# Patient Record
Sex: MALE | Race: BLACK OR AFRICAN AMERICAN | Employment: OTHER | URBAN - METROPOLITAN AREA
[De-identification: names, ages, dates, MRNs, and addresses within clinical notes are randomized per-mention and may not be internally consistent; named-entity substitution may affect disease eponyms.]

---

## 2017-10-17 PROBLEM — I10 HTN (HYPERTENSION): Status: ACTIVE | Noted: 2017-10-17

## 2017-10-17 PROBLEM — S72.009A FEMORAL NECK FRACTURE (HCC): Status: ACTIVE | Noted: 2017-10-17

## 2017-10-20 ENCOUNTER — CARE COORDINATION (OUTPATIENT)
Dept: CASE MANAGEMENT | Age: 72
End: 2017-10-20

## 2017-10-21 NOTE — CARE COORDINATION
Spoke with Patient's niece who said that Chyna Castellon was still asleep    Incision status: Niece said the home care came yesterday and checked it and said it looked fine    Edema/Swelling: Niece thinks about the same said they home care looked at it yesterday and said everything was fine.      Pain level and status: some pain per niece     Use of pain medications: yes     [address] active: Niece said the home care came yesterday and everything is set up     Outpatient therapy: N/A    Do you have all of your medications: yes    Changes in medications: No    Follow up appointments:    Future Appointments  Date Time Provider Alejandra Fajardo   11/3/2017 4:00 PM Satish Skinner MD [facility]       Daphney Fabry RN, BSN   Care Transition Coordinator  [phone]

## 2017-10-27 ENCOUNTER — CARE COORDINATION (OUTPATIENT)
Dept: CASE MANAGEMENT | Age: 72
End: 2017-10-27

## 2017-10-27 NOTE — CARE COORDINATION
Spoke with Jorge Maurer    Incision status: healing    Edema/Swelling: mild to moderate    Patient states he is at his outpatient therapy and doing well, pain is controlled , no needs identified    Follow up appointments:    Future Appointments  Date Time Provider Alejandra Tana   11/3/2017 4:00 PM Erika Maharaj MD [facility] Mederos MSN, MA, RN  Care Transition Coordinator  [phone]

## 2017-11-03 ENCOUNTER — OFFICE VISIT (OUTPATIENT)
Dept: ORTHOPEDIC SURGERY | Age: 72
End: 2017-11-03

## 2017-11-03 VITALS
WEIGHT: 115 LBS | HEART RATE: 64 BPM | DIASTOLIC BLOOD PRESSURE: 76 MMHG | BODY MASS INDEX: 20.38 KG/M2 | SYSTOLIC BLOOD PRESSURE: 125 MMHG | HEIGHT: 63 IN

## 2017-11-03 DIAGNOSIS — Z96.649 S/P HIP HEMIARTHROPLASTY: Primary | ICD-10-CM

## 2017-11-03 PROCEDURE — 73502 X-RAY EXAM HIP UNI 2-3 VIEWS: CPT | Performed by: ORTHOPAEDIC SURGERY

## 2017-11-03 PROCEDURE — 99024 POSTOP FOLLOW-UP VISIT: CPT | Performed by: ORTHOPAEDIC SURGERY

## 2017-11-03 RX ORDER — HYDROCODONE BITARTRATE AND ACETAMINOPHEN 5; 325 MG/1; MG/1
1 TABLET ORAL EVERY 8 HOURS PRN
Qty: 40 TABLET | Refills: 0 | Status: SHIPPED | OUTPATIENT
Start: 2017-11-03 | End: 2017-11-10

## 2017-11-03 RX ORDER — FUROSEMIDE 20 MG/1
20 TABLET ORAL 2 TIMES DAILY
Qty: 8 TABLET | Refills: 0 | Status: ON HOLD | OUTPATIENT
Start: 2017-11-03 | End: 2019-01-18

## 2017-11-03 NOTE — LETTER
[address] [address]  Phone: [phone]  Fax: [phone]    Dillon Cowart MD        November 3, 2017    Cecil Carton  [address] [place] [address] [address] [place] [address]      Order for manual wheel chair with leg rests    Dx: right hip fracture      If you have any questions or concerns, please don't hesitate to call.     Sincerely,              Dillon Cowart MD

## 2017-11-09 ENCOUNTER — TELEPHONE (OUTPATIENT)
Dept: ORTHOPEDIC SURGERY | Age: 72
End: 2017-11-09

## 2017-11-09 ENCOUNTER — CARE COORDINATION (OUTPATIENT)
Dept: CASE MANAGEMENT | Age: 72
End: 2017-11-09

## 2017-11-09 NOTE — CARE COORDINATION
Spoke with Kassandra Jony    Incision status: healing nicely     Edema/Swellingno    Pain level and status: on occasion     Use of pain medications: as needed, not too much    Bowels: good    Home Care Agency active: [facility]; 2x/week    Outpatient therapy: n/a    Do you have all of your medications: yes    Changes in medications: no    Doing well, had therapy today. next f/u listed below.  Agreed to more [address] f/u calls    Follow up appointments:    Future Appointments  Date Time Provider Alejandra Tana   11/28/2017 4:00 PM Misty Brown MD [facility]

## 2017-11-09 NOTE — TELEPHONE ENCOUNTER
[facility] home care calling for extended home care. He would like it for twice a week for 2 weeks.

## 2017-11-09 NOTE — TELEPHONE ENCOUNTER
[facility] home care calling for extended home care. He would like it for twice a week for 2 weeks.     Pt has 3 charts, XIN

## 2017-11-13 NOTE — PROGRESS NOTES
Encounter   Procedures    XR HIP 2-3 VW W PELVIS RIGHT       Plan:  He'll continue with his physical therapy and work on regaining his strength. He'll continue using adaptive devices such as a walker for stability. I did give him a short course of 20 mg Lasix to take over the weekend to see if this will help with swelling. His last potassium check was above 4. He will follow back with his primary care physician after the weekend and if he still having issues with swelling he may need further labs and medical workup. As far as his hip is concerned I'll see him back in 3 weeks to check his progress. He understands and accepts this course of care as does his daughter who accompanies him here today.

## 2017-11-22 ENCOUNTER — TELEPHONE (OUTPATIENT)
Dept: ORTHOPEDIC SURGERY | Age: 72
End: 2017-11-22

## 2017-11-28 ENCOUNTER — OFFICE VISIT (OUTPATIENT)
Dept: ORTHOPEDIC SURGERY | Age: 72
End: 2017-11-28

## 2017-11-28 VITALS — BODY MASS INDEX: 20.38 KG/M2 | WEIGHT: 115 LBS | HEIGHT: 63 IN

## 2017-11-28 DIAGNOSIS — Z96.649 S/P HIP HEMIARTHROPLASTY: Primary | ICD-10-CM

## 2017-11-28 PROCEDURE — 99024 POSTOP FOLLOW-UP VISIT: CPT | Performed by: ORTHOPAEDIC SURGERY

## 2017-12-01 ENCOUNTER — CARE COORDINATION (OUTPATIENT)
Dept: CASE MANAGEMENT | Age: 72
End: 2017-12-01

## 2017-12-01 NOTE — CARE COORDINATION
Spoke with Arnaldo Brian    Incision status: healed    Edema/Swellingno    Pain level and status: on occasion    Use of pain medications: no    Bowels: good    Home Care Agency active: [facility]; 1x/week for 2 more weeks starting the week of 12/4    Outpatient therapy: n/a    Do you have all of your medications: yes    Changes in medications: no    Pt states doing well, no issues or concerns.  Agreed to more [address] f/u calls      Follow up appointments:    Future Appointments  Date Time Provider Alejandra Tana   12/29/2017 4:00 PM Ramiro Purdy MD [facility]

## 2017-12-11 NOTE — PROGRESS NOTES
Michael Tejeda  [id number]  Encounter Date: 11/28/2017  YOB: 1945    Chief Complaint   Patient presents with    Post-Op Check     Right bipolar hip hemiarthroplasty; DOS 10/16/17. History:Mr. Michael Tejeda is here in follow up regarding his right hip hemiarthroplasty for femoral neck fracture. He still reports some moderate groin pain that increases with activities. He is now 6 weeks postop. He actually tripped and fell in the entryway to her building coming into the office today. Continues to use his walker to assist with ambulation. He denies numbness or tingling that radiates down the leg. He is working with physical therapy. Exam:  Ht 5' 3\" (1.6 m)   Wt 115 lb (52.2 kg)   BMI 20.37 kg/m²   General: Alert and oriented x3, No acute distress, Cooperative and conversant  right hip: Incision area remains well-healed. He tolerates logroll the hip today and can actively flex the hip to about 95°. There is some mild pain with resisted hip flexion and abduction in the groin. He can hold a seated knee extension. Calf is soft with negative Homans sign. No significant pedal edema. Capillary refill less than 2 seconds. No signs / symptoms of DVT / PE or infection    X-rays:  Based on the fact that he was able to continue ambulating after his fall he declined x-rays today. Assessment:  1. S/P hip hemiarthroplasty  External Referral To Physical Therapy       Orders  Orders Placed This Encounter   Procedures    External Referral To Physical Therapy       Plan:  Have him continue working with physical therapy and we'll go ahead and make a referral for continued outpatient PT. Needs to work on strengthening and balance. He can continue using his walker and transition to a cane as his strength improves. I will see him back in 4 weeks' time to check progress with repeat x-rays AP pelvis and 2 views right hip. He understands and accepts this course of care.

## 2017-12-29 ENCOUNTER — OFFICE VISIT (OUTPATIENT)
Dept: ORTHOPEDIC SURGERY | Age: 72
End: 2017-12-29

## 2017-12-29 VITALS
DIASTOLIC BLOOD PRESSURE: 82 MMHG | SYSTOLIC BLOOD PRESSURE: 126 MMHG | WEIGHT: 115 LBS | HEART RATE: 70 BPM | BODY MASS INDEX: 20.38 KG/M2 | HEIGHT: 63 IN

## 2017-12-29 DIAGNOSIS — Z96.649 S/P HIP HEMIARTHROPLASTY: Primary | ICD-10-CM

## 2017-12-29 PROCEDURE — 99024 POSTOP FOLLOW-UP VISIT: CPT | Performed by: ORTHOPAEDIC SURGERY

## 2017-12-29 PROCEDURE — 73502 X-RAY EXAM HIP UNI 2-3 VIEWS: CPT | Performed by: ORTHOPAEDIC SURGERY

## 2018-01-15 ENCOUNTER — CARE COORDINATION (OUTPATIENT)
Dept: CASE MANAGEMENT | Age: 73
End: 2018-01-15

## 2018-08-17 LAB
AVERAGE GLUCOSE: NORMAL
HBA1C MFR BLD: 14 %

## 2018-09-12 ENCOUNTER — HOSPITAL ENCOUNTER (EMERGENCY)
Age: 73
Discharge: NURSING, REHAB OR LONG-TERM CARE | End: 2018-09-12
Attending: EMERGENCY MEDICINE
Payer: MEDICARE

## 2018-09-12 VITALS
HEART RATE: 89 BPM | OXYGEN SATURATION: 95 % | WEIGHT: 139 LBS | DIASTOLIC BLOOD PRESSURE: 82 MMHG | SYSTOLIC BLOOD PRESSURE: 152 MMHG | TEMPERATURE: 98.2 F | RESPIRATION RATE: 20 BRPM | BODY MASS INDEX: 24.62 KG/M2

## 2018-09-12 DIAGNOSIS — E16.2 HYPOGLYCEMIA: Primary | ICD-10-CM

## 2018-09-12 LAB
ALBUMIN SERPL-MCNC: 3.2 G/DL (ref 3.4–5)
ALP BLD-CCNC: 90 U/L (ref 40–129)
ALT SERPL-CCNC: 13 U/L (ref 10–40)
ANION GAP SERPL CALCULATED.3IONS-SCNC: 11 MMOL/L (ref 3–16)
AST SERPL-CCNC: 23 U/L (ref 15–37)
BASOPHILS ABSOLUTE: 0 K/UL (ref 0–0.2)
BASOPHILS RELATIVE PERCENT: 0.3 %
BILIRUB SERPL-MCNC: <0.2 MG/DL (ref 0–1)
BILIRUBIN DIRECT: <0.2 MG/DL (ref 0–0.3)
BILIRUBIN URINE: NEGATIVE
BILIRUBIN, INDIRECT: ABNORMAL MG/DL (ref 0–1)
BLOOD, URINE: NEGATIVE
BUN BLDV-MCNC: 9 MG/DL (ref 7–20)
CALCIUM SERPL-MCNC: 8.8 MG/DL (ref 8.3–10.6)
CHLORIDE BLD-SCNC: 101 MMOL/L (ref 99–110)
CLARITY: CLEAR
CO2: 25 MMOL/L (ref 21–32)
COLOR: YELLOW
CREAT SERPL-MCNC: 1 MG/DL (ref 0.8–1.3)
EOSINOPHILS ABSOLUTE: 0.3 K/UL (ref 0–0.6)
EOSINOPHILS RELATIVE PERCENT: 3.8 %
EPITHELIAL CELLS, UA: ABNORMAL /HPF
GFR AFRICAN AMERICAN: >60
GFR NON-AFRICAN AMERICAN: >60
GLUCOSE BLD-MCNC: 114 MG/DL (ref 70–99)
GLUCOSE BLD-MCNC: 257 MG/DL (ref 70–99)
GLUCOSE BLD-MCNC: 360 MG/DL (ref 70–99)
GLUCOSE BLD-MCNC: 68 MG/DL (ref 70–99)
GLUCOSE BLD-MCNC: 74 MG/DL (ref 70–99)
GLUCOSE BLD-MCNC: 87 MG/DL (ref 70–99)
GLUCOSE URINE: 100 MG/DL
HCT VFR BLD CALC: 35.1 % (ref 40.5–52.5)
HEMOGLOBIN: 11.4 G/DL (ref 13.5–17.5)
KETONES, URINE: NEGATIVE MG/DL
LEUKOCYTE ESTERASE, URINE: NEGATIVE
LYMPHOCYTES ABSOLUTE: 0.6 K/UL (ref 1–5.1)
LYMPHOCYTES RELATIVE PERCENT: 8.4 %
MCH RBC QN AUTO: 30.2 PG (ref 26–34)
MCHC RBC AUTO-ENTMCNC: 32.5 G/DL (ref 31–36)
MCV RBC AUTO: 92.7 FL (ref 80–100)
MICROSCOPIC EXAMINATION: YES
MONOCYTES ABSOLUTE: 0.5 K/UL (ref 0–1.3)
MONOCYTES RELATIVE PERCENT: 6.8 %
NEUTROPHILS ABSOLUTE: 6.1 K/UL (ref 1.7–7.7)
NEUTROPHILS RELATIVE PERCENT: 80.7 %
NITRITE, URINE: NEGATIVE
PDW BLD-RTO: 14.6 % (ref 12.4–15.4)
PERFORMED ON: ABNORMAL
PERFORMED ON: NORMAL
PH UA: 8
PLATELET # BLD: 287 K/UL (ref 135–450)
PMV BLD AUTO: 8.4 FL (ref 5–10.5)
POTASSIUM SERPL-SCNC: 4.3 MMOL/L (ref 3.5–5.1)
PROTEIN UA: ABNORMAL MG/DL
RBC # BLD: 3.78 M/UL (ref 4.2–5.9)
RBC UA: ABNORMAL /HPF (ref 0–2)
SODIUM BLD-SCNC: 137 MMOL/L (ref 136–145)
SPECIFIC GRAVITY UA: 1.02
TOTAL PROTEIN: 6.6 G/DL (ref 6.4–8.2)
UROBILINOGEN, URINE: 0.2 E.U./DL
WBC # BLD: 7.6 K/UL (ref 4–11)
WBC UA: ABNORMAL /HPF (ref 0–5)

## 2018-09-12 PROCEDURE — 81001 URINALYSIS AUTO W/SCOPE: CPT

## 2018-09-12 PROCEDURE — 85025 COMPLETE CBC W/AUTO DIFF WBC: CPT

## 2018-09-12 PROCEDURE — 80076 HEPATIC FUNCTION PANEL: CPT

## 2018-09-12 PROCEDURE — 99285 EMERGENCY DEPT VISIT HI MDM: CPT

## 2018-09-12 PROCEDURE — 80048 BASIC METABOLIC PNL TOTAL CA: CPT

## 2018-09-12 ASSESSMENT — ENCOUNTER SYMPTOMS
CONSTIPATION: 0
CHEST TIGHTNESS: 0
SHORTNESS OF BREATH: 0
NAUSEA: 0
PHOTOPHOBIA: 0
COLOR CHANGE: 0
BLOOD IN STOOL: 0
DIARRHEA: 0
SORE THROAT: 0
WHEEZING: 0
VOMITING: 0
ABDOMINAL PAIN: 0
RHINORRHEA: 0
COUGH: 0

## 2018-09-12 NOTE — ED NOTES
FSBS 76 Md aware, per Md give pt orange juice and recheck fsbs, pt given 8 oz of OJ, pt does not have any symptoms of hypoglicemia     Noy Darling, RN  09/12/18 [address] Herbie, RN  09/12/18 [address], RN  09/12/18 1942

## 2018-09-12 NOTE — ED PROVIDER NOTES
color change and pallor. Neurological: Positive for headaches (reports mild pain from fall yesterday on anterior aspect of forehead). Negative for dizziness, speech difficulty, weakness, light-headedness and numbness. +LOC       Past Medical, Surgical, Family, and Social History     He has a past medical history of CAD (coronary artery disease); Diabetes mellitus ([facility] Diabetes mellitus type II, uncontrolled ([facility] D[facility]diabetic k[facility]acidoses) [facility] Hyperlipidemia; Hypertension; PEA (Pulseless electrical activity) ([facility] Pneumonia; Protein-calorie malnutrition, severe ([facility] and Systolic CHF ([facility] He has no past surgical history on file. His family history is not on file. He reports that he has never smoked. He has never used smokeless tobacco. He reports that he does not drink alcohol or use drugs. Medications     Previous Medications    AMLODIPINE BESYLATE PO    Take 5 mg by mouth daily Pt / family member unsure of dosage     ASPIRIN 81 MG EC TABLET    Take 81 mg by mouth daily     ATORVASTATIN (LIPITOR) 20 MG TABLET    TAKE ONE TABLET BY MOUTH EVERY NIGHT AT BEDTIME    CARVEDILOL (COREG) 6.25 MG TABLET    1 tablet by PEG Tube route 2 times daily (with meals)    ERGOCALCIFEROL (ERGOCALCIFEROL) 06493 UNITS CAPSULE    1 cap 2 times a week    FUROSEMIDE (LASIX) 20 MG TABLET    Take 1 tablet by mouth 2 times daily    INSULIN ASPART (NOVOLOG) 100 UNIT/ML INJECTION VIAL    Inject 6 Units into the skin 3 times daily (before meals) Check your blood sugars before each meal.  Take xtra correction insulin if needed.   IF:  -200 add 1 unit  201-250 add 2 units  251-300 add 3 units  > 301 add 4 units    INSULIN GLARGINE (TOUJEO MAX SOLOSTAR) 300 UNIT/ML INJECTION PEN    Inject 25 Units into the skin nightly    LISINOPRIL (PRINIVIL;ZESTRIL) 5 MG TABLET    Take 1 tablet by mouth every 24 hours    METFORMIN (GLUCOPHAGE) 500 MG TABLET    Take 500 mg by mouth 2 times daily (with meals)    NICOTINE (NICODERM 0.3 0.0 - 0.6 K/uL    Basophils # 0.0 0.0 - 0.2 K/uL   Basic Metabolic Panel (EP - 1)   Result Value Ref Range    Sodium 137 136 - 145 mmol/L    Potassium 4.3 3.5 - 5.1 mmol/L    Chloride 101 99 - 110 mmol/L    CO2 25 21 - 32 mmol/L    Anion Gap 11 3 - 16    Glucose 87 70 - 99 mg/dL    BUN 9 7 - 20 mg/dL    CREATININE 1.0 0.8 - 1.3 mg/dL    GFR Non-African American >60 >60    GFR African American >60 >60    Calcium 8.8 8.3 - 10.6 mg/dL   Hepatic function panel   Result Value Ref Range    Total Protein 6.6 6.4 - 8.2 g/dL    Alb 3.2 (L) 3.4 - 5.0 g/dL    Alkaline Phosphatase 90 40 - 129 U/L    ALT 13 10 - 40 U/L    AST 23 15 - 37 U/L    Total Bilirubin <0.2 0.0 - 1.0 mg/dL    Bilirubin, Direct <0.2 0.0 - 0.3 mg/dL    Bilirubin, Indirect see below 0.0 - 1.0 mg/dL   Urinalysis with microscopic (Lab)   Result Value Ref Range    Color, UA Yellow Straw/Yellow    Clarity, UA Clear Clear    Glucose, Ur 100 (A) Negative mg/dL    Bilirubin Urine Negative Negative    Ketones, Urine Negative Negative mg/dL    Specific Gravity, UA 1.020 1.005 - 1.030    Blood, Urine Negative Negative    pH, UA 8.0 5.0 - 8.0    Protein, UA TRACE (A) Negative mg/dL    Urobilinogen, Urine 0.2 <2.0 E.U./dL    Nitrite, Urine Negative Negative    Leukocyte Esterase, Urine Negative Negative    Microscopic Examination YES     WBC, UA 0-2 0 - 5 /HPF    RBC, UA 0-2 0 - 2 /HPF    Epi Cells 3-5 /HPF   POCT Glucose   Result Value Ref Range    POC Glucose 114 (H) 70 - 99 mg/dl    Performed on ACCU-CHEK    POCT Glucose   Result Value Ref Range    POC Glucose 68 (L) 70 - 99 mg/dl    Performed on ACCU-CHEK    POCT Glucose   Result Value Ref Range    POC Glucose 74 70 - 99 mg/dl    Performed on ACCU-CHEK    POCT Glucose   Result Value Ref Range    POC Glucose 257 (H) 70 - 99 mg/dl    Performed on ACCU-CHEK    POCT Glucose   Result Value Ref Range    POC Glucose 360 (H) 70 - 99 mg/dl    Performed on ACCU-CHEK        RECENT VITALS:  BP: 124/72, Temp: 98.2 °F (36.8 °C), Pulse: 100, Resp: 18, SpO2: 98 %       ED Course     Nursing Notes, Past Medical Hx, Past Surgical Hx, Social Hx, Allergies, and Family Hx were reviewed. The patient was given the following medications:  No orders of the defined types were placed in this encounter. CONSULTS:  None    MEDICAL DECISION MAKING / ASSESSMENT / PLAN     Marian Paul is a 74 y.o. male PMH HFrEF (ECHO 4/17 EF 30-35%), CAD, pAfib, DM, HLD, HTN who presents after being found unconscious with BS 39. This is the second occurrence of hypoglycemia in the last 2 days. Patient reports he was eating and drinking well today. Was given 8 units Novolog today at 1800 but does not believe he took any more than that. Patient denies any prodromal symptoms of hypoglycemia prior to becoming unconscious. Neurologically intact. Non-toxic appearing. Will feed patient and check CBC, BMP, LFTs and repeat glucose measurements. Reassessment 2010  Repeat accu-check at 68, followed by 74. UA without infection, glucose of 100, trace protein. Hemoglobin 11.4, stable from prior. Reassessment 2049  LFTs WNL. Electrolytes WNL. Repeat BS after eating 257. Reassessment 2213  Repeat blood sugar 360. Did discuss with [facility] who report patient received 6 units of insulin at breakfast, 6 at lunch and another 2 units following that. Will plan to discharge patient as no further episodes of hypoglycemia while here. Recommend patient's insulin regimen be decreased. Patient to follow closely with PCP. This patient was also evaluated by the attending physician. All care plans were discussed and agreed upon. Clinical Impression     1.  Hypoglycemia        Disposition     PATIENT REFERRED TO:  Janet Mckeon MD  [address] [address]  [phone]    Schedule an appointment as soon as possible for a visit in 3 days        DISCHARGE MEDICATIONS:  New Prescriptions    No medications on file       DISPOSITION Decision

## 2018-09-13 NOTE — ED NOTES
Report called to Beige at [facility]. All questions and concerns addressed. Nurse instructed to have Md there adjust insuline dosage.       Rajeev Portillo RN  09/12/18 [id number]

## 2018-10-29 ENCOUNTER — TELEPHONE (OUTPATIENT)
Dept: SURGERY | Age: 73
End: 2018-10-29

## 2018-12-13 ENCOUNTER — ANESTHESIA EVENT (OUTPATIENT)
Dept: ENDOSCOPY | Age: 73
End: 2018-12-13
Payer: MEDICARE

## 2018-12-13 RX ORDER — FERROUS SULFATE 325(65) MG
325 TABLET ORAL
Status: ON HOLD | COMMUNITY
End: 2019-05-24 | Stop reason: HOSPADM

## 2018-12-14 ENCOUNTER — HOSPITAL ENCOUNTER (OUTPATIENT)
Age: 73
Setting detail: OUTPATIENT SURGERY
Discharge: HOME | End: 2018-12-14
Attending: INTERNAL MEDICINE | Admitting: INTERNAL MEDICINE
Payer: MEDICARE

## 2018-12-14 ENCOUNTER — ANESTHESIA (OUTPATIENT)
Dept: ENDOSCOPY | Age: 73
End: 2018-12-14
Payer: MEDICARE

## 2018-12-14 VITALS
RESPIRATION RATE: 15 BRPM | DIASTOLIC BLOOD PRESSURE: 72 MMHG | OXYGEN SATURATION: 100 % | SYSTOLIC BLOOD PRESSURE: 149 MMHG

## 2018-12-14 VITALS
TEMPERATURE: 97 F | WEIGHT: 120 LBS | HEIGHT: 64 IN | DIASTOLIC BLOOD PRESSURE: 87 MMHG | RESPIRATION RATE: 16 BRPM | BODY MASS INDEX: 20.49 KG/M2 | HEART RATE: 87 BPM | SYSTOLIC BLOOD PRESSURE: 156 MMHG | OXYGEN SATURATION: 98 %

## 2018-12-14 LAB
GLUCOSE BLD-MCNC: 211 MG/DL (ref 70–99)
GLUCOSE BLD-MCNC: 225 MG/DL (ref 70–99)
PERFORMED ON: ABNORMAL
PERFORMED ON: ABNORMAL

## 2018-12-14 PROCEDURE — 43760 HC CHANGE OF GASTROSTOMY TUBE: CPT | Performed by: INTERNAL MEDICINE

## 2018-12-14 PROCEDURE — 7100000001 HC PACU RECOVERY - ADDTL 15 MIN: Performed by: INTERNAL MEDICINE

## 2018-12-14 PROCEDURE — 2580000003 HC RX 258

## 2018-12-14 PROCEDURE — 94761 N-INVAS EAR/PLS OXIMETRY MLT: CPT

## 2018-12-14 PROCEDURE — 94640 AIRWAY INHALATION TREATMENT: CPT

## 2018-12-14 PROCEDURE — 99213 OFFICE O/P EST LOW 20 MIN: CPT

## 2018-12-14 PROCEDURE — 6370000000 HC RX 637 (ALT 250 FOR IP): Performed by: ANESTHESIOLOGY

## 2018-12-14 PROCEDURE — 3700000000 HC ANESTHESIA ATTENDED CARE: Performed by: INTERNAL MEDICINE

## 2018-12-14 PROCEDURE — 2580000003 HC RX 258: Performed by: ANESTHESIOLOGY

## 2018-12-14 PROCEDURE — 7100000010 HC PHASE II RECOVERY - FIRST 15 MIN: Performed by: INTERNAL MEDICINE

## 2018-12-14 PROCEDURE — 2500000003 HC RX 250 WO HCPCS

## 2018-12-14 PROCEDURE — 7100000000 HC PACU RECOVERY - FIRST 15 MIN: Performed by: INTERNAL MEDICINE

## 2018-12-14 PROCEDURE — 94664 DEMO&/EVAL PT USE INHALER: CPT

## 2018-12-14 PROCEDURE — 6360000002 HC RX W HCPCS

## 2018-12-14 PROCEDURE — 7100000011 HC PHASE II RECOVERY - ADDTL 15 MIN: Performed by: INTERNAL MEDICINE

## 2018-12-14 RX ORDER — LIDOCAINE HYDROCHLORIDE 20 MG/ML
INJECTION, SOLUTION EPIDURAL; INFILTRATION; INTRACAUDAL; PERINEURAL PRN
Status: DISCONTINUED | OUTPATIENT
Start: 2018-12-14 | End: 2018-12-14 | Stop reason: SDUPTHER

## 2018-12-14 RX ORDER — LABETALOL HYDROCHLORIDE 5 MG/ML
5 INJECTION, SOLUTION INTRAVENOUS EVERY 10 MIN PRN
Status: DISCONTINUED | OUTPATIENT
Start: 2018-12-14 | End: 2018-12-14 | Stop reason: HOSPADM

## 2018-12-14 RX ORDER — FENTANYL CITRATE 50 UG/ML
25 INJECTION, SOLUTION INTRAMUSCULAR; INTRAVENOUS EVERY 5 MIN PRN
Status: DISCONTINUED | OUTPATIENT
Start: 2018-12-14 | End: 2018-12-14 | Stop reason: HOSPADM

## 2018-12-14 RX ORDER — PROMETHAZINE HYDROCHLORIDE 25 MG/ML
6.25 INJECTION, SOLUTION INTRAMUSCULAR; INTRAVENOUS
Status: DISCONTINUED | OUTPATIENT
Start: 2018-12-14 | End: 2018-12-14 | Stop reason: HOSPADM

## 2018-12-14 RX ORDER — SODIUM CHLORIDE 9 MG/ML
INJECTION, SOLUTION INTRAVENOUS CONTINUOUS PRN
Status: DISCONTINUED | OUTPATIENT
Start: 2018-12-14 | End: 2018-12-14 | Stop reason: SDUPTHER

## 2018-12-14 RX ORDER — SODIUM CHLORIDE 0.9 % (FLUSH) 0.9 %
10 SYRINGE (ML) INJECTION PRN
Status: DISCONTINUED | OUTPATIENT
Start: 2018-12-14 | End: 2018-12-14 | Stop reason: HOSPADM

## 2018-12-14 RX ORDER — SODIUM CHLORIDE 0.9 % (FLUSH) 0.9 %
10 SYRINGE (ML) INJECTION EVERY 12 HOURS SCHEDULED
Status: DISCONTINUED | OUTPATIENT
Start: 2018-12-14 | End: 2018-12-14 | Stop reason: HOSPADM

## 2018-12-14 RX ORDER — SODIUM CHLORIDE 9 MG/ML
INJECTION, SOLUTION INTRAVENOUS CONTINUOUS
Status: DISCONTINUED | OUTPATIENT
Start: 2018-12-14 | End: 2018-12-14 | Stop reason: HOSPADM

## 2018-12-14 RX ORDER — IPRATROPIUM BROMIDE AND ALBUTEROL SULFATE 2.5; .5 MG/3ML; MG/3ML
1 SOLUTION RESPIRATORY (INHALATION)
Status: DISCONTINUED | OUTPATIENT
Start: 2018-12-14 | End: 2018-12-14 | Stop reason: HOSPADM

## 2018-12-14 RX ORDER — PROPOFOL 10 MG/ML
INJECTION, EMULSION INTRAVENOUS PRN
Status: DISCONTINUED | OUTPATIENT
Start: 2018-12-14 | End: 2018-12-14 | Stop reason: SDUPTHER

## 2018-12-14 RX ADMIN — PROPOFOL 40 MG: 10 INJECTION, EMULSION INTRAVENOUS at 15:38

## 2018-12-14 RX ADMIN — LIDOCAINE HYDROCHLORIDE 20 MG: 20 INJECTION, SOLUTION EPIDURAL; INFILTRATION; INTRACAUDAL; PERINEURAL at 15:38

## 2018-12-14 RX ADMIN — IPRATROPIUM BROMIDE AND ALBUTEROL SULFATE 1 AMPULE: .5; 3 SOLUTION RESPIRATORY (INHALATION) at 15:25

## 2018-12-14 RX ADMIN — SODIUM CHLORIDE: 9 INJECTION, SOLUTION INTRAVENOUS at 15:24

## 2018-12-14 ASSESSMENT — PAIN SCALES - GENERAL
PAINLEVEL_OUTOF10: 0

## 2018-12-14 ASSESSMENT — PULMONARY FUNCTION TESTS
PIF_VALUE: 0

## 2018-12-14 ASSESSMENT — PAIN - FUNCTIONAL ASSESSMENT: PAIN_FUNCTIONAL_ASSESSMENT: 0-10

## 2018-12-14 ASSESSMENT — ENCOUNTER SYMPTOMS: SHORTNESS OF BREATH: 1

## 2018-12-14 NOTE — PROGRESS NOTES
[facility] called for transportation. They said it would be 60-90 minutes til they could be here. [facility] called and report about patient given to his nurse. Daughter at bedside. Verbalizes understanding of discharge instructions. Tolerated sitting up and po fluids well.
Report received from [facility]. Pt is ready for discharge. Waiting for [facility] transport to arrive. Family present.
Transportation arrived. Daughter walked and pt wheeled to [place].
Wheeled to bathroom and voided. Back to room. Waiting for transportation.
they will be removed, please bring a case for them. If you have a living will and a durable power of  for healthcare, please bring in a copy. As part of our patient safety program to minimize surgical site infections, we ask you to do the following:    · Please notify your surgeon if you develop any illness between         now and the  day of your surgery. · This includes a cough, cold, fever, sore throat, nausea,         or vomiting, and diarrhea, etc.  ·  Please notify your surgeon if you experience dizziness, shortness         of breath or blurred vision between now and the time of your surgery. Do not shave your operative site 96 hours prior to surgery. For face and neck surgery, men may use an electric razor 48 hours   prior to surgery. You may shower the night before surgery or the morning of   your surgery with an antibacterial soap. You will need to bring a photo ID and insurance card    [facility] has an onsite pharmacy, would you like to utilize our pharmacy     If you will be staying overnight and use a C-pap machine, please bring   your C-pap to hospital     Our goal is to provide you with excellent care, therefore, visitors will be limited to two(2) in the room at a time so that we may focus on providing this care for you. Please contact pre-admission testing if you have any further questions. [facility] phone number:  [phone] PAT fax number:  [phone]  Please note these are generalized instructions for all surgical cases, you may be provided with more specific instructions according to your surgery.

## 2019-01-17 ENCOUNTER — APPOINTMENT (OUTPATIENT)
Dept: CT IMAGING | Age: 74
DRG: 064 | End: 2019-01-17
Payer: MEDICARE

## 2019-01-17 ENCOUNTER — HOSPITAL ENCOUNTER (INPATIENT)
Age: 74
LOS: 4 days | Discharge: NURSING, REHAB OR LONG-TERM CARE | DRG: 064 | End: 2019-01-21
Attending: EMERGENCY MEDICINE | Admitting: FAMILY MEDICINE
Payer: MEDICARE

## 2019-01-17 DIAGNOSIS — R29.90 STROKE-LIKE SYMPTOMS: Primary | ICD-10-CM

## 2019-01-17 PROBLEM — I10 HTN (HYPERTENSION): Status: RESOLVED | Noted: 2017-10-17 | Resolved: 2019-01-17

## 2019-01-17 PROBLEM — S72.009A FEMORAL NECK FRACTURE (HCC): Status: RESOLVED | Noted: 2017-10-17 | Resolved: 2019-01-17

## 2019-01-17 LAB
APTT: 31.4 SEC (ref 26–36)
BASOPHILS ABSOLUTE: 0.1 K/UL (ref 0–0.2)
BASOPHILS RELATIVE PERCENT: 1.3 %
BILIRUBIN URINE: NEGATIVE
BLOOD, URINE: NEGATIVE
CALCIUM IONIZED: 1.26 MMOL/L (ref 1.12–1.32)
CLARITY: CLEAR
CO2: 26 MMOL/L (ref 21–32)
COLOR: YELLOW
EOSINOPHILS ABSOLUTE: 1.7 K/UL (ref 0–0.6)
EOSINOPHILS RELATIVE PERCENT: 23.3 %
GFR AFRICAN AMERICAN: 55
GFR NON-AFRICAN AMERICAN: 46
GLUCOSE BLD-MCNC: 167 MG/DL (ref 70–99)
GLUCOSE BLD-MCNC: 214 MG/DL (ref 70–99)
GLUCOSE URINE: NEGATIVE MG/DL
HCT VFR BLD CALC: 31.7 % (ref 40.5–52.5)
HEMOGLOBIN: 10.5 G/DL (ref 13.5–17.5)
INR BLD: 1.08 (ref 0.86–1.14)
KETONES, URINE: NEGATIVE MG/DL
LEUKOCYTE ESTERASE, URINE: NEGATIVE
LYMPHOCYTES ABSOLUTE: 0.9 K/UL (ref 1–5.1)
LYMPHOCYTES RELATIVE PERCENT: 11.9 %
MCH RBC QN AUTO: 30.8 PG (ref 26–34)
MCHC RBC AUTO-ENTMCNC: 33 G/DL (ref 31–36)
MCV RBC AUTO: 93.3 FL (ref 80–100)
MICROSCOPIC EXAMINATION: NORMAL
MONOCYTES ABSOLUTE: 0.7 K/UL (ref 0–1.3)
MONOCYTES RELATIVE PERCENT: 9.6 %
NEUTROPHILS ABSOLUTE: 4 K/UL (ref 1.7–7.7)
NEUTROPHILS RELATIVE PERCENT: 53.9 %
NITRITE, URINE: NEGATIVE
PDW BLD-RTO: 13.4 % (ref 12.4–15.4)
PERFORMED ON: ABNORMAL
PERFORMED ON: ABNORMAL
PH UA: 6
PLATELET # BLD: 217 K/UL (ref 135–450)
PMV BLD AUTO: 8.3 FL (ref 5–10.5)
POC ANION GAP: 10 (ref 10–20)
POC BUN: 17 MG/DL (ref 7–18)
POC CHLORIDE: 103 MMOL/L (ref 99–110)
POC CREATININE: 1.5 MG/DL (ref 0.8–1.3)
POC POTASSIUM: 4.9 MMOL/L (ref 3.5–5.1)
POC SAMPLE TYPE: ABNORMAL
POC SODIUM: 139 MMOL/L (ref 136–145)
PROTEIN UA: NEGATIVE MG/DL
PROTHROMBIN TIME: 12.3 SEC (ref 9.8–13)
RBC # BLD: 3.4 M/UL (ref 4.2–5.9)
SPECIFIC GRAVITY UA: 1.01
TROPONIN: 0.01 NG/ML
URINE TYPE: NORMAL
UROBILINOGEN, URINE: 0.2 E.U./DL
WBC # BLD: 7.5 K/UL (ref 4–11)

## 2019-01-17 PROCEDURE — 70498 CT ANGIOGRAPHY NECK: CPT

## 2019-01-17 PROCEDURE — 70496 CT ANGIOGRAPHY HEAD: CPT

## 2019-01-17 PROCEDURE — 81003 URINALYSIS AUTO W/O SCOPE: CPT

## 2019-01-17 PROCEDURE — 6360000004 HC RX CONTRAST MEDICATION: Performed by: EMERGENCY MEDICINE

## 2019-01-17 PROCEDURE — 2580000003 HC RX 258: Performed by: PHYSICIAN ASSISTANT

## 2019-01-17 PROCEDURE — 70450 CT HEAD/BRAIN W/O DYE: CPT

## 2019-01-17 PROCEDURE — 84484 ASSAY OF TROPONIN QUANT: CPT

## 2019-01-17 PROCEDURE — 85025 COMPLETE CBC W/AUTO DIFF WBC: CPT

## 2019-01-17 PROCEDURE — 93005 ELECTROCARDIOGRAM TRACING: CPT | Performed by: EMERGENCY MEDICINE

## 2019-01-17 PROCEDURE — 85730 THROMBOPLASTIN TIME PARTIAL: CPT

## 2019-01-17 PROCEDURE — 99285 EMERGENCY DEPT VISIT HI MDM: CPT

## 2019-01-17 PROCEDURE — 85610 PROTHROMBIN TIME: CPT

## 2019-01-17 PROCEDURE — 1200000000 HC SEMI PRIVATE

## 2019-01-17 PROCEDURE — 80047 BASIC METABLC PNL IONIZED CA: CPT

## 2019-01-17 RX ORDER — SODIUM CHLORIDE 9 MG/ML
INJECTION, SOLUTION INTRAVENOUS CONTINUOUS
Status: DISCONTINUED | OUTPATIENT
Start: 2019-01-17 | End: 2019-01-18

## 2019-01-17 RX ORDER — 0.9 % SODIUM CHLORIDE 0.9 %
500 INTRAVENOUS SOLUTION INTRAVENOUS ONCE
Status: COMPLETED | OUTPATIENT
Start: 2019-01-17 | End: 2019-01-17

## 2019-01-17 RX ADMIN — SODIUM CHLORIDE 500 ML: 9 INJECTION, SOLUTION INTRAVENOUS at 20:39

## 2019-01-17 RX ADMIN — IOPAMIDOL 80 ML: 755 INJECTION, SOLUTION INTRAVENOUS at 19:33

## 2019-01-18 ENCOUNTER — APPOINTMENT (OUTPATIENT)
Dept: MRI IMAGING | Age: 74
DRG: 064 | End: 2019-01-18
Payer: MEDICARE

## 2019-01-18 ENCOUNTER — APPOINTMENT (OUTPATIENT)
Dept: VASCULAR LAB | Age: 74
DRG: 064 | End: 2019-01-18
Payer: MEDICARE

## 2019-01-18 ENCOUNTER — APPOINTMENT (OUTPATIENT)
Dept: GENERAL RADIOLOGY | Age: 74
DRG: 064 | End: 2019-01-18
Payer: MEDICARE

## 2019-01-18 LAB
A/G RATIO: 1 (ref 1.1–2.2)
ALBUMIN SERPL-MCNC: 3.3 G/DL (ref 3.4–5)
ALP BLD-CCNC: 79 U/L (ref 40–129)
ALT SERPL-CCNC: 20 U/L (ref 10–40)
ANION GAP SERPL CALCULATED.3IONS-SCNC: 9 MMOL/L (ref 3–16)
AST SERPL-CCNC: 25 U/L (ref 15–37)
BASOPHILS ABSOLUTE: 0 K/UL (ref 0–0.2)
BASOPHILS RELATIVE PERCENT: 0.2 %
BILIRUB SERPL-MCNC: <0.2 MG/DL (ref 0–1)
BUN BLDV-MCNC: 15 MG/DL (ref 7–20)
CALCIUM SERPL-MCNC: 9 MG/DL (ref 8.3–10.6)
CHLORIDE BLD-SCNC: 107 MMOL/L (ref 99–110)
CHLORIDE URINE RANDOM: 163 MMOL/L
CHOLESTEROL, TOTAL: 91 MG/DL (ref 0–199)
CO2: 23 MMOL/L (ref 21–32)
CORTISOL TOTAL: 13.4 UG/DL
CREAT SERPL-MCNC: 1.3 MG/DL (ref 0.8–1.3)
CREATININE URINE: 45 MG/DL (ref 39–259)
EKG ATRIAL RATE: 88 BPM
EKG DIAGNOSIS: NORMAL
EKG Q-T INTERVAL: 374 MS
EKG QRS DURATION: 94 MS
EKG QTC CALCULATION (BAZETT): 455 MS
EKG R AXIS: -57 DEGREES
EKG T AXIS: 85 DEGREES
EKG VENTRICULAR RATE: 89 BPM
EOSINOPHILS ABSOLUTE: 0.8 K/UL (ref 0–0.6)
EOSINOPHILS RELATIVE PERCENT: 8.7 %
FOLATE: >20 NG/ML (ref 4.78–24.2)
GFR AFRICAN AMERICAN: >60
GFR NON-AFRICAN AMERICAN: 54
GLOBULIN: 3.4 G/DL
GLUCOSE BLD-MCNC: 105 MG/DL (ref 70–99)
GLUCOSE BLD-MCNC: 124 MG/DL (ref 70–99)
GLUCOSE BLD-MCNC: 145 MG/DL (ref 70–99)
GLUCOSE BLD-MCNC: 169 MG/DL (ref 70–99)
GLUCOSE BLD-MCNC: 24 MG/DL (ref 70–99)
GLUCOSE BLD-MCNC: 259 MG/DL (ref 70–99)
GLUCOSE BLD-MCNC: 37 MG/DL (ref 70–99)
GLUCOSE BLD-MCNC: 51 MG/DL (ref 70–99)
GLUCOSE BLD-MCNC: 69 MG/DL (ref 70–99)
HCT VFR BLD CALC: 32.7 % (ref 40.5–52.5)
HDLC SERPL-MCNC: 41 MG/DL (ref 40–60)
HEMOGLOBIN: 11 G/DL (ref 13.5–17.5)
LDL CHOLESTEROL CALCULATED: 36 MG/DL
LV EF: 58 %
LVEF MODALITY: NORMAL
LYMPHOCYTES ABSOLUTE: 0.6 K/UL (ref 1–5.1)
LYMPHOCYTES RELATIVE PERCENT: 7.1 %
MCH RBC QN AUTO: 31.3 PG (ref 26–34)
MCHC RBC AUTO-ENTMCNC: 33.7 G/DL (ref 31–36)
MCV RBC AUTO: 92.7 FL (ref 80–100)
MONOCYTES ABSOLUTE: 0.4 K/UL (ref 0–1.3)
MONOCYTES RELATIVE PERCENT: 5 %
NEUTROPHILS ABSOLUTE: 6.9 K/UL (ref 1.7–7.7)
NEUTROPHILS RELATIVE PERCENT: 79 %
PDW BLD-RTO: 14 % (ref 12.4–15.4)
PERFORMED ON: ABNORMAL
PLATELET # BLD: 226 K/UL (ref 135–450)
PMV BLD AUTO: 8.7 FL (ref 5–10.5)
POTASSIUM REFLEX MAGNESIUM: 5 MMOL/L (ref 3.5–5.1)
RBC # BLD: 3.53 M/UL (ref 4.2–5.9)
SEDIMENTATION RATE, ERYTHROCYTE: 63 MM/HR (ref 0–20)
SODIUM BLD-SCNC: 139 MMOL/L (ref 136–145)
SODIUM URINE: 160 MMOL/L
TOTAL CK: 369 U/L (ref 39–308)
TOTAL PROTEIN: 6.7 G/DL (ref 6.4–8.2)
TRIGL SERPL-MCNC: 69 MG/DL (ref 0–150)
UREA NITROGEN, UR: 229.3 MG/DL (ref 800–1666)
VITAMIN B-12: 309 PG/ML (ref 211–911)
VITAMIN D 25-HYDROXY: 57.3 NG/ML
VLDLC SERPL CALC-MCNC: 14 MG/DL
WBC # BLD: 8.8 K/UL (ref 4–11)

## 2019-01-18 PROCEDURE — 70551 MRI BRAIN STEM W/O DYE: CPT

## 2019-01-18 PROCEDURE — 99222 1ST HOSP IP/OBS MODERATE 55: CPT | Performed by: INTERNAL MEDICINE

## 2019-01-18 PROCEDURE — 97535 SELF CARE MNGMENT TRAINING: CPT

## 2019-01-18 PROCEDURE — 93971 EXTREMITY STUDY: CPT

## 2019-01-18 PROCEDURE — 97162 PT EVAL MOD COMPLEX 30 MIN: CPT

## 2019-01-18 PROCEDURE — 97116 GAIT TRAINING THERAPY: CPT

## 2019-01-18 PROCEDURE — 71046 X-RAY EXAM CHEST 2 VIEWS: CPT

## 2019-01-18 PROCEDURE — 92610 EVALUATE SWALLOWING FUNCTION: CPT

## 2019-01-18 PROCEDURE — 80053 COMPREHEN METABOLIC PANEL: CPT

## 2019-01-18 PROCEDURE — 82746 ASSAY OF FOLIC ACID SERUM: CPT

## 2019-01-18 PROCEDURE — 83036 HEMOGLOBIN GLYCOSYLATED A1C: CPT

## 2019-01-18 PROCEDURE — 84540 ASSAY OF URINE/UREA-N: CPT

## 2019-01-18 PROCEDURE — 6360000002 HC RX W HCPCS: Performed by: STUDENT IN AN ORGANIZED HEALTH CARE EDUCATION/TRAINING PROGRAM

## 2019-01-18 PROCEDURE — 2580000003 HC RX 258: Performed by: STUDENT IN AN ORGANIZED HEALTH CARE EDUCATION/TRAINING PROGRAM

## 2019-01-18 PROCEDURE — 82570 ASSAY OF URINE CREATININE: CPT

## 2019-01-18 PROCEDURE — 36415 COLL VENOUS BLD VENIPUNCTURE: CPT

## 2019-01-18 PROCEDURE — C8929 TTE W OR WO FOL WCON,DOPPLER: HCPCS

## 2019-01-18 PROCEDURE — 97166 OT EVAL MOD COMPLEX 45 MIN: CPT

## 2019-01-18 PROCEDURE — 85652 RBC SED RATE AUTOMATED: CPT

## 2019-01-18 PROCEDURE — 87040 BLOOD CULTURE FOR BACTERIA: CPT

## 2019-01-18 PROCEDURE — 97112 NEUROMUSCULAR REEDUCATION: CPT

## 2019-01-18 PROCEDURE — 74230 X-RAY XM SWLNG FUNCJ C+: CPT

## 2019-01-18 PROCEDURE — 82436 ASSAY OF URINE CHLORIDE: CPT

## 2019-01-18 PROCEDURE — 82306 VITAMIN D 25 HYDROXY: CPT

## 2019-01-18 PROCEDURE — G8997 SWALLOW GOAL STATUS: HCPCS

## 2019-01-18 PROCEDURE — 82550 ASSAY OF CK (CPK): CPT

## 2019-01-18 PROCEDURE — 80061 LIPID PANEL: CPT

## 2019-01-18 PROCEDURE — 97530 THERAPEUTIC ACTIVITIES: CPT

## 2019-01-18 PROCEDURE — 84300 ASSAY OF URINE SODIUM: CPT

## 2019-01-18 PROCEDURE — G8996 SWALLOW CURRENT STATUS: HCPCS

## 2019-01-18 PROCEDURE — 6370000000 HC RX 637 (ALT 250 FOR IP): Performed by: STUDENT IN AN ORGANIZED HEALTH CARE EDUCATION/TRAINING PROGRAM

## 2019-01-18 PROCEDURE — 92526 ORAL FUNCTION THERAPY: CPT

## 2019-01-18 PROCEDURE — 82607 VITAMIN B-12: CPT

## 2019-01-18 PROCEDURE — 85025 COMPLETE CBC W/AUTO DIFF WBC: CPT

## 2019-01-18 PROCEDURE — 92611 MOTION FLUOROSCOPY/SWALLOW: CPT

## 2019-01-18 PROCEDURE — 1200000000 HC SEMI PRIVATE

## 2019-01-18 PROCEDURE — 82533 TOTAL CORTISOL: CPT

## 2019-01-18 RX ORDER — DEXTROSE MONOHYDRATE 50 MG/ML
100 INJECTION, SOLUTION INTRAVENOUS PRN
Status: DISCONTINUED | OUTPATIENT
Start: 2019-01-18 | End: 2019-01-21 | Stop reason: HOSPADM

## 2019-01-18 RX ORDER — NICOTINE 21 MG/24HR
1 PATCH, TRANSDERMAL 24 HOURS TRANSDERMAL DAILY
Status: DISCONTINUED | OUTPATIENT
Start: 2019-01-18 | End: 2019-01-18

## 2019-01-18 RX ORDER — SODIUM CHLORIDE 9 MG/ML
INJECTION, SOLUTION INTRAVENOUS CONTINUOUS
Status: ACTIVE | OUTPATIENT
Start: 2019-01-18 | End: 2019-01-19

## 2019-01-18 RX ORDER — FERROUS SULFATE 325(65) MG
325 TABLET ORAL
Status: DISCONTINUED | OUTPATIENT
Start: 2019-01-18 | End: 2019-01-21 | Stop reason: HOSPADM

## 2019-01-18 RX ORDER — SODIUM CHLORIDE 0.9 % (FLUSH) 0.9 %
10 SYRINGE (ML) INJECTION PRN
Status: DISCONTINUED | OUTPATIENT
Start: 2019-01-18 | End: 2019-01-21 | Stop reason: HOSPADM

## 2019-01-18 RX ORDER — FUROSEMIDE 20 MG/1
20 TABLET ORAL 2 TIMES DAILY
COMMUNITY
End: 2019-03-26 | Stop reason: CLARIF

## 2019-01-18 RX ORDER — DEXTROSE MONOHYDRATE 25 G/50ML
12.5 INJECTION, SOLUTION INTRAVENOUS PRN
Status: DISCONTINUED | OUTPATIENT
Start: 2019-01-18 | End: 2019-01-21 | Stop reason: HOSPADM

## 2019-01-18 RX ORDER — DEXTROSE AND SODIUM CHLORIDE 5; .9 G/100ML; G/100ML
INJECTION, SOLUTION INTRAVENOUS CONTINUOUS
Status: ACTIVE | OUTPATIENT
Start: 2019-01-18 | End: 2019-01-18

## 2019-01-18 RX ORDER — SODIUM CHLORIDE 0.9 % (FLUSH) 0.9 %
10 SYRINGE (ML) INJECTION EVERY 12 HOURS SCHEDULED
Status: DISCONTINUED | OUTPATIENT
Start: 2019-01-18 | End: 2019-01-21 | Stop reason: HOSPADM

## 2019-01-18 RX ORDER — NICOTINE POLACRILEX 4 MG
15 LOZENGE BUCCAL PRN
Status: DISCONTINUED | OUTPATIENT
Start: 2019-01-18 | End: 2019-01-21 | Stop reason: HOSPADM

## 2019-01-18 RX ORDER — HEPARIN SODIUM 5000 [USP'U]/ML
5000 INJECTION, SOLUTION INTRAVENOUS; SUBCUTANEOUS EVERY 8 HOURS SCHEDULED
Status: DISCONTINUED | OUTPATIENT
Start: 2019-01-18 | End: 2019-01-21 | Stop reason: HOSPADM

## 2019-01-18 RX ORDER — ATORVASTATIN CALCIUM 40 MG/1
40 TABLET, FILM COATED ORAL NIGHTLY
Status: DISCONTINUED | OUTPATIENT
Start: 2019-01-18 | End: 2019-01-21 | Stop reason: HOSPADM

## 2019-01-18 RX ORDER — LABETALOL HYDROCHLORIDE 5 MG/ML
10 INJECTION, SOLUTION INTRAVENOUS EVERY 10 MIN PRN
Status: DISCONTINUED | OUTPATIENT
Start: 2019-01-18 | End: 2019-01-21 | Stop reason: HOSPADM

## 2019-01-18 RX ORDER — FAMOTIDINE 20 MG/1
20 TABLET, FILM COATED ORAL DAILY
Status: DISCONTINUED | OUTPATIENT
Start: 2019-01-18 | End: 2019-01-21 | Stop reason: HOSPADM

## 2019-01-18 RX ORDER — ASPIRIN 81 MG/1
81 TABLET ORAL DAILY
Status: DISCONTINUED | OUTPATIENT
Start: 2019-01-18 | End: 2019-01-21 | Stop reason: HOSPADM

## 2019-01-18 RX ORDER — ONDANSETRON 2 MG/ML
4 INJECTION INTRAMUSCULAR; INTRAVENOUS EVERY 6 HOURS PRN
Status: DISCONTINUED | OUTPATIENT
Start: 2019-01-18 | End: 2019-01-21 | Stop reason: HOSPADM

## 2019-01-18 RX ORDER — ACETAMINOPHEN 325 MG/1
650 TABLET ORAL EVERY 4 HOURS PRN
Status: DISCONTINUED | OUTPATIENT
Start: 2019-01-18 | End: 2019-01-21 | Stop reason: HOSPADM

## 2019-01-18 RX ADMIN — DEXTROSE MONOHYDRATE 100 ML/HR: 50 INJECTION, SOLUTION INTRAVENOUS at 01:58

## 2019-01-18 RX ADMIN — INSULIN LISPRO 6 UNITS: 100 INJECTION, SOLUTION INTRAVENOUS; SUBCUTANEOUS at 18:16

## 2019-01-18 RX ADMIN — DEXTROSE MONOHYDRATE 12.5 G: 500 INJECTION PARENTERAL at 22:18

## 2019-01-18 RX ADMIN — Medication 10 ML: at 22:21

## 2019-01-18 RX ADMIN — FAMOTIDINE 20 MG: 20 TABLET, FILM COATED ORAL at 13:28

## 2019-01-18 RX ADMIN — HEPARIN SODIUM 5000 UNITS: 5000 INJECTION INTRAVENOUS; SUBCUTANEOUS at 13:30

## 2019-01-18 RX ADMIN — DEXTROSE AND SODIUM CHLORIDE: 5; 900 INJECTION, SOLUTION INTRAVENOUS at 09:38

## 2019-01-18 RX ADMIN — DEXTROSE MONOHYDRATE 12.5 G: 500 INJECTION PARENTERAL at 01:41

## 2019-01-18 RX ADMIN — FERROUS SULFATE TAB 325 MG (65 MG ELEMENTAL FE) 325 MG: 325 (65 FE) TAB at 13:28

## 2019-01-18 RX ADMIN — DEXTROSE MONOHYDRATE 12.5 G: 500 INJECTION PARENTERAL at 06:53

## 2019-01-18 RX ADMIN — ASPIRIN 81 MG: 81 TABLET, COATED ORAL at 13:28

## 2019-01-18 RX ADMIN — SODIUM CHLORIDE: 9 INJECTION, SOLUTION INTRAVENOUS at 16:55

## 2019-01-18 RX ADMIN — DEXTROSE MONOHYDRATE 100 ML/HR: 50 INJECTION, SOLUTION INTRAVENOUS at 22:19

## 2019-01-18 RX ADMIN — SODIUM CHLORIDE: 9 INJECTION, SOLUTION INTRAVENOUS at 01:47

## 2019-01-18 ASSESSMENT — PAIN SCALES - GENERAL
PAINLEVEL_OUTOF10: 0

## 2019-01-18 ASSESSMENT — ENCOUNTER SYMPTOMS
SORE THROAT: 0
ABDOMINAL PAIN: 0
TROUBLE SWALLOWING: 1
COUGH: 0
WHEEZING: 0
NAUSEA: 0
CONSTIPATION: 0
SHORTNESS OF BREATH: 0
VOMITING: 0
ABDOMINAL DISTENTION: 0
DIARRHEA: 0

## 2019-01-19 PROBLEM — I63.511 ACUTE ISCHEMIC RIGHT MCA STROKE (HCC): Status: ACTIVE | Noted: 2019-01-17

## 2019-01-19 PROBLEM — I63.9 ACUTE ISCHEMIC STROKE (HCC): Status: ACTIVE | Noted: 2019-01-19

## 2019-01-19 LAB
ANION GAP SERPL CALCULATED.3IONS-SCNC: 12 MMOL/L (ref 3–16)
BASOPHILS ABSOLUTE: 0 K/UL (ref 0–0.2)
BASOPHILS RELATIVE PERCENT: 0.4 %
BUN BLDV-MCNC: 14 MG/DL (ref 7–20)
CALCIUM SERPL-MCNC: 8.4 MG/DL (ref 8.3–10.6)
CHLORIDE BLD-SCNC: 102 MMOL/L (ref 99–110)
CO2: 23 MMOL/L (ref 21–32)
CREAT SERPL-MCNC: 1.1 MG/DL (ref 0.8–1.3)
EOSINOPHILS ABSOLUTE: 0.4 K/UL (ref 0–0.6)
EOSINOPHILS RELATIVE PERCENT: 4.3 %
ESTIMATED AVERAGE GLUCOSE: 185.8 MG/DL
GFR AFRICAN AMERICAN: >60
GFR NON-AFRICAN AMERICAN: >60
GLUCOSE BLD-MCNC: 113 MG/DL (ref 70–99)
GLUCOSE BLD-MCNC: 116 MG/DL (ref 70–99)
GLUCOSE BLD-MCNC: 159 MG/DL (ref 70–99)
GLUCOSE BLD-MCNC: 205 MG/DL (ref 70–99)
GLUCOSE BLD-MCNC: 237 MG/DL (ref 70–99)
GLUCOSE BLD-MCNC: 245 MG/DL (ref 70–99)
GLUCOSE BLD-MCNC: 270 MG/DL (ref 70–99)
HBA1C MFR BLD: 8.1 %
HCT VFR BLD CALC: 32.1 % (ref 40.5–52.5)
HEMOGLOBIN: 10.8 G/DL (ref 13.5–17.5)
LYMPHOCYTES ABSOLUTE: 0.7 K/UL (ref 1–5.1)
LYMPHOCYTES RELATIVE PERCENT: 6.5 %
MAGNESIUM: 1.4 MG/DL (ref 1.8–2.4)
MCH RBC QN AUTO: 31.2 PG (ref 26–34)
MCHC RBC AUTO-ENTMCNC: 33.8 G/DL (ref 31–36)
MCV RBC AUTO: 92.3 FL (ref 80–100)
MONOCYTES ABSOLUTE: 0.5 K/UL (ref 0–1.3)
MONOCYTES RELATIVE PERCENT: 4.8 %
NEUTROPHILS ABSOLUTE: 8.7 K/UL (ref 1.7–7.7)
NEUTROPHILS RELATIVE PERCENT: 84 %
PDW BLD-RTO: 13.8 % (ref 12.4–15.4)
PERFORMED ON: ABNORMAL
PLATELET # BLD: 215 K/UL (ref 135–450)
PMV BLD AUTO: 8.6 FL (ref 5–10.5)
POTASSIUM REFLEX MAGNESIUM: 4.4 MMOL/L (ref 3.5–5.1)
RBC # BLD: 3.47 M/UL (ref 4.2–5.9)
SODIUM BLD-SCNC: 137 MMOL/L (ref 136–145)
TSH REFLEX: 2.01 UIU/ML (ref 0.27–4.2)
WBC # BLD: 10.4 K/UL (ref 4–11)

## 2019-01-19 PROCEDURE — 99233 SBSQ HOSP IP/OBS HIGH 50: CPT | Performed by: NURSE PRACTITIONER

## 2019-01-19 PROCEDURE — 36415 COLL VENOUS BLD VENIPUNCTURE: CPT

## 2019-01-19 PROCEDURE — 85025 COMPLETE CBC W/AUTO DIFF WBC: CPT

## 2019-01-19 PROCEDURE — 6370000000 HC RX 637 (ALT 250 FOR IP): Performed by: STUDENT IN AN ORGANIZED HEALTH CARE EDUCATION/TRAINING PROGRAM

## 2019-01-19 PROCEDURE — 2580000003 HC RX 258: Performed by: STUDENT IN AN ORGANIZED HEALTH CARE EDUCATION/TRAINING PROGRAM

## 2019-01-19 PROCEDURE — 80048 BASIC METABOLIC PNL TOTAL CA: CPT

## 2019-01-19 PROCEDURE — 6370000000 HC RX 637 (ALT 250 FOR IP): Performed by: INTERNAL MEDICINE

## 2019-01-19 PROCEDURE — 1200000000 HC SEMI PRIVATE

## 2019-01-19 PROCEDURE — 84443 ASSAY THYROID STIM HORMONE: CPT

## 2019-01-19 PROCEDURE — 6360000002 HC RX W HCPCS: Performed by: STUDENT IN AN ORGANIZED HEALTH CARE EDUCATION/TRAINING PROGRAM

## 2019-01-19 PROCEDURE — 83735 ASSAY OF MAGNESIUM: CPT

## 2019-01-19 RX ORDER — MAGNESIUM SULFATE IN WATER 40 MG/ML
2 INJECTION, SOLUTION INTRAVENOUS ONCE
Status: COMPLETED | OUTPATIENT
Start: 2019-01-19 | End: 2019-01-20

## 2019-01-19 RX ORDER — CLOPIDOGREL BISULFATE 75 MG/1
75 TABLET ORAL DAILY
Status: DISCONTINUED | OUTPATIENT
Start: 2019-01-19 | End: 2019-01-21 | Stop reason: HOSPADM

## 2019-01-19 RX ADMIN — HEPARIN SODIUM 5000 UNITS: 5000 INJECTION INTRAVENOUS; SUBCUTANEOUS at 01:42

## 2019-01-19 RX ADMIN — ACETAMINOPHEN 650 MG: 325 TABLET, FILM COATED ORAL at 08:20

## 2019-01-19 RX ADMIN — Medication 10 ML: at 20:38

## 2019-01-19 RX ADMIN — ASPIRIN 81 MG: 81 TABLET, COATED ORAL at 08:17

## 2019-01-19 RX ADMIN — SODIUM CHLORIDE: 9 INJECTION, SOLUTION INTRAVENOUS at 06:12

## 2019-01-19 RX ADMIN — INSULIN LISPRO 2 UNITS: 100 INJECTION, SOLUTION INTRAVENOUS; SUBCUTANEOUS at 13:05

## 2019-01-19 RX ADMIN — INSULIN LISPRO 2 UNITS: 100 INJECTION, SOLUTION INTRAVENOUS; SUBCUTANEOUS at 20:39

## 2019-01-19 RX ADMIN — ATORVASTATIN CALCIUM 40 MG: 40 TABLET, FILM COATED ORAL at 00:23

## 2019-01-19 RX ADMIN — CLOPIDOGREL BISULFATE 75 MG: 75 TABLET ORAL at 10:14

## 2019-01-19 RX ADMIN — HEPARIN SODIUM 5000 UNITS: 5000 INJECTION INTRAVENOUS; SUBCUTANEOUS at 08:17

## 2019-01-19 RX ADMIN — INSULIN LISPRO 4 UNITS: 100 INJECTION, SOLUTION INTRAVENOUS; SUBCUTANEOUS at 10:14

## 2019-01-19 RX ADMIN — ATORVASTATIN CALCIUM 40 MG: 40 TABLET, FILM COATED ORAL at 20:38

## 2019-01-19 RX ADMIN — HEPARIN SODIUM 5000 UNITS: 5000 INJECTION INTRAVENOUS; SUBCUTANEOUS at 19:42

## 2019-01-19 RX ADMIN — FERROUS SULFATE TAB 325 MG (65 MG ELEMENTAL FE) 325 MG: 325 (65 FE) TAB at 08:17

## 2019-01-19 RX ADMIN — FAMOTIDINE 20 MG: 20 TABLET, FILM COATED ORAL at 08:17

## 2019-01-19 RX ADMIN — Medication 10 ML: at 08:18

## 2019-01-19 RX ADMIN — INSULIN GLARGINE 5 UNITS: 100 INJECTION, SOLUTION SUBCUTANEOUS at 20:39

## 2019-01-19 ASSESSMENT — PAIN SCALES - GENERAL
PAINLEVEL_OUTOF10: 0

## 2019-01-19 ASSESSMENT — ENCOUNTER SYMPTOMS
ABDOMINAL PAIN: 0
VOMITING: 0
NAUSEA: 0
DIARRHEA: 0
SHORTNESS OF BREATH: 0
RHINORRHEA: 0
ABDOMINAL DISTENTION: 0
COLOR CHANGE: 0
WHEEZING: 0
SORE THROAT: 0
COUGH: 0
CHEST TIGHTNESS: 0
EYE PAIN: 0
TROUBLE SWALLOWING: 0

## 2019-01-20 LAB
ANION GAP SERPL CALCULATED.3IONS-SCNC: 7 MMOL/L (ref 3–16)
BASOPHILS ABSOLUTE: 0 K/UL (ref 0–0.2)
BASOPHILS RELATIVE PERCENT: 0.3 %
BUN BLDV-MCNC: 11 MG/DL (ref 7–20)
CALCIUM SERPL-MCNC: 8.8 MG/DL (ref 8.3–10.6)
CHLORIDE BLD-SCNC: 106 MMOL/L (ref 99–110)
CO2: 26 MMOL/L (ref 21–32)
CREAT SERPL-MCNC: 1 MG/DL (ref 0.8–1.3)
EOSINOPHILS ABSOLUTE: 1.1 K/UL (ref 0–0.6)
EOSINOPHILS RELATIVE PERCENT: 11.2 %
GFR AFRICAN AMERICAN: >60
GFR NON-AFRICAN AMERICAN: >60
GLUCOSE BLD-MCNC: 100 MG/DL (ref 70–99)
GLUCOSE BLD-MCNC: 153 MG/DL (ref 70–99)
GLUCOSE BLD-MCNC: 157 MG/DL (ref 70–99)
GLUCOSE BLD-MCNC: 257 MG/DL (ref 70–99)
GLUCOSE BLD-MCNC: 268 MG/DL (ref 70–99)
GLUCOSE BLD-MCNC: 69 MG/DL (ref 70–99)
GLUCOSE BLD-MCNC: 73 MG/DL (ref 70–99)
HCT VFR BLD CALC: 30.5 % (ref 40.5–52.5)
HEMOGLOBIN: 10.3 G/DL (ref 13.5–17.5)
LYMPHOCYTES ABSOLUTE: 0.8 K/UL (ref 1–5.1)
LYMPHOCYTES RELATIVE PERCENT: 8.3 %
MCH RBC QN AUTO: 31.5 PG (ref 26–34)
MCHC RBC AUTO-ENTMCNC: 33.9 G/DL (ref 31–36)
MCV RBC AUTO: 93 FL (ref 80–100)
MONOCYTES ABSOLUTE: 0.7 K/UL (ref 0–1.3)
MONOCYTES RELATIVE PERCENT: 7.2 %
NEUTROPHILS ABSOLUTE: 7.2 K/UL (ref 1.7–7.7)
NEUTROPHILS RELATIVE PERCENT: 73 %
PDW BLD-RTO: 13.8 % (ref 12.4–15.4)
PERFORMED ON: ABNORMAL
PLATELET # BLD: 207 K/UL (ref 135–450)
PMV BLD AUTO: 8.2 FL (ref 5–10.5)
POTASSIUM REFLEX MAGNESIUM: 4.1 MMOL/L (ref 3.5–5.1)
RBC # BLD: 3.29 M/UL (ref 4.2–5.9)
SODIUM BLD-SCNC: 139 MMOL/L (ref 136–145)
WBC # BLD: 9.9 K/UL (ref 4–11)

## 2019-01-20 PROCEDURE — 6360000002 HC RX W HCPCS: Performed by: STUDENT IN AN ORGANIZED HEALTH CARE EDUCATION/TRAINING PROGRAM

## 2019-01-20 PROCEDURE — 6370000000 HC RX 637 (ALT 250 FOR IP): Performed by: STUDENT IN AN ORGANIZED HEALTH CARE EDUCATION/TRAINING PROGRAM

## 2019-01-20 PROCEDURE — 85025 COMPLETE CBC W/AUTO DIFF WBC: CPT

## 2019-01-20 PROCEDURE — 6370000000 HC RX 637 (ALT 250 FOR IP): Performed by: INTERNAL MEDICINE

## 2019-01-20 PROCEDURE — 2580000003 HC RX 258: Performed by: STUDENT IN AN ORGANIZED HEALTH CARE EDUCATION/TRAINING PROGRAM

## 2019-01-20 PROCEDURE — 1200000000 HC SEMI PRIVATE

## 2019-01-20 PROCEDURE — 36415 COLL VENOUS BLD VENIPUNCTURE: CPT

## 2019-01-20 PROCEDURE — 99233 SBSQ HOSP IP/OBS HIGH 50: CPT | Performed by: NURSE PRACTITIONER

## 2019-01-20 PROCEDURE — 80048 BASIC METABOLIC PNL TOTAL CA: CPT

## 2019-01-20 RX ORDER — CARVEDILOL 6.25 MG/1
6.25 TABLET ORAL 2 TIMES DAILY WITH MEALS
Status: DISCONTINUED | OUTPATIENT
Start: 2019-01-20 | End: 2019-01-21 | Stop reason: HOSPADM

## 2019-01-20 RX ORDER — CARVEDILOL 6.25 MG/1
6.25 TABLET ORAL 2 TIMES DAILY WITH MEALS
Status: DISCONTINUED | OUTPATIENT
Start: 2019-01-20 | End: 2019-01-20

## 2019-01-20 RX ADMIN — CLOPIDOGREL BISULFATE 75 MG: 75 TABLET ORAL at 09:31

## 2019-01-20 RX ADMIN — INSULIN LISPRO 1 UNITS: 100 INJECTION, SOLUTION INTRAVENOUS; SUBCUTANEOUS at 13:10

## 2019-01-20 RX ADMIN — CARVEDILOL 6.25 MG: 6.25 TABLET, FILM COATED ORAL at 09:31

## 2019-01-20 RX ADMIN — HEPARIN SODIUM 5000 UNITS: 5000 INJECTION INTRAVENOUS; SUBCUTANEOUS at 01:36

## 2019-01-20 RX ADMIN — FAMOTIDINE 20 MG: 20 TABLET, FILM COATED ORAL at 09:39

## 2019-01-20 RX ADMIN — HEPARIN SODIUM 5000 UNITS: 5000 INJECTION INTRAVENOUS; SUBCUTANEOUS at 09:31

## 2019-01-20 RX ADMIN — INSULIN LISPRO 2 UNITS: 100 INJECTION, SOLUTION INTRAVENOUS; SUBCUTANEOUS at 21:23

## 2019-01-20 RX ADMIN — INSULIN LISPRO 3 UNITS: 100 INJECTION, SOLUTION INTRAVENOUS; SUBCUTANEOUS at 18:45

## 2019-01-20 RX ADMIN — FERROUS SULFATE TAB 325 MG (65 MG ELEMENTAL FE) 325 MG: 325 (65 FE) TAB at 09:31

## 2019-01-20 RX ADMIN — ASPIRIN 81 MG: 81 TABLET, COATED ORAL at 09:31

## 2019-01-20 RX ADMIN — HEPARIN SODIUM 5000 UNITS: 5000 INJECTION INTRAVENOUS; SUBCUTANEOUS at 18:46

## 2019-01-20 RX ADMIN — ATORVASTATIN CALCIUM 40 MG: 40 TABLET, FILM COATED ORAL at 21:22

## 2019-01-20 RX ADMIN — Medication 10 ML: at 09:35

## 2019-01-20 RX ADMIN — Medication 10 ML: at 21:25

## 2019-01-20 RX ADMIN — CARVEDILOL 6.25 MG: 6.25 TABLET, FILM COATED ORAL at 18:46

## 2019-01-20 RX ADMIN — INSULIN GLARGINE 5 UNITS: 100 INJECTION, SOLUTION SUBCUTANEOUS at 21:23

## 2019-01-20 RX ADMIN — MAGNESIUM SULFATE HEPTAHYDRATE 2 G: 40 INJECTION, SOLUTION INTRAVENOUS at 00:16

## 2019-01-20 ASSESSMENT — PAIN SCALES - GENERAL
PAINLEVEL_OUTOF10: 0

## 2019-01-21 ENCOUNTER — APPOINTMENT (OUTPATIENT)
Dept: CT IMAGING | Age: 74
DRG: 064 | End: 2019-01-21
Payer: MEDICARE

## 2019-01-21 VITALS
WEIGHT: 113.1 LBS | DIASTOLIC BLOOD PRESSURE: 67 MMHG | OXYGEN SATURATION: 99 % | RESPIRATION RATE: 18 BRPM | TEMPERATURE: 98 F | HEART RATE: 58 BPM | HEIGHT: 65 IN | BODY MASS INDEX: 18.84 KG/M2 | SYSTOLIC BLOOD PRESSURE: 154 MMHG

## 2019-01-21 LAB
ANION GAP SERPL CALCULATED.3IONS-SCNC: 10 MMOL/L (ref 3–16)
BASOPHILS ABSOLUTE: 0 K/UL (ref 0–0.2)
BASOPHILS RELATIVE PERCENT: 0.5 %
BUN BLDV-MCNC: 11 MG/DL (ref 7–20)
CALCIUM SERPL-MCNC: 9.5 MG/DL (ref 8.3–10.6)
CHLORIDE BLD-SCNC: 103 MMOL/L (ref 99–110)
CO2: 26 MMOL/L (ref 21–32)
CREAT SERPL-MCNC: 1.1 MG/DL (ref 0.8–1.3)
EOSINOPHILS ABSOLUTE: 1.4 K/UL (ref 0–0.6)
EOSINOPHILS RELATIVE PERCENT: 20.2 %
GFR AFRICAN AMERICAN: >60
GFR NON-AFRICAN AMERICAN: >60
GLUCOSE BLD-MCNC: 109 MG/DL (ref 70–99)
GLUCOSE BLD-MCNC: 205 MG/DL (ref 70–99)
GLUCOSE BLD-MCNC: 268 MG/DL (ref 70–99)
GLUCOSE BLD-MCNC: 65 MG/DL (ref 70–99)
GLUCOSE BLD-MCNC: 83 MG/DL (ref 70–99)
HCT VFR BLD CALC: 33.6 % (ref 40.5–52.5)
HEMOGLOBIN: 11.4 G/DL (ref 13.5–17.5)
LYMPHOCYTES ABSOLUTE: 1 K/UL (ref 1–5.1)
LYMPHOCYTES RELATIVE PERCENT: 13.7 %
MCH RBC QN AUTO: 31.3 PG (ref 26–34)
MCHC RBC AUTO-ENTMCNC: 33.8 G/DL (ref 31–36)
MCV RBC AUTO: 92.6 FL (ref 80–100)
MONOCYTES ABSOLUTE: 0.9 K/UL (ref 0–1.3)
MONOCYTES RELATIVE PERCENT: 12.7 %
NEUTROPHILS ABSOLUTE: 3.7 K/UL (ref 1.7–7.7)
NEUTROPHILS RELATIVE PERCENT: 52.9 %
PDW BLD-RTO: 14 % (ref 12.4–15.4)
PERFORMED ON: ABNORMAL
PLATELET # BLD: 228 K/UL (ref 135–450)
PMV BLD AUTO: 8.4 FL (ref 5–10.5)
POTASSIUM REFLEX MAGNESIUM: 4.2 MMOL/L (ref 3.5–5.1)
RBC # BLD: 3.63 M/UL (ref 4.2–5.9)
SODIUM BLD-SCNC: 139 MMOL/L (ref 136–145)
WBC # BLD: 7.1 K/UL (ref 4–11)

## 2019-01-21 PROCEDURE — 6370000000 HC RX 637 (ALT 250 FOR IP): Performed by: STUDENT IN AN ORGANIZED HEALTH CARE EDUCATION/TRAINING PROGRAM

## 2019-01-21 PROCEDURE — 6360000002 HC RX W HCPCS: Performed by: STUDENT IN AN ORGANIZED HEALTH CARE EDUCATION/TRAINING PROGRAM

## 2019-01-21 PROCEDURE — 97110 THERAPEUTIC EXERCISES: CPT

## 2019-01-21 PROCEDURE — 92523 SPEECH SOUND LANG COMPREHEN: CPT

## 2019-01-21 PROCEDURE — 6360000004 HC RX CONTRAST MEDICATION: Performed by: STUDENT IN AN ORGANIZED HEALTH CARE EDUCATION/TRAINING PROGRAM

## 2019-01-21 PROCEDURE — 36415 COLL VENOUS BLD VENIPUNCTURE: CPT

## 2019-01-21 PROCEDURE — 2500000003 HC RX 250 WO HCPCS: Performed by: RADIOLOGY

## 2019-01-21 PROCEDURE — 6370000000 HC RX 637 (ALT 250 FOR IP): Performed by: INTERNAL MEDICINE

## 2019-01-21 PROCEDURE — 97116 GAIT TRAINING THERAPY: CPT

## 2019-01-21 PROCEDURE — 85025 COMPLETE CBC W/AUTO DIFF WBC: CPT

## 2019-01-21 PROCEDURE — 99233 SBSQ HOSP IP/OBS HIGH 50: CPT | Performed by: NURSE PRACTITIONER

## 2019-01-21 PROCEDURE — 75574 CT ANGIO HRT W/3D IMAGE: CPT

## 2019-01-21 PROCEDURE — 2580000003 HC RX 258: Performed by: STUDENT IN AN ORGANIZED HEALTH CARE EDUCATION/TRAINING PROGRAM

## 2019-01-21 PROCEDURE — 92526 ORAL FUNCTION THERAPY: CPT

## 2019-01-21 PROCEDURE — 6370000000 HC RX 637 (ALT 250 FOR IP): Performed by: RADIOLOGY

## 2019-01-21 PROCEDURE — 80048 BASIC METABOLIC PNL TOTAL CA: CPT

## 2019-01-21 RX ORDER — LISINOPRIL 5 MG/1
5 TABLET ORAL EVERY 24 HOURS
Status: DISCONTINUED | OUTPATIENT
Start: 2019-01-21 | End: 2019-01-21 | Stop reason: HOSPADM

## 2019-01-21 RX ORDER — ATORVASTATIN CALCIUM 40 MG/1
40 TABLET, FILM COATED ORAL NIGHTLY
Qty: 30 TABLET | Refills: 3 | Status: SHIPPED | OUTPATIENT
Start: 2019-01-21 | End: 2019-03-26 | Stop reason: CLARIF

## 2019-01-21 RX ORDER — NITROGLYCERIN 0.4 MG/1
0.4 TABLET SUBLINGUAL ONCE
Status: COMPLETED | OUTPATIENT
Start: 2019-01-21 | End: 2019-01-21

## 2019-01-21 RX ORDER — METOPROLOL TARTRATE 5 MG/5ML
5 INJECTION INTRAVENOUS EVERY 5 MIN PRN
Status: DISCONTINUED | OUTPATIENT
Start: 2019-01-21 | End: 2019-01-21 | Stop reason: HOSPADM

## 2019-01-21 RX ADMIN — FERROUS SULFATE TAB 325 MG (65 MG ELEMENTAL FE) 325 MG: 325 (65 FE) TAB at 08:06

## 2019-01-21 RX ADMIN — CARVEDILOL 6.25 MG: 6.25 TABLET, FILM COATED ORAL at 08:06

## 2019-01-21 RX ADMIN — METOPROLOL TARTRATE 5 MG: 5 INJECTION, SOLUTION INTRAVENOUS at 08:45

## 2019-01-21 RX ADMIN — INSULIN LISPRO 2 UNITS: 100 INJECTION, SOLUTION INTRAVENOUS; SUBCUTANEOUS at 10:18

## 2019-01-21 RX ADMIN — NITROGLYCERIN 0.4 MG: 0.4 TABLET SUBLINGUAL at 08:45

## 2019-01-21 RX ADMIN — HEPARIN SODIUM 5000 UNITS: 5000 INJECTION INTRAVENOUS; SUBCUTANEOUS at 08:07

## 2019-01-21 RX ADMIN — INSULIN LISPRO 3 UNITS: 100 INJECTION, SOLUTION INTRAVENOUS; SUBCUTANEOUS at 12:46

## 2019-01-21 RX ADMIN — ASPIRIN 81 MG: 81 TABLET, COATED ORAL at 08:06

## 2019-01-21 RX ADMIN — HEPARIN SODIUM 5000 UNITS: 5000 INJECTION INTRAVENOUS; SUBCUTANEOUS at 01:29

## 2019-01-21 RX ADMIN — CLOPIDOGREL BISULFATE 75 MG: 75 TABLET ORAL at 08:06

## 2019-01-21 RX ADMIN — IOPAMIDOL 80 ML: 755 INJECTION, SOLUTION INTRAVENOUS at 08:38

## 2019-01-21 RX ADMIN — FAMOTIDINE 20 MG: 20 TABLET, FILM COATED ORAL at 08:06

## 2019-01-21 RX ADMIN — Medication 10 ML: at 10:20

## 2019-01-21 ASSESSMENT — PAIN SCALES - GENERAL
PAINLEVEL_OUTOF10: 0
PAINLEVEL_OUTOF10: 0

## 2019-01-23 LAB
BLOOD CULTURE, ROUTINE: NORMAL
CULTURE, BLOOD 2: NORMAL

## 2019-02-27 ENCOUNTER — HOSPITAL ENCOUNTER (INPATIENT)
Age: 74
LOS: 7 days | Discharge: NURSING, REHAB OR LONG-TERM CARE | DRG: 682 | End: 2019-03-07
Attending: EMERGENCY MEDICINE | Admitting: FAMILY MEDICINE
Payer: MEDICARE

## 2019-02-27 ENCOUNTER — APPOINTMENT (OUTPATIENT)
Dept: CT IMAGING | Age: 74
DRG: 682 | End: 2019-02-27
Payer: MEDICARE

## 2019-02-27 DIAGNOSIS — N17.9 ACUTE KIDNEY INJURY (HCC): Primary | ICD-10-CM

## 2019-02-27 LAB
ANION GAP SERPL CALCULATED.3IONS-SCNC: 18 MMOL/L (ref 3–16)
BASOPHILS ABSOLUTE: 0 K/UL (ref 0–0.2)
BASOPHILS RELATIVE PERCENT: 0.5 %
BUN BLDV-MCNC: 97 MG/DL (ref 7–20)
CALCIUM SERPL-MCNC: 9.2 MG/DL (ref 8.3–10.6)
CHLORIDE BLD-SCNC: 107 MMOL/L (ref 99–110)
CO2: 24 MMOL/L (ref 21–32)
CREAT SERPL-MCNC: 5.6 MG/DL (ref 0.8–1.3)
EOSINOPHILS ABSOLUTE: 0.2 K/UL (ref 0–0.6)
EOSINOPHILS RELATIVE PERCENT: 2.2 %
GFR AFRICAN AMERICAN: 12
GFR NON-AFRICAN AMERICAN: 10
GLUCOSE BLD-MCNC: 115 MG/DL (ref 70–99)
HCT VFR BLD CALC: 36.2 % (ref 40.5–52.5)
HEMOGLOBIN: 12 G/DL (ref 13.5–17.5)
LYMPHOCYTES ABSOLUTE: 0.8 K/UL (ref 1–5.1)
LYMPHOCYTES RELATIVE PERCENT: 9.9 %
MCH RBC QN AUTO: 30.6 PG (ref 26–34)
MCHC RBC AUTO-ENTMCNC: 33.1 G/DL (ref 31–36)
MCV RBC AUTO: 92.6 FL (ref 80–100)
MONOCYTES ABSOLUTE: 0.5 K/UL (ref 0–1.3)
MONOCYTES RELATIVE PERCENT: 6 %
NEUTROPHILS ABSOLUTE: 6.9 K/UL (ref 1.7–7.7)
NEUTROPHILS RELATIVE PERCENT: 81.4 %
PDW BLD-RTO: 14.1 % (ref 12.4–15.4)
PLATELET # BLD: 217 K/UL (ref 135–450)
PMV BLD AUTO: 10 FL (ref 5–10.5)
POTASSIUM REFLEX MAGNESIUM: 5.3 MMOL/L (ref 3.5–5.1)
RBC # BLD: 3.9 M/UL (ref 4.2–5.9)
SODIUM BLD-SCNC: 149 MMOL/L (ref 136–145)
SODIUM URINE: 64 MMOL/L
WBC # BLD: 8.4 K/UL (ref 4–11)

## 2019-02-27 PROCEDURE — 2580000003 HC RX 258: Performed by: STUDENT IN AN ORGANIZED HEALTH CARE EDUCATION/TRAINING PROGRAM

## 2019-02-27 PROCEDURE — 96361 HYDRATE IV INFUSION ADD-ON: CPT

## 2019-02-27 PROCEDURE — 96360 HYDRATION IV INFUSION INIT: CPT

## 2019-02-27 PROCEDURE — 85025 COMPLETE CBC W/AUTO DIFF WBC: CPT

## 2019-02-27 PROCEDURE — 99285 EMERGENCY DEPT VISIT HI MDM: CPT

## 2019-02-27 PROCEDURE — 93005 ELECTROCARDIOGRAM TRACING: CPT | Performed by: STUDENT IN AN ORGANIZED HEALTH CARE EDUCATION/TRAINING PROGRAM

## 2019-02-27 PROCEDURE — 84132 ASSAY OF SERUM POTASSIUM: CPT

## 2019-02-27 PROCEDURE — 81001 URINALYSIS AUTO W/SCOPE: CPT

## 2019-02-27 PROCEDURE — 84300 ASSAY OF URINE SODIUM: CPT

## 2019-02-27 PROCEDURE — 80048 BASIC METABOLIC PNL TOTAL CA: CPT

## 2019-02-27 PROCEDURE — 70490 CT SOFT TISSUE NECK W/O DYE: CPT

## 2019-02-27 RX ORDER — 0.9 % SODIUM CHLORIDE 0.9 %
1000 INTRAVENOUS SOLUTION INTRAVENOUS ONCE
Status: DISCONTINUED | OUTPATIENT
Start: 2019-02-27 | End: 2019-03-07 | Stop reason: HOSPADM

## 2019-02-27 RX ORDER — 0.9 % SODIUM CHLORIDE 0.9 %
1000 INTRAVENOUS SOLUTION INTRAVENOUS ONCE
Status: DISCONTINUED | OUTPATIENT
Start: 2019-02-27 | End: 2019-02-27

## 2019-02-27 RX ORDER — 0.9 % SODIUM CHLORIDE 0.9 %
1000 INTRAVENOUS SOLUTION INTRAVENOUS ONCE
Status: COMPLETED | OUTPATIENT
Start: 2019-02-27 | End: 2019-02-28

## 2019-02-27 RX ADMIN — SODIUM CHLORIDE 1000 ML: 9 INJECTION, SOLUTION INTRAVENOUS at 21:59

## 2019-02-27 ASSESSMENT — ENCOUNTER SYMPTOMS
SORE THROAT: 0
COUGH: 0
ABDOMINAL PAIN: 0
NAUSEA: 0
TROUBLE SWALLOWING: 1
VOMITING: 0
DIARRHEA: 0
SHORTNESS OF BREATH: 0

## 2019-02-28 ENCOUNTER — APPOINTMENT (OUTPATIENT)
Dept: GENERAL RADIOLOGY | Age: 74
DRG: 682 | End: 2019-02-28
Payer: MEDICARE

## 2019-02-28 PROBLEM — E11.65 UNCONTROLLED TYPE 2 DIABETES MELLITUS WITH HYPERGLYCEMIA, WITH LONG-TERM CURRENT USE OF INSULIN (HCC): Chronic | Status: ACTIVE | Noted: 2017-10-17

## 2019-02-28 PROBLEM — Z86.73 H/O ISCHEMIC MULTIFOCAL MULTIPLE VASCULAR TERRITORIES STROKE: Chronic | Status: ACTIVE | Noted: 2019-02-28

## 2019-02-28 PROBLEM — N17.9 AKI (ACUTE KIDNEY INJURY) (HCC): Status: ACTIVE | Noted: 2019-02-28

## 2019-02-28 PROBLEM — Z79.01 ANTICOAGULATED: Chronic | Status: ACTIVE | Noted: 2019-02-28

## 2019-02-28 PROBLEM — E43 SEVERE PROTEIN-CALORIE MALNUTRITION (HCC): Chronic | Status: ACTIVE | Noted: 2019-02-28

## 2019-02-28 PROBLEM — Z96.649 S/P HIP HEMIARTHROPLASTY: Chronic | Status: ACTIVE | Noted: 2017-12-29

## 2019-02-28 PROBLEM — Z93.1 S/P PERCUTANEOUS ENDOSCOPIC GASTROSTOMY (PEG) TUBE PLACEMENT (HCC): Status: ACTIVE | Noted: 2019-02-28

## 2019-02-28 PROBLEM — E86.0 DEHYDRATION: Status: ACTIVE | Noted: 2019-02-28

## 2019-02-28 PROBLEM — I28.0 RIGHT TO LEFT CARDIAC SHUNT (HCC): Chronic | Status: ACTIVE | Noted: 2019-02-28

## 2019-02-28 PROBLEM — Z79.4 UNCONTROLLED TYPE 2 DIABETES MELLITUS WITH HYPERGLYCEMIA, WITH LONG-TERM CURRENT USE OF INSULIN (HCC): Chronic | Status: ACTIVE | Noted: 2017-10-17

## 2019-02-28 PROBLEM — D64.9 NORMOCYTIC ANEMIA: Chronic | Status: ACTIVE | Noted: 2019-02-28

## 2019-02-28 PROBLEM — I63.511 ACUTE ISCHEMIC RIGHT MCA STROKE (HCC): Chronic | Status: ACTIVE | Noted: 2019-01-17

## 2019-02-28 LAB
ALBUMIN SERPL-MCNC: 2.6 G/DL (ref 3.4–5)
ALBUMIN SERPL-MCNC: 2.8 G/DL (ref 3.4–5)
ANION GAP SERPL CALCULATED.3IONS-SCNC: 16 MMOL/L (ref 3–16)
ANION GAP SERPL CALCULATED.3IONS-SCNC: 21 MMOL/L (ref 3–16)
ANION GAP SERPL CALCULATED.3IONS-SCNC: 21 MMOL/L (ref 3–16)
BASOPHILS ABSOLUTE: 0 K/UL (ref 0–0.2)
BASOPHILS RELATIVE PERCENT: 0.5 %
BILIRUBIN URINE: NEGATIVE
BLOOD, URINE: NEGATIVE
BUN BLDV-MCNC: 92 MG/DL (ref 7–20)
BUN BLDV-MCNC: 94 MG/DL (ref 7–20)
BUN BLDV-MCNC: 98 MG/DL (ref 7–20)
CALCIUM SERPL-MCNC: 8.5 MG/DL (ref 8.3–10.6)
CALCIUM SERPL-MCNC: 8.8 MG/DL (ref 8.3–10.6)
CALCIUM SERPL-MCNC: 9.5 MG/DL (ref 8.3–10.6)
CASTS: ABNORMAL /LPF
CHLORIDE BLD-SCNC: 107 MMOL/L (ref 99–110)
CHLORIDE BLD-SCNC: 112 MMOL/L (ref 99–110)
CHLORIDE BLD-SCNC: 112 MMOL/L (ref 99–110)
CLARITY: CLEAR
CO2: 20 MMOL/L (ref 21–32)
CO2: 21 MMOL/L (ref 21–32)
CO2: 23 MMOL/L (ref 21–32)
COLOR: YELLOW
CREAT SERPL-MCNC: 5.2 MG/DL (ref 0.8–1.3)
CREAT SERPL-MCNC: 5.4 MG/DL (ref 0.8–1.3)
CREAT SERPL-MCNC: 5.6 MG/DL (ref 0.8–1.3)
EKG ATRIAL RATE: 82 BPM
EKG DIAGNOSIS: NORMAL
EKG P AXIS: 82 DEGREES
EKG P-R INTERVAL: 140 MS
EKG Q-T INTERVAL: 390 MS
EKG QRS DURATION: 98 MS
EKG QTC CALCULATION (BAZETT): 455 MS
EKG R AXIS: -54 DEGREES
EKG T AXIS: 90 DEGREES
EKG VENTRICULAR RATE: 82 BPM
EOSINOPHILS ABSOLUTE: 0.2 K/UL (ref 0–0.6)
EOSINOPHILS RELATIVE PERCENT: 4.1 %
EPITHELIAL CELLS, UA: ABNORMAL /HPF
GFR AFRICAN AMERICAN: 12
GFR AFRICAN AMERICAN: 13
GFR AFRICAN AMERICAN: 13
GFR NON-AFRICAN AMERICAN: 10
GFR NON-AFRICAN AMERICAN: 10
GFR NON-AFRICAN AMERICAN: 11
GLUCOSE BLD-MCNC: 113 MG/DL (ref 70–99)
GLUCOSE BLD-MCNC: 118 MG/DL (ref 70–99)
GLUCOSE BLD-MCNC: 121 MG/DL (ref 70–99)
GLUCOSE BLD-MCNC: 122 MG/DL (ref 70–99)
GLUCOSE BLD-MCNC: 138 MG/DL (ref 70–99)
GLUCOSE BLD-MCNC: 161 MG/DL (ref 70–99)
GLUCOSE BLD-MCNC: 171 MG/DL (ref 70–99)
GLUCOSE URINE: NEGATIVE MG/DL
HCT VFR BLD CALC: 32.3 % (ref 40.5–52.5)
HEMOGLOBIN: 10.7 G/DL (ref 13.5–17.5)
KETONES, URINE: NEGATIVE MG/DL
LACTIC ACID: 1 MMOL/L (ref 0.4–2)
LEUKOCYTE ESTERASE, URINE: NEGATIVE
LYMPHOCYTES ABSOLUTE: 0.7 K/UL (ref 1–5.1)
LYMPHOCYTES RELATIVE PERCENT: 12.3 %
MAGNESIUM: 1.7 MG/DL (ref 1.8–2.4)
MCH RBC QN AUTO: 30.5 PG (ref 26–34)
MCHC RBC AUTO-ENTMCNC: 33 G/DL (ref 31–36)
MCV RBC AUTO: 92.3 FL (ref 80–100)
MICROSCOPIC EXAMINATION: YES
MONOCYTES ABSOLUTE: 0.5 K/UL (ref 0–1.3)
MONOCYTES RELATIVE PERCENT: 8.6 %
NEUTROPHILS ABSOLUTE: 4.3 K/UL (ref 1.7–7.7)
NEUTROPHILS RELATIVE PERCENT: 74.5 %
NITRITE, URINE: NEGATIVE
PDW BLD-RTO: 13.7 % (ref 12.4–15.4)
PERFORMED ON: ABNORMAL
PH UA: 5.5
PHOSPHORUS: 4.5 MG/DL (ref 2.5–4.9)
PHOSPHORUS: 4.5 MG/DL (ref 2.5–4.9)
PLATELET # BLD: 187 K/UL (ref 135–450)
PMV BLD AUTO: 10 FL (ref 5–10.5)
POTASSIUM SERPL-SCNC: 4.6 MMOL/L (ref 3.5–5.1)
POTASSIUM SERPL-SCNC: 4.6 MMOL/L (ref 3.5–5.1)
POTASSIUM SERPL-SCNC: 5.2 MMOL/L (ref 3.5–5.1)
PROCALCITONIN: 0.41 NG/ML (ref 0–0.15)
PROTEIN UA: ABNORMAL MG/DL
RBC # BLD: 3.51 M/UL (ref 4.2–5.9)
RBC UA: ABNORMAL /HPF (ref 0–2)
REASON FOR REJECTION: NORMAL
REJECTED TEST: NORMAL
SODIUM BLD-SCNC: 149 MMOL/L (ref 136–145)
SODIUM BLD-SCNC: 151 MMOL/L (ref 136–145)
SODIUM BLD-SCNC: 153 MMOL/L (ref 136–145)
SPECIFIC GRAVITY UA: 1.02
URINE REFLEX TO CULTURE: ABNORMAL
URINE TYPE: ABNORMAL
UROBILINOGEN, URINE: 0.2 E.U./DL
WBC # BLD: 5.8 K/UL (ref 4–11)
WBC UA: ABNORMAL /HPF (ref 0–5)

## 2019-02-28 PROCEDURE — 74230 X-RAY XM SWLNG FUNCJ C+: CPT

## 2019-02-28 PROCEDURE — 85025 COMPLETE CBC W/AUTO DIFF WBC: CPT

## 2019-02-28 PROCEDURE — 83735 ASSAY OF MAGNESIUM: CPT

## 2019-02-28 PROCEDURE — 84145 PROCALCITONIN (PCT): CPT

## 2019-02-28 PROCEDURE — 71046 X-RAY EXAM CHEST 2 VIEWS: CPT

## 2019-02-28 PROCEDURE — 92610 EVALUATE SWALLOWING FUNCTION: CPT

## 2019-02-28 PROCEDURE — 6370000000 HC RX 637 (ALT 250 FOR IP): Performed by: STUDENT IN AN ORGANIZED HEALTH CARE EDUCATION/TRAINING PROGRAM

## 2019-02-28 PROCEDURE — 6360000002 HC RX W HCPCS: Performed by: STUDENT IN AN ORGANIZED HEALTH CARE EDUCATION/TRAINING PROGRAM

## 2019-02-28 PROCEDURE — 2580000003 HC RX 258: Performed by: STUDENT IN AN ORGANIZED HEALTH CARE EDUCATION/TRAINING PROGRAM

## 2019-02-28 PROCEDURE — 92611 MOTION FLUOROSCOPY/SWALLOW: CPT

## 2019-02-28 PROCEDURE — 36415 COLL VENOUS BLD VENIPUNCTURE: CPT

## 2019-02-28 PROCEDURE — 80069 RENAL FUNCTION PANEL: CPT

## 2019-02-28 PROCEDURE — 92526 ORAL FUNCTION THERAPY: CPT

## 2019-02-28 PROCEDURE — 1200000000 HC SEMI PRIVATE

## 2019-02-28 PROCEDURE — 6360000002 HC RX W HCPCS: Performed by: FAMILY MEDICINE

## 2019-02-28 PROCEDURE — 87449 NOS EACH ORGANISM AG IA: CPT

## 2019-02-28 PROCEDURE — 83605 ASSAY OF LACTIC ACID: CPT

## 2019-02-28 RX ORDER — ASPIRIN 81 MG/1
81 TABLET ORAL DAILY
Status: DISCONTINUED | OUTPATIENT
Start: 2019-02-28 | End: 2019-03-07 | Stop reason: HOSPADM

## 2019-02-28 RX ORDER — DEXTROSE MONOHYDRATE 50 MG/ML
100 INJECTION, SOLUTION INTRAVENOUS PRN
Status: DISCONTINUED | OUTPATIENT
Start: 2019-02-28 | End: 2019-03-07 | Stop reason: HOSPADM

## 2019-02-28 RX ORDER — DEXTROSE, SODIUM CHLORIDE, SODIUM LACTATE, POTASSIUM CHLORIDE, AND CALCIUM CHLORIDE 5; .6; .31; .03; .02 G/100ML; G/100ML; G/100ML; G/100ML; G/100ML
INJECTION, SOLUTION INTRAVENOUS CONTINUOUS
Status: DISCONTINUED | OUTPATIENT
Start: 2019-02-28 | End: 2019-03-02

## 2019-02-28 RX ORDER — ONDANSETRON 2 MG/ML
4 INJECTION INTRAMUSCULAR; INTRAVENOUS EVERY 6 HOURS PRN
Status: DISCONTINUED | OUTPATIENT
Start: 2019-02-28 | End: 2019-03-07 | Stop reason: HOSPADM

## 2019-02-28 RX ORDER — DEXTROSE MONOHYDRATE 25 G/50ML
12.5 INJECTION, SOLUTION INTRAVENOUS PRN
Status: DISCONTINUED | OUTPATIENT
Start: 2019-02-28 | End: 2019-03-07 | Stop reason: HOSPADM

## 2019-02-28 RX ORDER — SODIUM CHLORIDE 0.9 % (FLUSH) 0.9 %
10 SYRINGE (ML) INJECTION PRN
Status: DISCONTINUED | OUTPATIENT
Start: 2019-02-28 | End: 2019-03-07 | Stop reason: HOSPADM

## 2019-02-28 RX ORDER — DEXTROSE AND SODIUM CHLORIDE 5; .45 G/100ML; G/100ML
INJECTION, SOLUTION INTRAVENOUS CONTINUOUS
Status: DISCONTINUED | OUTPATIENT
Start: 2019-02-28 | End: 2019-02-28

## 2019-02-28 RX ORDER — CLOPIDOGREL BISULFATE 75 MG/1
75 TABLET ORAL DAILY
Status: DISCONTINUED | OUTPATIENT
Start: 2019-02-28 | End: 2019-03-07 | Stop reason: HOSPADM

## 2019-02-28 RX ORDER — SODIUM CHLORIDE 0.9 % (FLUSH) 0.9 %
10 SYRINGE (ML) INJECTION EVERY 12 HOURS SCHEDULED
Status: DISCONTINUED | OUTPATIENT
Start: 2019-02-28 | End: 2019-03-07 | Stop reason: HOSPADM

## 2019-02-28 RX ORDER — ATORVASTATIN CALCIUM 40 MG/1
40 TABLET, FILM COATED ORAL NIGHTLY
Status: DISCONTINUED | OUTPATIENT
Start: 2019-02-28 | End: 2019-03-07 | Stop reason: HOSPADM

## 2019-02-28 RX ORDER — SODIUM CHLORIDE 9 MG/ML
INJECTION, SOLUTION INTRAVENOUS CONTINUOUS
Status: DISCONTINUED | OUTPATIENT
Start: 2019-02-28 | End: 2019-02-28

## 2019-02-28 RX ORDER — MAGNESIUM SULFATE IN WATER 40 MG/ML
2 INJECTION, SOLUTION INTRAVENOUS ONCE
Status: COMPLETED | OUTPATIENT
Start: 2019-02-28 | End: 2019-02-28

## 2019-02-28 RX ORDER — NICOTINE POLACRILEX 4 MG
15 LOZENGE BUCCAL PRN
Status: DISCONTINUED | OUTPATIENT
Start: 2019-02-28 | End: 2019-03-07 | Stop reason: HOSPADM

## 2019-02-28 RX ADMIN — DEXTROSE AND SODIUM CHLORIDE: 5; 450 INJECTION, SOLUTION INTRAVENOUS at 02:38

## 2019-02-28 RX ADMIN — MAGNESIUM SULFATE HEPTAHYDRATE 2 G: 40 INJECTION, SOLUTION INTRAVENOUS at 12:56

## 2019-02-28 RX ADMIN — INSULIN LISPRO 1 UNITS: 100 INJECTION, SOLUTION INTRAVENOUS; SUBCUTANEOUS at 17:57

## 2019-02-28 RX ADMIN — ATORVASTATIN CALCIUM 40 MG: 40 TABLET, FILM COATED ORAL at 19:50

## 2019-02-28 RX ADMIN — ASPIRIN 81 MG: 81 TABLET ORAL at 12:55

## 2019-02-28 RX ADMIN — Medication 10 ML: at 19:54

## 2019-02-28 RX ADMIN — SODIUM CHLORIDE, SODIUM LACTATE, POTASSIUM CHLORIDE, CALCIUM CHLORIDE AND DEXTROSE MONOHYDRATE: 5; 600; 310; 30; 20 INJECTION, SOLUTION INTRAVENOUS at 12:35

## 2019-02-28 RX ADMIN — INSULIN LISPRO 1 UNITS: 100 INJECTION, SOLUTION INTRAVENOUS; SUBCUTANEOUS at 20:01

## 2019-02-28 RX ADMIN — SODIUM CHLORIDE: 9 INJECTION, SOLUTION INTRAVENOUS at 05:08

## 2019-02-28 RX ADMIN — AMPICILLIN SODIUM AND SULBACTAM SODIUM 1.5 G: 1; .5 INJECTION, POWDER, FOR SOLUTION INTRAMUSCULAR; INTRAVENOUS at 05:17

## 2019-02-28 RX ADMIN — CLOPIDOGREL 75 MG: 75 TABLET, FILM COATED ORAL at 12:55

## 2019-02-28 RX ADMIN — APIXABAN 2.5 MG: 5 TABLET, FILM COATED ORAL at 12:55

## 2019-02-28 RX ADMIN — APIXABAN 2.5 MG: 5 TABLET, FILM COATED ORAL at 19:50

## 2019-02-28 ASSESSMENT — ENCOUNTER SYMPTOMS
ABDOMINAL PAIN: 0
PHOTOPHOBIA: 0
RHINORRHEA: 0
CHOKING: 0
CHEST TIGHTNESS: 0
COUGH: 1
NAUSEA: 0
SINUS PAIN: 0
DIARRHEA: 0
VOMITING: 0
SORE THROAT: 0
SHORTNESS OF BREATH: 0

## 2019-02-28 ASSESSMENT — PAIN SCALES - GENERAL
PAINLEVEL_OUTOF10: 0
PAINLEVEL_OUTOF10: 0

## 2019-03-01 PROBLEM — I65.01 VERTEBRAL ARTERY STENOSIS, RIGHT: Chronic | Status: ACTIVE | Noted: 2019-03-01

## 2019-03-01 LAB
ALBUMIN SERPL-MCNC: 3.2 G/DL (ref 3.4–5)
ANION GAP SERPL CALCULATED.3IONS-SCNC: 16 MMOL/L (ref 3–16)
BASOPHILS ABSOLUTE: 0 K/UL (ref 0–0.2)
BASOPHILS RELATIVE PERCENT: 0.5 %
BUN BLDV-MCNC: 86 MG/DL (ref 7–20)
CALCIUM SERPL-MCNC: 9.3 MG/DL (ref 8.3–10.6)
CHLORIDE BLD-SCNC: 111 MMOL/L (ref 99–110)
CO2: 23 MMOL/L (ref 21–32)
CREAT SERPL-MCNC: 5.1 MG/DL (ref 0.8–1.3)
EOSINOPHILS ABSOLUTE: 0.3 K/UL (ref 0–0.6)
EOSINOPHILS RELATIVE PERCENT: 6.1 %
GFR AFRICAN AMERICAN: 13
GFR NON-AFRICAN AMERICAN: 11
GLUCOSE BLD-MCNC: 192 MG/DL (ref 70–99)
GLUCOSE BLD-MCNC: 215 MG/DL (ref 70–99)
GLUCOSE BLD-MCNC: 227 MG/DL (ref 70–99)
GLUCOSE BLD-MCNC: 244 MG/DL (ref 70–99)
GLUCOSE BLD-MCNC: 303 MG/DL (ref 70–99)
HCT VFR BLD CALC: 33.8 % (ref 40.5–52.5)
HEMOGLOBIN: 11.2 G/DL (ref 13.5–17.5)
LYMPHOCYTES ABSOLUTE: 0.7 K/UL (ref 1–5.1)
LYMPHOCYTES RELATIVE PERCENT: 13.7 %
MAGNESIUM: 2.3 MG/DL (ref 1.8–2.4)
MCH RBC QN AUTO: 30.8 PG (ref 26–34)
MCHC RBC AUTO-ENTMCNC: 33.1 G/DL (ref 31–36)
MCV RBC AUTO: 93.3 FL (ref 80–100)
MONOCYTES ABSOLUTE: 0.4 K/UL (ref 0–1.3)
MONOCYTES RELATIVE PERCENT: 8.6 %
NEUTROPHILS ABSOLUTE: 3.7 K/UL (ref 1.7–7.7)
NEUTROPHILS RELATIVE PERCENT: 71.1 %
PDW BLD-RTO: 13.9 % (ref 12.4–15.4)
PERFORMED ON: ABNORMAL
PHOSPHORUS: 4.3 MG/DL (ref 2.5–4.9)
PLATELET # BLD: 230 K/UL (ref 135–450)
PMV BLD AUTO: 9.9 FL (ref 5–10.5)
POTASSIUM SERPL-SCNC: 4.7 MMOL/L (ref 3.5–5.1)
RBC # BLD: 3.62 M/UL (ref 4.2–5.9)
SODIUM BLD-SCNC: 150 MMOL/L (ref 136–145)
SODIUM URINE: 82 MMOL/L
WBC # BLD: 5.2 K/UL (ref 4–11)

## 2019-03-01 PROCEDURE — 80069 RENAL FUNCTION PANEL: CPT

## 2019-03-01 PROCEDURE — 87070 CULTURE OTHR SPECIMN AEROBIC: CPT

## 2019-03-01 PROCEDURE — 84300 ASSAY OF URINE SODIUM: CPT

## 2019-03-01 PROCEDURE — 2580000003 HC RX 258: Performed by: STUDENT IN AN ORGANIZED HEALTH CARE EDUCATION/TRAINING PROGRAM

## 2019-03-01 PROCEDURE — 6370000000 HC RX 637 (ALT 250 FOR IP): Performed by: STUDENT IN AN ORGANIZED HEALTH CARE EDUCATION/TRAINING PROGRAM

## 2019-03-01 PROCEDURE — 1200000000 HC SEMI PRIVATE

## 2019-03-01 PROCEDURE — 36415 COLL VENOUS BLD VENIPUNCTURE: CPT

## 2019-03-01 PROCEDURE — 87186 SC STD MICRODIL/AGAR DIL: CPT

## 2019-03-01 PROCEDURE — 87077 CULTURE AEROBIC IDENTIFY: CPT

## 2019-03-01 PROCEDURE — 85025 COMPLETE CBC W/AUTO DIFF WBC: CPT

## 2019-03-01 PROCEDURE — 83735 ASSAY OF MAGNESIUM: CPT

## 2019-03-01 PROCEDURE — 82570 ASSAY OF URINE CREATININE: CPT

## 2019-03-01 PROCEDURE — 6360000002 HC RX W HCPCS: Performed by: STUDENT IN AN ORGANIZED HEALTH CARE EDUCATION/TRAINING PROGRAM

## 2019-03-01 PROCEDURE — 87205 SMEAR GRAM STAIN: CPT

## 2019-03-01 PROCEDURE — 92526 ORAL FUNCTION THERAPY: CPT

## 2019-03-01 PROCEDURE — 87449 NOS EACH ORGANISM AG IA: CPT

## 2019-03-01 RX ADMIN — INSULIN LISPRO 1 UNITS: 100 INJECTION, SOLUTION INTRAVENOUS; SUBCUTANEOUS at 22:58

## 2019-03-01 RX ADMIN — INSULIN LISPRO 2 UNITS: 100 INJECTION, SOLUTION INTRAVENOUS; SUBCUTANEOUS at 13:08

## 2019-03-01 RX ADMIN — ATORVASTATIN CALCIUM 40 MG: 40 TABLET, FILM COATED ORAL at 20:29

## 2019-03-01 RX ADMIN — ASPIRIN 81 MG: 81 TABLET ORAL at 08:09

## 2019-03-01 RX ADMIN — INSULIN LISPRO 1 UNITS: 100 INJECTION, SOLUTION INTRAVENOUS; SUBCUTANEOUS at 08:34

## 2019-03-01 RX ADMIN — APIXABAN 2.5 MG: 5 TABLET, FILM COATED ORAL at 20:30

## 2019-03-01 RX ADMIN — CLOPIDOGREL 75 MG: 75 TABLET, FILM COATED ORAL at 08:09

## 2019-03-01 RX ADMIN — AMPICILLIN SODIUM AND SULBACTAM SODIUM 1.5 G: 1; .5 INJECTION, POWDER, FOR SOLUTION INTRAMUSCULAR; INTRAVENOUS at 04:55

## 2019-03-01 RX ADMIN — INSULIN LISPRO 4 UNITS: 100 INJECTION, SOLUTION INTRAVENOUS; SUBCUTANEOUS at 17:36

## 2019-03-01 RX ADMIN — SODIUM CHLORIDE, SODIUM LACTATE, POTASSIUM CHLORIDE, CALCIUM CHLORIDE AND DEXTROSE MONOHYDRATE: 5; 600; 310; 30; 20 INJECTION, SOLUTION INTRAVENOUS at 22:59

## 2019-03-01 RX ADMIN — APIXABAN 2.5 MG: 5 TABLET, FILM COATED ORAL at 08:09

## 2019-03-01 RX ADMIN — SODIUM CHLORIDE, SODIUM LACTATE, POTASSIUM CHLORIDE, CALCIUM CHLORIDE AND DEXTROSE MONOHYDRATE: 5; 600; 310; 30; 20 INJECTION, SOLUTION INTRAVENOUS at 13:08

## 2019-03-01 ASSESSMENT — PAIN SCALES - GENERAL
PAINLEVEL_OUTOF10: 0

## 2019-03-02 LAB
ALBUMIN SERPL-MCNC: 2.6 G/DL (ref 3.4–5)
ANION GAP SERPL CALCULATED.3IONS-SCNC: 14 MMOL/L (ref 3–16)
BASOPHILS ABSOLUTE: 0 K/UL (ref 0–0.2)
BASOPHILS RELATIVE PERCENT: 0.7 %
BUN BLDV-MCNC: 70 MG/DL (ref 7–20)
CALCIUM SERPL-MCNC: 9 MG/DL (ref 8.3–10.6)
CHLORIDE BLD-SCNC: 112 MMOL/L (ref 99–110)
CO2: 23 MMOL/L (ref 21–32)
CREAT SERPL-MCNC: 4.5 MG/DL (ref 0.8–1.3)
CREATININE URINE: 64.1 MG/DL (ref 39–259)
EOSINOPHILS ABSOLUTE: 0.3 K/UL (ref 0–0.6)
EOSINOPHILS RELATIVE PERCENT: 6.6 %
GFR AFRICAN AMERICAN: 15
GFR NON-AFRICAN AMERICAN: 13
GLUCOSE BLD-MCNC: 238 MG/DL (ref 70–99)
GLUCOSE BLD-MCNC: 251 MG/DL (ref 70–99)
GLUCOSE BLD-MCNC: 286 MG/DL (ref 70–99)
GLUCOSE BLD-MCNC: 312 MG/DL (ref 70–99)
GLUCOSE BLD-MCNC: 350 MG/DL (ref 70–99)
HCT VFR BLD CALC: 31.3 % (ref 40.5–52.5)
HEMOGLOBIN: 10.4 G/DL (ref 13.5–17.5)
L. PNEUMOPHILA SEROGP 1 UR AG: NORMAL
L. PNEUMOPHILA SEROGP 1 UR AG: NORMAL
LYMPHOCYTES ABSOLUTE: 0.8 K/UL (ref 1–5.1)
LYMPHOCYTES RELATIVE PERCENT: 17.2 %
MCH RBC QN AUTO: 30.6 PG (ref 26–34)
MCHC RBC AUTO-ENTMCNC: 33.1 G/DL (ref 31–36)
MCV RBC AUTO: 92.5 FL (ref 80–100)
MONOCYTES ABSOLUTE: 0.5 K/UL (ref 0–1.3)
MONOCYTES RELATIVE PERCENT: 11.3 %
NEUTROPHILS ABSOLUTE: 3 K/UL (ref 1.7–7.7)
NEUTROPHILS RELATIVE PERCENT: 64.2 %
PDW BLD-RTO: 13.9 % (ref 12.4–15.4)
PERFORMED ON: ABNORMAL
PHOSPHORUS: 3.3 MG/DL (ref 2.5–4.9)
PLATELET # BLD: 212 K/UL (ref 135–450)
PMV BLD AUTO: 9.3 FL (ref 5–10.5)
POTASSIUM SERPL-SCNC: 4.1 MMOL/L (ref 3.5–5.1)
RBC # BLD: 3.39 M/UL (ref 4.2–5.9)
SODIUM BLD-SCNC: 149 MMOL/L (ref 136–145)
STREP PNEUMONIAE ANTIGEN, URINE: NORMAL
STREP PNEUMONIAE ANTIGEN, URINE: NORMAL
WBC # BLD: 4.7 K/UL (ref 4–11)

## 2019-03-02 PROCEDURE — 6370000000 HC RX 637 (ALT 250 FOR IP): Performed by: INTERNAL MEDICINE

## 2019-03-02 PROCEDURE — 2580000003 HC RX 258: Performed by: STUDENT IN AN ORGANIZED HEALTH CARE EDUCATION/TRAINING PROGRAM

## 2019-03-02 PROCEDURE — 36415 COLL VENOUS BLD VENIPUNCTURE: CPT

## 2019-03-02 PROCEDURE — 1200000000 HC SEMI PRIVATE

## 2019-03-02 PROCEDURE — 2580000003 HC RX 258: Performed by: INTERNAL MEDICINE

## 2019-03-02 PROCEDURE — 6370000000 HC RX 637 (ALT 250 FOR IP): Performed by: STUDENT IN AN ORGANIZED HEALTH CARE EDUCATION/TRAINING PROGRAM

## 2019-03-02 PROCEDURE — 6360000002 HC RX W HCPCS: Performed by: STUDENT IN AN ORGANIZED HEALTH CARE EDUCATION/TRAINING PROGRAM

## 2019-03-02 PROCEDURE — 51701 INSERT BLADDER CATHETER: CPT

## 2019-03-02 PROCEDURE — 80069 RENAL FUNCTION PANEL: CPT

## 2019-03-02 PROCEDURE — 51798 US URINE CAPACITY MEASURE: CPT

## 2019-03-02 PROCEDURE — 85025 COMPLETE CBC W/AUTO DIFF WBC: CPT

## 2019-03-02 PROCEDURE — 92526 ORAL FUNCTION THERAPY: CPT

## 2019-03-02 RX ORDER — LANOLIN ALCOHOL/MO/W.PET/CERES
CREAM (GRAM) TOPICAL 2 TIMES DAILY
Status: DISCONTINUED | OUTPATIENT
Start: 2019-03-02 | End: 2019-03-07 | Stop reason: HOSPADM

## 2019-03-02 RX ORDER — TAMSULOSIN HYDROCHLORIDE 0.4 MG/1
0.4 CAPSULE ORAL DAILY
Status: DISCONTINUED | OUTPATIENT
Start: 2019-03-02 | End: 2019-03-07

## 2019-03-02 RX ORDER — SODIUM CHLORIDE, SODIUM LACTATE, POTASSIUM CHLORIDE, CALCIUM CHLORIDE 600; 310; 30; 20 MG/100ML; MG/100ML; MG/100ML; MG/100ML
INJECTION, SOLUTION INTRAVENOUS CONTINUOUS
Status: DISCONTINUED | OUTPATIENT
Start: 2019-03-02 | End: 2019-03-03

## 2019-03-02 RX ADMIN — INSULIN GLARGINE 5 UNITS: 100 INJECTION, SOLUTION SUBCUTANEOUS at 21:22

## 2019-03-02 RX ADMIN — SODIUM CHLORIDE, POTASSIUM CHLORIDE, SODIUM LACTATE AND CALCIUM CHLORIDE: 600; 310; 30; 20 INJECTION, SOLUTION INTRAVENOUS at 11:54

## 2019-03-02 RX ADMIN — INSULIN LISPRO 5 UNITS: 100 INJECTION, SOLUTION INTRAVENOUS; SUBCUTANEOUS at 21:22

## 2019-03-02 RX ADMIN — SODIUM CHLORIDE, POTASSIUM CHLORIDE, SODIUM LACTATE AND CALCIUM CHLORIDE: 600; 310; 30; 20 INJECTION, SOLUTION INTRAVENOUS at 21:12

## 2019-03-02 RX ADMIN — ATORVASTATIN CALCIUM 40 MG: 40 TABLET, FILM COATED ORAL at 21:12

## 2019-03-02 RX ADMIN — APIXABAN 2.5 MG: 5 TABLET, FILM COATED ORAL at 09:14

## 2019-03-02 RX ADMIN — INSULIN LISPRO 2 UNITS: 100 INJECTION, SOLUTION INTRAVENOUS; SUBCUTANEOUS at 09:18

## 2019-03-02 RX ADMIN — APIXABAN 2.5 MG: 5 TABLET, FILM COATED ORAL at 21:12

## 2019-03-02 RX ADMIN — INSULIN LISPRO 12 UNITS: 100 INJECTION, SOLUTION INTRAVENOUS; SUBCUTANEOUS at 12:10

## 2019-03-02 RX ADMIN — CLOPIDOGREL 75 MG: 75 TABLET, FILM COATED ORAL at 09:15

## 2019-03-02 RX ADMIN — INSULIN LISPRO 15 UNITS: 100 INJECTION, SOLUTION INTRAVENOUS; SUBCUTANEOUS at 16:53

## 2019-03-02 RX ADMIN — Medication 10 ML: at 21:12

## 2019-03-02 RX ADMIN — ASPIRIN 81 MG: 81 TABLET ORAL at 09:15

## 2019-03-02 RX ADMIN — Medication 10 ML: at 09:22

## 2019-03-02 RX ADMIN — TAMSULOSIN HYDROCHLORIDE 0.4 MG: 0.4 CAPSULE ORAL at 13:25

## 2019-03-02 RX ADMIN — Medication: at 21:25

## 2019-03-02 RX ADMIN — AMPICILLIN SODIUM AND SULBACTAM SODIUM 1.5 G: 1; .5 INJECTION, POWDER, FOR SOLUTION INTRAMUSCULAR; INTRAVENOUS at 04:50

## 2019-03-02 ASSESSMENT — PAIN SCALES - GENERAL
PAINLEVEL_OUTOF10: 0
PAINLEVEL_OUTOF10: 0

## 2019-03-03 LAB
ALBUMIN SERPL-MCNC: 2.5 G/DL (ref 3.4–5)
ANION GAP SERPL CALCULATED.3IONS-SCNC: 11 MMOL/L (ref 3–16)
BASOPHILS ABSOLUTE: 0 K/UL (ref 0–0.2)
BASOPHILS RELATIVE PERCENT: 0.5 %
BILIRUBIN URINE: NEGATIVE
BLOOD, URINE: NEGATIVE
BUN BLDV-MCNC: 56 MG/DL (ref 7–20)
CALCIUM SERPL-MCNC: 8.7 MG/DL (ref 8.3–10.6)
CHLORIDE BLD-SCNC: 118 MMOL/L (ref 99–110)
CLARITY: CLEAR
CO2: 25 MMOL/L (ref 21–32)
COLOR: YELLOW
CREAT SERPL-MCNC: 4.1 MG/DL (ref 0.8–1.3)
CULTURE, RESPIRATORY: ABNORMAL
EOSINOPHILS ABSOLUTE: 0.3 K/UL (ref 0–0.6)
EOSINOPHILS RELATIVE PERCENT: 7.1 %
EPITHELIAL CELLS, UA: NORMAL /HPF
GFR AFRICAN AMERICAN: 17
GFR NON-AFRICAN AMERICAN: 14
GLUCOSE BLD-MCNC: 115 MG/DL (ref 70–99)
GLUCOSE BLD-MCNC: 120 MG/DL (ref 70–99)
GLUCOSE BLD-MCNC: 133 MG/DL (ref 70–99)
GLUCOSE BLD-MCNC: 141 MG/DL (ref 70–99)
GLUCOSE BLD-MCNC: 94 MG/DL (ref 70–99)
GLUCOSE URINE: NEGATIVE MG/DL
GRAM STAIN RESULT: ABNORMAL
HCT VFR BLD CALC: 28.6 % (ref 40.5–52.5)
HEMOGLOBIN: 9.6 G/DL (ref 13.5–17.5)
KETONES, URINE: NEGATIVE MG/DL
LEUKOCYTE ESTERASE, URINE: ABNORMAL
LYMPHOCYTES ABSOLUTE: 0.7 K/UL (ref 1–5.1)
LYMPHOCYTES RELATIVE PERCENT: 18.3 %
MCH RBC QN AUTO: 30.9 PG (ref 26–34)
MCHC RBC AUTO-ENTMCNC: 33.5 G/DL (ref 31–36)
MCV RBC AUTO: 92.2 FL (ref 80–100)
MICROSCOPIC EXAMINATION: YES
MONOCYTES ABSOLUTE: 0.4 K/UL (ref 0–1.3)
MONOCYTES RELATIVE PERCENT: 10.7 %
NEUTROPHILS ABSOLUTE: 2.6 K/UL (ref 1.7–7.7)
NEUTROPHILS RELATIVE PERCENT: 63.4 %
NITRITE, URINE: NEGATIVE
ORGANISM: ABNORMAL
ORGANISM: ABNORMAL
PDW BLD-RTO: 14.1 % (ref 12.4–15.4)
PERFORMED ON: ABNORMAL
PH UA: 5.5 (ref 5–8)
PHOSPHORUS: 2.8 MG/DL (ref 2.5–4.9)
PLATELET # BLD: 196 K/UL (ref 135–450)
PMV BLD AUTO: 9 FL (ref 5–10.5)
POTASSIUM SERPL-SCNC: 4.2 MMOL/L (ref 3.5–5.1)
PROTEIN UA: NEGATIVE MG/DL
RBC # BLD: 3.1 M/UL (ref 4.2–5.9)
RBC UA: NORMAL /HPF (ref 0–2)
SODIUM BLD-SCNC: 154 MMOL/L (ref 136–145)
SPECIFIC GRAVITY UA: 1.01 (ref 1–1.03)
URINE TYPE: ABNORMAL
UROBILINOGEN, URINE: 0.2 E.U./DL
WBC # BLD: 4.1 K/UL (ref 4–11)
WBC UA: NORMAL /HPF (ref 0–5)

## 2019-03-03 PROCEDURE — 81001 URINALYSIS AUTO W/SCOPE: CPT

## 2019-03-03 PROCEDURE — 2580000003 HC RX 258: Performed by: INTERNAL MEDICINE

## 2019-03-03 PROCEDURE — 85025 COMPLETE CBC W/AUTO DIFF WBC: CPT

## 2019-03-03 PROCEDURE — 6370000000 HC RX 637 (ALT 250 FOR IP): Performed by: STUDENT IN AN ORGANIZED HEALTH CARE EDUCATION/TRAINING PROGRAM

## 2019-03-03 PROCEDURE — 2580000003 HC RX 258: Performed by: STUDENT IN AN ORGANIZED HEALTH CARE EDUCATION/TRAINING PROGRAM

## 2019-03-03 PROCEDURE — 1200000000 HC SEMI PRIVATE

## 2019-03-03 PROCEDURE — 92526 ORAL FUNCTION THERAPY: CPT

## 2019-03-03 PROCEDURE — 6370000000 HC RX 637 (ALT 250 FOR IP): Performed by: INTERNAL MEDICINE

## 2019-03-03 PROCEDURE — 51798 US URINE CAPACITY MEASURE: CPT

## 2019-03-03 PROCEDURE — 36415 COLL VENOUS BLD VENIPUNCTURE: CPT

## 2019-03-03 PROCEDURE — 6360000002 HC RX W HCPCS: Performed by: STUDENT IN AN ORGANIZED HEALTH CARE EDUCATION/TRAINING PROGRAM

## 2019-03-03 PROCEDURE — 80069 RENAL FUNCTION PANEL: CPT

## 2019-03-03 RX ORDER — CIPROFLOXACIN 500 MG/1
500 TABLET, FILM COATED ORAL EVERY 12 HOURS SCHEDULED
Status: DISCONTINUED | OUTPATIENT
Start: 2019-03-03 | End: 2019-03-03

## 2019-03-03 RX ORDER — CIPROFLOXACIN 250 MG/1
250 TABLET, FILM COATED ORAL EVERY 12 HOURS SCHEDULED
Status: DISCONTINUED | OUTPATIENT
Start: 2019-03-03 | End: 2019-03-07

## 2019-03-03 RX ORDER — SODIUM CHLORIDE 450 MG/100ML
INJECTION, SOLUTION INTRAVENOUS CONTINUOUS
Status: DISCONTINUED | OUTPATIENT
Start: 2019-03-03 | End: 2019-03-05

## 2019-03-03 RX ADMIN — Medication 10 ML: at 08:29

## 2019-03-03 RX ADMIN — ASPIRIN 81 MG: 81 TABLET ORAL at 09:50

## 2019-03-03 RX ADMIN — CIPROFLOXACIN HYDROCHLORIDE 250 MG: 250 TABLET, FILM COATED ORAL at 20:50

## 2019-03-03 RX ADMIN — APIXABAN 2.5 MG: 5 TABLET, FILM COATED ORAL at 09:50

## 2019-03-03 RX ADMIN — Medication: at 09:50

## 2019-03-03 RX ADMIN — Medication: at 20:56

## 2019-03-03 RX ADMIN — INSULIN LISPRO 3 UNITS: 100 INJECTION, SOLUTION INTRAVENOUS; SUBCUTANEOUS at 12:15

## 2019-03-03 RX ADMIN — AMPICILLIN SODIUM AND SULBACTAM SODIUM 1.5 G: 1; .5 INJECTION, POWDER, FOR SOLUTION INTRAMUSCULAR; INTRAVENOUS at 04:18

## 2019-03-03 RX ADMIN — INSULIN GLARGINE 5 UNITS: 100 INJECTION, SOLUTION SUBCUTANEOUS at 20:50

## 2019-03-03 RX ADMIN — SODIUM CHLORIDE: 4.5 INJECTION, SOLUTION INTRAVENOUS at 08:28

## 2019-03-03 RX ADMIN — TAMSULOSIN HYDROCHLORIDE 0.4 MG: 0.4 CAPSULE ORAL at 09:50

## 2019-03-03 RX ADMIN — CLOPIDOGREL 75 MG: 75 TABLET, FILM COATED ORAL at 09:50

## 2019-03-03 RX ADMIN — APIXABAN 2.5 MG: 5 TABLET, FILM COATED ORAL at 20:50

## 2019-03-03 RX ADMIN — ATORVASTATIN CALCIUM 40 MG: 40 TABLET, FILM COATED ORAL at 20:50

## 2019-03-03 RX ADMIN — CIPROFLOXACIN HYDROCHLORIDE 250 MG: 250 TABLET, FILM COATED ORAL at 09:50

## 2019-03-03 ASSESSMENT — PAIN SCALES - GENERAL
PAINLEVEL_OUTOF10: 0
PAINLEVEL_OUTOF10: 0

## 2019-03-04 ENCOUNTER — APPOINTMENT (OUTPATIENT)
Dept: ULTRASOUND IMAGING | Age: 74
DRG: 682 | End: 2019-03-04
Payer: MEDICARE

## 2019-03-04 LAB
ALBUMIN SERPL-MCNC: 2.3 G/DL (ref 3.4–5)
ANION GAP SERPL CALCULATED.3IONS-SCNC: 9 MMOL/L (ref 3–16)
BASOPHILS ABSOLUTE: 0 K/UL (ref 0–0.2)
BASOPHILS RELATIVE PERCENT: 0.7 %
BUN BLDV-MCNC: 48 MG/DL (ref 7–20)
CALCIUM SERPL-MCNC: 8.3 MG/DL (ref 8.3–10.6)
CHLORIDE BLD-SCNC: 114 MMOL/L (ref 99–110)
CO2: 24 MMOL/L (ref 21–32)
CREAT SERPL-MCNC: 3.6 MG/DL (ref 0.8–1.3)
EOSINOPHILS ABSOLUTE: 0.3 K/UL (ref 0–0.6)
EOSINOPHILS RELATIVE PERCENT: 8.1 %
GFR AFRICAN AMERICAN: 20
GFR NON-AFRICAN AMERICAN: 17
GLUCOSE BLD-MCNC: 100 MG/DL (ref 70–99)
GLUCOSE BLD-MCNC: 152 MG/DL (ref 70–99)
GLUCOSE BLD-MCNC: 195 MG/DL (ref 70–99)
GLUCOSE BLD-MCNC: 97 MG/DL (ref 70–99)
GLUCOSE BLD-MCNC: 98 MG/DL (ref 70–99)
HCT VFR BLD CALC: 27.8 % (ref 40.5–52.5)
HEMOGLOBIN: 9.3 G/DL (ref 13.5–17.5)
LYMPHOCYTES ABSOLUTE: 0.8 K/UL (ref 1–5.1)
LYMPHOCYTES RELATIVE PERCENT: 20 %
MCH RBC QN AUTO: 31 PG (ref 26–34)
MCHC RBC AUTO-ENTMCNC: 33.5 G/DL (ref 31–36)
MCV RBC AUTO: 92.6 FL (ref 80–100)
MONOCYTES ABSOLUTE: 0.3 K/UL (ref 0–1.3)
MONOCYTES RELATIVE PERCENT: 8.4 %
NEUTROPHILS ABSOLUTE: 2.5 K/UL (ref 1.7–7.7)
NEUTROPHILS RELATIVE PERCENT: 62.8 %
PDW BLD-RTO: 14.4 % (ref 12.4–15.4)
PERFORMED ON: ABNORMAL
PERFORMED ON: NORMAL
PHOSPHORUS: 2.8 MG/DL (ref 2.5–4.9)
PLATELET # BLD: 181 K/UL (ref 135–450)
PMV BLD AUTO: 8.9 FL (ref 5–10.5)
POTASSIUM SERPL-SCNC: 4.4 MMOL/L (ref 3.5–5.1)
RBC # BLD: 3 M/UL (ref 4.2–5.9)
SODIUM BLD-SCNC: 147 MMOL/L (ref 136–145)
WBC # BLD: 4 K/UL (ref 4–11)

## 2019-03-04 PROCEDURE — 6370000000 HC RX 637 (ALT 250 FOR IP): Performed by: STUDENT IN AN ORGANIZED HEALTH CARE EDUCATION/TRAINING PROGRAM

## 2019-03-04 PROCEDURE — 1200000000 HC SEMI PRIVATE

## 2019-03-04 PROCEDURE — 76770 US EXAM ABDO BACK WALL COMP: CPT

## 2019-03-04 PROCEDURE — 85025 COMPLETE CBC W/AUTO DIFF WBC: CPT

## 2019-03-04 PROCEDURE — 36415 COLL VENOUS BLD VENIPUNCTURE: CPT

## 2019-03-04 PROCEDURE — 92526 ORAL FUNCTION THERAPY: CPT

## 2019-03-04 PROCEDURE — 51798 US URINE CAPACITY MEASURE: CPT

## 2019-03-04 PROCEDURE — 2580000003 HC RX 258: Performed by: INTERNAL MEDICINE

## 2019-03-04 PROCEDURE — 6370000000 HC RX 637 (ALT 250 FOR IP): Performed by: INTERNAL MEDICINE

## 2019-03-04 PROCEDURE — 80069 RENAL FUNCTION PANEL: CPT

## 2019-03-04 RX ORDER — CARVEDILOL 6.25 MG/1
6.25 TABLET ORAL 2 TIMES DAILY WITH MEALS
Status: DISCONTINUED | OUTPATIENT
Start: 2019-03-04 | End: 2019-03-07 | Stop reason: HOSPADM

## 2019-03-04 RX ADMIN — INSULIN GLARGINE 5 UNITS: 100 INJECTION, SOLUTION SUBCUTANEOUS at 20:49

## 2019-03-04 RX ADMIN — Medication: at 20:51

## 2019-03-04 RX ADMIN — CIPROFLOXACIN HYDROCHLORIDE 250 MG: 250 TABLET, FILM COATED ORAL at 20:50

## 2019-03-04 RX ADMIN — CIPROFLOXACIN HYDROCHLORIDE 250 MG: 250 TABLET, FILM COATED ORAL at 10:25

## 2019-03-04 RX ADMIN — APIXABAN 2.5 MG: 5 TABLET, FILM COATED ORAL at 10:25

## 2019-03-04 RX ADMIN — CARVEDILOL 6.25 MG: 6.25 TABLET, FILM COATED ORAL at 17:34

## 2019-03-04 RX ADMIN — INSULIN LISPRO 2 UNITS: 100 INJECTION, SOLUTION INTRAVENOUS; SUBCUTANEOUS at 20:49

## 2019-03-04 RX ADMIN — SODIUM CHLORIDE: 4.5 INJECTION, SOLUTION INTRAVENOUS at 10:24

## 2019-03-04 RX ADMIN — APIXABAN 2.5 MG: 5 TABLET, FILM COATED ORAL at 20:50

## 2019-03-04 RX ADMIN — ATORVASTATIN CALCIUM 40 MG: 40 TABLET, FILM COATED ORAL at 20:50

## 2019-03-04 RX ADMIN — ASPIRIN 81 MG: 81 TABLET ORAL at 10:25

## 2019-03-04 RX ADMIN — INSULIN LISPRO 3 UNITS: 100 INJECTION, SOLUTION INTRAVENOUS; SUBCUTANEOUS at 12:50

## 2019-03-04 RX ADMIN — TAMSULOSIN HYDROCHLORIDE 0.4 MG: 0.4 CAPSULE ORAL at 10:25

## 2019-03-04 RX ADMIN — Medication: at 10:25

## 2019-03-04 RX ADMIN — CLOPIDOGREL 75 MG: 75 TABLET, FILM COATED ORAL at 10:25

## 2019-03-04 ASSESSMENT — PAIN SCALES - GENERAL
PAINLEVEL_OUTOF10: 0

## 2019-03-05 PROBLEM — E86.0 DEHYDRATION: Status: RESOLVED | Noted: 2019-02-28 | Resolved: 2019-03-05

## 2019-03-05 PROBLEM — E16.1 HYPOGLYCEMIA, EXOGENOUS HYPERINSULINEMIA: Status: ACTIVE | Noted: 2019-03-05

## 2019-03-05 LAB
ALBUMIN SERPL-MCNC: 2.1 G/DL (ref 3.4–5)
ANION GAP SERPL CALCULATED.3IONS-SCNC: 10 MMOL/L (ref 3–16)
BASOPHILS ABSOLUTE: 0 K/UL (ref 0–0.2)
BASOPHILS RELATIVE PERCENT: 0.6 %
BUN BLDV-MCNC: 40 MG/DL (ref 7–20)
CALCIUM SERPL-MCNC: 8.1 MG/DL (ref 8.3–10.6)
CHLORIDE BLD-SCNC: 111 MMOL/L (ref 99–110)
CO2: 19 MMOL/L (ref 21–32)
CREAT SERPL-MCNC: 3.2 MG/DL (ref 0.8–1.3)
EOSINOPHILS ABSOLUTE: 0.4 K/UL (ref 0–0.6)
EOSINOPHILS RELATIVE PERCENT: 9.9 %
GFR AFRICAN AMERICAN: 23
GFR NON-AFRICAN AMERICAN: 19
GLUCOSE BLD-MCNC: 159 MG/DL (ref 70–99)
GLUCOSE BLD-MCNC: 161 MG/DL (ref 70–99)
GLUCOSE BLD-MCNC: 165 MG/DL (ref 70–99)
GLUCOSE BLD-MCNC: 68 MG/DL (ref 70–99)
GLUCOSE BLD-MCNC: 69 MG/DL (ref 70–99)
GLUCOSE BLD-MCNC: 73 MG/DL (ref 70–99)
HCT VFR BLD CALC: 29 % (ref 40.5–52.5)
HEMOGLOBIN: 9.7 G/DL (ref 13.5–17.5)
LYMPHOCYTES ABSOLUTE: 0.7 K/UL (ref 1–5.1)
LYMPHOCYTES RELATIVE PERCENT: 18.4 %
MCH RBC QN AUTO: 31.1 PG (ref 26–34)
MCHC RBC AUTO-ENTMCNC: 33.3 G/DL (ref 31–36)
MCV RBC AUTO: 93.5 FL (ref 80–100)
MONOCYTES ABSOLUTE: 0.3 K/UL (ref 0–1.3)
MONOCYTES RELATIVE PERCENT: 8.8 %
NEUTROPHILS ABSOLUTE: 2.3 K/UL (ref 1.7–7.7)
NEUTROPHILS RELATIVE PERCENT: 62.3 %
PDW BLD-RTO: 14 % (ref 12.4–15.4)
PERFORMED ON: ABNORMAL
PERFORMED ON: NORMAL
PHOSPHORUS: 2.9 MG/DL (ref 2.5–4.9)
PLATELET # BLD: 165 K/UL (ref 135–450)
PMV BLD AUTO: 8.9 FL (ref 5–10.5)
POTASSIUM SERPL-SCNC: 4.4 MMOL/L (ref 3.5–5.1)
RBC # BLD: 3.11 M/UL (ref 4.2–5.9)
SODIUM BLD-SCNC: 140 MMOL/L (ref 136–145)
WBC # BLD: 3.8 K/UL (ref 4–11)

## 2019-03-05 PROCEDURE — 6370000000 HC RX 637 (ALT 250 FOR IP): Performed by: STUDENT IN AN ORGANIZED HEALTH CARE EDUCATION/TRAINING PROGRAM

## 2019-03-05 PROCEDURE — 2580000003 HC RX 258: Performed by: STUDENT IN AN ORGANIZED HEALTH CARE EDUCATION/TRAINING PROGRAM

## 2019-03-05 PROCEDURE — 6370000000 HC RX 637 (ALT 250 FOR IP): Performed by: INTERNAL MEDICINE

## 2019-03-05 PROCEDURE — 36415 COLL VENOUS BLD VENIPUNCTURE: CPT

## 2019-03-05 PROCEDURE — 85025 COMPLETE CBC W/AUTO DIFF WBC: CPT

## 2019-03-05 PROCEDURE — 92526 ORAL FUNCTION THERAPY: CPT

## 2019-03-05 PROCEDURE — 80069 RENAL FUNCTION PANEL: CPT

## 2019-03-05 PROCEDURE — 1200000000 HC SEMI PRIVATE

## 2019-03-05 PROCEDURE — 2580000003 HC RX 258: Performed by: INTERNAL MEDICINE

## 2019-03-05 RX ORDER — SODIUM CHLORIDE 9 MG/ML
INJECTION, SOLUTION INTRAVENOUS CONTINUOUS
Status: ACTIVE | OUTPATIENT
Start: 2019-03-05 | End: 2019-03-07

## 2019-03-05 RX ORDER — AMLODIPINE BESYLATE 5 MG/1
5 TABLET ORAL DAILY
Status: DISCONTINUED | OUTPATIENT
Start: 2019-03-05 | End: 2019-03-07

## 2019-03-05 RX ADMIN — TAMSULOSIN HYDROCHLORIDE 0.4 MG: 0.4 CAPSULE ORAL at 09:08

## 2019-03-05 RX ADMIN — SODIUM CHLORIDE: 9 INJECTION, SOLUTION INTRAVENOUS at 12:52

## 2019-03-05 RX ADMIN — Medication 10 ML: at 21:46

## 2019-03-05 RX ADMIN — APIXABAN 2.5 MG: 5 TABLET, FILM COATED ORAL at 09:08

## 2019-03-05 RX ADMIN — SODIUM CHLORIDE: 4.5 INJECTION, SOLUTION INTRAVENOUS at 04:44

## 2019-03-05 RX ADMIN — INSULIN GLARGINE 5 UNITS: 100 INJECTION, SOLUTION SUBCUTANEOUS at 21:49

## 2019-03-05 RX ADMIN — Medication: at 09:08

## 2019-03-05 RX ADMIN — INSULIN LISPRO 1 UNITS: 100 INJECTION, SOLUTION INTRAVENOUS; SUBCUTANEOUS at 17:07

## 2019-03-05 RX ADMIN — CARVEDILOL 6.25 MG: 6.25 TABLET, FILM COATED ORAL at 17:07

## 2019-03-05 RX ADMIN — CARVEDILOL 6.25 MG: 6.25 TABLET, FILM COATED ORAL at 09:08

## 2019-03-05 RX ADMIN — AMLODIPINE BESYLATE 5 MG: 5 TABLET ORAL at 12:51

## 2019-03-05 RX ADMIN — APIXABAN 2.5 MG: 5 TABLET, FILM COATED ORAL at 21:45

## 2019-03-05 RX ADMIN — ASPIRIN 81 MG: 81 TABLET ORAL at 09:08

## 2019-03-05 RX ADMIN — CLOPIDOGREL 75 MG: 75 TABLET, FILM COATED ORAL at 09:08

## 2019-03-05 RX ADMIN — Medication: at 21:46

## 2019-03-05 RX ADMIN — ATORVASTATIN CALCIUM 40 MG: 40 TABLET, FILM COATED ORAL at 21:46

## 2019-03-05 RX ADMIN — CIPROFLOXACIN HYDROCHLORIDE 250 MG: 250 TABLET, FILM COATED ORAL at 21:46

## 2019-03-05 RX ADMIN — INSULIN LISPRO 1 UNITS: 100 INJECTION, SOLUTION INTRAVENOUS; SUBCUTANEOUS at 12:54

## 2019-03-05 RX ADMIN — CIPROFLOXACIN HYDROCHLORIDE 250 MG: 250 TABLET, FILM COATED ORAL at 09:08

## 2019-03-05 ASSESSMENT — PAIN SCALES - GENERAL
PAINLEVEL_OUTOF10: 0

## 2019-03-06 LAB
ALBUMIN SERPL-MCNC: 2.3 G/DL (ref 3.4–5)
ANION GAP SERPL CALCULATED.3IONS-SCNC: 9 MMOL/L (ref 3–16)
BASOPHILS ABSOLUTE: 0 K/UL (ref 0–0.2)
BASOPHILS RELATIVE PERCENT: 0.9 %
BUN BLDV-MCNC: 32 MG/DL (ref 7–20)
CALCIUM SERPL-MCNC: 7.7 MG/DL (ref 8.3–10.6)
CHLORIDE BLD-SCNC: 112 MMOL/L (ref 99–110)
CO2: 20 MMOL/L (ref 21–32)
CREAT SERPL-MCNC: 2.7 MG/DL (ref 0.8–1.3)
EOSINOPHILS ABSOLUTE: 0.3 K/UL (ref 0–0.6)
EOSINOPHILS RELATIVE PERCENT: 10.7 %
FERRITIN: 316.6 NG/ML (ref 30–400)
GFR AFRICAN AMERICAN: 28
GFR NON-AFRICAN AMERICAN: 23
GLUCOSE BLD-MCNC: 107 MG/DL (ref 70–99)
GLUCOSE BLD-MCNC: 120 MG/DL (ref 70–99)
GLUCOSE BLD-MCNC: 122 MG/DL (ref 70–99)
GLUCOSE BLD-MCNC: 184 MG/DL (ref 70–99)
GLUCOSE BLD-MCNC: 95 MG/DL (ref 70–99)
HCT VFR BLD CALC: 28 % (ref 40.5–52.5)
HEMOGLOBIN: 9.5 G/DL (ref 13.5–17.5)
IRON SATURATION: 62 % (ref 20–50)
IRON: 70 UG/DL (ref 59–158)
LYMPHOCYTES ABSOLUTE: 0.6 K/UL (ref 1–5.1)
LYMPHOCYTES RELATIVE PERCENT: 19.6 %
MCH RBC QN AUTO: 30.9 PG (ref 26–34)
MCHC RBC AUTO-ENTMCNC: 33.8 G/DL (ref 31–36)
MCV RBC AUTO: 91.6 FL (ref 80–100)
MONOCYTES ABSOLUTE: 0.3 K/UL (ref 0–1.3)
MONOCYTES RELATIVE PERCENT: 10 %
NEUTROPHILS ABSOLUTE: 1.9 K/UL (ref 1.7–7.7)
NEUTROPHILS RELATIVE PERCENT: 58.8 %
PDW BLD-RTO: 13.9 % (ref 12.4–15.4)
PERFORMED ON: ABNORMAL
PERFORMED ON: NORMAL
PHOSPHORUS: 2.9 MG/DL (ref 2.5–4.9)
PLATELET # BLD: 154 K/UL (ref 135–450)
PMV BLD AUTO: 9.2 FL (ref 5–10.5)
POTASSIUM SERPL-SCNC: 4.1 MMOL/L (ref 3.5–5.1)
RBC # BLD: 3.06 M/UL (ref 4.2–5.9)
SODIUM BLD-SCNC: 141 MMOL/L (ref 136–145)
TOTAL IRON BINDING CAPACITY: 113 UG/DL (ref 260–445)
WBC # BLD: 3.2 K/UL (ref 4–11)

## 2019-03-06 PROCEDURE — 85025 COMPLETE CBC W/AUTO DIFF WBC: CPT

## 2019-03-06 PROCEDURE — 83540 ASSAY OF IRON: CPT

## 2019-03-06 PROCEDURE — 80069 RENAL FUNCTION PANEL: CPT

## 2019-03-06 PROCEDURE — 1200000000 HC SEMI PRIVATE

## 2019-03-06 PROCEDURE — 6370000000 HC RX 637 (ALT 250 FOR IP): Performed by: STUDENT IN AN ORGANIZED HEALTH CARE EDUCATION/TRAINING PROGRAM

## 2019-03-06 PROCEDURE — 82728 ASSAY OF FERRITIN: CPT

## 2019-03-06 PROCEDURE — 36415 COLL VENOUS BLD VENIPUNCTURE: CPT

## 2019-03-06 PROCEDURE — 6370000000 HC RX 637 (ALT 250 FOR IP): Performed by: INTERNAL MEDICINE

## 2019-03-06 PROCEDURE — 83550 IRON BINDING TEST: CPT

## 2019-03-06 RX ADMIN — TAMSULOSIN HYDROCHLORIDE 0.4 MG: 0.4 CAPSULE ORAL at 08:44

## 2019-03-06 RX ADMIN — CIPROFLOXACIN HYDROCHLORIDE 250 MG: 250 TABLET, FILM COATED ORAL at 20:37

## 2019-03-06 RX ADMIN — CARVEDILOL 6.25 MG: 6.25 TABLET, FILM COATED ORAL at 08:44

## 2019-03-06 RX ADMIN — CLOPIDOGREL 75 MG: 75 TABLET, FILM COATED ORAL at 08:45

## 2019-03-06 RX ADMIN — APIXABAN 2.5 MG: 5 TABLET, FILM COATED ORAL at 08:44

## 2019-03-06 RX ADMIN — APIXABAN 2.5 MG: 5 TABLET, FILM COATED ORAL at 20:37

## 2019-03-06 RX ADMIN — CIPROFLOXACIN HYDROCHLORIDE 250 MG: 250 TABLET, FILM COATED ORAL at 08:45

## 2019-03-06 RX ADMIN — ATORVASTATIN CALCIUM 40 MG: 40 TABLET, FILM COATED ORAL at 20:37

## 2019-03-06 RX ADMIN — Medication: at 20:38

## 2019-03-06 RX ADMIN — ASPIRIN 81 MG: 81 TABLET ORAL at 08:45

## 2019-03-06 RX ADMIN — Medication: at 08:47

## 2019-03-06 RX ADMIN — INSULIN GLARGINE 5 UNITS: 100 INJECTION, SOLUTION SUBCUTANEOUS at 20:40

## 2019-03-06 RX ADMIN — AMLODIPINE BESYLATE 5 MG: 5 TABLET ORAL at 08:44

## 2019-03-06 RX ADMIN — CARVEDILOL 6.25 MG: 6.25 TABLET, FILM COATED ORAL at 17:53

## 2019-03-06 ASSESSMENT — PAIN SCALES - GENERAL
PAINLEVEL_OUTOF10: 0

## 2019-03-07 VITALS
TEMPERATURE: 93.6 F | RESPIRATION RATE: 18 BRPM | HEIGHT: 65 IN | WEIGHT: 102.6 LBS | DIASTOLIC BLOOD PRESSURE: 76 MMHG | SYSTOLIC BLOOD PRESSURE: 132 MMHG | BODY MASS INDEX: 17.09 KG/M2 | HEART RATE: 74 BPM | OXYGEN SATURATION: 100 %

## 2019-03-07 LAB
ALBUMIN SERPL-MCNC: 2 G/DL (ref 3.4–5)
ANION GAP SERPL CALCULATED.3IONS-SCNC: 8 MMOL/L (ref 3–16)
BASOPHILS ABSOLUTE: 0 K/UL (ref 0–0.2)
BASOPHILS RELATIVE PERCENT: 0.7 %
BUN BLDV-MCNC: 29 MG/DL (ref 7–20)
CALCIUM SERPL-MCNC: 7.7 MG/DL (ref 8.3–10.6)
CHLORIDE BLD-SCNC: 116 MMOL/L (ref 99–110)
CO2: 20 MMOL/L (ref 21–32)
CREAT SERPL-MCNC: 2.4 MG/DL (ref 0.8–1.3)
EOSINOPHILS ABSOLUTE: 0.3 K/UL (ref 0–0.6)
EOSINOPHILS RELATIVE PERCENT: 9.7 %
GFR AFRICAN AMERICAN: 32
GFR NON-AFRICAN AMERICAN: 26
GLUCOSE BLD-MCNC: 164 MG/DL (ref 70–99)
GLUCOSE BLD-MCNC: 57 MG/DL (ref 70–99)
GLUCOSE BLD-MCNC: 60 MG/DL (ref 70–99)
GLUCOSE BLD-MCNC: 70 MG/DL (ref 70–99)
GLUCOSE BLD-MCNC: 88 MG/DL (ref 70–99)
HCT VFR BLD CALC: 29.5 % (ref 40.5–52.5)
HEMOGLOBIN: 9.9 G/DL (ref 13.5–17.5)
LYMPHOCYTES ABSOLUTE: 0.6 K/UL (ref 1–5.1)
LYMPHOCYTES RELATIVE PERCENT: 17.6 %
MCH RBC QN AUTO: 30.8 PG (ref 26–34)
MCHC RBC AUTO-ENTMCNC: 33.6 G/DL (ref 31–36)
MCV RBC AUTO: 91.7 FL (ref 80–100)
MONOCYTES ABSOLUTE: 0.4 K/UL (ref 0–1.3)
MONOCYTES RELATIVE PERCENT: 12.4 %
NEUTROPHILS ABSOLUTE: 2 K/UL (ref 1.7–7.7)
NEUTROPHILS RELATIVE PERCENT: 59.6 %
PDW BLD-RTO: 14.3 % (ref 12.4–15.4)
PERFORMED ON: ABNORMAL
PERFORMED ON: NORMAL
PHOSPHORUS: 3 MG/DL (ref 2.5–4.9)
PLATELET # BLD: 147 K/UL (ref 135–450)
PMV BLD AUTO: 9.3 FL (ref 5–10.5)
POTASSIUM SERPL-SCNC: 3.7 MMOL/L (ref 3.5–5.1)
RBC # BLD: 3.21 M/UL (ref 4.2–5.9)
SODIUM BLD-SCNC: 144 MMOL/L (ref 136–145)
WBC # BLD: 3.3 K/UL (ref 4–11)

## 2019-03-07 PROCEDURE — 80069 RENAL FUNCTION PANEL: CPT

## 2019-03-07 PROCEDURE — 36415 COLL VENOUS BLD VENIPUNCTURE: CPT

## 2019-03-07 PROCEDURE — 6370000000 HC RX 637 (ALT 250 FOR IP): Performed by: STUDENT IN AN ORGANIZED HEALTH CARE EDUCATION/TRAINING PROGRAM

## 2019-03-07 PROCEDURE — 92526 ORAL FUNCTION THERAPY: CPT

## 2019-03-07 PROCEDURE — 6370000000 HC RX 637 (ALT 250 FOR IP): Performed by: INTERNAL MEDICINE

## 2019-03-07 PROCEDURE — 85025 COMPLETE CBC W/AUTO DIFF WBC: CPT

## 2019-03-07 RX ORDER — AMLODIPINE BESYLATE 10 MG/1
10 TABLET ORAL DAILY
Status: DISCONTINUED | OUTPATIENT
Start: 2019-03-07 | End: 2019-03-07 | Stop reason: HOSPADM

## 2019-03-07 RX ORDER — TAMSULOSIN HYDROCHLORIDE 0.4 MG/1
0.8 CAPSULE ORAL DAILY
Status: DISCONTINUED | OUTPATIENT
Start: 2019-03-08 | End: 2019-03-07 | Stop reason: HOSPADM

## 2019-03-07 RX ADMIN — TAMSULOSIN HYDROCHLORIDE 0.4 MG: 0.4 CAPSULE ORAL at 10:13

## 2019-03-07 RX ADMIN — AMLODIPINE BESYLATE 10 MG: 10 TABLET ORAL at 10:13

## 2019-03-07 RX ADMIN — Medication: at 10:14

## 2019-03-07 RX ADMIN — ASPIRIN 81 MG: 81 TABLET ORAL at 10:12

## 2019-03-07 RX ADMIN — CLOPIDOGREL 75 MG: 75 TABLET, FILM COATED ORAL at 10:13

## 2019-03-07 RX ADMIN — APIXABAN 2.5 MG: 5 TABLET, FILM COATED ORAL at 10:12

## 2019-03-07 RX ADMIN — CIPROFLOXACIN HYDROCHLORIDE 250 MG: 250 TABLET, FILM COATED ORAL at 10:13

## 2019-03-07 RX ADMIN — CARVEDILOL 6.25 MG: 6.25 TABLET, FILM COATED ORAL at 10:13

## 2019-03-07 ASSESSMENT — PAIN SCALES - GENERAL
PAINLEVEL_OUTOF10: 0

## 2019-03-26 ENCOUNTER — APPOINTMENT (OUTPATIENT)
Dept: GENERAL RADIOLOGY | Age: 74
DRG: 987 | End: 2019-03-26
Payer: MEDICARE

## 2019-03-26 ENCOUNTER — HOSPITAL ENCOUNTER (INPATIENT)
Age: 74
LOS: 12 days | Discharge: NURSING, REHAB OR LONG-TERM CARE | DRG: 987 | End: 2019-04-07
Attending: EMERGENCY MEDICINE | Admitting: INTERNAL MEDICINE
Payer: MEDICARE

## 2019-03-26 ENCOUNTER — APPOINTMENT (OUTPATIENT)
Dept: CT IMAGING | Age: 74
DRG: 987 | End: 2019-03-26
Payer: MEDICARE

## 2019-03-26 DIAGNOSIS — N17.9 ACUTE RENAL FAILURE, UNSPECIFIED ACUTE RENAL FAILURE TYPE (HCC): ICD-10-CM

## 2019-03-26 DIAGNOSIS — A41.9 SEPSIS, DUE TO UNSPECIFIED ORGANISM: ICD-10-CM

## 2019-03-26 DIAGNOSIS — T68.XXXA HYPOTHERMIA, INITIAL ENCOUNTER: Primary | ICD-10-CM

## 2019-03-26 PROBLEM — E87.20 METABOLIC ACIDOSIS: Status: ACTIVE | Noted: 2019-03-26

## 2019-03-26 PROBLEM — G93.41 ACUTE METABOLIC ENCEPHALOPATHY: Status: ACTIVE | Noted: 2019-03-26

## 2019-03-26 LAB
ALBUMIN SERPL-MCNC: 2.1 G/DL (ref 3.4–5)
ALP BLD-CCNC: 169 U/L (ref 40–129)
ALT SERPL-CCNC: 113 U/L (ref 10–40)
AMORPHOUS: ABNORMAL /HPF
ANION GAP SERPL CALCULATED.3IONS-SCNC: 14 MMOL/L (ref 3–16)
ANION GAP SERPL CALCULATED.3IONS-SCNC: 17 MMOL/L (ref 3–16)
ANISOCYTOSIS: ABNORMAL
AST SERPL-CCNC: 29 U/L (ref 15–37)
BACTERIA: ABNORMAL /HPF
BANDED NEUTROPHILS RELATIVE PERCENT: 1 % (ref 0–7)
BASOPHILS ABSOLUTE: 0 K/UL (ref 0–0.2)
BASOPHILS RELATIVE PERCENT: 0 %
BILIRUB SERPL-MCNC: <0.2 MG/DL (ref 0–1)
BILIRUBIN DIRECT: <0.2 MG/DL (ref 0–0.3)
BILIRUBIN URINE: NEGATIVE
BILIRUBIN, INDIRECT: ABNORMAL MG/DL (ref 0–1)
BLOOD, URINE: NEGATIVE
BUN BLDV-MCNC: 105 MG/DL (ref 7–20)
BUN BLDV-MCNC: 111 MG/DL (ref 7–20)
BURR CELLS: ABNORMAL
CALCIUM SERPL-MCNC: 7.5 MG/DL (ref 8.3–10.6)
CALCIUM SERPL-MCNC: 8.6 MG/DL (ref 8.3–10.6)
CHLORIDE BLD-SCNC: 115 MMOL/L (ref 99–110)
CHLORIDE BLD-SCNC: 116 MMOL/L (ref 99–110)
CLARITY: CLEAR
CO2: 16 MMOL/L (ref 21–32)
CO2: 17 MMOL/L (ref 21–32)
COLOR: YELLOW
CREAT SERPL-MCNC: 5.4 MG/DL (ref 0.8–1.3)
CREAT SERPL-MCNC: 5.9 MG/DL (ref 0.8–1.3)
EKG ATRIAL RATE: 62 BPM
EKG DIAGNOSIS: NORMAL
EKG P AXIS: 69 DEGREES
EKG P-R INTERVAL: 110 MS
EKG Q-T INTERVAL: 478 MS
EKG QRS DURATION: 102 MS
EKG QTC CALCULATION (BAZETT): 485 MS
EKG R AXIS: 37 DEGREES
EKG T AXIS: 203 DEGREES
EKG VENTRICULAR RATE: 62 BPM
EOSINOPHILS ABSOLUTE: 0 K/UL (ref 0–0.6)
EOSINOPHILS RELATIVE PERCENT: 0 %
EPITHELIAL CELLS, UA: ABNORMAL /HPF
GFR AFRICAN AMERICAN: 11
GFR AFRICAN AMERICAN: 13
GFR NON-AFRICAN AMERICAN: 10
GFR NON-AFRICAN AMERICAN: 9
GLUCOSE BLD-MCNC: 180 MG/DL (ref 70–99)
GLUCOSE BLD-MCNC: 235 MG/DL (ref 70–99)
GLUCOSE URINE: NEGATIVE MG/DL
HCT VFR BLD CALC: 34.1 % (ref 40.5–52.5)
HEMOGLOBIN: 11.1 G/DL (ref 13.5–17.5)
KETONES, URINE: ABNORMAL MG/DL
LEUKOCYTE ESTERASE, URINE: NEGATIVE
LYMPHOCYTES ABSOLUTE: 0.7 K/UL (ref 1–5.1)
LYMPHOCYTES RELATIVE PERCENT: 11 %
MAGNESIUM: 1.4 MG/DL (ref 1.8–2.4)
MAGNESIUM: 1.8 MG/DL (ref 1.8–2.4)
MCH RBC QN AUTO: 30.7 PG (ref 26–34)
MCHC RBC AUTO-ENTMCNC: 32.5 G/DL (ref 31–36)
MCV RBC AUTO: 94.6 FL (ref 80–100)
MICROSCOPIC EXAMINATION: YES
MONOCYTES ABSOLUTE: 0.5 K/UL (ref 0–1.3)
MONOCYTES RELATIVE PERCENT: 8 %
NEUTROPHILS ABSOLUTE: 5.2 K/UL (ref 1.7–7.7)
NEUTROPHILS RELATIVE PERCENT: 80 %
NITRITE, URINE: NEGATIVE
NUCLEATED RED BLOOD CELLS: 5 /100 WBC
PDW BLD-RTO: 16.8 % (ref 12.4–15.4)
PH UA: 5.5 (ref 5–8)
PHOSPHORUS: 5.3 MG/DL (ref 2.5–4.9)
PHOSPHORUS: 6 MG/DL (ref 2.5–4.9)
PLATELET # BLD: 197 K/UL (ref 135–450)
PMV BLD AUTO: 10.1 FL (ref 5–10.5)
POTASSIUM SERPL-SCNC: 4.8 MMOL/L (ref 3.5–5.1)
POTASSIUM SERPL-SCNC: 5.9 MMOL/L (ref 3.5–5.1)
PROCALCITONIN: 0.52 NG/ML (ref 0–0.15)
PROTEIN UA: ABNORMAL MG/DL
RAPID INFLUENZA  B AGN: NEGATIVE
RAPID INFLUENZA A AGN: NEGATIVE
RBC # BLD: 3.61 M/UL (ref 4.2–5.9)
RBC UA: ABNORMAL /HPF (ref 0–2)
SODIUM BLD-SCNC: 145 MMOL/L (ref 136–145)
SODIUM BLD-SCNC: 150 MMOL/L (ref 136–145)
SPECIFIC GRAVITY UA: 1.02 (ref 1–1.03)
TOTAL PROTEIN: 4.7 G/DL (ref 6.4–8.2)
URINE TYPE: ABNORMAL
UROBILINOGEN, URINE: 0.2 E.U./DL
WBC # BLD: 6.4 K/UL (ref 4–11)
WBC UA: ABNORMAL /HPF (ref 0–5)
YEAST: PRESENT /HPF

## 2019-03-26 PROCEDURE — 82803 BLOOD GASES ANY COMBINATION: CPT

## 2019-03-26 PROCEDURE — 2580000003 HC RX 258: Performed by: EMERGENCY MEDICINE

## 2019-03-26 PROCEDURE — 87633 RESP VIRUS 12-25 TARGETS: CPT

## 2019-03-26 PROCEDURE — 87086 URINE CULTURE/COLONY COUNT: CPT

## 2019-03-26 PROCEDURE — 96365 THER/PROPH/DIAG IV INF INIT: CPT

## 2019-03-26 PROCEDURE — 87040 BLOOD CULTURE FOR BACTERIA: CPT

## 2019-03-26 PROCEDURE — 81001 URINALYSIS AUTO W/SCOPE: CPT

## 2019-03-26 PROCEDURE — 87581 M.PNEUMON DNA AMP PROBE: CPT

## 2019-03-26 PROCEDURE — 93005 ELECTROCARDIOGRAM TRACING: CPT | Performed by: STUDENT IN AN ORGANIZED HEALTH CARE EDUCATION/TRAINING PROGRAM

## 2019-03-26 PROCEDURE — 71045 X-RAY EXAM CHEST 1 VIEW: CPT

## 2019-03-26 PROCEDURE — 94761 N-INVAS EAR/PLS OXIMETRY MLT: CPT

## 2019-03-26 PROCEDURE — 83605 ASSAY OF LACTIC ACID: CPT

## 2019-03-26 PROCEDURE — 84132 ASSAY OF SERUM POTASSIUM: CPT

## 2019-03-26 PROCEDURE — 85025 COMPLETE CBC W/AUTO DIFF WBC: CPT

## 2019-03-26 PROCEDURE — 6370000000 HC RX 637 (ALT 250 FOR IP): Performed by: STUDENT IN AN ORGANIZED HEALTH CARE EDUCATION/TRAINING PROGRAM

## 2019-03-26 PROCEDURE — 6360000002 HC RX W HCPCS: Performed by: STUDENT IN AN ORGANIZED HEALTH CARE EDUCATION/TRAINING PROGRAM

## 2019-03-26 PROCEDURE — 84145 PROCALCITONIN (PCT): CPT

## 2019-03-26 PROCEDURE — 83735 ASSAY OF MAGNESIUM: CPT

## 2019-03-26 PROCEDURE — 2500000003 HC RX 250 WO HCPCS: Performed by: STUDENT IN AN ORGANIZED HEALTH CARE EDUCATION/TRAINING PROGRAM

## 2019-03-26 PROCEDURE — 2000000000 HC ICU R&B

## 2019-03-26 PROCEDURE — 82330 ASSAY OF CALCIUM: CPT

## 2019-03-26 PROCEDURE — 74018 RADEX ABDOMEN 1 VIEW: CPT

## 2019-03-26 PROCEDURE — 84439 ASSAY OF FREE THYROXINE: CPT

## 2019-03-26 PROCEDURE — 87804 INFLUENZA ASSAY W/OPTIC: CPT

## 2019-03-26 PROCEDURE — 2580000003 HC RX 258: Performed by: STUDENT IN AN ORGANIZED HEALTH CARE EDUCATION/TRAINING PROGRAM

## 2019-03-26 PROCEDURE — 80076 HEPATIC FUNCTION PANEL: CPT

## 2019-03-26 PROCEDURE — 70450 CT HEAD/BRAIN W/O DYE: CPT

## 2019-03-26 PROCEDURE — 2700000000 HC OXYGEN THERAPY PER DAY

## 2019-03-26 PROCEDURE — 99291 CRITICAL CARE FIRST HOUR: CPT

## 2019-03-26 PROCEDURE — 93005 ELECTROCARDIOGRAM TRACING: CPT | Performed by: EMERGENCY MEDICINE

## 2019-03-26 PROCEDURE — 87641 MR-STAPH DNA AMP PROBE: CPT

## 2019-03-26 PROCEDURE — 84295 ASSAY OF SERUM SODIUM: CPT

## 2019-03-26 PROCEDURE — 02HV33Z INSERTION OF INFUSION DEVICE INTO SUPERIOR VENA CAVA, PERCUTANEOUS APPROACH: ICD-10-PCS | Performed by: INTERNAL MEDICINE

## 2019-03-26 PROCEDURE — 80048 BASIC METABOLIC PNL TOTAL CA: CPT

## 2019-03-26 PROCEDURE — 87798 DETECT AGENT NOS DNA AMP: CPT

## 2019-03-26 PROCEDURE — 84100 ASSAY OF PHOSPHORUS: CPT

## 2019-03-26 PROCEDURE — 87486 CHLMYD PNEUM DNA AMP PROBE: CPT

## 2019-03-26 PROCEDURE — 84443 ASSAY THYROID STIM HORMONE: CPT

## 2019-03-26 PROCEDURE — 82947 ASSAY GLUCOSE BLOOD QUANT: CPT

## 2019-03-26 PROCEDURE — 6360000002 HC RX W HCPCS: Performed by: EMERGENCY MEDICINE

## 2019-03-26 RX ORDER — ACETAMINOPHEN 325 MG/1
650 TABLET ORAL EVERY 4 HOURS PRN
Status: DISCONTINUED | OUTPATIENT
Start: 2019-03-26 | End: 2019-03-29

## 2019-03-26 RX ORDER — MIRTAZAPINE 15 MG/1
15 TABLET, FILM COATED ORAL NIGHTLY
Status: ON HOLD | COMMUNITY
End: 2019-05-24 | Stop reason: HOSPADM

## 2019-03-26 RX ORDER — HEPARIN SODIUM 10000 [USP'U]/100ML
12 INJECTION, SOLUTION INTRAVENOUS CONTINUOUS
Status: DISCONTINUED | OUTPATIENT
Start: 2019-03-26 | End: 2019-03-27

## 2019-03-26 RX ORDER — SODIUM CHLORIDE 9 MG/ML
INJECTION, SOLUTION INTRAVENOUS
Status: COMPLETED
Start: 2019-03-26 | End: 2019-03-26

## 2019-03-26 RX ORDER — MEGESTROL ACETATE 40 MG/ML
400 SUSPENSION ORAL DAILY
Status: ON HOLD | COMMUNITY
End: 2019-05-04 | Stop reason: CLARIF

## 2019-03-26 RX ORDER — LINEZOLID 2 MG/ML
600 INJECTION, SOLUTION INTRAVENOUS EVERY 12 HOURS
Status: DISCONTINUED | OUTPATIENT
Start: 2019-03-27 | End: 2019-03-27

## 2019-03-26 RX ORDER — SODIUM CHLORIDE 9 MG/ML
INJECTION, SOLUTION INTRAVENOUS
Status: DISPENSED
Start: 2019-03-26 | End: 2019-03-27

## 2019-03-26 RX ORDER — NICOTINE POLACRILEX 4 MG
15 LOZENGE BUCCAL PRN
Status: DISCONTINUED | OUTPATIENT
Start: 2019-03-26 | End: 2019-04-07 | Stop reason: HOSPADM

## 2019-03-26 RX ORDER — HEPARIN SODIUM 5000 [USP'U]/ML
30 INJECTION, SOLUTION INTRAVENOUS; SUBCUTANEOUS PRN
Status: DISCONTINUED | OUTPATIENT
Start: 2019-03-26 | End: 2019-03-27

## 2019-03-26 RX ORDER — DEXTROSE MONOHYDRATE 50 MG/ML
100 INJECTION, SOLUTION INTRAVENOUS PRN
Status: DISCONTINUED | OUTPATIENT
Start: 2019-03-26 | End: 2019-04-07 | Stop reason: HOSPADM

## 2019-03-26 RX ORDER — MAGNESIUM SULFATE IN WATER 40 MG/ML
2 INJECTION, SOLUTION INTRAVENOUS ONCE
Status: COMPLETED | OUTPATIENT
Start: 2019-03-26 | End: 2019-03-26

## 2019-03-26 RX ORDER — LISINOPRIL 5 MG/1
5 TABLET ORAL DAILY
Status: ON HOLD | COMMUNITY
End: 2019-04-22 | Stop reason: HOSPADM

## 2019-03-26 RX ORDER — DEXTROSE MONOHYDRATE 25 G/50ML
12.5 INJECTION, SOLUTION INTRAVENOUS PRN
Status: DISCONTINUED | OUTPATIENT
Start: 2019-03-26 | End: 2019-04-07 | Stop reason: HOSPADM

## 2019-03-26 RX ORDER — ERGOCALCIFEROL 1.25 MG/1
50000 CAPSULE ORAL
Status: ON HOLD | COMMUNITY
End: 2019-05-24 | Stop reason: HOSPADM

## 2019-03-26 RX ORDER — 0.9 % SODIUM CHLORIDE 0.9 %
1000 INTRAVENOUS SOLUTION INTRAVENOUS ONCE
Status: COMPLETED | OUTPATIENT
Start: 2019-03-26 | End: 2019-03-27

## 2019-03-26 RX ORDER — 0.9 % SODIUM CHLORIDE 0.9 %
1000 INTRAVENOUS SOLUTION INTRAVENOUS ONCE
Status: COMPLETED | OUTPATIENT
Start: 2019-03-26 | End: 2019-03-26

## 2019-03-26 RX ORDER — SODIUM CHLORIDE 0.9 % (FLUSH) 0.9 %
10 SYRINGE (ML) INJECTION PRN
Status: DISCONTINUED | OUTPATIENT
Start: 2019-03-26 | End: 2019-03-29

## 2019-03-26 RX ORDER — HEPARIN SODIUM 5000 [USP'U]/ML
60 INJECTION, SOLUTION INTRAVENOUS; SUBCUTANEOUS PRN
Status: DISCONTINUED | OUTPATIENT
Start: 2019-03-26 | End: 2019-03-27

## 2019-03-26 RX ORDER — CARVEDILOL 6.25 MG/1
6.25 TABLET ORAL 2 TIMES DAILY WITH MEALS
Status: ON HOLD | COMMUNITY
End: 2019-04-22 | Stop reason: HOSPADM

## 2019-03-26 RX ORDER — SODIUM CHLORIDE 0.9 % (FLUSH) 0.9 %
10 SYRINGE (ML) INJECTION EVERY 12 HOURS SCHEDULED
Status: DISCONTINUED | OUTPATIENT
Start: 2019-03-26 | End: 2019-04-07 | Stop reason: HOSPADM

## 2019-03-26 RX ORDER — 0.9 % SODIUM CHLORIDE 0.9 %
500 INTRAVENOUS SOLUTION INTRAVENOUS ONCE
Status: COMPLETED | OUTPATIENT
Start: 2019-03-26 | End: 2019-03-26

## 2019-03-26 RX ORDER — FERROUS SULFATE 325(65) MG
325 TABLET ORAL
Status: DISCONTINUED | OUTPATIENT
Start: 2019-03-26 | End: 2019-03-26

## 2019-03-26 RX ADMIN — CEFEPIME HYDROCHLORIDE 1 G: 1 INJECTION, POWDER, FOR SOLUTION INTRAMUSCULAR; INTRAVENOUS at 16:27

## 2019-03-26 RX ADMIN — SODIUM BICARBONATE: 84 INJECTION, SOLUTION INTRAVENOUS at 21:28

## 2019-03-26 RX ADMIN — VANCOMYCIN HYDROCHLORIDE 1000 MG: 10 INJECTION, POWDER, LYOPHILIZED, FOR SOLUTION INTRAVENOUS at 17:35

## 2019-03-26 RX ADMIN — SODIUM CHLORIDE 1000 ML: 9 INJECTION, SOLUTION INTRAVENOUS at 21:28

## 2019-03-26 RX ADMIN — SODIUM CHLORIDE 500 ML: 9 INJECTION, SOLUTION INTRAVENOUS at 19:08

## 2019-03-26 RX ADMIN — Medication 10 ML: at 22:48

## 2019-03-26 RX ADMIN — MAGNESIUM SULFATE HEPTAHYDRATE 2 G: 40 INJECTION, SOLUTION INTRAVENOUS at 21:31

## 2019-03-26 RX ADMIN — INSULIN LISPRO 2 UNITS: 100 INJECTION, SOLUTION INTRAVENOUS; SUBCUTANEOUS at 21:34

## 2019-03-26 RX ADMIN — INSULIN LISPRO 1 UNITS: 100 INJECTION, SOLUTION INTRAVENOUS; SUBCUTANEOUS at 21:35

## 2019-03-26 RX ADMIN — PIPERACILLIN SODIUM,TAZOBACTAM SODIUM 3.38 G: 3; .375 INJECTION, POWDER, FOR SOLUTION INTRAVENOUS at 23:31

## 2019-03-26 RX ADMIN — SODIUM CHLORIDE 1000 ML: 0.9 INJECTION, SOLUTION INTRAVENOUS at 16:04

## 2019-03-26 RX ADMIN — SODIUM CHLORIDE 1000 ML: 0.9 INJECTION, SOLUTION INTRAVENOUS at 21:34

## 2019-03-26 ASSESSMENT — PAIN SCALES - GENERAL
PAINLEVEL_OUTOF10: 0
PAINLEVEL_OUTOF10: 0

## 2019-03-26 ASSESSMENT — ENCOUNTER SYMPTOMS
CONSTIPATION: 0
ABDOMINAL DISTENTION: 0
COUGH: 0
DIARRHEA: 0
ABDOMINAL PAIN: 0
TROUBLE SWALLOWING: 1
VOMITING: 1
SHORTNESS OF BREATH: 0
NAUSEA: 0

## 2019-03-26 NOTE — PROGRESS NOTES
4 Eyes Admission Assessment     I agree as the admission nurse that 2 RN's have performed a thorough Head to Toe Skin Assessment on the patient. ALL assessment sites listed below have been assessed on admission. Areas assessed by both nurses:   [x]   Head, Face, and Ears   [x]   Shoulders, Back, and Chest - old G tube site, dry, discolored and crusted over  [x]   Arms, Elbows, and Hands   [x]   Coccyx, Sacrum, and Ischum dark discoloration, but intact  X   Legs, Feet, and Heels - dry scaly, flaking skin        Does the Patient have Skin Breakdown?   No         Larry Prevention initiated:  Yes   Wound Care Orders initiated:  NA      [facility] nurse consulted for Pressure Injury (Stage 3,4, Unstageable, DTI, NWPT, and Complex wounds):  No      Nurse 1 eSignature: Electronically signed by Elmer Wilson RN on 3/26/19 at 6:44 PM    **SHARE this note so that the co-signing nurse is able to place an eSignature**    Nurse 2 eSignature: Electronically signed by Karly Gutierrez RN on 3/26/19 at 6:48 PM

## 2019-03-26 NOTE — ED NOTES
Bed: B14-14  Expected date: 3/26/19  Expected time: 3:11 PM  Means of arrival:   Comments:  CLARE, [facility]  03/26/19 1524

## 2019-03-26 NOTE — H&P
Angina pectoris ([facility]     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[facility]     CAD (coronary artery disease)     Cardiac arrest ([facility]     Cardiac arrest (HCC)     Chronic systolic heart failure (HCC)     Diabetes mellitus ([facility]     Diabetes mellitus ([facility] 10/9/2012    Diabetes mellitus type II, uncontrolled ([facility]     4/1017 A1c = 13    DKA (diabetic ketoacidoses) ([facility] 04/2017    Elevated LFTs     Femoral neck fracture ([facility] 10/17/2017    HTN (hypertension) 10/17/2017    Hyperkalemia     Hyperlipidemia     Hypertension     [facility]oxysmal [facility] fibrillation) ([facility]     Paroxysmal A-fib ([facility])     PEA (Pulseless electrical activity) ([facility] 04/13/2017    Pneumonia 04/2017    LIKELY PNEUMOCOCCAL    Pneumonia due to organism     Pneumonia of right lower lobe due to Streptococcus pneumoniae ([facility])     Protein-calorie malnutrition, severe ([facility] 04/2017    BMI = 19    S/P hip hemiarthroplasty 12/29/2017    Septic shock ([facility])     Systolic CHF ([facility]     EF 30%    Thrombocytopenia ([facility])     Thrombocytopenia ([facility])     Type 2 diabetes mellitus with hyperglycemia ([facility] 11/28/2016     Past SurgicalHistory:          Procedure Laterality Date    GASTROSTOMY TUBE CHANGE N/A 12/14/2018    GASTRIC TUBE REMOVAL performed by Hollie Gosselin, MD at [facility]      G-tube placement    JOINT REPLACEMENT      hip     Medications Prior to Admission:      Prior to Admission medications    Medication Sig Start Date End Date Taking?  Authorizing Provider   carvedilol (COREG) 6.25 MG tablet Take 6.25 mg by mouth 2 times daily (with meals)   Yes Historical Provider, MD   vitamin D (ERGOCALCIFEROL) 02103 units CAPS capsule Take 50,000 Units by mouth Twice a Week Give on Monday and Thursday   Yes Historical Provider, MD   lisinopril (PRINIVIL;ZESTRIL) 5 MG tablet Take 5 mg by mouth daily   Yes Historical Provider, MD   megestrol (MEGACE) 40 MG/ML suspension Take 400 mg by mouth daily   Yes Historical Provider, MD mirtazapine (REMERON) 15 MG tablet Take 15 mg by mouth nightly   Yes Historical Provider, MD   apixaban (ELIQUIS) 2.5 MG TABS tablet Take 1 tablet by mouth 2 times daily 3/7/19  Yes Bobbi Palacios MD   insulin glargine (TOUJEO MAX SOLOSTAR) 300 UNIT/ML injection pen Inject 5 Units into the skin nightly 1/21/19  Yes Raquel Epperson MD   metFORMIN (GLUCOPHAGE) 500 MG tablet Take 500 mg by mouth 2 times daily (with meals)    Yes Historical Provider, MD   insulin aspart (NOVOLOG) 100 UNIT/ML injection vial Inject 0-4 Units into the skin 3 times daily (before meals) Inject as per sliding scale   Yes Historical Provider, MD   ferrous sulfate 325 (65 Fe) MG tablet Take 325 mg by mouth 3 times daily (with meals)    Yes Historical Provider, MD   amLODIPine (NORVASC) 5 MG tablet Take 5 mg by mouth daily    Yes Historical Provider, MD   aspirin 81 MG tablet Take 81 mg by mouth daily    Yes Historical Provider, MD     Allergies:  Patient has no known allergies. Social History:      The patient currently lives at [facility]:   reports that he quit smoking about 14 months ago. He has never used smokeless tobacco.  ETOH:   reports that he does not drink alcohol. Family History:    History reviewed. No pertinent family history. REVIEW OF SYSTEMS: Pertinent positives as noted in the HPI. All other systems reviewed and negative. ROS: Review of Systems   Constitutional: Positive for appetite change. Negative for chills and fever. HENT: Positive for trouble swallowing. Respiratory: Negative for cough and shortness of breath. Cardiovascular: Negative for chest pain, palpitations and leg swelling. Gastrointestinal: Positive for vomiting (1 ep on 3/21). Negative for abdominal distention, abdominal pain, constipation, diarrhea and nausea. Genitourinary: Positive for decreased urine volume. Negative for difficulty urinating, dysuria, frequency and genital sores.    Musculoskeletal: Negative. Skin: Negative. Neurological: Positive for speech difficulty (Slow) and weakness. Negative for dizziness, light-headedness and headaches. Psychiatric/Behavioral: Positive for confusion. PHYSICAL EXAM PERFORMED:    Temp  Av.1 °F (33.4 °C)  Min: 90.3 °F (32.4 °C)  Max: 93.4 °F (34.1 °C)  Pulse  Av.8  Min: 61  Max: 73  BP  Min: 73/52  Max: 101/58  SpO2  Av.1 %  Min: 88 %  Max: 100 %  Patient Vitals for the past 4 hrs:   BP Temp Temp src Pulse Resp SpO2 Height Weight   19 1900 (!) 93/58 93.4 °F (34.1 °C) CORE 67 13 97 % 5' 5\" (1.651 m) 94 lb 12.8 oz (43 kg)   19 1754 (!) 73/52 92.8 °F (33.8 °C) -- 73 19 98 % -- --   19 1745 (!) 73/51 -- -- 63 12 97 % -- --   19 1730 (!) 101/58 92.5 °F (33.6 °C) -- 69 17 98 % -- --   19 1715 (!) 74/57 -- -- 65 11 91 % -- --   19 1704 (!) 88/55 -- -- 63 11 97 % -- --   19 1700 87/62 (!) 91.8 °F (33.2 °C) CORE 64 13 99 % -- --   19 1645 91/61 -- -- 61 10 (!) 88 % -- --   19 1630 92/67 -- -- 64 13 -- -- --   19 1615 90/70 (!) 91.6 °F (33.1 °C) -- 63 12 100 % 5' 5\" (1.651 m) 89 lb 8 oz (40.6 kg)   19 1608 -- -- -- -- -- -- -- 89 lb 1.6 oz (40.4 kg)   19 1600 (!) 88/56 -- -- 62 16 -- -- --   19 1550 (!) 74/61 (!) 90.3 °F (32.4 °C) CORE 63 18 -- -- --     No intake or output data in the 24 hours ending 19  Physical Exam   Constitutional: He is oriented to person, place, and time. He appears well-developed. He appears cachectic. He is easily aroused. No distress. Nasal cannula in place. Frail, laying in bed   HENT:   Head: Normocephalic and atraumatic. Nose: Nose normal.   Mouth/Throat: Oropharynx is clear and moist.   Eyes: Conjunctivae and EOM are normal. No scleral icterus. Neck: Normal range of motion. Neck supple. No JVD present. No tracheal deviation present. Cardiovascular: Normal rate, regular rhythm, normal heart sounds and intact distal pulses. PORTABLE   Final Result      Right lower lung opacity present previously improved likely relates to improvement of the atelectasis and/or pneumonitis. Right lower lobe bronchiectasis present previously. ASSESSMENT & PLAN:  Anthony De La Vega is a 66 y.o. male PMH ischemic MCA stroke, CKD, FTT, presents from NH with electrolyte imbalances, found to be hypothermic, hypernatremic, hyperkalemic with SANDY on CKD. Hypotension. Concern for sepsis, volume depletion secondary to poor oral intake. Received 1L bolus. Remains hypotensive. Giving D5 with bicarb at 60 cc/hr. Levophed for hypotension. Active Hospital Problems    Diagnosis Date Noted    possible sepsis [A41.9] 03/26/2019    Hypothermia [I46. XXXA] 03/26/2019    SANDY (acute kidney injury) ([facility] [N17.9] 56/87/0075    Metabolic acidosis [V72.8] 03/26/2019    Acute metabolic encephalopathy [E02.90] 03/26/2019     Neuro/Sedation  Acute metabolic encephalopathy - Likely 2/2 sepsis w/ h/o ischemic MCA stroke, HCT w/o acute stroke/mass/hemorrhage  - Infxn w/u as below    Hypothermia to the 90s  - Possibly 2/2 sepsis, HCT as above  - TSH w/ reflex, cortisol - pending  - [employer]even [employer], if not improved consider warming fluids  - watch for rewarming     Respiratory  Previously treated PNA - CXR w/ RLL opacity improved, bronchiectasis  - RespFilmArray, RapidFlu, RespCx, MRSA screen, procal - pending  - ABG - pending    Cardiovascular  Hypotension - Likely 2/2 hypovolemia 2/2 dec PO intake, but still some c/f sepsis w/ concomitant hypothermia, lactic acidosis w/o sig leukocytosis, s/p 1L NS bolus  - 150meq bicarb in 1L D5 @ 60ml/hr d/t hypernatremia and acidosis  - 500ml NS bolus  - Levophed, wean as tolerated  - BCx x2, UCx, Resp W/u - pending  - Zosyn/Zyvox (No vanc d/t SANDY)  - Lactate q6h    Paroxysmal Afib - rate controlled on coreg  - Eliquis  - HOLD BB d/t HoTN    CHFpEF  - HOLD coreg d/t hypotension  - Strict I/O, daily wts    CAD s/p cardiac arrest  - Holding ASA    Chronic Fe Def Anemia  - Supp Fe    SANDY on CKD - Likely prerenal, 2/2 hypovolemia 2/2 dec PO intake Cr 5.9 (2.4 several wks ago), , s/p 1L bolus  - RFP q6h  - Strict I/Os  - UA unremarkable, UCx - pending  - hold nephrotoxic meds  - If K and Na do not improve, consider Nephro consult (Kidney HTN)    IDDMII - Was on metformin, SSI, and basal  - Hypoglycemia protocol  - LDSSI  - accuchecks q6h    FEN/GI  Elyte Abnmls   - Hypernatremia   - Na 150, if fluids, use <150mEq Na  - Hyperkalemia - No gross EKG changes   - Repeat EKG  - RFPs as above    Oral Dysphagia 2/2 CVA w/ sev protein calorie malnutrition 2/2 stroke - had PEG tube previously, but was removed 12/2018, dec PO intake per NH  - SLP Eval and Treat  - Dietitian Consulted  - NGT placement    Palliative Care Consulted    Prophylaxis: AC'ed on Eliquis  Diet: Diet NPO Effective Now  Code Status: Full Code    Dispo - ICU for hypothermia, hyperkalemia, hypernatremia, hypotension     Austyn Cortés MD    I will discuss the patient with the senior resident and MD Austyn Velasquez MD  Internal Medicine Resident, PGY-1

## 2019-03-26 NOTE — PROGRESS NOTES
Zosyn 3.375g IV q8 hours ordered for patient. This medication is renally eliminated. Will change to q12 hours per renal dose adjustment policy. Estimated CrCl < 10 mL/min  SANDY on CKD    Pharmacy will continue to monitor renal function and adjust dose as necessary. Please call with any questions.     Thanks  James Dockery, [facility] 3/26/2019, 7:35 PM

## 2019-03-26 NOTE — PROGRESS NOTES
Patient picked up from ED. Notified HUC that we were taking patient to ICU and to notify RN if in another room. SBP in 70s on arrival, patient alert and oriented, but slow responses. Currently on 2L NC, temp sensing link in place with CORE temp of 34.1C. Skin intact, but lower extremities very dry, scaly.  Patient states he does have a daughter in town, ICU residents aware of patient's arrival.  Dr Clifton Torres at bedside, 1L NS bolus started per Dr Clifton Torres.

## 2019-03-26 NOTE — ED NOTES
Home Med List is complete. Source of medications in list is [facility].     Patient's RN states that he did have all morning meds today, with the exception of BP meds.   These were held for low BP.        Zander Davis PharmD., [facility]   3/26/2019 4:41 PM  Wireless: [phone]

## 2019-03-26 NOTE — ED PROVIDER NOTES
[address] [facility] [address] ENCOUNTER          ATTENDING PHYSICIAN NOTE       Date of evaluation: 3/26/2019    Chief Complaint     Fatigue      History of Present Illness     Vimal Walker is a 66 y.o. male who presents to the emergency department from his nursing facility with abnormal labs. The patient has a previous history of stroke leading to dysphasia and some decreased by mouth intake with possible aspiration. He had initial impression 1 month ago for acute renal failure secondary to prerenal failure. He had laboratory investigations done at his nursing facility, which resulted earlier today and showed a sodium of 190 and a creatinine of 5.6 , as well as a BUN greater than 100. Review of Systems     As documented in the HPI, otherwise all other systems were reviewed and were negative. Past Medical, Surgical, Family, and Social History     He has a past medical history of Acute renal failure ([address] Acute respiratory failure with hypoxia ([address] Angina pectoris ([address] 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[address] CAD (coronary artery disease), Cardiac arrest ([address] Cardiac arrest ([address] Chronic systolic heart failure ([address] Diabetes mellitus ([address] Diabetes mellitus ([address] Diabetes mellitus type II, uncontrolled ([address] DKA (diabetic ketoacidoses) ([address] Elevated LFTs, Femoral neck fracture ([address] HTN (hypertension), Hyperkalemia, Hyperlipidemia, Hypertension, PAF (paroxysmal atrial fibrillation) ([address] Paroxysmal A-fib ([address] PEA (Pulseless electrical activity) ([address] Pneumonia, Pneumonia due to organism, Pneumonia of right lower lobe due to Streptococcus pneumoniae ([address] Protein-calorie malnutrition, severe ([address] S/P hip hemiarthroplasty, Septic shock ([address] Systolic CHF ([address] Thrombocytopenia ([address] Thrombocytopenia ([address] and Type 2 diabetes mellitus with hyperglycemia ([address]   He has a past surgical history that includes Gastrostomy tube placement; joint replacement; and gastrostomy tube change (N/A, 12/14/2018). His family history is not on file. He reports that he quit smoking about 14 months ago. He has never used smokeless tobacco. He reports that he does not drink alcohol or use drugs. Medications     Previous Medications    AMLODIPINE (NORVASC) 5 MG TABLET    Take 5 mg by mouth daily     APIXABAN (ELIQUIS) 2.5 MG TABS TABLET    Take 1 tablet by mouth 2 times daily    ASPIRIN 81 MG TABLET    Take 81 mg by mouth daily     CARVEDILOL (COREG) 6.25 MG TABLET    Take 6.25 mg by mouth 2 times daily (with meals)    FERROUS SULFATE 325 (65 FE) MG TABLET    Take 325 mg by mouth 3 times daily (with meals)     INSULIN ASPART (NOVOLOG) 100 UNIT/ML INJECTION VIAL    Inject 0-4 Units into the skin 3 times daily (before meals) Inject as per sliding scale    INSULIN GLARGINE (TOUJEO MAX SOLOSTAR) 300 UNIT/ML INJECTION PEN    Inject 5 Units into the skin nightly    LISINOPRIL (PRINIVIL;ZESTRIL) 5 MG TABLET    Take 5 mg by mouth daily    MEGESTROL (MEGACE) 40 MG/ML SUSPENSION    Take 400 mg by mouth daily    METFORMIN (GLUCOPHAGE) 500 MG TABLET    Take 500 mg by mouth 2 times daily (with meals)     MIRTAZAPINE (REMERON) 15 MG TABLET    Take 15 mg by mouth nightly    VITAMIN D (ERGOCALCIFEROL) 32264 UNITS CAPS CAPSULE    Take 50,000 Units by mouth Twice a Week Give on Monday and Thursday       Allergies     He has No Known Allergies.     Physical Exam     INITIAL VITALS: BP: (!) 74/61, Temp: (!) 90.3 °F (32.4 °C), Pulse: 63, Resp: 18, SpO2: 100 %   General: Frail, cachectic, 24-year-old male, sitting in bed in no acute cardiac or disease  HEENT:  head is atraumatic, pupils equal round and reactive to light, sclera are clear, oropharynx is nonerythematous  Neck: supple, no lymphadenopathy  Chest: clear to auscultation bilaterally with no wheezes rhonchi, rales  Cardiovascular: Regular, rate, and rhythm, normal S1S2, no murmurs, rubs, or gallops, 2+ radial pulses bilaterally, capillary refill 2 seconds  Abdominal: Soft, investigations sent. IV access was obtained. He was found to have acute renal failure with creatinine of 5.9, BUN of 111. Sodium was 150 for us. Potassium was 5.9. His EKG did not demonstrate any changes consistent with hyperkalemia. The patient's CBC did have 1% bands and some [place] cells but had no evidence of significant leukocytosis, lactic acid was slightly greater than 2. He was given 1 L of IV fluids. There was concern for possible sepsis given his hypothermia. The patient was given mitomycin and cefepime. The chest x-ray showed a right lower lung opacity, which was present. Previous. His urinalysis currently pending. Blood cultures, urine cultures have been sent. At this point time, the patient will be admitted to the ICU for further care and management of hypothermia and the acute renal failure with possible sepsis superimposed. Due to the immediate potential for life-threatening deterioration due to . Acute renal failure, hypothermia, I spent 35 minutes providing direct bedside critical care. This time is excluding time spent supervising residents and time spent performing separately billed procedures      Clinical Impression     1. Hypothermia, initial encounter    2. Acute renal failure, unspecified acute renal failure type ([facility]        Disposition     PATIENT REFERRED TO:  No follow-up provider specified.     DISCHARGE MEDICATIONS:  New Prescriptions    No medications on file       DISPOSITION           Sandra Marcelino MD  03/26/19 [id number]

## 2019-03-27 PROBLEM — E43 SEVERE MALNUTRITION (HCC): Chronic | Status: ACTIVE | Noted: 2019-03-27

## 2019-03-27 PROBLEM — E03.9 ACQUIRED HYPOTHYROIDISM: Status: ACTIVE | Noted: 2019-03-27

## 2019-03-27 LAB
ALBUMIN SERPL-MCNC: 2.2 G/DL (ref 3.4–5)
ALBUMIN SERPL-MCNC: 2.3 G/DL (ref 3.4–5)
ALBUMIN SERPL-MCNC: 2.5 G/DL (ref 3.4–5)
AMMONIA: 31 UMOL/L (ref 16–60)
ANION GAP SERPL CALCULATED.3IONS-SCNC: 12 MMOL/L (ref 3–16)
ANION GAP SERPL CALCULATED.3IONS-SCNC: 13 MMOL/L (ref 3–16)
ANION GAP SERPL CALCULATED.3IONS-SCNC: 14 MMOL/L (ref 3–16)
ANION GAP SERPL CALCULATED.3IONS-SCNC: 16 MMOL/L (ref 3–16)
ANION GAP SERPL CALCULATED.3IONS-SCNC: 16 MMOL/L (ref 3–16)
ANTI-XA UNFRAC HEPARIN: >1.8 IU/ML (ref 0.3–0.7)
APTT: 75 SEC (ref 26–36)
BASE EXCESS ARTERIAL: -9 (ref -3–3)
BASE EXCESS VENOUS: -9 (ref -3–3)
BASOPHILS ABSOLUTE: 0 K/UL (ref 0–0.2)
BASOPHILS RELATIVE PERCENT: 0.2 %
BUN BLDV-MCNC: 100 MG/DL (ref 7–20)
BUN BLDV-MCNC: 92 MG/DL (ref 7–20)
BUN BLDV-MCNC: 93 MG/DL (ref 7–20)
BUN BLDV-MCNC: 94 MG/DL (ref 7–20)
BUN BLDV-MCNC: 97 MG/DL (ref 7–20)
CALCIUM IONIZED: 1.15 MMOL/L (ref 1.12–1.32)
CALCIUM SERPL-MCNC: 7.4 MG/DL (ref 8.3–10.6)
CALCIUM SERPL-MCNC: 7.5 MG/DL (ref 8.3–10.6)
CALCIUM SERPL-MCNC: 7.6 MG/DL (ref 8.3–10.6)
CHLORIDE BLD-SCNC: 116 MMOL/L (ref 99–110)
CHLORIDE BLD-SCNC: 118 MMOL/L (ref 99–110)
CHLORIDE BLD-SCNC: 122 MMOL/L (ref 99–110)
CO2: 17 MMOL/L (ref 21–32)
CO2: 17 MMOL/L (ref 21–32)
CO2: 19 MMOL/L (ref 21–32)
CO2: 20 MMOL/L (ref 21–32)
CO2: 21 MMOL/L (ref 21–32)
CORTISOL TOTAL: 22.5 UG/DL
CREAT SERPL-MCNC: 4.8 MG/DL (ref 0.8–1.3)
CREAT SERPL-MCNC: 5 MG/DL (ref 0.8–1.3)
CREAT SERPL-MCNC: 5 MG/DL (ref 0.8–1.3)
CREAT SERPL-MCNC: 5.1 MG/DL (ref 0.8–1.3)
CREAT SERPL-MCNC: 5.3 MG/DL (ref 0.8–1.3)
EKG ATRIAL RATE: 68 BPM
EKG DIAGNOSIS: NORMAL
EKG P AXIS: 76 DEGREES
EKG P-R INTERVAL: 122 MS
EKG Q-T INTERVAL: 452 MS
EKG QRS DURATION: 114 MS
EKG QTC CALCULATION (BAZETT): 480 MS
EKG R AXIS: 55 DEGREES
EKG T AXIS: 91 DEGREES
EKG VENTRICULAR RATE: 68 BPM
EOSINOPHILS ABSOLUTE: 0.1 K/UL (ref 0–0.6)
EOSINOPHILS RELATIVE PERCENT: 1.9 %
GFR AFRICAN AMERICAN: 13
GFR AFRICAN AMERICAN: 13
GFR AFRICAN AMERICAN: 14
GFR NON-AFRICAN AMERICAN: 11
GFR NON-AFRICAN AMERICAN: 12
GLUCOSE BLD-MCNC: 101 MG/DL (ref 70–99)
GLUCOSE BLD-MCNC: 103 MG/DL (ref 70–99)
GLUCOSE BLD-MCNC: 129 MG/DL (ref 70–99)
GLUCOSE BLD-MCNC: 173 MG/DL (ref 70–99)
GLUCOSE BLD-MCNC: 175 MG/DL (ref 70–99)
GLUCOSE BLD-MCNC: 176 MG/DL (ref 70–99)
GLUCOSE BLD-MCNC: 212 MG/DL (ref 70–99)
GLUCOSE BLD-MCNC: 218 MG/DL (ref 70–99)
GLUCOSE BLD-MCNC: 242 MG/DL (ref 70–99)
GLUCOSE BLD-MCNC: 87 MG/DL (ref 70–99)
GLUCOSE BLD-MCNC: 98 MG/DL (ref 70–99)
HCO3 ARTERIAL: 17 MMOL/L (ref 21–29)
HCO3 VENOUS: 17.2 MMOL/L (ref 23–29)
HCT VFR BLD CALC: 25.1 % (ref 40.5–52.5)
HEMOGLOBIN: 8.3 G/DL (ref 13.5–17.5)
INR BLD: 2.02 (ref 0.86–1.14)
LACTATE: 0.79 MMOL/L (ref 0.4–2)
LACTATE: 2.58 MMOL/L (ref 0.4–2)
LACTIC ACID, SEPSIS: 1.5 MMOL/L (ref 0.4–1.9)
LYMPHOCYTES ABSOLUTE: 0.5 K/UL (ref 1–5.1)
LYMPHOCYTES RELATIVE PERCENT: 9.6 %
MAGNESIUM: 1.8 MG/DL (ref 1.8–2.4)
MAGNESIUM: 1.9 MG/DL (ref 1.8–2.4)
MAGNESIUM: 2 MG/DL (ref 1.8–2.4)
MAGNESIUM: 2.2 MG/DL (ref 1.8–2.4)
MCH RBC QN AUTO: 30.5 PG (ref 26–34)
MCHC RBC AUTO-ENTMCNC: 33.1 G/DL (ref 31–36)
MCV RBC AUTO: 92.1 FL (ref 80–100)
MONOCYTES ABSOLUTE: 0.3 K/UL (ref 0–1.3)
MONOCYTES RELATIVE PERCENT: 6.9 %
NEUTROPHILS ABSOLUTE: 4 K/UL (ref 1.7–7.7)
NEUTROPHILS RELATIVE PERCENT: 81.4 %
O2 SAT, ARTERIAL: 74 % (ref 93–100)
O2 SAT, VEN: 30 %
PCO2 ARTERIAL: 34 MM HG (ref 35–45)
PCO2, VEN: 34.8 MM HG (ref 40–50)
PDW BLD-RTO: 15.7 % (ref 12.4–15.4)
PERFORMED ON: ABNORMAL
PERFORMED ON: NORMAL
PH ARTERIAL: 7.31 (ref 7.35–7.45)
PH VENOUS: 7.3 (ref 7.35–7.45)
PHOSPHORUS: 3.5 MG/DL (ref 2.5–4.9)
PHOSPHORUS: 3.6 MG/DL (ref 2.5–4.9)
PHOSPHORUS: 3.8 MG/DL (ref 2.5–4.9)
PHOSPHORUS: 4 MG/DL (ref 2.5–4.9)
PHOSPHORUS: 4.3 MG/DL (ref 2.5–4.9)
PLATELET # BLD: 179 K/UL (ref 135–450)
PMV BLD AUTO: 9.7 FL (ref 5–10.5)
PO2 ARTERIAL: 42.4 MM HG (ref 75–108)
PO2, VEN: 21 MM HG
POC POTASSIUM: 4.7 MMOL/L (ref 3.5–5.1)
POC SAMPLE TYPE: ABNORMAL
POC SAMPLE TYPE: ABNORMAL
POC SODIUM: 149 MMOL/L (ref 136–145)
POTASSIUM SERPL-SCNC: 4 MMOL/L (ref 3.5–5.1)
POTASSIUM SERPL-SCNC: 4.2 MMOL/L (ref 3.5–5.1)
POTASSIUM SERPL-SCNC: 4.2 MMOL/L (ref 3.5–5.1)
POTASSIUM SERPL-SCNC: 4.4 MMOL/L (ref 3.5–5.1)
POTASSIUM SERPL-SCNC: 4.4 MMOL/L (ref 3.5–5.1)
PROTHROMBIN TIME: 23 SEC (ref 9.8–13)
RBC # BLD: 2.72 M/UL (ref 4.2–5.9)
REPORT: NORMAL
RESPIRATORY PANEL PCR: NORMAL
SODIUM BLD-SCNC: 149 MMOL/L (ref 136–145)
SODIUM BLD-SCNC: 151 MMOL/L (ref 136–145)
SODIUM BLD-SCNC: 151 MMOL/L (ref 136–145)
SODIUM BLD-SCNC: 152 MMOL/L (ref 136–145)
SODIUM BLD-SCNC: 154 MMOL/L (ref 136–145)
T4 FREE: 1 NG/DL (ref 0.9–1.8)
TCO2 ARTERIAL: 18 MMOL/L
TCO2 CALC VENOUS: 18 MMOL/L
TSH REFLEX: 12.41 UIU/ML (ref 0.27–4.2)
URINE CULTURE, ROUTINE: NORMAL
WBC # BLD: 4.9 K/UL (ref 4–11)

## 2019-03-27 PROCEDURE — 2500000003 HC RX 250 WO HCPCS: Performed by: STUDENT IN AN ORGANIZED HEALTH CARE EDUCATION/TRAINING PROGRAM

## 2019-03-27 PROCEDURE — 6370000000 HC RX 637 (ALT 250 FOR IP): Performed by: STUDENT IN AN ORGANIZED HEALTH CARE EDUCATION/TRAINING PROGRAM

## 2019-03-27 PROCEDURE — 2580000003 HC RX 258: Performed by: STUDENT IN AN ORGANIZED HEALTH CARE EDUCATION/TRAINING PROGRAM

## 2019-03-27 PROCEDURE — 6360000002 HC RX W HCPCS: Performed by: STUDENT IN AN ORGANIZED HEALTH CARE EDUCATION/TRAINING PROGRAM

## 2019-03-27 PROCEDURE — 85520 HEPARIN ASSAY: CPT

## 2019-03-27 PROCEDURE — 82140 ASSAY OF AMMONIA: CPT

## 2019-03-27 PROCEDURE — 80069 RENAL FUNCTION PANEL: CPT

## 2019-03-27 PROCEDURE — 36556 INSERT NON-TUNNEL CV CATH: CPT

## 2019-03-27 PROCEDURE — 83735 ASSAY OF MAGNESIUM: CPT

## 2019-03-27 PROCEDURE — 85025 COMPLETE CBC W/AUTO DIFF WBC: CPT

## 2019-03-27 PROCEDURE — 99221 1ST HOSP IP/OBS SF/LOW 40: CPT | Performed by: NURSE PRACTITIONER

## 2019-03-27 PROCEDURE — 92610 EVALUATE SWALLOWING FUNCTION: CPT

## 2019-03-27 PROCEDURE — 82533 TOTAL CORTISOL: CPT

## 2019-03-27 PROCEDURE — 85610 PROTHROMBIN TIME: CPT

## 2019-03-27 PROCEDURE — 93010 ELECTROCARDIOGRAM REPORT: CPT | Performed by: INTERNAL MEDICINE

## 2019-03-27 PROCEDURE — 80074 ACUTE HEPATITIS PANEL: CPT

## 2019-03-27 PROCEDURE — 36415 COLL VENOUS BLD VENIPUNCTURE: CPT

## 2019-03-27 PROCEDURE — 2580000003 HC RX 258: Performed by: INTERNAL MEDICINE

## 2019-03-27 PROCEDURE — 85730 THROMBOPLASTIN TIME PARTIAL: CPT

## 2019-03-27 PROCEDURE — 99233 SBSQ HOSP IP/OBS HIGH 50: CPT | Performed by: INTERNAL MEDICINE

## 2019-03-27 PROCEDURE — 2000000000 HC ICU R&B

## 2019-03-27 PROCEDURE — 83605 ASSAY OF LACTIC ACID: CPT

## 2019-03-27 PROCEDURE — 2500000003 HC RX 250 WO HCPCS: Performed by: INTERNAL MEDICINE

## 2019-03-27 RX ORDER — HEPARIN SODIUM 5000 [USP'U]/ML
60 INJECTION, SOLUTION INTRAVENOUS; SUBCUTANEOUS PRN
Status: DISCONTINUED | OUTPATIENT
Start: 2019-03-27 | End: 2019-03-28

## 2019-03-27 RX ORDER — HEPARIN SODIUM 10000 [USP'U]/100ML
6 INJECTION, SOLUTION INTRAVENOUS CONTINUOUS
Status: DISCONTINUED | OUTPATIENT
Start: 2019-03-27 | End: 2019-03-28

## 2019-03-27 RX ORDER — 0.9 % SODIUM CHLORIDE 0.9 %
5 VIAL (ML) INJECTION DAILY
Status: DISCONTINUED | OUTPATIENT
Start: 2019-03-27 | End: 2019-04-07 | Stop reason: HOSPADM

## 2019-03-27 RX ORDER — SODIUM CHLORIDE 9 MG/ML
INJECTION, SOLUTION INTRAVENOUS
Status: DISPENSED
Start: 2019-03-27 | End: 2019-03-27

## 2019-03-27 RX ORDER — HEPARIN SODIUM 5000 [USP'U]/ML
60 INJECTION, SOLUTION INTRAVENOUS; SUBCUTANEOUS ONCE
Status: COMPLETED | OUTPATIENT
Start: 2019-03-27 | End: 2019-03-27

## 2019-03-27 RX ORDER — ASPIRIN 81 MG/1
81 TABLET, CHEWABLE ORAL DAILY
Status: DISCONTINUED | OUTPATIENT
Start: 2019-03-27 | End: 2019-03-28

## 2019-03-27 RX ORDER — LEVOTHYROXINE SODIUM 0.07 MG/1
75 TABLET ORAL DAILY
Status: DISCONTINUED | OUTPATIENT
Start: 2019-03-27 | End: 2019-03-27

## 2019-03-27 RX ORDER — LEVOTHYROXINE SODIUM ANHYDROUS 100 UG/5ML
37.5 INJECTION, POWDER, LYOPHILIZED, FOR SOLUTION INTRAVENOUS DAILY
Status: DISCONTINUED | OUTPATIENT
Start: 2019-03-27 | End: 2019-03-27 | Stop reason: ALTCHOICE

## 2019-03-27 RX ORDER — HEPARIN SODIUM 5000 [USP'U]/ML
30 INJECTION, SOLUTION INTRAVENOUS; SUBCUTANEOUS PRN
Status: DISCONTINUED | OUTPATIENT
Start: 2019-03-27 | End: 2019-03-28

## 2019-03-27 RX ORDER — 0.9 % SODIUM CHLORIDE 0.9 %
5 VIAL (ML) INJECTION DAILY
Status: DISCONTINUED | OUTPATIENT
Start: 2019-03-27 | End: 2019-03-27 | Stop reason: ALTCHOICE

## 2019-03-27 RX ORDER — LEVOTHYROXINE SODIUM ANHYDROUS 100 UG/5ML
37.5 INJECTION, POWDER, LYOPHILIZED, FOR SOLUTION INTRAVENOUS DAILY
Status: DISCONTINUED | OUTPATIENT
Start: 2019-03-27 | End: 2019-04-07 | Stop reason: HOSPADM

## 2019-03-27 RX ADMIN — Medication 10 ML: at 20:50

## 2019-03-27 RX ADMIN — SODIUM CHLORIDE, PRESERVATIVE FREE 5 ML: 5 INJECTION INTRAVENOUS at 14:53

## 2019-03-27 RX ADMIN — LEVOTHYROXINE SODIUM ANHYDROUS 37.5 MCG: 100 INJECTION, POWDER, LYOPHILIZED, FOR SOLUTION INTRAVENOUS at 14:48

## 2019-03-27 RX ADMIN — DEXTROSE AND SODIUM CHLORIDE: 5; 450 INJECTION, SOLUTION INTRAVENOUS at 19:41

## 2019-03-27 RX ADMIN — ASPIRIN 81 MG 81 MG: 81 TABLET ORAL at 11:10

## 2019-03-27 RX ADMIN — NOREPINEPHRINE BITARTRATE 2 MCG/MIN: 1 INJECTION INTRAVENOUS at 00:29

## 2019-03-27 RX ADMIN — HEPARIN SODIUM 2650 UNITS: 5000 INJECTION INTRAVENOUS; SUBCUTANEOUS at 21:22

## 2019-03-27 RX ADMIN — INSULIN LISPRO 1 UNITS: 100 INJECTION, SOLUTION INTRAVENOUS; SUBCUTANEOUS at 08:42

## 2019-03-27 RX ADMIN — INSULIN LISPRO 2 UNITS: 100 INJECTION, SOLUTION INTRAVENOUS; SUBCUTANEOUS at 11:54

## 2019-03-27 RX ADMIN — HEPARIN SODIUM AND DEXTROSE 12 UNITS/KG/HR: 10000; 5 INJECTION INTRAVENOUS at 00:28

## 2019-03-27 RX ADMIN — PIPERACILLIN SODIUM,TAZOBACTAM SODIUM 3.38 G: 3; .375 INJECTION, POWDER, FOR SOLUTION INTRAVENOUS at 07:47

## 2019-03-27 RX ADMIN — Medication 10 ML: at 09:09

## 2019-03-27 RX ADMIN — SODIUM BICARBONATE: 84 INJECTION, SOLUTION INTRAVENOUS at 15:40

## 2019-03-27 RX ADMIN — MUPIROCIN: 20 OINTMENT TOPICAL at 08:03

## 2019-03-27 RX ADMIN — HEPARIN SODIUM AND DEXTROSE 9 UNITS/KG/HR: 10000; 5 INJECTION INTRAVENOUS at 21:22

## 2019-03-27 RX ADMIN — APIXABAN 2.5 MG: 5 TABLET, FILM COATED ORAL at 11:10

## 2019-03-27 RX ADMIN — NOREPINEPHRINE BITARTRATE 3 MCG/MIN: 1 INJECTION INTRAVENOUS at 07:27

## 2019-03-27 ASSESSMENT — PAIN SCALES - GENERAL
PAINLEVEL_OUTOF10: 0

## 2019-03-27 ASSESSMENT — ENCOUNTER SYMPTOMS
DIARRHEA: 0
COUGH: 1
CONSTIPATION: 0
SHORTNESS OF BREATH: 0

## 2019-03-27 NOTE — FLOWSHEET NOTE
03/27/19 1525   Encounter Summary   Services provided to: Patient  Kee Cabral, cousin, was present. )   Referral/Consult From: [address] [facility] Family members   Continue Visiting   (Seen 3/27/19, ADRY. )   Complexity of Encounter Moderate   Length of Encounter 15 minutes   Routine   Type Initial   Assessment Approachable   Intervention Nurtured hope   Outcome Expressed gratitude

## 2019-03-27 NOTE — PROGRESS NOTES
Nutrition Assessment    Type and Reason for Visit: Initial, Consult    Nutrition Recommendations:     1. Full liquid, honey-thick diet per SLP recommendations. Monitor tolerance and ability to advance. 2. Add Magic cup BID, encourage acceptance  3. Monitor nutritional adequacy and clinical progress    Nutrition Assessment: Consult for supplement recommendations. Patient nutritionally compromised as he meets ASPEN criteria for severe protein calorie malnutrition. Patient with hx od dysphagia and poor po intake requiring PEG however removed January 2019. SLP recommends full honey thick liquids until MBS completed. Patient is edentulous. EMR reflects significant wt loss of 5% ~1 month and 19% over the past three months. Will order ONS and monitor nutritional status. Malnutrition Assessment:  · Malnutrition Status: Meets the criteria for severe malnutrition  · Context: Chronic illness  · Findings of the 6 clinical characteristics of malnutrition (Minimum of 2 out of 6 clinical characteristics is required to make the diagnosis of moderate or severe Protein Calorie Malnutrition based on AND/ASPEN Guidelines):  1. Energy Intake-Less than or equal to 75% of estimated energy requirement, Greater than or equal to 1 month    2. Weight Loss-10% loss or greater, in 3 months  3. Fat Loss-Severe subcutaneous fat loss,    4. Muscle Loss-Severe muscle mass loss,    5. Fluid Accumulation-No significant fluid accumulation,    6.   Strength-Not measured    Nutrition Risk Level: High    Nutrient Needs:  · Estimated Daily Total Kcal: 1341-9793  · Estimated Daily Protein (g): 57-66  · Estimated Daily Total Fluid (ml/day): 1540 ml    Nutrition Diagnosis:   · Problem: Severe malnutrition  · Etiology: related to Insufficient energy/nutrient consumption     Signs and symptoms:  as evidenced by Diet history of poor intake, Weight loss greater than or equal to 7.5% in 3 months, Severe loss of subcutaneous fat, Severe muscle loss    Objective Information:  · Nutrition-Focused Physical Findings: +BM 3/26  · Wound Type: None  · Current Nutrition Therapies:  · Oral Diet Orders: Full Liquid, Honey Thick   · Oral Diet intake: 0%  · Oral Nutrition Supplement (ONS) Orders: None  · Anthropometric Measures:  · Ht: 5' 5\" (165.1 cm)   · Current Body Wt: 97 lb (44 kg)  · % Weight Change:  ,  5% loss ~1 month, 19% ~3 months  · Ideal Body Wt: 136 lb (61.7 kg),   · BMI Classification: BMI <18.5 Underweight(16.2)    Nutrition Interventions:   Continue current diet, Start ONS  Continued Inpatient Monitoring    Nutrition Evaluation:   · Evaluation: Goals set   · Goals: Patient will tolerate and consume 50% or greater of all meals and supplements    · Monitoring: Nutrition Progression, Supplement Intake, Diet Tolerance, Pertinent Labs      Electronically signed by Norberto Bee RD, BRITTNEY on 3/27/19 at 10:10 AM    Contact Number: [phone]

## 2019-03-27 NOTE — PROGRESS NOTES
Corpak removed per MD at this time d/t radiologist stating tube not in correct place. Patient tolerated fine, will attempt to place another Corpak. Will continue to monitor.

## 2019-03-27 NOTE — PROGRESS NOTES
Speech Language Pathology  Dysphagia - contact note    RN Bruce Sue notified SLP pt having difficulty with lunch. Observed pt briefly with lunch, pt holding throat and sounding wet/congested. D/w RN and will make pt NPO this date and follow up next session for repeat BS assessment and follow up with repeat MBS as appropriate. Alyssa Paul M.S./[facility]-SLP #[id number]  Pg.  # [id number]

## 2019-03-27 NOTE — PROCEDURES
Jhoan Agudelo is a 66 y.o. male patient. 1. Hypothermia, initial encounter    2. Acute renal failure, unspecified acute renal failure type [facility])      Past Medical History:   Diagnosis Date    Acute renal failure (HCC)     Acute respiratory failure with hypoxia (HCC)     Angina pectoris (HCC)     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[address]     CAD (coronary artery disease)     Cardiac arrest ([address]     Cardiac arrest ([address]     Chronic systolic heart failure ([address]     Diabetes mellitus ([address]     Diabetes mellitus ([address] 10/9/2012    Diabetes mellitus type II, uncontrolled ([address]     4/1017 A1c = 13    DKA (diabetic ketoacidoses) ([address] 04/2017    Elevated LFTs     Femoral neck fracture ([address] 10/17/2017    HTN (hypertension) 10/17/2017    Hyperkalemia     Hyperlipidemia     Hypertension     PAF (paroxysmal atrial fibrillation) ([facility])     Paroxysmal A-fib ([facility])     PEA (Pulseless electrical activity) ([address] 04/13/2017    Pneumonia 04/2017    LIKELY PNEUMOCOCCAL    Pneumonia due to organism     Pneumonia of right lower lobe due to Streptococcus pneumoniae ([facility])     Protein-calorie malnutrition, severe ([address] 04/2017    BMI = 19    S/P hip hemiarthroplasty 12/29/2017    Septic shock (HCC)     Systolic CHF (HCC)     EF 30%    Thrombocytopenia (HCC)     Thrombocytopenia (HCC)     Type 2 diabetes mellitus with hyperglycemia ([address] 11/28/2016     Blood pressure (!) 83/57, pulse 76, temperature 97 °F (36.1 °C), temperature source Core, resp. rate 15, height 5' 5\" (1.651 m), weight 94 lb 12.8 oz (43 kg), SpO2 (!) 81 %. Central Line  Date/Time: 3/26/2019 11:46 PM  Performed by: Jac Carlson DO  Authorized by: Jac Carlson DO   Consent: Verbal consent obtained. Written consent obtained.   Risks and benefits: risks, benefits and alternatives were discussed  Consent given by: guardian  Patient understanding: patient states understanding of the procedure being performed  Patient consent: the patient's understanding of the procedure matches consent given  Procedure consent: procedure consent matches procedure scheduled  Relevant documents: relevant documents present and verified  Test results: test results available and properly labeled  Site marked: the operative site was marked  Imaging studies: imaging studies available  Required items: required blood products, implants, devices, and special equipment available  Patient identity confirmed: arm band and hospital-assigned identification number  Time out: Immediately prior to procedure a \"time out\" was called to verify the correct patient, procedure, equipment, support staff and site/side marked as required.   Indications: vascular access  Anesthesia: local infiltration    Anesthesia:  Local Anesthetic: lidocaine 1% without epinephrine  Anesthetic total: 3 mL    Sedation:  Patient sedated: no    Preparation: skin prepped with ChloraPrep  Skin prep agent dried: skin prep agent completely dried prior to procedure  Sterile barriers: all five maximum sterile barriers used - cap, mask, sterile gown, sterile gloves, and large sterile sheet  Hand hygiene: hand hygiene performed prior to central venous catheter insertion  Location details: right internal jugular  Patient position: Trendelenburg  Catheter size: 7 Fr  Pre-procedure: landmarks identified  Ultrasound guidance: yes  Sterile ultrasound techniques: sterile gel and sterile probe covers were used  Number of attempts: 1  Successful placement: yes  Assessment: placement verified by x-ray  Patient tolerance: Patient tolerated the procedure well with no immediate complications          Liv Eddy DO  3/26/2019

## 2019-03-27 NOTE — PROGRESS NOTES
Medical residents at bedside placing CVC. Patient remains hypotensive, third liter of IVF bolus infusing at this time. This RN remains at bedside, will continue to monitor.

## 2019-03-27 NOTE — PROGRESS NOTES
Attempted to place a second Corpak at this time. Unable to advance corpak, meeting resistance, and patient coughing up thick, white sputum. Notified MD, okay to leave out overnight. Will hold PO meds and start a continuous Heparin drip per MD. Will continue to monitor.

## 2019-03-27 NOTE — PROGRESS NOTES
Speech Language Pathology  Facility/Department: [facility] ICU   BEDSIDE SWALLOW EVALUATION      NAME: Kalee Florian  : 1945  MRN: [id number]    ADMISSION DATE: 3/26/2019  ADMITTING DIAGNOSIS: has Vitamin D deficiency; Vitamin B deficiency; Type 2 diabetes mellitus ([address] Angiopathy, peripheral ([address] Noncompliance with medication regimen; Gonadotropin deficiency ([address] Essential hypertension; Long term current use of insulin ([address] Dyslipidemia; Carotid artery narrowing; Atherosclerosis of coronary artery; Paroxysmal A-fib ([address] Coronary artery disease involving native coronary artery of native heart without angina pectoris; Cardiomyopathy ([address] Uncontrolled type 2 diabetes mellitus with hyperglycemia, with long-term current use of insulin ([address] S/P hip hemiarthroplasty; Acute ischemic right MCA stroke ([address] SANDY (acute kidney injury) ([address] Normocytic anemia; Severe protein-calorie malnutrition ([address] S/P percutaneous endoscopic gastrostomy (PEG) tube placement ([address] Right to left cardiac shunt ([address] H/O ischemic multifocal multiple vascular territories stroke; Anticoagulated; Vertebral artery stenosis, right; Hypoglycemia, exogenous insulin, in setting of SANDY; possible sepsis; Hypothermia; SANDY (acute kidney injury) ([address] Metabolic acidosis; and Acute metabolic encephalopathy on their problem list.  ONSET DATE: 3/26/19    Recent Chest Xray 3/27/19  Right IJ central line in the SVC.  No pneumothorax.      Final Result       Right lower lung opacity present previously improved likely relates to improvement of the atelectasis and/or pneumonitis.       Right lower lobe bronchiectasis present previously.            CT of head 3/27/19      1. No acute stroke, mass, or hemorrhage. 2. Remote infarcts in the bilateral cerebellar and cerebral hemispheres as above. 3. Moderate chronic small vessel ischemic white matter disease. 4. Mild diffuse atrophy.    5. Moderate right mastoid effusion.         Date of Eval: 3/27/2019  Evaluating Therapist: Sakshi Cesar    Current Diet level:  Current Diet : NPO  Current Liquid Diet : NPO    Primary Complaint  Patient Complaint: pt eager to eat. Pt states he would not ever want a feeding tube     Pain:  Pain Assessment  Patient Currently in Pain: No    Reason for Referral  Ethyl Party was referred for a bedside swallow evaluation to assess the efficiency of his swallow function, identify signs and symptoms of aspiration and make recommendations regarding safe dietary consistencies, effective compensatory strategies, and safe eating environment. Previous MBS (#2) 1/18/19  Oral Phase: Mild-moderate oral phase deficits. Pt edentulous and reported that dentures currently at nursing home when questioned and reported \"I can chew anything with or without them\".  Pt [facility] for pureed with fairly timely oral phase with no oral residue.  With cracker trial, pt with slow  A-P tranport and prolonged mastication with minimal residue on the left due to reduced left lingual strength and ROM.     Pharyngeal phase:  Pt with moderate pharyngeal deficits characterized by delay in swallow initiation with most po trials with spillover into pyriform sinus prior to swallow initiation with most po trials.  Minimal to no pharyngeal residue.  Due to reduced laryngeal excursion and elevation, pt with silent aspiration during the swallow with nectar via cup and aspiration with delayed cough response with nectar via cup with chin tuck.  Chin tuck did not eliminate aspiration. No aspiration/penetration with puree x2 trials, honey thick liquids via cup X2 or spoon or with nectar via spoon (only one trial given).  Did not attempt thins due to deficits with nectar.      Previous MBS #1- 4/21/17  Oral Phase:   1. Moderate-severe oral stage dysphagia characterized by suspected decreased mastication and decreased lingual manipulation/control.    2. Prolonged bolus prep and oral transit with lingual mashing.    3. Reduced bolus control with premature bolus loss to the pharynx with all trials.    4. Lingual residue with all trials is cleared with cued repeat swallow. Pharyngeal:   1. Severe pharyngeal stage dysphagia characterized by delayed swallow, decreased laryngeal elevation, and reduced pharyngeal peristalsis.    2. Premature spillage to the valleculae with all trials and to the pyriform with nectar and thin trials.    3. Intermittent, flash penetration with nectar and honey trials.    4. Deep penetration with thin; residue lining laryngeal vestibule evident post-penetration of thin.    5. Vallecular and pyriform residue (vallecular > pyriform) with all trials.    6. Clears liquid residue with cued repeat swallow.    7. Unable to clear puree residue using repeat swallows and/or liquid wash.    8. Patient at severe risk for aspiration after the swallow of observed residues during study. Upper Esophageal Screen:   1. No apparent upper esophageal involvements per screening  Dysphagia Outcome Severity Scale: Level 2: Moderate Severe dysphagia- Maximum assistance or maximum use of strategies with partial PO only    MBS results 2/28/19:  Pt presents with moderate oral and pharyngeal phase dysphagia  Oral- pt with poor A-P transport with all consistencies. Mastication with cracker was prolonged. pt unable to draw honey thick barium up the straw. Pharyngeal- pt presents with incomplete epiglottal closure which resulted in deep penetration of nectar thick barium without spontaneous clearing. Nectar thick barium would have been aspirated it pt was not instructed to cough and clear throat several times. Pt also silently aspirated thin barium during the swallow.  Did not attempt chin tuck as question pt's ability to recall and perform independently as well as this strategy was not beneficial in the past. Pt with no aspiration or penetration with pureed, cracker or honey thick liquids by cup.  Pt with minimal residue remaining in the valleculae and pyriform after the swallow with honey thick liquids and to a slightly greater degree with pureed and cracker. Pt able to clear residue when instructed to dry swallow.        Impression  Dysphagia Diagnosis: Suspected needs further assessment  Dysphagia Impression : Pt with extensive swallowing history, most recent MBS 2/28/19, where dysphagia II/honey thick liquids recommended. NH records state pt was on dental soft/nectar and then downgraded 3/26/19 to puree/nectar. Pt recalls coughing and choking on a turkey sandwich prior to admit. Pt analyzed with puree, honey thick via tsp/cup and soft solids. Pt with no overt signs of aspiration with these consistencies, voice remained clear. Swallow movement felt upon palpation of anterior neck. Pt is edentulous, demonstrated effective mastication with soft solid, though some lingual residue noted. Recommend trial Full/honey thick liquids (liquid per prior MBS recommendation) with close monitoring for s/s of aspiration. Will follow up to determine toleration, need to repeat MBS vs ability to advance texture. Dysphagia Outcome Severity Scale: Level 3: Moderate dysphagia- Total assisstance, supervision or strategies. Two or more diet consistencies restricted     Treatment Plan  Requires SLP Intervention: Yes  Duration/Frequency of Treatment: 3-5 x  D/C Recommendations: To be determined       Recommended Diet and Intervention  Diet Recommendation: Full Honey thick liquids - no straw - if any s/s of aspiration emerge, or there is respiratory decline,  make NPO until further evaluated by SLP    Recommended Form of Meds: Meds in puree  Therapeutic Interventions: Diet tolerance monitoring;Oral care; Patient/Family education    Compensatory Swallowing Strategies  Upright as possible for all oral intake;  Remain upright for 30-45 minutes after meals;  Small bites/sips;   No straws   Effortful swallow     Treatment/Goals  Pt to be seen to address the following goals:  1-The patient will tolerate recommended diet without observed clinical signs of aspiration    2- The patient Mick Raymundo will verbalize/demonstrate understanding of dysphagia recommendations  3/27: Educated pt to purpose of visit, s/s of aspiration, concern if aspiration occurs, rationale for diet recommendation/strategies to reduce risk for aspiration and instruction to notify staff if any signs emerge. Pt stated understanding. Cont goal    General  Chart Reviewed: Yes  Behavior/Cognition: Alert; Cooperative;Pleasant mood  Respiratory Status: O2 via nasual cannula  Follows Directions: Simple  Dentition: Edentulous  Patient Positioning: Upright in bed  Baseline Vocal Quality: Normal  Volitional Cough: Strong  Prior Dysphagia History: pt has been seen extensively by dysphagia team.  Most recently, pt had MBS 2/2/19. Results as outlined above  Consistencies Administered: Soft solid;Puree; Honey - teaspoon;Honey - cup    Vision/Hearing  Vision  Vision: Within Functional Limits  Hearing  Hearing: Within functional limits    Oral Motor Deficits  Oral/Motor  Oral Motor: Within functional limits    Oral Phase Dysfunction  Pt is edentulous, demonstrated effective mastication with soft solid, though some lingual residue noted. Indicators of Pharyngeal Phase Dysfunction  Pt with extensive swallowing history, most recent MBS 2/28/19, where dysphagia II/honey thick liquids recommended. NH records state pt was on dental soft/nectar and then downgraded 3/26/19 to puree/nectar. Pt recalls coughing and choking on a turkey sandwich prior to admit. Pt analyzed with puree, honey thick via tsp/cup and soft solids. Pt with no overt signs of aspiration with these consistencies, voice remained clear. Swallow movement felt upon palpation of anterior neck.     Prognosis  Prognosis  Prognosis for safe diet advancement: guarded  Barriers to reach goals: time post onset;severity of dysphagia  Individuals consulted  Consulted and agree with results and recommendations: Patient;RN    Education  Patient Education: pt educated to purpose of visit  Patient Education Response: Verbalizes understanding       Therapy Time  SLP Individual Minutes  Time In: 0869  Time Out: 0830  Minutes: 20    Plan:  Diet Recommendation: Full Honey thick liquids - no straw - if any s/s of aspiration emerge, or there is respiratory decline,  make NPO until further evaluated by SLP  Pt therapy goal: pt eager to eat  Pt dc goal: to feel better    Gracie Cat M.S./[facility]-SLP #[id number]  Pg.  # [phone]    Needs met prior to leaving room, call light within reach, d/w RN Curtis Grover and Mnyor Johnson  This document will serve as a dc summary if pt dc prior to next visit  3/27/2019 8:42 AM

## 2019-03-27 NOTE — PLAN OF CARE
Problem: Risk for Impaired Skin Integrity  Goal: Tissue integrity - skin and mucous membranes  Description  Structural intactness and normal physiological function of skin and  mucous membranes. Outcome: Met This Shift  Pt with dry, flaky skin. Turned side to side this shift. Pat boots in place with heel protectors. Scar per previous G tube  Sacral heart in place. Problem: Falls - Risk of:  Goal: Will remain free from falls  Description  Will remain free from falls  Outcome: Met This Shift  Uses call light appropriately for assistance as needed. Bed breaks on, non skid footwear on, side rails up x2, call light within reach. Hourly rounding performed per unit protocol. Will continue safety precautions.

## 2019-03-27 NOTE — PROGRESS NOTES
Resident Progress Note  PGY-2                                                             Code:Full Code  Admit Date: 3/26/2019                                             PCP: Earnestine Mclaughlin MD                                 SUBJECTIVE:     Interval Hx: Patient admitted yesterday evening from SNF. Overnight remained hypotensive and placed on pressor support now off. BP stable. Temp improved to 97. He is alert and oriented x2. Advanced dementia. No new complaints. MEDICATIONS:   Scheduled Meds:   mupirocin   Topical Daily    sodium chloride flush  10 mL Intravenous 2 times per day    linezolid  600 mg Intravenous Q12H    piperacillin-tazobactam  3.375 g Intravenous Q12H    insulin lispro  0-6 Units Subcutaneous TID WC    insulin lispro  0-3 Units Subcutaneous Nightly      Continuous Infusions:   sodium chloride      dextrose      norepinephrine 5 mcg/min (03/27/19 0049)    sodium bicarbonate infusion 60 mL/hr at 03/26/19 2128    heparin (porcine) 12 Units/kg/hr (03/27/19 0028)    sodium chloride       PRN Meds:sodium chloride flush, acetaminophen, glucose, dextrose, glucagon (rDNA), dextrose, heparin (porcine), heparin (porcine)    Allergies: No Known Allergies    PHYSICAL EXAM:       Vitals: /67   Pulse 73   Temp 97.9 °F (36.6 °C) (Core)   Resp 17   Ht 5' 5\" (1.651 m)   Wt 97 lb 3.6 oz (44.1 kg)   SpO2 96%   BMI 16.18 kg/m²     I/O:      Intake/Output Summary (Last 24 hours) at 3/27/2019 0651  Last data filed at 3/27/2019 0630  Gross per 24 hour   Intake 3994.35 ml   Output 575 ml   Net 3419.35 ml     I/O this shift:  In: 3994.4 [I.V.:3994.4]  Out: 500 [Urine:500]  I/O last 3 completed shifts:  In: -   Out: 75 [Urine:75]  Review of Systems   Constitutional: Negative for chills and fever. Respiratory: Positive for cough. Negative for shortness of breath. Cardiovascular: Negative for chest pain. Gastrointestinal: Negative for constipation and diarrhea.    Neurological: Positive for speech difficulty and weakness. Psychiatric/Behavioral: Positive for decreased concentration. Physical Exam   Constitutional:   Malnourished   HENT:   Head: Normocephalic and atraumatic. Eyes: Pupils are equal, round, and reactive to light. Neck: Normal range of motion. Cardiovascular: Normal rate, regular rhythm, normal heart sounds and intact distal pulses. Pulmonary/Chest: Effort normal. He has rales. Abdominal: Soft. Bowel sounds are normal. He exhibits no distension. There is no tenderness. Musculoskeletal: Normal range of motion. He exhibits no edema. Neurological: He is alert. No cranial nerve deficit or sensory deficit. Oriented x2   Skin: Skin is warm and dry. Lower extremity skin changes noted. DATA:       Labs:  CBC:   Recent Labs     03/26/19  1551 03/27/19  0614   WBC 6.4 4.9   HGB 11.1* 8.3*   HCT 34.1* 25.1*    179       BMP:   Recent Labs     03/26/19  1551 03/26/19  1944 03/27/19  0037   * 145 152*   K 5.9* 4.8 4.4   * 115* 122*   CO2 17* 16* 17*   * 105* 100*   CREATININE 5.9* 5.4* 5.1*   GLUCOSE 180* 235* 129*     LFT's:   Recent Labs     03/26/19 1944   AST 29   *   BILITOT <0.2   ALKPHOS 169*     Troponin: No results for input(s): TROPONINI in the last 72 hours. BNP: No results for input(s): BNP in the last 72 hours. ABGs: No results for input(s): PHART, IFB2ZIX, PO2ART in the last 72 hours. INR: No results for input(s): INR in the last 72 hours. U/A:  Recent Labs     03/26/19  1622   COLORU Yellow   PHUR 5.5   WBCUA 6-10*   RBCUA 0-2   YEAST Present*   BACTERIA 2+*   CLARITYU Clear   SPECGRAV 1.025   LEUKOCYTESUR Negative   UROBILINOGEN 0.2   BILIRUBINUR Negative   BLOODU Negative   GLUCOSEU Negative   AMORPHOUS 1+*       XR CHEST PORTABLE   Final Result     Right IJ central line in the SVC. No pneumothorax.           XR ABDOMEN (KUB) (SINGLE AP VIEW)   Final Result      Feeding tube with tip possibly dehydration vs sepsis  -Replace free water deficit 1.2L  -Received IV fluids boluses for resuscitation  -On D5 with bicarb at 60cc/hr. Acute metabolic encephalopathy-Likely in setting of uremia vs sepsis  -CT head negative  -Continue working on sepsis workup and correcting acidemia. Chronic problems  pAfib-holding home eliquis started on heparin drip  HTN-Hold home antihypertensives  Diastolic heart failure-Holding betablocker in setting of hypotension  Ischemic MCA-Continue heparin holding holding home AC/AP. Will discuss with attending physician Dr. Luz Chang    Code Status: FULL  FEN: Pending SLP. Corpak difficult to place.    PPX: Heparin drip  DISPO: ICU     Nathan Harrison MD, PGY-2  [employer]  3/27/2019,  6:51 AM

## 2019-03-27 NOTE — CONSULTS
Nephrology Consult Note  [phone]  [phone]   [email or web address].        Reason for Consult:  SANDY, Hypernatremia     HISTORY OF PRESENT ILLNESS:                This is a patient with significant past medical history of SANDY,CKD prior CVA ,dysphagia with recent admission here for SANDY and Pneumonia , Cr was 2.4 at discharge  who presents with volume depletion very poor po intake and SANDY, Cr >5, hypernatremia . Initially hypotensive now improved, off pressors. He is making urine . Case reviewed with ICU staff.     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[facility])     CAD (coronary artery disease)     Cardiac arrest ([address]     Cardiac arrest ([facility])     Chronic systolic heart failure ([facility])     Diabetes mellitus ([address]     Diabetes mellitus ([address] 10/9/2012    Diabetes mellitus type II, uncontrolled ([address]     4/1017 A1c = 13    DKA (diabetic ketoacidoses) ([address] 04/2017    Elevated LFTs     Femoral neck fracture ([address] 10/17/2017    HTN (hypertension) 10/17/2017    Hyperkalemia     Hyperlipidemia     Hypertension     PAF (paroxysmal atrial fibrillation) ([facility])     Paroxysmal A-fib ([facility])     PEA (Pulseless electrical activity) ([address] 04/13/2017    Pneumonia 04/2017    LIKELY PNEUMOCOCCAL    Pneumonia due to organism     Pneumonia of right lower lobe due to Streptococcus pneumoniae ([facility])     Protein-calorie malnutrition, severe ([address] 04/2017    BMI = 19    S/P hip hemiarthroplasty 12/29/2017    Septic shock ([facility])     Systolic CHF ([address]     EF 30%    Thrombocytopenia (HCC)     Thrombocytopenia (HCC)     Type 2 diabetes mellitus with hyperglycemia ([address] 11/28/2016       Past Surgical History:        Procedure Laterality Date    GASTROSTOMY TUBE CHANGE N/A 12/14/2018    GASTRIC TUBE REMOVAL performed by Tee Chauhan MD at [facility]      G-tube placement    JOINT REPLACEMENT      hip       Current Medications:    No current facility-administered medications on file prior to encounter. Current Outpatient Medications on File Prior to Encounter   Medication Sig Dispense Refill    carvedilol (COREG) 6.25 MG tablet Take 6.25 mg by mouth 2 times daily (with meals)      vitamin D (ERGOCALCIFEROL) 49755 units CAPS capsule Take 50,000 Units by mouth Twice a Week Give on Monday and Thursday      lisinopril (PRINIVIL;ZESTRIL) 5 MG tablet Take 5 mg by mouth daily      megestrol (MEGACE) 40 MG/ML suspension Take 400 mg by mouth daily      mirtazapine (REMERON) 15 MG tablet Take 15 mg by mouth nightly      apixaban (ELIQUIS) 2.5 MG TABS tablet Take 1 tablet by mouth 2 times daily 60 tablet 2    insulin glargine (TOUJEO MAX SOLOSTAR) 300 UNIT/ML injection pen Inject 5 Units into the skin nightly 3 pen 3    metFORMIN (GLUCOPHAGE) 500 MG tablet Take 500 mg by mouth 2 times daily (with meals)       insulin aspart (NOVOLOG) 100 UNIT/ML injection vial Inject 0-4 Units into the skin 3 times daily (before meals) Inject as per sliding scale      ferrous sulfate 325 (65 Fe) MG tablet Take 325 mg by mouth 3 times daily (with meals)       amLODIPine (NORVASC) 5 MG tablet Take 5 mg by mouth daily       aspirin 81 MG tablet Take 81 mg by mouth daily          Allergies:  Patient has no known allergies.     Social History:    Social History     Socioeconomic History    Marital status:      Spouse name: Not on file    Number of children: Not on file    Years of education: Not on file    Highest education level: Not on file   Occupational History    Not on file   Social Needs    Financial resource strain: Not on file    Food insecurity:     Worry: Not on file     Inability: Not on file    Transportation needs:     Medical: Not on file     Non-medical: Not on file   Tobacco Use    Smoking status: Former Smoker     Last attempt to quit: 1/18/2018     Years since quittin.1    Smokeless tobacco: Never Used   Substance and Sexual Activity    Alcohol use: No    Drug use: No    Sexual activity: Not on file   Lifestyle    Physical activity:     Days per week: Not on file     Minutes per session: Not on file    Stress: Not on file   Relationships    Social connections:     Talks on phone: Not on file     Gets together: Not on file     Attends Hindu service: Not on file     Active member of club or organization: Not on file     Attends meetings of clubs or organizations: Not on file     Relationship status: Not on file    Intimate partner violence:     Fear of current or ex partner: Not on file     Emotionally abused: Not on file     Physically abused: Not on file     Forced sexual activity: Not on file   Other Topics Concern    Not on file   Social History Narrative    ** Merged History Encounter **         Lives alone       Family History:   History reviewed. No pertinent family history. REVIEW OF SYSTEMS:    Unable vt take much po ,he is some what confused but denies pain or dyspnea , rest of 12 point RoS is negative     PHYSICAL EXAM:    Vitals:    BP (!) 110/95   Pulse 89   Temp 97.9 °F (36.6 °C)   Resp 14   Ht 5' 5\" (1.651 m)   Wt 97 lb 3.6 oz (44.1 kg)   SpO2 97%   BMI 16.18 kg/m²   I/O last 3 completed shifts: In: 4620.4 [I.V.:4620.4]  Out: 825 [Urine:825]  I/O this shift:  In: -   Out: 275 [Urine:275]    Physical Exam:  Gen: Resting in bed, NAD. HEENT: MMM, OP clear. CV: RRR no m/r/g. No S3.  Lungs: Good respiratory effort, clear air entry   Abd: S/NT +BS  Ext: No edema, no cyanosis  Skin: Warm. No rashes appreciated. : No TTP over bladder, nondistended. Neuro: Alert and oriented x 3, nonfocal.  Joints: No erythema noted over joints.     DATA:    CBC:   Lab Results   Component Value Date    WBC 4.9 2019    RBC 2.72 2019    HGB 8.3 2019    HCT 25.1 2019    MCV 92.1 2019    MCH 30.5 2019    MCHC 33.1 03/27/2019    RDW 15.7 03/27/2019     03/27/2019    MPV 9.7 03/27/2019     BMP:    Lab Results   Component Value Date     03/27/2019    K 4.2 03/27/2019    K 5.3 02/27/2019     03/27/2019    CO2 20 03/27/2019    BUN 92 03/27/2019    LABALBU 2.5 03/27/2019    CREATININE 5.3 03/27/2019    CALCIUM 7.6 03/27/2019    GFRAA 13 03/27/2019    LABGLOM 11 03/27/2019    GLUCOSE 103 03/27/2019       IMPRESSION/RECOMMENDATIONS:      1. SANDY in setting of volume depletion and hypotension ,misael have ATN, agree with supportive care   2. IV fluids adjusted   3. Acidosis, better will remove bicarb from IV for now   4. Hypernatremia needs more free water ,will adjust IV fluids as discussed. 5. Encephalopathy now seems to be improving   6. Needs nutrition may need feeding tube replaced   7. DM2 monitor   8. Brian Danielson will check iron may need Epogen   9. PAF was on eliquis     Thank you for allowing me to participate in the care of this patient. I will continue to follow along. Please call with questions.     Diane Saha MD

## 2019-03-27 NOTE — PROGRESS NOTES
Patient's lunch arrived, patient unable to feed himself at this time. This RN at bedside assisting feeding, patient unable to clear mouth well with one small bite of pudding. Second bite able to swallow, and began coughing. SLP at nursing station near by, called to bedside to witness s/sx of aspiration. Patient coughing, holding throat. When asked why he was holding his throat, patient unable to explain why. Lunch tray removed, patient returned to NPO status and MDs notified.

## 2019-03-27 NOTE — PLAN OF CARE
Problem: Neurological  Intervention: Speech Evaluation/treatment  SLP completed evaluation.  Please refer to notes in EMR.]

## 2019-03-27 NOTE — PROGRESS NOTES
Patient with decreased UOP throughout the shift. Notified MD, no new orders, will continue to monitor.

## 2019-03-27 NOTE — CONSULTS
[facility]  Palliative Medicine Consultation Note      Date Of Admission:3/26/2019  Date of consult: 03/27/19  Seen by [facility] in the past:  No    Recommendations:        Patient lying in bed, appears chronically ill. He was alert and oriented X 4 today. We did discuss how things were going at the facility and what he understands about his overall health issues. He wasn't very insightful but did state that his daughter Jeanine Gilman does assist him when needed in making medical decisions. He does have 4 adult children, but he reported that he would want Dior to make his decisions in the event he cannot make his own decisions. PC will ask [place] to please come and do HCPOA/LW with patient. He did state that he would want to be resuscitated and intubated if necessary, but is unsure of having a PEG placed again. 1. Goals of Care/Advanced Care planning/Code status: Full code  2. Pain: patient denied pain  3. SOB: patient denies SOB but is on supplemental oxygen therapy  4. Dysphagia: patient had a PEG tube in the past due to dysphagia and today speech saw him and recommended continuation of full/honey thick liquids. Patient indicated that he didn't think he would want a PEG again and when PC reach out to patient's daughter Jeanine Gilman, she indicated, \"I just wish he would eat. \" PC continued the discussion about the modified diet and that patient doesn't appear to want to eat that diet and she agreed. PC did talk about liberalizing diet and Hospice care but she said they were not interested in hospice at this time. Will continue conversation with patient as well. 5. Malnutrition: patient is apparently not entertaining the dysphagia diet and his PO intake has decreased per facility and daughter. He is 97 lbs. If he continues not to eat and refuses PEG tube then Hospice would be the next steps. [facility]    7. AMS changes: alert and oriented today for PC Hypertension     [facility]ox[facility] fibrillation) ([facility])     Paroxysmal A-fib ([facility])     PEA (Pulseless electrical activity) ([facility]) 04/13/2017    Pneumonia 04/2017    LIKELY PNEUMOCOCCAL    Pneumonia due to organism     Pneumonia of right lower lobe due to Streptococcus pneumoniae ([facility])     Protein-calorie malnutrition, severe ([facility] 04/2017    BMI = 19    S/P hip hemiarthroplasty 12/29/2017    Septic shock ([facility])     Systolic CHF ([facility]     EF 30%    Thrombocytopenia ([facility])     Thrombocytopenia ([facility])     Type 2 diabetes mellitus with hyperglycemia ([facility] 11/28/2016       Past Surgical History:        Procedure Laterality Date    GASTROSTOMY TUBE CHANGE N/A 12/14/2018    GASTRIC TUBE REMOVAL performed by Harsha Bonner MD at [address] [place] [address]      G-tube placement    JOINT REPLACEMENT      hip       Current Medications:    Current Facility-Administered Medications: sodium chloride 0.9 % infusion, , ,   mupirocin (BACTROBAN) 2 % ointment, , Topical, Daily  sodium chloride flush 0.9 % injection 10 mL, 10 mL, Intravenous, 2 times per day  sodium chloride flush 0.9 % injection 10 mL, 10 mL, Intravenous, PRN  acetaminophen (TYLENOL) tablet 650 mg, 650 mg, Oral, Q4H PRN  linezolid (ZYVOX) IVPB 600 mg, 600 mg, Intravenous, Q12H  piperacillin-tazobactam (ZOSYN) 3.375 g in dextrose 5 % 100 mL IVPB extended infusion (mini-bag), 3.375 g, Intravenous, Q12H  glucose (GLUTOSE) 40 % oral gel 15 g, 15 g, Oral, PRN  dextrose 50 % solution 12.5 g, 12.5 g, Intravenous, PRN  glucagon (rDNA) injection 1 mg, 1 mg, Intramuscular, PRN  dextrose 5 % solution, 100 mL/hr, Intravenous, PRN  insulin lispro (HUMALOG) injection pen 0-6 Units, 0-6 Units, Subcutaneous, TID WC  insulin lispro (HUMALOG) injection pen 0-3 Units, 0-3 Units, Subcutaneous, Nightly  norepinephrine (LEVOPHED) 16 mg in dextrose 5 % 250 mL infusion, 2 mcg/min, Intravenous, Continuous  sodium bicarbonate 150 mEq in dextrose 5 % 1,000 mL infusion, , Intravenous, Continuous  heparin (porcine) injection 2,600 Units, 60 Units/kg, Intravenous, PRN  heparin (porcine) injection 1,300 Units, 30 Units/kg, Intravenous, PRN  heparin 25,000 units in dextrose 5% 250 mL infusion, 12 Units/kg/hr, Intravenous, Continuous  sodium chloride 0.9 % infusion, , ,     Allergies:  Patient has no known allergies. Social History:    Patient currently lives in a nursing home    Review of Systems -   General ROS: positive for  - fatigue and weight loss  Psychological ROS: negative  ENT ROS: negative  Hematological and Lymphatic ROS: negative  Respiratory ROS: positive for - cough and shortness of breath  Cardiovascular ROS: negative  Gastrointestinal ROS: positive for - appetite loss and difficulty swallowing   Genito-Urinary ROS: negative  Musculoskeletal ROS: positive for - muscular weakness  Neurological ROS: negative    Objective:        PHYSICAL EXAM:    General appearance: alert, appears stated age, cachectic, cooperative and no distress  Head: Normocephalic, without obvious abnormality, atraumatic  Eyes: negative findings: lids and lashes normal and conjunctivae and sclerae normal  Lungs: diminished breath sounds anterior - bilateral and RLL and coarse respiratory sounds  Heart: regular rate and rhythm  Abdomen: soft, non-tender; bowel sounds normal; no masses,  no organomegaly  Extremities: extremities normal, atraumatic, no cyanosis or edema  Neurologic: Grossly normal    Palliative Performance Scale:  60% ? Ambulation reduced; Significant disease; Can't do hobbies/housework; intake normal   or reduced; occasional assist; LOC full/confusion  50% ? Mainly sit/lie; Extensive disease; Can't do any work; Considerable assist; intake normal  Or reduced; LOC full/confusion  40% ? Mainly in bed; Extensive disease; Mainly assist; intake normal or reduced; occasional assist; LOC full/confusion  30% ? Bed Bound; Extensive disease;  Total care; intake reduced; LOC patient and family on unit greater than 50% in counseling regarding palliative care and in goals of care for the patient. Thank you to Dr. Ivone Hanks for this consultation. We will continue to follow Mr. Suarez's care as needed.         Johanne Stein, NIKA/CNP  Palliative Care  [phone]

## 2019-03-27 NOTE — PROGRESS NOTES
Patient has triggered bed alarm multiple times each hour today. Continues to be a high fall risk. Educated numerous times on safety precautions, bedrest order, not pulling lines and IVs, etc.  Bed alarm remains engaged, bed in lowest and locked position, and patient already directly across from nursing station. Will continue to frequently check on patient.

## 2019-03-27 NOTE — PROGRESS NOTES
ICU Progress Note    Admit Date: 3/26/2019  Hospital Day: 2    ICU DAY  Code:Full Code  PCP: Florin Grande MD            SUBJECTIVE:   Interval Hx:  Patient was seen this morning. ROS was somewhat limited due to decrease in mentation. He was alert and orientated times one only to himself. He looked fatigued but comfortable. Denies any chest pain. Nursing staff endorses spontaneous coughing without significant sputum production. Has been stable on the nasal cannula saturating appropriately. Levophed is weaned off. Blood pressures stable. Advance diet. MEDICATIONS:   Scheduled Meds:    mupirocin   Topical Daily    sodium chloride flush  10 mL Intravenous 2 times per day    linezolid  600 mg Intravenous Q12H    piperacillin-tazobactam  3.375 g Intravenous Q12H    insulin lispro  0-6 Units Subcutaneous TID WC    insulin lispro  0-3 Units Subcutaneous Nightly      Continuous Infusions:   sodium chloride      dextrose      norepinephrine 3 mcg/min (03/27/19 0727)    sodium bicarbonate infusion 60 mL/hr at 03/26/19 2128    heparin (porcine) 12 Units/kg/hr (03/27/19 0028)    sodium chloride       PRN Meds:sodium chloride flush, acetaminophen, glucose, dextrose, glucagon (rDNA), dextrose, heparin (porcine), heparin (porcine)  Allergies: No Known Allergies    PHYSICAL EXAM:       Vitals: /70   Pulse 84   Temp 97.9 °F (36.6 °C) (Core)   Resp 18   Ht 5' 5\" (1.651 m)   Wt 97 lb 3.6 oz (44.1 kg)   SpO2 92%   BMI 16.18 kg/m²   I/O:      Intake/Output Summary (Last 24 hours) at 3/27/2019 0744  Last data filed at 3/27/2019 0630  Gross per 24 hour   Intake 3994.35 ml   Output 575 ml   Net 3419.35 ml     No intake/output data recorded. I/O last 3 completed shifts: In: 3994.4 [I.V.:3994.4]  Out: 18 [Urine:575]    Physical Examination:   Physical Exam   Constitutional: He appears cachectic. He has a sickly appearance. He appears ill. He appears distressed. Nasal cannula in place.    HENT:   Head: Normocephalic and atraumatic. Not microcephalic. Eyes: Pupils are equal, round, and reactive to light. Right eye exhibits no discharge. No scleral icterus. Unable to fully asses   Neck: Normal range of motion. Neck supple. Cardiovascular: Normal rate, regular rhythm and intact distal pulses. Pulmonary/Chest: No respiratory distress. Increased effot, decreased air intake, coarse breathe sounds. Abdominal: Soft. Bowel sounds are normal. There is no tenderness. Musculoskeletal: Normal range of motion. He exhibits no edema or tenderness. Neurological:   Tired, solmenent, orientated times one. Vent Settings:   / / /     DATA:       Labs:  CBC:   Recent Labs     03/26/19  1551 03/27/19  0614   WBC 6.4 4.9   HGB 11.1* 8.3*   HCT 34.1* 25.1*    179       BMP:   Recent Labs     03/26/19  1944 03/27/19  0037 03/27/19  0614    152* 149*   K 4.8 4.4 4.4   * 122* 118*   CO2 16* 17* 17*   * 100* 97*   CREATININE 5.4* 5.1* 5.0*   GLUCOSE 235* 129* 175*     ABGs:   Recent Labs     03/26/19  2004   PHART 7.308*   EMT4OUS 34.0*   PO2ART 42.4*       ASSESSMENT AND PLAN:   Luis Dukes is a 66 y.o. male, who was admitted with Hypothermia. Neuro/Sedation  Acute metabolic encephalopathy   - Likely 2/2 dehydration, hypothermia   - h/o ischemic MCA stroke 1/17/2019 with residual swallowing deficits   - Head CT no acute changes on this admission 3/26/2019  - MRI never done     Respiratory  Hypoxic respiratory failure   - Unlikely to be PNA, with xray that shows resolution of previous pneumonia. Which was + klebsiella, + Pseudomonas.  Negative WBC, Afebrile.   - BCx, UCx, Resp panel follow up  - discontinue antibiotics  - VBG 7.308      Cardiovascular  Hypotension   - Likely 2/2 hypovolemia and hypothermia 2/2 dec PO intake  - lactic acid resolved   - Levophed requirement is off      Paroxysmal Afib   - NSR  - Continue eliquis now that he is PO  - Discontinue heparin  - HOLD BB d/t HoTN     CHFpEF  - HOLD coreg d/t hypotension  - Strict I/O, daily wts     CAD s/p cardiac arrest  - restarted aspirin  - restarted plavix   - discontinue heparin      SANDY on CKD (baseline Cr approx. 2)  - Prerenal, decreased PO intake   - Improved creatine with hydration, history of similar   - UA was relatively bland.   - On discharge cr 2.4  - renal US on last admission showed simple renal cyst nothing more   - RFP q6h for electrolyte follow up  - continue 150meq bicarb in 1L D5 @ 60ml/hr d/t hypernatremia and acidosis     IDDMII   - Was on metformin 500 BID, SSI, and Lantus 5 units nightly  - Hypoglycemia protocol  - LDSSI     FEN/GI  Elyte Abnmls   - Hypernatremia with Bicarb deficit of 123  - continue 150meq bicarb in 1L D5 @ 60ml/hr d/t hypernatremia and acidosis  - RFPs every 6 hours     Oral Dysphagia 2/2 CVA w/ sev protein calorie malnutrition 2/2 stroke   - dysphagia diet dysphagia II mechanically altered, honey thick restarted     Transaminitis   - most likely due hypoperfusion in setting of hypovolemia   - hepatitis panel pending       Hypothermia resolved  Palliative Care Consulted    Code Status: Full Code  PPX: Plavix   DISPO: ICU  -----------------------------  Ramón Keith MD, PGY - [address] Internal [facility]  3/27/2019 7:44 AM   Patient examined, findings as discussed with Dr. Kela Fleischer. Additionally, find TSH quite elevated at 12.4. This may explain hypothermia. Hormone therapy started. Other problems related particularly to dysphagia and inadequate oral intake, both food and water. MBS pending. He may need to make a decision regarding placement of feeding tube. Renal function responding slowly to rehydration. Patient and family updated regarding his progress.   Discussed with Dr. Tate Every

## 2019-03-27 NOTE — PROGRESS NOTES
Corpak placed at this time per MD orders. Patient tolerated well. KUB taken at this time, will continue to monitor.

## 2019-03-28 ENCOUNTER — APPOINTMENT (OUTPATIENT)
Dept: GENERAL RADIOLOGY | Age: 74
DRG: 987 | End: 2019-03-28
Payer: MEDICARE

## 2019-03-28 LAB
ABO/RH: NORMAL
ALBUMIN SERPL-MCNC: 1.9 G/DL (ref 3.4–5)
ALBUMIN SERPL-MCNC: 1.9 G/DL (ref 3.4–5)
ALBUMIN SERPL-MCNC: 2 G/DL (ref 3.4–5)
ALBUMIN SERPL-MCNC: 2 G/DL (ref 3.4–5)
ALBUMIN SERPL-MCNC: 2.1 G/DL (ref 3.4–5)
ANION GAP SERPL CALCULATED.3IONS-SCNC: 10 MMOL/L (ref 3–16)
ANION GAP SERPL CALCULATED.3IONS-SCNC: 12 MMOL/L (ref 3–16)
ANTI-XA UNFRAC HEPARIN: 1.22 IU/ML (ref 0.3–0.7)
ANTI-XA UNFRAC HEPARIN: >1.8 IU/ML (ref 0.3–0.7)
ANTIBODY SCREEN: NORMAL
BASOPHILS ABSOLUTE: 0 K/UL (ref 0–0.2)
BASOPHILS RELATIVE PERCENT: 0.3 %
BLOOD BANK DISPENSE STATUS: NORMAL
BLOOD BANK PRODUCT CODE: NORMAL
BPU ID: NORMAL
BUN BLDV-MCNC: 78 MG/DL (ref 7–20)
BUN BLDV-MCNC: 83 MG/DL (ref 7–20)
BUN BLDV-MCNC: 86 MG/DL (ref 7–20)
BUN BLDV-MCNC: 88 MG/DL (ref 7–20)
BUN BLDV-MCNC: 89 MG/DL (ref 7–20)
CALCIUM SERPL-MCNC: 7.4 MG/DL (ref 8.3–10.6)
CALCIUM SERPL-MCNC: 7.5 MG/DL (ref 8.3–10.6)
CALCIUM SERPL-MCNC: 7.5 MG/DL (ref 8.3–10.6)
CALCIUM SERPL-MCNC: 7.6 MG/DL (ref 8.3–10.6)
CALCIUM SERPL-MCNC: 7.7 MG/DL (ref 8.3–10.6)
CHLORIDE BLD-SCNC: 117 MMOL/L (ref 99–110)
CHLORIDE BLD-SCNC: 118 MMOL/L (ref 99–110)
CO2: 20 MMOL/L (ref 21–32)
CO2: 20 MMOL/L (ref 21–32)
CO2: 21 MMOL/L (ref 21–32)
CO2: 22 MMOL/L (ref 21–32)
CO2: 22 MMOL/L (ref 21–32)
CREAT SERPL-MCNC: 4.2 MG/DL (ref 0.8–1.3)
CREAT SERPL-MCNC: 4.3 MG/DL (ref 0.8–1.3)
CREAT SERPL-MCNC: 4.5 MG/DL (ref 0.8–1.3)
CREAT SERPL-MCNC: 4.7 MG/DL (ref 0.8–1.3)
CREAT SERPL-MCNC: 4.9 MG/DL (ref 0.8–1.3)
DESCRIPTION BLOOD BANK: NORMAL
EOSINOPHILS ABSOLUTE: 0.1 K/UL (ref 0–0.6)
EOSINOPHILS RELATIVE PERCENT: 1.5 %
GFR AFRICAN AMERICAN: 14
GFR AFRICAN AMERICAN: 15
GFR AFRICAN AMERICAN: 15
GFR AFRICAN AMERICAN: 16
GFR AFRICAN AMERICAN: 17
GFR NON-AFRICAN AMERICAN: 12
GFR NON-AFRICAN AMERICAN: 12
GFR NON-AFRICAN AMERICAN: 13
GFR NON-AFRICAN AMERICAN: 13
GFR NON-AFRICAN AMERICAN: 14
GLUCOSE BLD-MCNC: 131 MG/DL (ref 70–99)
GLUCOSE BLD-MCNC: 144 MG/DL (ref 70–99)
GLUCOSE BLD-MCNC: 145 MG/DL (ref 70–99)
GLUCOSE BLD-MCNC: 193 MG/DL (ref 70–99)
GLUCOSE BLD-MCNC: 194 MG/DL (ref 70–99)
GLUCOSE BLD-MCNC: 222 MG/DL (ref 70–99)
GLUCOSE BLD-MCNC: 238 MG/DL (ref 70–99)
GLUCOSE BLD-MCNC: 247 MG/DL (ref 70–99)
GLUCOSE BLD-MCNC: 289 MG/DL (ref 70–99)
GLUCOSE BLD-MCNC: 290 MG/DL (ref 70–99)
HAV IGM SER IA-ACNC: NORMAL
HCT VFR BLD CALC: 20.3 % (ref 40.5–52.5)
HCT VFR BLD CALC: 21.2 % (ref 40.5–52.5)
HEMOGLOBIN: 6.7 G/DL (ref 13.5–17.5)
HEMOGLOBIN: 7.2 G/DL (ref 13.5–17.5)
HEPATITIS B CORE IGM ANTIBODY: NORMAL
HEPATITIS B SURFACE ANTIGEN INTERPRETATION: NORMAL
HEPATITIS C ANTIBODY INTERPRETATION: NORMAL
LYMPHOCYTES ABSOLUTE: 0.5 K/UL (ref 1–5.1)
LYMPHOCYTES RELATIVE PERCENT: 7.9 %
MAGNESIUM: 1.7 MG/DL (ref 1.8–2.4)
MAGNESIUM: 1.7 MG/DL (ref 1.8–2.4)
MAGNESIUM: 2.1 MG/DL (ref 1.8–2.4)
MAGNESIUM: 2.2 MG/DL (ref 1.8–2.4)
MAGNESIUM: 2.5 MG/DL (ref 1.8–2.4)
MCH RBC QN AUTO: 31 PG (ref 26–34)
MCHC RBC AUTO-ENTMCNC: 33.9 G/DL (ref 31–36)
MCV RBC AUTO: 91.5 FL (ref 80–100)
MONOCYTES ABSOLUTE: 0.4 K/UL (ref 0–1.3)
MONOCYTES RELATIVE PERCENT: 6.6 %
MRSA SCREEN RT-PCR: NORMAL
NEUTROPHILS ABSOLUTE: 5.2 K/UL (ref 1.7–7.7)
NEUTROPHILS RELATIVE PERCENT: 83.7 %
OCCULT BLOOD DIAGNOSTIC: ABNORMAL
PDW BLD-RTO: 15.8 % (ref 12.4–15.4)
PERFORMED ON: ABNORMAL
PHOSPHORUS: 3.2 MG/DL (ref 2.5–4.9)
PHOSPHORUS: 3.3 MG/DL (ref 2.5–4.9)
PHOSPHORUS: 3.4 MG/DL (ref 2.5–4.9)
PLATELET # BLD: 125 K/UL (ref 135–450)
PMV BLD AUTO: 9.4 FL (ref 5–10.5)
POTASSIUM SERPL-SCNC: 3.7 MMOL/L (ref 3.5–5.1)
POTASSIUM SERPL-SCNC: 3.7 MMOL/L (ref 3.5–5.1)
POTASSIUM SERPL-SCNC: 3.8 MMOL/L (ref 3.5–5.1)
POTASSIUM SERPL-SCNC: 3.9 MMOL/L (ref 3.5–5.1)
POTASSIUM SERPL-SCNC: 4 MMOL/L (ref 3.5–5.1)
RBC # BLD: 2.31 M/UL (ref 4.2–5.9)
SODIUM BLD-SCNC: 148 MMOL/L (ref 136–145)
SODIUM BLD-SCNC: 149 MMOL/L (ref 136–145)
SODIUM BLD-SCNC: 151 MMOL/L (ref 136–145)
SODIUM BLD-SCNC: 152 MMOL/L (ref 136–145)
SODIUM BLD-SCNC: 152 MMOL/L (ref 136–145)
WBC # BLD: 6.2 K/UL (ref 4–11)

## 2019-03-28 PROCEDURE — 6370000000 HC RX 637 (ALT 250 FOR IP): Performed by: STUDENT IN AN ORGANIZED HEALTH CARE EDUCATION/TRAINING PROGRAM

## 2019-03-28 PROCEDURE — 99233 SBSQ HOSP IP/OBS HIGH 50: CPT | Performed by: INTERNAL MEDICINE

## 2019-03-28 PROCEDURE — 2580000003 HC RX 258: Performed by: STUDENT IN AN ORGANIZED HEALTH CARE EDUCATION/TRAINING PROGRAM

## 2019-03-28 PROCEDURE — C9113 INJ PANTOPRAZOLE SODIUM, VIA: HCPCS | Performed by: STUDENT IN AN ORGANIZED HEALTH CARE EDUCATION/TRAINING PROGRAM

## 2019-03-28 PROCEDURE — 80069 RENAL FUNCTION PANEL: CPT

## 2019-03-28 PROCEDURE — 92526 ORAL FUNCTION THERAPY: CPT

## 2019-03-28 PROCEDURE — 85014 HEMATOCRIT: CPT

## 2019-03-28 PROCEDURE — 85018 HEMOGLOBIN: CPT

## 2019-03-28 PROCEDURE — 85025 COMPLETE CBC W/AUTO DIFF WBC: CPT

## 2019-03-28 PROCEDURE — 92611 MOTION FLUOROSCOPY/SWALLOW: CPT

## 2019-03-28 PROCEDURE — 36592 COLLECT BLOOD FROM PICC: CPT

## 2019-03-28 PROCEDURE — 6360000002 HC RX W HCPCS: Performed by: STUDENT IN AN ORGANIZED HEALTH CARE EDUCATION/TRAINING PROGRAM

## 2019-03-28 PROCEDURE — 86901 BLOOD TYPING SEROLOGIC RH(D): CPT

## 2019-03-28 PROCEDURE — 36430 TRANSFUSION BLD/BLD COMPNT: CPT

## 2019-03-28 PROCEDURE — 71045 X-RAY EXAM CHEST 1 VIEW: CPT

## 2019-03-28 PROCEDURE — 86900 BLOOD TYPING SEROLOGIC ABO: CPT

## 2019-03-28 PROCEDURE — 99232 SBSQ HOSP IP/OBS MODERATE 35: CPT | Performed by: NURSE PRACTITIONER

## 2019-03-28 PROCEDURE — 74230 X-RAY XM SWLNG FUNCJ C+: CPT

## 2019-03-28 PROCEDURE — 36415 COLL VENOUS BLD VENIPUNCTURE: CPT

## 2019-03-28 PROCEDURE — 2500000003 HC RX 250 WO HCPCS: Performed by: INTERNAL MEDICINE

## 2019-03-28 PROCEDURE — P9016 RBC LEUKOCYTES REDUCED: HCPCS

## 2019-03-28 PROCEDURE — 86923 COMPATIBILITY TEST ELECTRIC: CPT

## 2019-03-28 PROCEDURE — 83735 ASSAY OF MAGNESIUM: CPT

## 2019-03-28 PROCEDURE — 85520 HEPARIN ASSAY: CPT

## 2019-03-28 PROCEDURE — 1200000000 HC SEMI PRIVATE

## 2019-03-28 PROCEDURE — 2580000003 HC RX 258: Performed by: INTERNAL MEDICINE

## 2019-03-28 PROCEDURE — G0328 FECAL BLOOD SCRN IMMUNOASSAY: HCPCS

## 2019-03-28 PROCEDURE — 86850 RBC ANTIBODY SCREEN: CPT

## 2019-03-28 RX ORDER — 0.9 % SODIUM CHLORIDE 0.9 %
250 INTRAVENOUS SOLUTION INTRAVENOUS ONCE
Status: COMPLETED | OUTPATIENT
Start: 2019-03-28 | End: 2019-03-29

## 2019-03-28 RX ORDER — DEXTROSE AND SODIUM CHLORIDE 5; .45 G/100ML; G/100ML
INJECTION, SOLUTION INTRAVENOUS CONTINUOUS
Status: DISCONTINUED | OUTPATIENT
Start: 2019-03-28 | End: 2019-04-02

## 2019-03-28 RX ORDER — PANTOPRAZOLE SODIUM 40 MG/10ML
40 INJECTION, POWDER, LYOPHILIZED, FOR SOLUTION INTRAVENOUS 2 TIMES DAILY
Status: DISCONTINUED | OUTPATIENT
Start: 2019-03-28 | End: 2019-04-07 | Stop reason: HOSPADM

## 2019-03-28 RX ORDER — MAGNESIUM SULFATE IN WATER 40 MG/ML
2 INJECTION, SOLUTION INTRAVENOUS ONCE
Status: COMPLETED | OUTPATIENT
Start: 2019-03-28 | End: 2019-03-28

## 2019-03-28 RX ADMIN — SODIUM CHLORIDE 250 ML: 9 INJECTION, SOLUTION INTRAVENOUS at 22:53

## 2019-03-28 RX ADMIN — LEVOTHYROXINE SODIUM ANHYDROUS 37.5 MCG: 100 INJECTION, POWDER, LYOPHILIZED, FOR SOLUTION INTRAVENOUS at 10:47

## 2019-03-28 RX ADMIN — MUPIROCIN: 20 OINTMENT TOPICAL at 09:30

## 2019-03-28 RX ADMIN — INSULIN LISPRO 2 UNITS: 100 INJECTION, SOLUTION INTRAVENOUS; SUBCUTANEOUS at 12:06

## 2019-03-28 RX ADMIN — INSULIN LISPRO 6 UNITS: 100 INJECTION, SOLUTION INTRAVENOUS; SUBCUTANEOUS at 16:41

## 2019-03-28 RX ADMIN — Medication 10 ML: at 09:30

## 2019-03-28 RX ADMIN — DEXTROSE AND SODIUM CHLORIDE: 5; 450 INJECTION, SOLUTION INTRAVENOUS at 12:49

## 2019-03-28 RX ADMIN — INSULIN LISPRO 1 UNITS: 100 INJECTION, SOLUTION INTRAVENOUS; SUBCUTANEOUS at 09:30

## 2019-03-28 RX ADMIN — DEXTROSE AND SODIUM CHLORIDE: 5; 450 INJECTION, SOLUTION INTRAVENOUS at 10:48

## 2019-03-28 RX ADMIN — SODIUM CHLORIDE, PRESERVATIVE FREE 5 ML: 5 INJECTION INTRAVENOUS at 10:48

## 2019-03-28 RX ADMIN — MAGNESIUM SULFATE HEPTAHYDRATE 2 G: 40 INJECTION, SOLUTION INTRAVENOUS at 08:12

## 2019-03-28 RX ADMIN — Medication 10 ML: at 20:39

## 2019-03-28 RX ADMIN — INSULIN LISPRO 3 UNITS: 100 INJECTION, SOLUTION INTRAVENOUS; SUBCUTANEOUS at 21:33

## 2019-03-28 RX ADMIN — DEXTROSE AND SODIUM CHLORIDE: 5; 450 INJECTION, SOLUTION INTRAVENOUS at 05:20

## 2019-03-28 RX ADMIN — PANTOPRAZOLE SODIUM 40 MG: 40 INJECTION, POWDER, FOR SOLUTION INTRAVENOUS at 20:39

## 2019-03-28 RX ADMIN — ASPIRIN 81 MG 81 MG: 81 TABLET ORAL at 12:51

## 2019-03-28 ASSESSMENT — PAIN SCALES - GENERAL
PAINLEVEL_OUTOF10: 0

## 2019-03-28 NOTE — PLAN OF CARE
Problem: Risk for Impaired Skin Integrity  Goal: Tissue integrity - skin and mucous membranes  Description  Structural intactness and normal physiological function of skin and  mucous membranes. Outcome: Ongoing  Note:   Pt at risk for skin breakdown. See Larry score. Pt remains on bedrest. Unable to reposition self in bed. Heels elevated off bed. Sacral heart mepilex intact to protect,  site inspected and intact underneath. Will continue to turn and reposition patient every two hours and as needed. Will continue to keep patient clean and dry, applying skin care cream as needed. Pillows used for repositioning q2hs. Will continue to monitor and assess for skin breakdown. Problem: Falls - Risk of:  Goal: Will remain free from falls  Description  Will remain free from falls  Outcome: Ongoing  Note:   Pt is a fall risk. Fall risk protocol in place. See Leola Siddiqi Fall Score. Pt bed is in low position, bed alarm is on, side rails up, fall risk bracelet applied. , non-skid footwear in use. Patient/family educated on fall risk protocol, instructed to call for assistance when needed and belongings are in reach. assistance. Will continue with hourly rounds for po intake, pain needs, toileting and repositioning as needed. Will continue to monitor for needs. Problem: Nutritional:  Goal: Consumption of food in small portions  Description  Consumption of food in small portions  Outcome: Ongoing  Goal: Consumption of liquid of appropriate consistency  Description  Consumption of liquid of appropriate consistency  Outcome: Ongoing  Note:   Liquids to be nectar thick per Speech Language Therapist and Modified Barium Study 3/28/19     Problem: Respiratory:  Goal: Ability to maintain a clear airway will improve  Description  Ability to maintain a clear airway will improve  Outcome: Ongoing  Note:   Will monitor pt while eating for any signs of asp. Will encourage to clear throat effectively.  Pt will sit upright while eating.

## 2019-03-28 NOTE — PROCEDURES
INSTRUMENTAL SWALLOW REPORT  MODIFIED BARIUM SWALLOW/treatment note    NAME: Kev Farrell   : 1945  MRN: [id number]       Date of Eval: 3/28/2019     Ordering Physician: Dr. Gurdeep Wallis  Radiologist: Dr. Princess Marquez     Referring Diagnosis(es): Referring Diagnosis: sepsis    Past Medical History:  has a past medical history of Acute renal failure ([address] Acute respiratory failure with hypoxia ([address] Angina pectoris ([address] 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[address] CAD (coronary artery disease), Cardiac arrest [facility]), Cardiac arrest ([address] Chronic systolic heart failure ([address] Diabetes mellitus ([address] Diabetes mellitus ([address] Diabetes mellitus type II, uncontrolled ([address] DKA (diabetic ketoacidoses) ([address] Elevated LFTs, Femoral neck fracture ([address] HTN (hypertension), Hyperkalemia, Hyperlipidemia, Hypertension, PAF (paroxysmal atrial fibrillation) ([address] Paroxysmal A-fib ([address] PEA (Pulseless electrical activity) ([address] Pneumonia, Pneumonia due to organism, Pneumonia of right lower lobe due to Streptococcus pneumoniae ([address] Protein-calorie malnutrition, severe ([address] S/P hip hemiarthroplasty, Septic shock ([address] Systolic CHF ([address] Thrombocytopenia ([address] Thrombocytopenia ([address] and Type 2 diabetes mellitus with hyperglycemia ([address] Past Surgical History:  has a past surgical history that includes Gastrostomy tube placement; joint replacement; and gastrostomy tube change (N/A, 2018). Current Diet Solid Consistency: NPO  Current Diet Liquid Consistency: NPO       Type of Study: Repeat MBS     Recent Chest Xray 3/27/19  Right IJ central line in the SVC.  No pneumothorax.      Final Result       Right lower lung opacity present previously improved likely relates to improvement of the atelectasis and/or pneumonitis.       Right lower lobe bronchiectasis present previously.             CT of head 3/27/19      1. No acute stroke, mass, or hemorrhage.    2. Remote infarcts in the bilateral cerebellar and cerebral hemispheres as    3. Reduced bolus control with premature bolus loss to the pharynx with all trials.    4. Lingual residue with all trials is cleared with cued repeat swallow. Pharyngeal:   1. Severe pharyngeal stage dysphagia characterized by delayed swallow, decreased laryngeal elevation, and reduced pharyngeal peristalsis.    2. Premature spillage to the valleculae with all trials and to the pyriform with nectar and thin trials.    3. Intermittent, flash penetration with nectar and honey trials.    4. Deep penetration with thin; residue lining laryngeal vestibule evident post-penetration of thin.    5. Vallecular and pyriform residue (vallecular > pyriform) with all trials.    6. Clears liquid residue with cued repeat swallow.    7. Unable to clear puree residue using repeat swallows and/or liquid wash.    8. Patient at severe risk for aspiration after the swallow of observed residues during study. Upper Esophageal Screen:   1. No apparent upper esophageal involvements per screening  Dysphagia Outcome Severity Scale: Level 2: Moderate Severe dysphagia- Maximum assistance or maximum use of strategies with partial PO only     MBS results 2/28/19:  Pt presents with moderate oral and pharyngeal phase dysphagia  Oral- pt with poor A-P transport with all consistencies. Mastication with cracker was prolonged. pt unable to draw honey thick barium up the straw. Pharyngeal- pt presents with incomplete epiglottal closure which resulted in deep penetration of nectar thick barium without spontaneous clearing. Nectar thick barium would have been aspirated it pt was not instructed to cough and clear throat several times. Pt also silently aspirated thin barium during the swallow.  Did not attempt chin tuck as question pt's ability to recall and perform independently as well as this strategy was not beneficial in the past. Pt with no aspiration or penetration with pureed, cracker or honey thick liquids by cup.  Pt with minimal residue airway, passes below the vocal folds, and no effort is made to eject    Recommended Diet:  Liquid consistency: Nectar(clears)  Liquid administration via: Spoon;Cup    Medication administration: (medication through alternate means as able or if needed PO, trial whole followed by nectar)    Safe Swallow Protocol:  Supervision: 1:1  Compensatory Swallowing Strategies:   Small bites/sips  Assist feed;  Upright as possible for all oral intake  Remain upright for 30-45 minutes after meals    Recommendations/Treatment  Requires SLP Intervention: Yes  D/C Recommendations: pt will require follow up upon dc     Recommended Exercises:    Therapeutic Interventions: Diet tolerance monitoring;Patient/Family education;Oral care; Tongue base strengthening;Effortful swallow      Education: Images and recommendations were reviewed with pt following this exam.   Patient Education: pt educated to results of MBS  Patient Education Response: Needs reinforcement    Prognosis  Prognosis for safe diet advancement: guarded  Barriers to reach goals: severity of dysphagia;time post onset  Duration/Frequency of Treatment  Duration/Frequency of Treatment: 3-5 x/week      Goals:    1-The patient will tolerate recommended diet without observed clinical signs of aspiration  3/27: ALEN Zavala notified SLP pt having difficulty with lunch. Observed pt briefly with lunch, pt holding throat and sounding wet/congested. D/w RN and will make pt NPO this date and follow up next session for repeat BS assessment and follow up with repeat MBS as appropriate.     2- The patient Radha Pravin will verbalize/demonstrate understanding of dysphagia recommendations  3/27: Educated pt to purpose of visit, s/s of aspiration, concern if aspiration occurs, rationale for diet recommendation/strategies to reduce risk for aspiration and instruction to notify staff if any signs emerge. Pt stated understanding.   Cont goal  3/28: pt educated to results of MBS, rationale for diet recommendations and strategies to improve safety of swallow. Explained cont risk for aspiration and pt cont to state he would not want a feeding tube. Pt will benefit from cont education  Cont goal    3- Pt will participate in [facility]  3/28: goal met    4- pt will participate in swallowing exercises with mod cues    Oral Preparation / Oral Phase  Pt exhibits slow propulsion of bolus posteriorly in oral cavity. Lingual residue of cracker noted after study completed. Pharyngeal Phase  Pt with reduced tongue base strength and reduced laryngeal elevation resulting in mod amount of residue in valleculae and pyriform sinuses with puree and honey thick liquid textures. This then resulted in spillover into airway with deep penetration and eventual SILENT aspiration. Pt did not clear residue spontaneously, no extra swallows were noted. Pt asked if he felt anything, which he denied. Pt then asked to swallow again, which did not clear residue. Pt then presented with nectar thick liquid trials via cup/straw/tsp, where no penetration or aspiration was identified. Thin liquids was presented via tsp and cup, where deep penetration occurred during the swallow, without spontaneous clearing and no cough reflex. Esophageal Phase  Not assessed    Pain   Patient Currently in Pain: Denies  Pain Level: 0    Therapy Time:   Individual Concurrent Group Co-treatment   Time In 0957         Time Out 1021         Minutes 24               Plan:  Recommended diet: nectar thick clear liquids - monitor closely for s/s of aspiration.     Will follow up for advancement, pending pt/family wishes for quality of life  Needs met prior to leaving radiology,  d/w RN BODØ  This document will serve as a dc summary if pt dc prior to next visit   3/28/2019, 10:23 AM

## 2019-03-28 NOTE — PROGRESS NOTES
RN down to MBS with pt. Results communicated with physicians and dietician. Pt tolerated trip well, VSS.

## 2019-03-28 NOTE — PROGRESS NOTES
ICU Progress Note    Admit Date: 3/26/2019  Hospital Day: 3    ICU DAY  Code:Full Code  PCP: Beatriz Raphael MD            SUBJECTIVE:   Interval Hx:  Patient was seen this morning. Was mentating much better this morning. Failed diet yesterday with s/s aspiration. Alert and orientated times three. He looked fatigued but comfortable. Denies any chest pain. No complaints otherhwise. Not requiring oxygen overnight  In the evening was a little confused. Nursing staff endorses spontaneous coughing without significant sputum production. Blood pressures stable. Good urine output. MEDICATIONS:   Scheduled Meds:    magnesium sulfate  2 g Intravenous Once    mupirocin   Topical Daily    aspirin  81 mg Oral Daily    levothyroxine  37.5 mcg Intravenous Daily    And    sodium chloride (PF)  5 mL Intravenous Daily    sodium chloride flush  10 mL Intravenous 2 times per day    insulin lispro  0-6 Units Subcutaneous TID WC    insulin lispro  0-3 Units Subcutaneous Nightly      Continuous Infusions:   IV infusion builder 100 mL/hr at 03/28/19 0520    heparin (porcine) Stopped (03/28/19 0420)    dextrose      norepinephrine Stopped (03/27/19 1630)     PRN Meds:heparin (porcine), heparin (porcine), sodium chloride flush, acetaminophen, glucose, dextrose, glucagon (rDNA), dextrose  Allergies: No Known Allergies    PHYSICAL EXAM:       Vitals: BP (!) 117/53   Pulse 70   Temp 96.3 °F (35.7 °C) (Core)   Resp 11   Ht 5' 5\" (1.651 m)   Wt 101 lb 13.6 oz (46.2 kg)   SpO2 100%   BMI 16.95 kg/m²   I/O:      Intake/Output Summary (Last 24 hours) at 3/28/2019 0724  Last data filed at 3/28/2019 0600  Gross per 24 hour   Intake 2517 ml   Output 1390 ml   Net 1127 ml     No intake/output data recorded. I/O last 3 completed shifts: In: 2517 [I.V.:2517]  Out: 1276 [Urine:1390]    Physical Examination:   Physical Exam   Constitutional: He appears cachectic. He has a sickly appearance. He appears ill.  He appears distressed. HENT:   Head: Normocephalic and atraumatic. Not microcephalic. Eyes: Pupils are equal, round, and reactive to light. Right eye exhibits no discharge. No scleral icterus. Unable to fully asses   Neck: Normal range of motion. Neck supple. Cardiovascular: Normal rate, regular rhythm and intact distal pulses. Pulmonary/Chest: No respiratory distress. Decreased air intake, decreased air intake, no crackles, no wheeing no coarseness    Abdominal: Soft. Bowel sounds are normal. There is no tenderness. Musculoskeletal: Normal range of motion. He exhibits no edema or tenderness. Neurological:   Alert orientated times 3       Vent Settings:   / / /     DATA:       Labs:  CBC:   Recent Labs     03/26/19  1551 03/27/19  0614 03/28/19  0343   WBC 6.4 4.9 6.2   HGB 11.1* 8.3* 7.2*   HCT 34.1* 25.1* 21.2*    179 125*       BMP:   Recent Labs     03/27/19  1634 03/27/19  2143 03/28/19  0343   * 151* 152*  152*   K 4.2 4.0 3.7  3.7   * 118* 118*  118*   CO2 20* 21 22  22   BUN 92* 93* 88*  89*   CREATININE 5.3* 4.8* 4.9*  4.7*   GLUCOSE 103* 98 145*  144*     ABGs:   Recent Labs     03/26/19 2004   PHART 7.308*   [id number] 34.0*   PO2ART 42.4*       ASSESSMENT AND PLAN:   Francesco Mishra is a 66 y.o. male, who was admitted with Hypothermia. Neuro/Sedation  Acute metabolic encephalopathy   - mildly improving with fluids, probably a component of malnutrition, hypothyroidism  - stroke in January with residual deficits  - Head CT no acute changes on this admission 3/26/2019 (no MRI record)     Respiratory  Hypoxic respiratory failure (resolved)  - repeat xray looks worse but could be aspiration leading to pneumonitis, watch for signs of infection before starting antibiotics. - continue to be afebrile and no leukocytosis.  Is trending towards hypothermia which may be sign of infection vs (malnutrition, low metabolic state and hypothyroidism)  - BCx NGTD, UCx NGTD, Resp f/u negative with aspiration (silent), and he does not want a feeding tube. Chest x-ray findings with right lower lobe infiltrative process likely represents chronic aspiration. No sign of active infection at this time. Ongoing discussion regarding goals of care is necessary.   He may transfer to the medical floor

## 2019-03-28 NOTE — PROGRESS NOTES
questions appropriately but falls asleep during conversation. Subjective:     Scheduled Meds:   insulin lispro  0-12 Units Subcutaneous TID WC    insulin lispro  0-6 Units Subcutaneous Nightly    mupirocin   Topical Daily    aspirin  81 mg Oral Daily    levothyroxine  37.5 mcg Intravenous Daily    And    sodium chloride (PF)  5 mL Intravenous Daily    sodium chloride flush  10 mL Intravenous 2 times per day       Continuous Infusions:   dextrose 5 % and 0.45 % NaCl 150 mL/hr at 03/28/19 1249    heparin (porcine) 6 Units/kg/hr (03/28/19 0817)    dextrose      norepinephrine Stopped (03/27/19 1630)       PRN Meds:heparin (porcine), heparin (porcine), sodium chloride flush, acetaminophen, glucose, dextrose, glucagon (rDNA), dextrose    Review of Systems -   General ROS: positive for  - fatigue and weight loss  Psychological ROS: negative  ENT ROS: negative  Hematological and Lymphatic ROS: negative  Respiratory ROS: positive for - cough and shortness of breath  Cardiovascular ROS: negative  Gastrointestinal ROS: positive for - appetite loss and difficulty swallowing   Genito-Urinary ROS: negative  Musculoskeletal ROS: positive for - muscular weakness  Neurological ROS: negative    Performance status 40%    Objective:     Patient Vitals for the past 8 hrs:   BP Temp Pulse Resp SpO2   03/28/19 1200 (!) 113/94 95.7 °F (35.4 °C) 74 14 --   03/28/19 1100 104/70 -- 72 14 --   03/28/19 1000 99/65 -- 77 15 --   03/28/19 0900 118/71 -- 63 12 96 %   03/28/19 0800 120/68 -- 76 12 96 %     I/O last 3 completed shifts: In: 1891 [I.V.:1891]  Out: 2030 [Urine:1530]  No intake/output data recorded.     General appearance: appears stated age, cooperative and fatigued  Lungs: clear to auscultation bilaterally  Heart: regular rate and rhythm  Abdomen: soft, non-tender; bowel sounds normal; no masses,  no organomegaly  Extremities: extremities normal, atraumatic, no cyanosis or edema  Skin: Skin color, texture, turgor

## 2019-03-28 NOTE — PROGRESS NOTES
Spoke with pharmacy and ICU residents about heparin drip. Since patient was previously on heparin drip 12 hours ago and first anti-xa was drawn and was 1.8, decision to decrease rate of heparin drip to 9u/kg/hr and still bolus patient. Will check anti-xa in 6 hours.

## 2019-03-28 NOTE — PROGRESS NOTES
Speech Language Pathology  Facility/Department: [facility] ICU  Dysphagia Daily Treatment Note    NAME: Francesco Mishra  : 1945  MRN: [id number]    Patient Diagnosis(es):   Patient Active Problem List    Diagnosis Date Noted    Severe malnutrition ([address] 2019    Acquired hypothyroidism 2019    Altered mental status     Oropharyngeal dysphagia     Weakness     SOB (shortness of breath)     Goals of care, counseling/discussion     DNR (do not resuscitate) discussion     Encounter for palliative care     possible sepsis 2019    Hypothermia 2019    SANDY (acute kidney injury) ([address]     Metabolic acidosis     Acute metabolic encephalopathy     Hypoglycemia, exogenous insulin, in setting of SANDY 2019    Vertebral artery stenosis, right 2019    SANDY (acute kidney injury) ([address] 2019    Normocytic anemia 2019    Severe protein-calorie malnutrition ([address] 2019    S/P percutaneous endoscopic gastrostomy (PEG) tube placement ([address] 2019    Right to left cardiac shunt ([address] 2019    H/O ischemic multifocal multiple vascular territories stroke 2019    Anticoagulated 2019    Acute ischemic right MCA stroke ([address] 2019    S/P hip hemiarthroplasty 2017    Uncontrolled type 2 diabetes mellitus with hyperglycemia, with long-term current use of insulin ([address] 10/17/2017    Paroxysmal A-fib ([address]     Coronary artery disease involving native coronary artery of native heart without angina pectoris     Cardiomyopathy ([address]     Acute renal failure ([address]     Noncompliance with medication regimen 10/17/2016    Type 2 diabetes mellitus ([address] 2016    Essential hypertension 2016    Vitamin B deficiency 2012    Atherosclerosis of coronary artery 2011    Long term current use of insulin ([address] 2011    Vitamin D deficiency 2010    Angiopathy, peripheral ([address] 09/17/2010    Gonadotropin deficiency ([facility] 09/17/2010    Dyslipidemia 02/12/2010    Carotid artery narrowing 02/12/2010     Allergies: No Known Allergies    Type of Study: Repeat MBS  Recent Chest Xray 3/27/19      Right IJ central line in the SVC.  No pneumothorax.       Final Result       Right lower lung opacity present previously improved likely relates to improvement of the atelectasis and/or pneumonitis.       Right lower lobe bronchiectasis present previously.             CT of head 3/27/19      1. No acute stroke, mass, or hemorrhage. 2. Remote infarcts in the bilateral cerebellar and cerebral hemispheres as above. 3. Moderate chronic small vessel ischemic white matter disease. 4. Mild diffuse atrophy. 5. Moderate right mastoid effusion.         Prior dysphagia history:  Previous MBS (#2) 1/18/19  Oral Phase: Mild-moderate oral phase deficits. Pt edentulous and reported that dentures currently at nursing home when questioned and reported \"I can chew anything with or without them\".  Pt [facility] for pureed with fairly timely oral phase with no oral residue.  With cracker trial, pt with slow  A-P tranport and prolonged mastication with minimal residue on the left due to reduced left lingual strength and ROM.     Pharyngeal phase:  Pt with moderate pharyngeal deficits characterized by delay in swallow initiation with most po trials with spillover into pyriform sinus prior to swallow initiation with most po trials.  Minimal to no pharyngeal residue.  Due to reduced laryngeal excursion and elevation, pt with silent aspiration during the swallow with nectar via cup and aspiration with delayed cough response with nectar via cup with chin tuck.  Chin tuck did not eliminate aspiration.  No aspiration/penetration with puree x2 trials, honey thick liquids via cup X2 or spoon or with nectar via spoon (only one trial given).  Did not attempt thins due to deficits with nectar.      Previous MBS #1- 4/21/17  Oral Phase: 1. Moderate-severe oral stage dysphagia characterized by suspected decreased mastication and decreased lingual manipulation/control.    2. Prolonged bolus prep and oral transit with lingual mashing.    3. Reduced bolus control with premature bolus loss to the pharynx with all trials.    4. Lingual residue with all trials is cleared with cued repeat swallow. Pharyngeal:   1. Severe pharyngeal stage dysphagia characterized by delayed swallow, decreased laryngeal elevation, and reduced pharyngeal peristalsis.    2. Premature spillage to the valleculae with all trials and to the pyriform with nectar and thin trials.    3. Intermittent, flash penetration with nectar and honey trials.    4. Deep penetration with thin; residue lining laryngeal vestibule evident post-penetration of thin.    5. Vallecular and pyriform residue (vallecular > pyriform) with all trials.    6. Clears liquid residue with cued repeat swallow.    7. Unable to clear puree residue using repeat swallows and/or liquid wash.    8. Patient at severe risk for aspiration after the swallow of observed residues during study. Upper Esophageal Screen:   1. No apparent upper esophageal involvements per screening  Dysphagia Outcome Severity Scale: Level 2: Moderate Severe dysphagia- Maximum assistance or maximum use of strategies with partial PO only     MBS results 2/28/19:  Pt presents with moderate oral and pharyngeal phase dysphagia  Oral- pt with poor A-P transport with all consistencies. Mastication with cracker was prolonged. pt unable to draw honey thick barium up the straw. Pharyngeal- pt presents with incomplete epiglottal closure which resulted in deep penetration of nectar thick barium without spontaneous clearing. Nectar thick barium would have been aspirated it pt was not instructed to cough and clear throat several times.  Pt also silently aspirated thin barium during the swallow.  Did not attempt chin tuck as question pt's ability to recall and perform independently as well as this strategy was not beneficial in the past. Pt with no aspiration or penetration with pureed, cracker or honey thick liquids by cup. Pt with minimal residue remaining in the valleculae and pyriform after the swallow with honey thick liquids and to a slightly greater degree with pureed and cracker. Pt able to clear residue when instructed to dry swallow.         [facility] 3/28/19:  Impressions:  Pt exhibits slow propulsion of bolus posteriorly in oral cavity. Lingual residue of cracker noted after study completed. Pt with reduced tongue base strength and reduced laryngeal elevation resulting in mod amount of residue in valleculae and pyriform sinuses with puree and honey thick liquid textures. This then resulted in spillover into airway with deep penetration and eventual SILENT aspiration. Pt did not clear residue spontaneously, no extra swallows were noted. Pt asked if he felt anything, which he denied. Pt then asked to swallow again, which did not clear residue. Pt then presented with nectar thick liquid trials via cup/straw/tsp, where no penetration or aspiration was identified. Thin liquids was presented via tsp and cup, where deep penetration occurred during the swallow, without spontaneous clearing and no cough reflex. In summary, nectar thick liquids were the only texture that pt did not penetrate or aspirate. Would trial nectar thick clear liquid diet with close monitoring for s/s of aspiration. Pending pt wishes for quality of life, will follow up for diet advancement. Solid texture poses a risk due to pt being edentulous, oral residue noted after study as well as pt report of choking on turkey sandwich prior to admit.       Chart reviewed.     Medical Diagnosis: sepsis  Treatment Diagnosis: dysphagia     Pain: none    Current Diet : nectar clears    Treatment:  Pt seen bedside to address the following goals:  1-The patient will tolerate recommended diet without observed clinical signs of aspiration  3/27: ALEN Vu notified SLP pt having difficulty with lunch. Bennett Leal pt briefly with lunch, pt holding throat and sounding wet/congested.  D/w RN and will make pt NPO this date and follow up next session for repeat BS assessment and follow up with repeat MBS as appropriate. 3/28: pt analyzed with portion of lunch. Pt consuming nectar thick clears with no immediate coughing or throat clearing, voice remained clear. After finished with tray, occasional mild cough noted. Cont goal    2- The patient Sakina Syed will verbalize/demonstrate understanding of dysphagia recommendations  3/27: Educated pt to purpose of visit, s/s of aspiration, concern if aspiration occurs, rationale for diet recommendation/strategies to reduce risk for aspiration and instruction to notify staff if any signs emerge. Pt stated understanding. Cont goal  3/28: pt educated to results of MBS, rationale for diet recommendations and strategies to improve safety of swallow. Explained cont risk for aspiration and pt cont to state he would not want a feeding tube. Pt will benefit from cont education  Cont goal  3/28:  Pt /family member educated again to results of MBS study. Explained high risk for aspiration of PO, however nectar thick liquids were consumed without aspiration. Also educated to strategies to aide in increasing safety of PO. Discussed cont this nectar clear liquid diet vs advancing for quality of life,  however this would increase risk for aspiration due to residue that was noted on MBS. Pt did not state his wishes regarding this, however did reiterate that he would not want a feeding tube.    Pt / family will benefit from cont education  Cont goal    3- Pt will participate in [facility]  3/28: goal met     4- pt will participate in swallowing exercises with mod cues  3/28: not addressed at this time  Cont goal    Patient/Family/Caregiver Education:  As above    Compensatory Strategies:  Supervision: 1:1  Compensatory Swallowing Strategies:   Small bites/sips  Assist feed;  Upright as possible for all oral intake  Remain upright for 30-45 minutes after meals        Plan:  Continued daily Dysphagia treatment with goals per  plan of care. Diet recommendations: Nectar thick clear liquids   Consider advance of solids (to puree or D2)  for QOL, pending pt/family wishes  DC recommendation: pt will most likely benefit from ongoing treatment upon dc  Treatment: 13  D/W nursing [facility] [place]  Needs met prior to leaving room, call button in reach. Jennifer Iavn M.S./[facility]-SLP #[id number]  Pg.  # [phone]    If patient is discharged prior to next treatment, this note will serve as the discharge summary

## 2019-03-28 NOTE — PROGRESS NOTES
Transfer Acceptance note     The patient was seen and examined. Briefly, Mr. Gm Benavidez is a 65 y/o M who presented with hypothermia. He was found to have a temperature of 92.1. Patient had a recent stroke and has had difficulty swallowing since then. His oral intake has been poor at his nursing home. He also presented with a creatinine of 5.9, sodium of 150 and an elevated lactic. Patient was admitted to the ICU. He was started on tracey hugger and broad spectrum antibiotics for suspected sepsis. He was fluid resuscitated as well. His hypothermia has improved and he is now refusing tracey hugger. He continues to receive fluids and his creatinine and sodium have been trending downwards. Antibiotics were discontinued as patient does not have a leukocytosis and has remained afebrile. His mental status has improved. CT head was negative. He is on heparin for paroxysmal afib as he is unable to swallow pills. Speech has been evaluating the patient and recommended nectar thick fluids only. Patient is refusing feeding tube placement.      Vitals:    03/28/19 1000   BP: 99/65   Pulse: 77   Resp: 15   Temp:    SpO2:      Scheduled Meds:   mupirocin   Topical Daily    aspirin  81 mg Oral Daily    levothyroxine  37.5 mcg Intravenous Daily    And    sodium chloride (PF)  5 mL Intravenous Daily    sodium chloride flush  10 mL Intravenous 2 times per day    insulin lispro  0-6 Units Subcutaneous TID WC    insulin lispro  0-3 Units Subcutaneous Nightly     Continuous Infusions:   dextrose 5 % and 0.45 % NaCl 150 mL/hr at 03/28/19 1048    heparin (porcine) 6 Units/kg/hr (03/28/19 0817)    dextrose      norepinephrine Stopped (03/27/19 1630)     PRN Meds:heparin (porcine), heparin (porcine), sodium chloride flush, acetaminophen, glucose, dextrose, glucagon (rDNA), dextrose    Gen: supine in bed; no apparent distress, cachectic  HEENT: MMM  Cardiovascular: +S1, +S2; RRR; no murmurs  Lungs:CTAB  Abdomen: soft, non-tender, non-distended, bowel sounds present throughout  Extremities: warm, well-perfused; no cyanosis, no edema, no erythema  Neuro: AAO x3; no evidence of aphasia or neglect    The objective and subjective findings as well as the ICU course of treatment have been reviewed with the ICU team. The treatment plan has been reviewed with the ICU team. The patient is being transferred to [place] [id number] in stable condition.     Karrie Hernandez DO, PGY1  [phone]  03/28/19  11:10 AM

## 2019-03-28 NOTE — PROGRESS NOTES
Speech Language Pathology  Dysphagia - contact note    Chart reviewed, d/w RN. Okay to proceed with MBS today. Full note to follow. Mirza Lemus M.S./[facility]-SLP #[id number]  Pg.  # [phone]

## 2019-03-29 LAB
ALBUMIN SERPL-MCNC: 1.7 G/DL (ref 3.4–5)
ALBUMIN SERPL-MCNC: 1.8 G/DL (ref 3.4–5)
ALBUMIN SERPL-MCNC: 1.8 G/DL (ref 3.4–5)
ANION GAP SERPL CALCULATED.3IONS-SCNC: 10 MMOL/L (ref 3–16)
ANION GAP SERPL CALCULATED.3IONS-SCNC: 11 MMOL/L (ref 3–16)
ANION GAP SERPL CALCULATED.3IONS-SCNC: 11 MMOL/L (ref 3–16)
ANTI-XA UNFRAC HEPARIN: 0.76 IU/ML (ref 0.3–0.7)
APTT: 37.1 SEC (ref 26–36)
BASOPHILS ABSOLUTE: 0 K/UL (ref 0–0.2)
BASOPHILS RELATIVE PERCENT: 0.2 %
BUN BLDV-MCNC: 63 MG/DL (ref 7–20)
BUN BLDV-MCNC: 68 MG/DL (ref 7–20)
BUN BLDV-MCNC: 74 MG/DL (ref 7–20)
CALCIUM SERPL-MCNC: 7.6 MG/DL (ref 8.3–10.6)
CALCIUM SERPL-MCNC: 7.6 MG/DL (ref 8.3–10.6)
CALCIUM SERPL-MCNC: 7.7 MG/DL (ref 8.3–10.6)
CHLORIDE BLD-SCNC: 115 MMOL/L (ref 99–110)
CHLORIDE BLD-SCNC: 117 MMOL/L (ref 99–110)
CHLORIDE BLD-SCNC: 117 MMOL/L (ref 99–110)
CO2: 18 MMOL/L (ref 21–32)
CO2: 19 MMOL/L (ref 21–32)
CO2: 19 MMOL/L (ref 21–32)
CREAT SERPL-MCNC: 3.7 MG/DL (ref 0.8–1.3)
CREAT SERPL-MCNC: 3.9 MG/DL (ref 0.8–1.3)
CREAT SERPL-MCNC: 4 MG/DL (ref 0.8–1.3)
EOSINOPHILS ABSOLUTE: 0.2 K/UL (ref 0–0.6)
EOSINOPHILS RELATIVE PERCENT: 3.7 %
GFR AFRICAN AMERICAN: 18
GFR AFRICAN AMERICAN: 18
GFR AFRICAN AMERICAN: 19
GFR NON-AFRICAN AMERICAN: 15
GFR NON-AFRICAN AMERICAN: 15
GFR NON-AFRICAN AMERICAN: 16
GLUCOSE BLD-MCNC: 140 MG/DL (ref 70–99)
GLUCOSE BLD-MCNC: 141 MG/DL (ref 70–99)
GLUCOSE BLD-MCNC: 154 MG/DL (ref 70–99)
GLUCOSE BLD-MCNC: 155 MG/DL (ref 70–99)
GLUCOSE BLD-MCNC: 157 MG/DL (ref 70–99)
GLUCOSE BLD-MCNC: 75 MG/DL (ref 70–99)
GLUCOSE BLD-MCNC: 79 MG/DL (ref 70–99)
HCT VFR BLD CALC: 22.2 % (ref 40.5–52.5)
HCT VFR BLD CALC: 22.3 % (ref 40.5–52.5)
HCT VFR BLD CALC: 23 % (ref 40.5–52.5)
HCT VFR BLD CALC: 23.2 % (ref 40.5–52.5)
HEMOGLOBIN: 7.5 G/DL (ref 13.5–17.5)
HEMOGLOBIN: 7.5 G/DL (ref 13.5–17.5)
HEMOGLOBIN: 7.7 G/DL (ref 13.5–17.5)
HEMOGLOBIN: 7.8 G/DL (ref 13.5–17.5)
INR BLD: 1.82 (ref 0.86–1.14)
LYMPHOCYTES ABSOLUTE: 0.5 K/UL (ref 1–5.1)
LYMPHOCYTES RELATIVE PERCENT: 8.3 %
MAGNESIUM: 1.7 MG/DL (ref 1.8–2.4)
MAGNESIUM: 1.8 MG/DL (ref 1.8–2.4)
MAGNESIUM: 1.9 MG/DL (ref 1.8–2.4)
MCH RBC QN AUTO: 30.5 PG (ref 26–34)
MCHC RBC AUTO-ENTMCNC: 33.6 G/DL (ref 31–36)
MCV RBC AUTO: 90.8 FL (ref 80–100)
MONOCYTES ABSOLUTE: 0.4 K/UL (ref 0–1.3)
MONOCYTES RELATIVE PERCENT: 6.6 %
NEUTROPHILS ABSOLUTE: 4.8 K/UL (ref 1.7–7.7)
NEUTROPHILS RELATIVE PERCENT: 81.2 %
PDW BLD-RTO: 15 % (ref 12.4–15.4)
PERFORMED ON: ABNORMAL
PERFORMED ON: NORMAL
PHOSPHORUS: 2.8 MG/DL (ref 2.5–4.9)
PHOSPHORUS: 3 MG/DL (ref 2.5–4.9)
PHOSPHORUS: 3.3 MG/DL (ref 2.5–4.9)
PLATELET # BLD: 105 K/UL (ref 135–450)
PMV BLD AUTO: 9.4 FL (ref 5–10.5)
POTASSIUM SERPL-SCNC: 3.7 MMOL/L (ref 3.5–5.1)
POTASSIUM SERPL-SCNC: 3.8 MMOL/L (ref 3.5–5.1)
POTASSIUM SERPL-SCNC: 3.8 MMOL/L (ref 3.5–5.1)
PROTHROMBIN TIME: 20.8 SEC (ref 9.8–13)
RBC # BLD: 2.44 M/UL (ref 4.2–5.9)
SODIUM BLD-SCNC: 144 MMOL/L (ref 136–145)
SODIUM BLD-SCNC: 146 MMOL/L (ref 136–145)
SODIUM BLD-SCNC: 147 MMOL/L (ref 136–145)
WBC # BLD: 6 K/UL (ref 4–11)

## 2019-03-29 PROCEDURE — 85014 HEMATOCRIT: CPT

## 2019-03-29 PROCEDURE — 85610 PROTHROMBIN TIME: CPT

## 2019-03-29 PROCEDURE — 1200000000 HC SEMI PRIVATE

## 2019-03-29 PROCEDURE — 6360000002 HC RX W HCPCS: Performed by: STUDENT IN AN ORGANIZED HEALTH CARE EDUCATION/TRAINING PROGRAM

## 2019-03-29 PROCEDURE — 2580000003 HC RX 258: Performed by: STUDENT IN AN ORGANIZED HEALTH CARE EDUCATION/TRAINING PROGRAM

## 2019-03-29 PROCEDURE — C9113 INJ PANTOPRAZOLE SODIUM, VIA: HCPCS | Performed by: STUDENT IN AN ORGANIZED HEALTH CARE EDUCATION/TRAINING PROGRAM

## 2019-03-29 PROCEDURE — 36415 COLL VENOUS BLD VENIPUNCTURE: CPT

## 2019-03-29 PROCEDURE — 85730 THROMBOPLASTIN TIME PARTIAL: CPT

## 2019-03-29 PROCEDURE — 80069 RENAL FUNCTION PANEL: CPT

## 2019-03-29 PROCEDURE — 94760 N-INVAS EAR/PLS OXIMETRY 1: CPT

## 2019-03-29 PROCEDURE — 2500000003 HC RX 250 WO HCPCS: Performed by: STUDENT IN AN ORGANIZED HEALTH CARE EDUCATION/TRAINING PROGRAM

## 2019-03-29 PROCEDURE — 92526 ORAL FUNCTION THERAPY: CPT

## 2019-03-29 PROCEDURE — 83735 ASSAY OF MAGNESIUM: CPT

## 2019-03-29 PROCEDURE — 51798 US URINE CAPACITY MEASURE: CPT

## 2019-03-29 PROCEDURE — 85018 HEMOGLOBIN: CPT

## 2019-03-29 PROCEDURE — 85025 COMPLETE CBC W/AUTO DIFF WBC: CPT

## 2019-03-29 PROCEDURE — 85520 HEPARIN ASSAY: CPT

## 2019-03-29 PROCEDURE — 36592 COLLECT BLOOD FROM PICC: CPT

## 2019-03-29 PROCEDURE — 6370000000 HC RX 637 (ALT 250 FOR IP): Performed by: STUDENT IN AN ORGANIZED HEALTH CARE EDUCATION/TRAINING PROGRAM

## 2019-03-29 RX ORDER — MAGNESIUM SULFATE IN WATER 40 MG/ML
2 INJECTION, SOLUTION INTRAVENOUS ONCE
Status: COMPLETED | OUTPATIENT
Start: 2019-03-29 | End: 2019-03-29

## 2019-03-29 RX ADMIN — DEXTROSE AND SODIUM CHLORIDE: 5; 450 INJECTION, SOLUTION INTRAVENOUS at 20:15

## 2019-03-29 RX ADMIN — INSULIN LISPRO 2 UNITS: 100 INJECTION, SOLUTION INTRAVENOUS; SUBCUTANEOUS at 20:42

## 2019-03-29 RX ADMIN — INSULIN LISPRO 3 UNITS: 100 INJECTION, SOLUTION INTRAVENOUS; SUBCUTANEOUS at 09:04

## 2019-03-29 RX ADMIN — PANTOPRAZOLE SODIUM 40 MG: 40 INJECTION, POWDER, FOR SOLUTION INTRAVENOUS at 20:42

## 2019-03-29 RX ADMIN — SODIUM CHLORIDE, PRESERVATIVE FREE 5 ML: 5 INJECTION INTRAVENOUS at 09:08

## 2019-03-29 RX ADMIN — MUPIROCIN: 20 OINTMENT TOPICAL at 09:10

## 2019-03-29 RX ADMIN — MAGNESIUM SULFATE HEPTAHYDRATE 2 G: 40 INJECTION, SOLUTION INTRAVENOUS at 18:38

## 2019-03-29 RX ADMIN — PANTOPRAZOLE SODIUM 40 MG: 40 INJECTION, POWDER, FOR SOLUTION INTRAVENOUS at 09:05

## 2019-03-29 RX ADMIN — Medication 10 ML: at 09:08

## 2019-03-29 RX ADMIN — DEXTROSE AND SODIUM CHLORIDE: 5; 450 INJECTION, SOLUTION INTRAVENOUS at 01:45

## 2019-03-29 RX ADMIN — DEXTROSE AND SODIUM CHLORIDE: 5; 450 INJECTION, SOLUTION INTRAVENOUS at 13:17

## 2019-03-29 RX ADMIN — LEVOTHYROXINE SODIUM ANHYDROUS 37.5 MCG: 100 INJECTION, POWDER, LYOPHILIZED, FOR SOLUTION INTRAVENOUS at 09:05

## 2019-03-29 RX ADMIN — INSULIN LISPRO 3 UNITS: 100 INJECTION, SOLUTION INTRAVENOUS; SUBCUTANEOUS at 17:56

## 2019-03-29 ASSESSMENT — PAIN SCALES - GENERAL
PAINLEVEL_OUTOF10: 0

## 2019-03-29 NOTE — PLAN OF CARE
Problem: Risk for Impaired Skin Integrity  Goal: Tissue integrity - skin and mucous membranes  Description  Structural intactness and normal physiological function of skin and  mucous membranes. Outcome: Ongoing  Note:   Pt at risk for skin breakdown. See Larry score. Pt remains on bedrest. Able to reposition self in bed. Heels elevated off bed. Sacral heart mepilex intact to protect,  site inspected and intact underneath. Will continue to help turn and reposition patient every two hours and as needed. Will continue to keep patient clean and dry, applying skin care cream as needed. Pillows used for repositioning q2hs. Will continue to monitor and assess for skin breakdown. Problem: Falls - Risk of:  Goal: Will remain free from falls  Description  Will remain free from falls  Outcome: Ongoing  Note:   Pt is a fall risk. Fall risk protocol in place. See Holly [place] Fall Score. Pt bed is in low position, bed alarm is on, side rails up, fall risk bracelet applied. , non-skid footwear in use. Patient/family educated on fall risk protocol, instructed to call for assistance when needed and belongings are in reach. Will continue with hourly rounds for po intake, pain needs, toileting and repositioning as needed. Will continue to monitor for needs. Problem: Nutrition  Goal: Optimal nutrition therapy  Outcome: Ongoing  Note:   Poor intake. Pt has lack of appetite. Will continue to encourage diet promoting nutrition, new orders for clear liquid diet. Problem: Nutritional:  Goal: Consumption of liquid of appropriate consistency  Description  Consumption of liquid of appropriate consistency  Note:   All liquids to be nectar thick. RN will continue to monitor and ensure it is nectar thick. Problem: Respiratory:  Goal: Absence of aspiration  Description  Absence of aspiration  Note:   On room at at this time. All liquids nectar thick.

## 2019-03-29 NOTE — PROGRESS NOTES
Hospital Medicine Care  Progress Note      Chief complain; Fatigue           Subjective:     Patient feels much better. He denies any pain. He denies any chest pain, shortness of breath. I have discussed with him regarding the tube feedings, however he doesn't seem excited for the gastrostomy tube    Drop in Hgb . Appetite is normal per patient. On discussion of weight loss, he doesn't have clue why he is loosing weight. Review of Systems:     Review of Systems as mentioned above, other ros is negative. Objective:       Medications        Scheduled Meds:   pantoprazole  40 mg Intravenous BID    insulin lispro  0-18 Units Subcutaneous TID WC    insulin lispro  0-9 Units Subcutaneous Nightly    mupirocin   Topical Daily    levothyroxine  37.5 mcg Intravenous Daily    And    sodium chloride (PF)  5 mL Intravenous Daily    sodium chloride flush  10 mL Intravenous 2 times per day     Continuous Infusions:   dextrose 5 % and 0.45 % NaCl 150 mL/hr at 03/29/19 0145    dextrose       PRN Meds:.glucose, dextrose, glucagon (rDNA), dextrose      Allergies  No Known Allergies      Vitals:    03/29/19 0900 03/29/19 1000 03/29/19 1100 03/29/19 1200   BP: (!) 134/93 97/61 133/79 120/62   Pulse: 79 76 77 78   Resp: 15 13 16 13   Temp:    96.6 °F (35.9 °C)   TempSrc:    Core   SpO2:  100%  95%   Weight:       Height:             Physical exam:         Physical Exam   Constitutional: He is oriented to person, place, and time. He appears well-developed. He appears cachectic. No distress. HENT:   Head: Normocephalic and atraumatic. Eyes: Pupils are equal, round, and reactive to light. EOM are normal.   Cardiovascular: Normal rate and regular rhythm. Exam reveals no friction rub. No murmur heard. Pulmonary/Chest: Effort normal and breath sounds normal. No stridor. No respiratory distress. Abdominal: Soft. Bowel sounds are normal. He exhibits no distension. There is no tenderness.    Musculoskeletal: Normal range of motion. He exhibits no edema or deformity. Neurological: He is alert and oriented to person, place, and time. No cranial nerve deficit. Coordination normal.   Skin: Skin is dry. Capillary refill takes less than 2 seconds. He is not diaphoretic. Labs      Recent Labs     03/27/19  0614 03/28/19  0343 03/28/19 2215 03/29/19  0408 03/29/19  1145   WBC 4.9 6.2  --  6.0  --    HGB 8.3* 7.2* 6.7* 7.5* 7.8*   HCT 25.1* 21.2* 20.3* 22.2* 23.0*    125*  --  105*  --    MCV 92.1 91.5  --  90.8  --         Recent Labs     03/28/19 2215 03/29/19  0408 03/29/19  1145   * 147* 146*   K 3.8 3.7 3.8   * 117* 117*   CO2 20* 19* 19*   PHOS 3.3 3.3 3.0   BUN 78* 74* 68*   CREATININE 4.2* 4.0* 3.9*       Recent Labs     03/26/19  1944   AST 29   *   BILIDIR <0.2   BILITOT <0.2   ALKPHOS 169*       Recent Labs     03/28/19 2215 03/29/19  0408 03/29/19  1145   MG 2.10 1.90 1.80       Radiology  FL MODIFIED BARIUM SWALLOW W VIDEO   Final Result      Silent aspiration with honey, puree and thin consistencies. No definite penetration or aspiration with nectar. XR CHEST PORTABLE   Final Result   Impression: Patchy infiltrate of the right lung base is increased in the interval. Stable blunting of the right costophrenic angle. XR CHEST PORTABLE   Final Result     Right IJ central line in the SVC. No pneumothorax. XR ABDOMEN (KUB) (SINGLE AP VIEW)   Final Result      Feeding tube with tip possibly within the right mainstem bronchus. The tube should be removed and replaced      Findings called as a critical result to the floor by Dr. Jose Ramon Kilgore at the time of the dictation 9:33 PM 3/26/2019. CT Head WO Contrast   Final Result      1. No acute stroke, mass, or hemorrhage. 2. Remote infarcts in the bilateral cerebellar and cerebral hemispheres as above. 3. Moderate chronic small vessel ischemic white matter disease. 4. Mild diffuse atrophy.    5. Moderate right

## 2019-03-29 NOTE — PROGRESS NOTES
GI Progress Note    Mert Huerta is a 66 y.o. male patient. 1. Hypothermia, initial encounter    2. Acute renal failure, unspecified acute renal failure type ([facility]    3. Sepsis, due to unspecified organism ([facility]    4. Oropharyngeal dysphagia  5.       malnutrition    Admit Date: 3/26/2019    Subjective:       feels the same  No abdominal pain  Not eating.  Little intake of thickened iquid            Scheduled Meds:   pantoprazole  40 mg Intravenous BID    insulin lispro  0-18 Units Subcutaneous TID WC    insulin lispro  0-9 Units Subcutaneous Nightly    mupirocin   Topical Daily    levothyroxine  37.5 mcg Intravenous Daily    And    sodium chloride (PF)  5 mL Intravenous Daily    sodium chloride flush  10 mL Intravenous 2 times per day       Continuous Infusions:   dextrose 5 % and 0.45 % NaCl 150 mL/hr at 03/29/19 1317    dextrose         PRN Meds:  glucose, dextrose, glucagon (rDNA), dextrose      Objective:       Patient Vitals for the past 24 hrs:   BP Temp Temp src Pulse Resp SpO2 Weight   03/29/19 1600 135/70 96.4 °F (35.8 °C) CORE 77 13 97 % --   03/29/19 1243 -- -- -- -- 12 99 % --   03/29/19 1200 120/62 96.6 °F (35.9 °C) CORE 78 13 95 % --   03/29/19 1100 133/79 -- -- 77 16 -- --   03/29/19 1000 97/61 -- -- 76 13 100 % --   03/29/19 0900 (!) 134/93 -- -- 79 15 -- --   03/29/19 0800 (!) 140/82 98.1 °F (36.7 °C) CORE 75 14 99 % --   03/29/19 0700 121/78 -- -- 71 13 93 % --   03/29/19 0600 -- -- -- 71 15 -- 106 lb 4.2 oz (48.2 kg)   03/29/19 0500 (!) 109/54 -- -- 68 13 -- --   03/29/19 0400 120/67 97.2 °F (36.2 °C) CORE 72 19 100 % --   03/29/19 0300 133/72 -- -- 80 16 99 % --   03/29/19 0200 -- -- -- 60 9 100 % --   03/29/19 0110 118/67 95.9 °F (35.5 °C) CORE 60 11 97 % --   03/29/19 0100 (!) 94/54 -- -- (!) 49 14 100 % --   03/29/19 0014 (!) 88/58 96 °F (35.6 °C) Axillary 56 10 100 % --   03/29/19 0000 101/66 95.4 °F (35.2 °C) CORE 62 12 100 % --   03/28/19 2314 119/67 95.2 °F (35.1 °C) CORE 70 13 100 % --   19 2300 126/72 -- -- 74 14 97 % --   19 2253 129/70 95.2 °F (35.1 °C) -- 74 13 -- --   19 2200 -- -- -- 74 16 -- --   19 2100 -- -- -- 65 12 -- --   19 123/83 95.5 °F (35.3 °C) Oral 76 18 100 % --       Exam:    VITALS:  /70   Pulse 77   Temp 96.4 °F (35.8 °C) (Core)   Resp 13   Ht 5' 5\" (1.651 m)   Wt 106 lb 4.2 oz (48.2 kg)   SpO2 97%   BMI 17.68 kg/m²   TEMPERATURE:  Current - Temp: 96.4 °F (35.8 °C); Max - Temp  Av.2 °F (35.7 °C)  Min: 95.2 °F (35.1 °C)  Max: 98.1 °F (36.7 °C)    NAD  General appearance: alert, appears stated age, cooperative and no distress  Head: Normocephalic, without obvious abnormality, atraumatic  Neck: supple, symmetrical, trachea midline and thyroid not enlarged, symmetric, no tenderness/mass/nodules  CVS:  RRR, Nl s1s2  Lungs CTA Bilaterally, normal effort  Abdomen: soft, non-tender; bowel sounds normal; no masses,  no organomegaly  Extremities: No edema. Lab Data:  Recent Labs     19  0614 19  0343  19  0408 19  1145 19  1730   WBC 4.9 6.2  --  6.0  --   --    HGB 8.3* 7.2*   < > 7.5* 7.8* 7.7*   HCT 25.1* 21.2*   < > 22.2* 23.0* 23.2*   MCV 92.1 91.5  --  90.8  --   --     125*  --  105*  --   --     < > = values in this interval not displayed. Recent Labs     19  0408 19  1145 19  1730   * 146* 144   K 3.7 3.8 3.8   * 117* 115*   CO2 19* 19* 18*   PHOS 3.3 3.0 2.8   BUN 74* 68* 63*   CREATININE 4.0* 3.9* 3.7*     Recent Labs     19   AST 29   *   BILIDIR <0.2   BILITOT <0.2   ALKPHOS 169*     No results for input(s): LIPASE, AMYLASE in the last 72 hours. Recent Labs     19  0614 19  0408   PROT 4.7*  --   --    INR  --  2.02* 1.82*     No results for input(s): PTT in the last 72 hours. No results for input(s): OCCULTBLD in the last 72 hours.     INR 1.82  Anti xa 7.6    Assessment:       Principal Problem:    Hypothermia  Active Problems:    Acute renal failure (HCC)    possible sepsis    SANDY (acute kidney injury) (HCC)    Metabolic acidosis    Acute metabolic encephalopathy    Altered mental status    Oropharyngeal dysphagia    Weakness    SOB (shortness of breath)    Goals of care, counseling/discussion    DNR (do not resuscitate) discussion    Encounter for palliative care    Severe malnutrition ([facility]    Acquired hypothyroidism  Resolved Problems:    * No resolved hospital problems. *  malnutrition  Hypoalbuminemia  Dehydration  SANDY  Anemia, blood loss  Gi bleed      Recommendations: With prolonged INR procedures has to be postponed till INR returns to normal    Repeat INR in am    Discussed with RN who felt patient will not  cooperate with NG insertion to deliver [address] with pharmacist re thickening golytely. It can not be done    If patient agrees to PEG procedure, we can give golytely through it to clean his colon. Discussed with RN    Adele Kraus m.d.   ICU [id number]  40 minutes of critical care      Asael Encinas MD  3/29/2019  6:13 PM

## 2019-03-29 NOTE — PROGRESS NOTES
Speech Language Pathology  Facility/Department: [facility] ICU  Dysphagia Daily Treatment Note    NAME: Haley Nixon  : 1945  MRN: [id number]    Patient Diagnosis(es):   Patient Active Problem List    Diagnosis Date Noted    Severe malnutrition ([address] 2019    Acquired hypothyroidism 2019    Altered mental status     Oropharyngeal dysphagia     Weakness     SOB (shortness of breath)     Goals of care, counseling/discussion     DNR (do not resuscitate) discussion     Encounter for palliative care     possible sepsis 2019    Hypothermia 2019    SANDY (acute kidney injury) ([address]     Metabolic acidosis     Acute metabolic encephalopathy     Hypoglycemia, exogenous insulin, in setting of SANDY 2019    Vertebral artery stenosis, right 2019    SANDY (acute kidney injury) ([address] 2019    Normocytic anemia 2019    Severe protein-calorie malnutrition ([address] 2019    S/P percutaneous endoscopic gastrostomy (PEG) tube placement ([address] 2019    Right to left cardiac shunt ([address] 2019    H/O ischemic multifocal multiple vascular territories stroke 2019    Anticoagulated 2019    Acute ischemic right MCA stroke ([address] 2019    S/P hip hemiarthroplasty 2017    Uncontrolled type 2 diabetes mellitus with hyperglycemia, with long-term current use of insulin ([address] 10/17/2017    Paroxysmal A-fib ([address]     Coronary artery disease involving native coronary artery of native heart without angina pectoris     Cardiomyopathy ([address]     Acute renal failure ([address]     Noncompliance with medication regimen 10/17/2016    Type 2 diabetes mellitus ([address] 2016    Essential hypertension 2016    Vitamin B deficiency 2012    Atherosclerosis of coronary artery 2011    Long term current use of insulin ([address] 2011    Vitamin D deficiency 2010    Angiopathy, peripheral ([address] 2010  Gonadotropin deficiency ([facility] 09/17/2010    Dyslipidemia 02/12/2010    Carotid artery narrowing 02/12/2010     Allergies: No Known Allergies    Type of Study: Repeat MBS  Recent Chest Xray 3/27/19      Right IJ central line in the SVC.  No pneumothorax.       Final Result       Right lower lung opacity present previously improved likely relates to improvement of the atelectasis and/or pneumonitis.       Right lower lobe bronchiectasis present previously.             CT of head 3/27/19      1. No acute stroke, mass, or hemorrhage. 2. Remote infarcts in the bilateral cerebellar and cerebral hemispheres as above. 3. Moderate chronic small vessel ischemic white matter disease. 4. Mild diffuse atrophy. 5. Moderate right mastoid effusion.         Prior dysphagia history:  Previous MBS (#2) 1/18/19  Oral Phase: Mild-moderate oral phase deficits. Pt edentulous and reported that dentures currently at nursing home when questioned and reported \"I can chew anything with or without them\".  Pt [facility] for pureed with fairly timely oral phase with no oral residue.  With cracker trial, pt with slow  A-P tranport and prolonged mastication with minimal residue on the left due to reduced left lingual strength and ROM.     Pharyngeal phase:  Pt with moderate pharyngeal deficits characterized by delay in swallow initiation with most po trials with spillover into pyriform sinus prior to swallow initiation with most po trials.  Minimal to no pharyngeal residue.  Due to reduced laryngeal excursion and elevation, pt with silent aspiration during the swallow with nectar via cup and aspiration with delayed cough response with nectar via cup with chin tuck.  Chin tuck did not eliminate aspiration.  No aspiration/penetration with puree x2 trials, honey thick liquids via cup X2 or spoon or with nectar via spoon (only one trial given).  Did not attempt thins due to deficits with nectar.      Previous MBS #1- 4/21/17  Oral Phase: and perform independently as well as this strategy was not beneficial in the past. Pt with no aspiration or penetration with pureed, cracker or honey thick liquids by cup. Pt with minimal residue remaining in the valleculae and pyriform after the swallow with honey thick liquids and to a slightly greater degree with pureed and cracker. Pt able to clear residue when instructed to dry swallow.         [facility] [place] 3/28/19:  Impressions:  Pt exhibits slow propulsion of bolus posteriorly in oral cavity. Lingual residue of cracker noted after study completed. Pt with reduced tongue base strength and reduced laryngeal elevation resulting in mod amount of residue in valleculae and pyriform sinuses with puree and honey thick liquid textures. This then resulted in spillover into airway with deep penetration and eventual SILENT aspiration. Pt did not clear residue spontaneously, no extra swallows were noted. Pt asked if he felt anything, which he denied. Pt then asked to swallow again, which did not clear residue. Pt then presented with nectar thick liquid trials via cup/straw/tsp, where no penetration or aspiration was identified. Thin liquids was presented via tsp and cup, where deep penetration occurred during the swallow, without spontaneous clearing and no cough reflex. In summary, nectar thick liquids were the only texture that pt did not penetrate or aspirate. Would trial nectar thick clear liquid diet with close monitoring for s/s of aspiration. Pending pt wishes for quality of life, will follow up for diet advancement. Solid texture poses a risk due to pt being edentulous, oral residue noted after study as well as pt report of choking on turkey sandwich prior to admit.       Chart reviewed.     Medical Diagnosis: sepsis  Treatment Diagnosis: dysphagia     Pain: none    Current Diet : nectar clears    Treatment:  Pt seen bedside to address the following goals:  1-The patient will tolerate recommended diet without observed clinical signs of aspiration  3/27: ALEN Stewart notified SLP pt having difficulty with lunch. Yahaira Huggins pt briefly with lunch, pt holding throat and sounding wet/congested.  D/w RN and will make pt NPO this date and follow up next session for repeat BS assessment and follow up with repeat MBS as appropriate. 3/28: pt analyzed with portion of lunch. Pt consuming nectar thick clears with no immediate coughing or throat clearing, voice remained clear. After finished with tray, occasional mild cough noted. Cont goal  3/29: Pt only agreeable to x1 trial of nectar thick liquids which results in delayed cough. RN reports lungs are okay but pt continued with productive cough and has very poor PO intake. Daughter present and endorses pt was eating \"nothing\" at nursing home. Continue goal.     2- The patient Collette Limb will verbalize/demonstrate understanding of dysphagia recommendations  3/27: Educated pt to purpose of visit, s/s of aspiration, concern if aspiration occurs, rationale for diet recommendation/strategies to reduce risk for aspiration and instruction to notify staff if any signs emerge. Pt stated understanding. Cont goal  3/28: pt educated to results of MBS, rationale for diet recommendations and strategies to improve safety of swallow. Explained cont risk for aspiration and pt cont to state he would not want a feeding tube. Pt will benefit from cont education  Cont goal  3/28:  Pt /family member educated again to results of MBS study. Explained high risk for aspiration of PO, however nectar thick liquids were consumed without aspiration. Also educated to strategies to aide in increasing safety of PO. Discussed cont this nectar clear liquid diet vs advancing for quality of life,  however this would increase risk for aspiration due to residue that was noted on MBS. Pt did not state his wishes regarding this, however did reiterate that he would not want a feeding tube.    Pt / family will benefit from cont education  Cont goal  3/29: Patient's daughter present and reports she was not present for results of MBS study. Explained risk of aspiration with PO but no aspiration with nectars per MBS. Educated on importance of oral care in reducing risk for aspiration pneumonia. Educated on recommendation for nectar thick liquids only at this time to reduced risk of aspiration and choices would be for alternative means or diet advancement for quality of life. Patient was not able to verbalize any information educated on or indicate desire/aversion for feeding tube as previously documented. Daughter indicated she would want feeding tube placed for nourishment and reducing aspiration. Educated that tube placement would not eliminate possibility of aspiration. Per chart review, there is a man listed as the patients legal guardian. Pt / family would benefit from ongoing education. Continue goal.      3- Pt will participate in [address]  3/28: goal met     4- pt will participate in swallowing exercises with mod cues  3/28: not addressed at this time  Cont goal  3/29: Goal not targeted this session. Continue goal.     Patient/Family/Caregiver Education:  As above    Compensatory Strategies:  Supervision: 1:1  Compensatory Swallowing Strategies:   Small bites/sips  Assist feed;  Upright as possible for all oral intake  Remain upright for 30-45 minutes after meals      Plan:  Continued daily Dysphagia treatment with goals per  plan of care. Diet recommendations: Nectar thick clear liquids   Consider advance of solids (to puree or D2)  for QOL, pending pt/family wishes  DC recommendation: pt will most likely benefit from ongoing treatment upon dc  Treatment: 10  D/W nursing  Needs met prior to leaving room, call button in reach. Teresa Munoz M.A., [id number] [facility], [phone]  Speech-Language Pathologist  Pg.  # [phone]    If patient is discharged prior to next treatment, this note will serve as the discharge summary

## 2019-03-29 NOTE — PROGRESS NOTES
Internal Medicine PGY-1 Resident Progress Note        PCP: Hussain Cross MD    Date of Admission: 3/26/2019    Chief Complaint: Abnormal Labs     Subjective: Yesterday, patient was FOBT+ and Hgb dropped to 6.7 and required 1upRBC. Otherwise, patient denies any CP or SOB. Medications:  Reviewed    Infusion Medications    dextrose 5 % and 0.45 % NaCl 150 mL/hr at 03/29/19 1317    dextrose       Scheduled Medications    pantoprazole  40 mg Intravenous BID    insulin lispro  0-18 Units Subcutaneous TID WC    insulin lispro  0-9 Units Subcutaneous Nightly    mupirocin   Topical Daily    levothyroxine  37.5 mcg Intravenous Daily    And    sodium chloride (PF)  5 mL Intravenous Daily    sodium chloride flush  10 mL Intravenous 2 times per day     PRN Meds: glucose, dextrose, glucagon (rDNA), dextrose      Intake/Output Summary (Last 24 hours) at 3/29/2019 1453  Last data filed at 3/29/2019 1400  Gross per 24 hour   Intake 5566 ml   Output 1700 ml   Net 3866 ml       Physical Exam Performed:    /62   Pulse 78   Temp 96.6 °F (35.9 °C) (Core)   Resp 12   Ht 5' 5\" (1.651 m)   Wt 106 lb 4.2 oz (48.2 kg)   SpO2 99%   BMI 17.68 kg/m²     General appearance: Thin appearing AA M, No apparent distress, appears stated age and cooperative. HEENT: Pupils equal, round, and reactive to light. Conjunctivae/corneas clear. Neck: Supple, with full range of motion. No jugular venous distention. Trachea midline. Respiratory:  Normal respiratory effort. Decreased lung sounds but grossly clear to auscultation, bilaterally without Rales/Wheezes/Rhonchi. Cardiovascular: Regular rate and rhythm with normal S1/S2 without murmurs, rubs or gallops. Abdomen: Soft, non-tender, non-distended with normal bowel sounds. Musculoskeletal: No clubbing, cyanosis or edema bilaterally. Full range of motion without deformity. Skin: Skin color, texture, turgor normal.  No rashes or lesions.   Neurologic:  Neurovascularly intact without any focal sensory/motor deficits. Decreased strength on R side, Cranial nerves: II-XII intact, grossly non-focal.  Psychiatric: Alert and oriented x 3, thought content appropriate, normal insight  Capillary Refill: Brisk,< 3 seconds   Peripheral Pulses: +2 palpable, equal bilaterally       Labs:   Recent Labs     03/27/19  0614 03/28/19  0343 03/28/19  2215 03/29/19  0408 03/29/19  1145   WBC 4.9 6.2  --  6.0  --    HGB 8.3* 7.2* 6.7* 7.5* 7.8*   HCT 25.1* 21.2* 20.3* 22.2* 23.0*    125*  --  105*  --      Recent Labs     03/28/19  2215 03/29/19  0408 03/29/19  1145   * 147* 146*   K 3.8 3.7 3.8   * 117* 117*   CO2 20* 19* 19*   BUN 78* 74* 68*   CREATININE 4.2* 4.0* 3.9*   CALCIUM 7.6* 7.7* 7.6*   PHOS 3.3 3.3 3.0     Recent Labs     03/26/19  1944   AST 29   *   BILIDIR <0.2   BILITOT <0.2   ALKPHOS 169*     Recent Labs     03/27/19  0614 03/29/19  0408   INR 2.02* 1.82*     Urinalysis:      Lab Results   Component Value Date    NITRU Negative 03/26/2019    WBCUA 6-10 03/26/2019    BACTERIA 2+ 03/26/2019    RBCUA 0-2 03/26/2019    BLOODU Negative 03/26/2019    SPECGRAV 1.025 03/26/2019    GLUCOSEU Negative 03/26/2019       Radiology:  FL MODIFIED BARIUM SWALLOW W VIDEO   Final Result      Silent aspiration with honey, puree and thin consistencies. No definite penetration or aspiration with nectar. XR CHEST PORTABLE   Final Result   Impression: Patchy infiltrate of the right lung base is increased in the interval. Stable blunting of the right costophrenic angle. XR CHEST PORTABLE   Final Result     Right IJ central line in the SVC. No pneumothorax. XR ABDOMEN (KUB) (SINGLE AP VIEW)   Final Result      Feeding tube with tip possibly within the right mainstem bronchus. The tube should be removed and replaced      Findings called as a critical result to the floor by Dr. Becca Garcia at the time of the dictation 9:33 PM 3/26/2019.       CT Head WO Contrast Final Result      1. No acute stroke, mass, or hemorrhage. 2. Remote infarcts in the bilateral cerebellar and cerebral hemispheres as above. 3. Moderate chronic small vessel ischemic white matter disease. 4. Mild diffuse atrophy. 5. Moderate right mastoid effusion. XR CHEST PORTABLE   Final Result      Right lower lung opacity present previously improved likely relates to improvement of the atelectasis and/or pneumonitis. Right lower lobe bronchiectasis present previously. Assessment/Plan:    Philippe Joe is a 66 y.o. male PMH ischemic MCA stroke, CKD, FTT, presents from NH with electrolyte imbalances, found to be hypothermic, hypernatremic, hyperkalemic with SANDY on CKD. Hypotension. Concern for sepsis, volume depletion secondary to poor oral intake. Active Hospital Problems    Diagnosis Date Noted    Severe malnutrition ([facility] [E43] 03/27/2019    Acquired hypothyroidism [E03.9] 03/27/2019    Altered mental status [R41.82]     Oropharyngeal dysphagia [R13.12]     Weakness [R53.1]     SOB (shortness of breath) [R06.02]     Goals of care, counseling/discussion [Z71.89]     DNR (do not resuscitate) discussion [Z71.89]     Encounter for palliative care [Z51.5]     possible sepsis [A41.9] 03/26/2019    Hypothermia [T68. XXXA] 03/26/2019    SANDY (acute kidney injury) ([facility] [N17.9] 99/65/0979    Metabolic acidosis [L64.5] 03/26/2019    Acute metabolic encephalopathy [R10.65] 03/26/2019    Acute renal failure (HCC) [N17.9]      Acute metabolic encephalopathy   stroke in January with residual deficits, Head CT no acute changes on this admission 3/26/2019 (no MRI record)   - mildly improving with fluids, probably a component of malnutrition, hypothyroidism  -cont D51/2 NS @ 150   -Clear liquid diet with supplements      Hypotension   Likely 2/2 hypovolemia and hypothermia 2/2 dec PO intake, FOBT+ w/ decreased hemoglobin   -GI consulted-EGD and colonoscopy tomorrow   -cont D51/2 NS @ 150   -Clear liquid diet with supplements      Paroxysmal Afib   NSR  -holding Heparin drip in setting of FOBT+  - holding home coreg because of concerns of hypotension and NPO     CHFpEF  - HOLD coreg see above  - Strict I/O, daily wts     CAD s/p cardiac arrest  - restart aspirin once PO, hold heparin gtt in setting of FOBT+   - hold Plavix     SANDY on CKD (baseline Cr approx. 2)   Prerenal vs ATN, was seeing improvement with hydration creatine more stable overnight , UA didn't show any casts on admission, renal US (wnl last admission)  -Nephrology consulted   - RFP q6h for electrolyte follow up  - Increase fluids to 150 ml/hr D5 with 1/2 normal saline      IDDMII (glood glucose control)  - hold home metformin 500 BID  - continue SSI and Lantus 5 units nightly  - Hypoglycemia protocol  - HDSSI     Hypothroidism  - elevated TSH   - IV levo increased to 37.5     Oral Dysphagia 2/2 CVA w/ sev protein calorie malnutrition 2/2 stroke   -clear liquid for anticipation of EGD and colonoscopy   -will likely need PEG tube in the future      Anemia   FOBT+ in setting of decreased Hgb, heparin gtt stopped  -GI consulted-EGD and colonoscopy tomorrow   -H/H q6h, transfuse < 7        DVT Prophylaxis: SCDs  Diet: Dietary Nutrition Supplements: Standard High Calorie Oral Supplement, Frozen Oral Supplement  DIET CLEAR LIQUID; Nectar Thick  Code Status: Full Code    PT/OT Eval Status: pending     Dispo - GM[facility]     Derek Pascual MD    I will discuss the patient with the senior resident and MD Derek Garduno M.D.   Internal Medicine PGY-1  Pager: [phone]

## 2019-03-29 NOTE — PROGRESS NOTES
Nephrology Progress Note  [phone]   [email or web address]    Patient:  Cara Breaux   : 1945    CC:  SANDY   This is a patient with significant past medical history of SANDY,CKD prior CVA ,dysphagia with recent admission here for SANDY and Pneumonia , Cr was 2.4 at discharge  who presents with volume depletion very poor po intake and SANDY, Cr >5, hypernatremia . Initially hypotensive now improved, off pressors. Subjective:  More alert today interactive he does not want feeding tube he tells me     ROS:   No dyspnea ,not taking much po,     SHx:  He has visitors today. Meds:  Reviewed     Vitals:  /62   Pulse 78   Temp 96.6 °F (35.9 °C) (Core)   Resp 12   Ht 5' 5\" (1.651 m)   Wt 106 lb 4.2 oz (48.2 kg)   SpO2 99%   BMI 17.68 kg/m²     Physical Exam:  Gen: Resting in bed, NAD. HEENT: MMM, OP clear. CV: RRR no rub noted   Lungs: good respiratory effort and clear air entry   Abd: S/NT +BS  Ext: No edema, no cyanosis  Skin: Warm. No rashes appreciated. Labs:  CBC:   Lab Results   Component Value Date    WBC 6.0 2019    RBC 2.44 2019    HGB 7.8 2019    HCT 23.0 2019    MCV 90.8 2019    MCH 30.5 2019    MCHC 33.6 2019    RDW 15.0 2019     2019    MPV 9.4 2019     BMP:    Lab Results   Component Value Date     2019    K 3.8 2019    K 5.3 2019     2019    CO2 19 2019    BUN 68 2019    LABALBU 1.7 2019    CREATININE 3.9 2019    CALCIUM 7.6 2019    GFRAA 18 2019    LABGLOM 15 2019    GLUCOSE 79 2019       Assessment/Plan:  1. SANDY /CKD due to volume depletion better with IV fluids   2. Hypernatremia improved with increase in  IV fluids   3. Needs tube feeds   4. PAF on AC   5.  DM2 monitor       Marylene Kaska, MD

## 2019-03-29 NOTE — PROGRESS NOTES
Nutrition Assessment (Enteral Nutrition)    Type and Reason for Visit: Reassess    Nutrition Recommendations:     1. Clear liquid nectar thick diet per SLP recommendations. 2. Continue Ensure Enlive and Magic cup TID  3. Monitor patient and family goals of care- patient meets ASPEN criteria for severe protein calorie malnutrition and likely can not meet nutrition needs via po intake due to dyphagia and edentulous. Monitor for possible PEG vs diet advancement for pleasure. Tube feeding recommendations provided below as needed. ENTERAL NUTRITION  1. Recommend order \"Diet: Tube feed continuous/ with diet\". Initiate Glucerna 1.2 formula at 20 mL/hr and as tolerated, increase by 20 mL/hr q 4 hours until goal of 50 mL/hr is met per PEG. 2. Suggest 100 mL H20 q 4 hours for 100% fluid recommendations when no IVF. Recommend reevaluate IV fluids at this time. Minimum of 30 mL q 4 hours H2O flush recommended to maintain feeding tube. Increase flush if Na increases greater than 145 mEq/L.   3. Monitor closely and correct lytes (K, Phos, Mg) before progressing TF to goal d/t risk of refeeding syndrome (hx extended inadequate nutritional intake). 4. Monitor for tolerance (bowel habits, N/V, cramping, abdominal distention). 5. Monitor for need to convert continuous feeding to nocturnal / prn with diet to encourage po intake, lifestyle. Nutrition Assessment: Patient remains nutritionally compromised as he is only tolerating nectar thick liquids at this time and has dx of severe PCM. Patient initially adamant about not having feeding tube placed. PC met with patient and family and discussed need for PEG vs hospice with CC. Family and patient more receptive to PEG after meeting per [facility] nurse. Noted Na+ 147, D5 with 1/2 NS started at 150 ml/hr. Will provide EN recommendations and monitor patient wishes for POC.      Malnutrition Assessment:  · Malnutrition Status: Meets the criteria for severe malnutrition  · Context: Chronic illness  · Findings of the 6 clinical characteristics of malnutrition (Minimum of 2 out of 6 clinical characteristics is required to make the diagnosis of moderate or severe Protein Calorie Malnutrition based on AND/ASPEN Guidelines):  1. Energy Intake-Less than or equal to 75% of estimated energy requirement, Greater than or equal to 1 month    2. Weight Loss-10% loss or greater, in 3 months  3. Fat Loss-Severe subcutaneous fat loss,    4. Muscle Loss-Severe muscle mass loss,    5. Fluid Accumulation-No significant fluid accumulation,    6.  Strength-Not measured    Nutrition Risk Level: High    Nutrition Needs:  · Estimated Daily Total Kcal: 6003-4174  · Estimated Daily Protein (g): 66-79  · Estimated Daily Fluid (ml/day): 1540 ml    Nutrition Diagnosis:   · Problem: Severe malnutrition  · Etiology: related to Insufficient energy/nutrient consumption     Signs and symptoms:  as evidenced by Diet history of poor intake, Weight loss greater than or equal to 7.5% in 3 months, Severe loss of subcutaneous fat, Severe muscle loss    Objective Information:  · Nutrition-Focused Physical Findings: +BM 3/29  · Wound Type: None  · Current Nutrition Therapies:  · Oral Diet Orders: Nectar Thick, Clear Liquid   · Oral Diet intake: 1-25%  · Oral Nutrition Supplement (ONS) Orders: None  · ONS intake: 26-50%  · Tube Feeding (TF) Orders:   · Goal TF & Flush Orders Provides: Glucerna at goal rate of 50 ml/hr will provide 1200 ml TV, 1440 kcal, 72 gm protein and 966 ml free water.  Recommend 100 ml Q4 water flush  · Anthropometric Measures:  · Ht: 5' 5\" (165.1 cm)   · Current Body Wt: 106 lb (48.1 kg)(+7L)  · Admission Body Wt: 89 lb (40.4 kg)  · Weight Change: 5% loss ~1 month, 19% ~3 months   · Ideal Body Wt: 136 lb (61.7 kg),     · BMI Classification: BMI <18.5 Underweight    Nutrition Interventions:   Continue current diet, Start ONS  Continued Inpatient Monitoring    Nutrition Evaluation: · Evaluation: Progressing toward goals   · Goals: Patient will tolerate and consume 50% or greater of all meals and supplements   · Monitoring: Nutrition Progression, Meal Intake, Supplement Intake, Diet Tolerance, Weight, Pertinent Labs, Chewing/Swallowing      Electronically signed by Charlene Babin RD, LD on 3/29/19 at 10:54 AM    Contact Number: [phone]

## 2019-03-30 LAB
ALBUMIN SERPL-MCNC: 1.6 G/DL (ref 3.4–5)
ALBUMIN SERPL-MCNC: 1.7 G/DL (ref 3.4–5)
ANION GAP SERPL CALCULATED.3IONS-SCNC: 11 MMOL/L (ref 3–16)
ANION GAP SERPL CALCULATED.3IONS-SCNC: 9 MMOL/L (ref 3–16)
ANTI-XA UNFRAC HEPARIN: 0.19 IU/ML (ref 0.3–0.7)
BASOPHILS ABSOLUTE: 0 K/UL (ref 0–0.2)
BASOPHILS RELATIVE PERCENT: 0.3 %
BUN BLDV-MCNC: 60 MG/DL (ref 7–20)
BUN BLDV-MCNC: 60 MG/DL (ref 7–20)
CALCIUM SERPL-MCNC: 7.4 MG/DL (ref 8.3–10.6)
CALCIUM SERPL-MCNC: 7.4 MG/DL (ref 8.3–10.6)
CHLORIDE BLD-SCNC: 112 MMOL/L (ref 99–110)
CHLORIDE BLD-SCNC: 116 MMOL/L (ref 99–110)
CO2: 17 MMOL/L (ref 21–32)
CO2: 17 MMOL/L (ref 21–32)
CREAT SERPL-MCNC: 3.5 MG/DL (ref 0.8–1.3)
CREAT SERPL-MCNC: 3.5 MG/DL (ref 0.8–1.3)
EOSINOPHILS ABSOLUTE: 0.2 K/UL (ref 0–0.6)
EOSINOPHILS RELATIVE PERCENT: 3.9 %
FERRITIN: 429.3 NG/ML (ref 30–400)
GFR AFRICAN AMERICAN: 21
GFR AFRICAN AMERICAN: 21
GFR NON-AFRICAN AMERICAN: 17
GFR NON-AFRICAN AMERICAN: 17
GLUCOSE BLD-MCNC: 166 MG/DL (ref 70–99)
GLUCOSE BLD-MCNC: 190 MG/DL (ref 70–99)
GLUCOSE BLD-MCNC: 191 MG/DL (ref 70–99)
GLUCOSE BLD-MCNC: 216 MG/DL (ref 70–99)
GLUCOSE BLD-MCNC: 226 MG/DL (ref 70–99)
GLUCOSE BLD-MCNC: 232 MG/DL (ref 70–99)
HCT VFR BLD CALC: 22.3 % (ref 40.5–52.5)
HEMOGLOBIN: 7.3 G/DL (ref 13.5–17.5)
IMMATURE RETIC FRACT: 0.5 (ref 0.21–0.37)
INR BLD: 1.25 (ref 0.86–1.14)
IRON SATURATION: 72 % (ref 20–50)
IRON: 56 UG/DL (ref 59–158)
LYMPHOCYTES ABSOLUTE: 0.4 K/UL (ref 1–5.1)
LYMPHOCYTES RELATIVE PERCENT: 9.7 %
MAGNESIUM: 2 MG/DL (ref 1.8–2.4)
MAGNESIUM: 2.2 MG/DL (ref 1.8–2.4)
MCH RBC QN AUTO: 30.5 PG (ref 26–34)
MCHC RBC AUTO-ENTMCNC: 33 G/DL (ref 31–36)
MCV RBC AUTO: 92.4 FL (ref 80–100)
MONOCYTES ABSOLUTE: 0.2 K/UL (ref 0–1.3)
MONOCYTES RELATIVE PERCENT: 4.5 %
NEUTROPHILS ABSOLUTE: 3.8 K/UL (ref 1.7–7.7)
NEUTROPHILS RELATIVE PERCENT: 81.6 %
PDW BLD-RTO: 15.3 % (ref 12.4–15.4)
PERFORMED ON: ABNORMAL
PHOSPHORUS: 2.5 MG/DL (ref 2.5–4.9)
PHOSPHORUS: 2.6 MG/DL (ref 2.5–4.9)
PLATELET # BLD: 87 K/UL (ref 135–450)
PLATELET SLIDE REVIEW: ABNORMAL
PMV BLD AUTO: 9.3 FL (ref 5–10.5)
POTASSIUM SERPL-SCNC: 3.6 MMOL/L (ref 3.5–5.1)
POTASSIUM SERPL-SCNC: 3.7 MMOL/L (ref 3.5–5.1)
PROTHROMBIN TIME: 14.3 SEC (ref 9.8–13)
RBC # BLD: 2.41 M/UL (ref 4.2–5.9)
RETICULOCYTE ABSOLUTE COUNT: 0.03 M/UL
RETICULOCYTE COUNT PCT: 1.27 % (ref 0.5–2.18)
SODIUM BLD-SCNC: 140 MMOL/L (ref 136–145)
SODIUM BLD-SCNC: 142 MMOL/L (ref 136–145)
TOTAL IRON BINDING CAPACITY: 78 UG/DL (ref 260–445)
WBC # BLD: 4.6 K/UL (ref 4–11)

## 2019-03-30 PROCEDURE — C9113 INJ PANTOPRAZOLE SODIUM, VIA: HCPCS | Performed by: STUDENT IN AN ORGANIZED HEALTH CARE EDUCATION/TRAINING PROGRAM

## 2019-03-30 PROCEDURE — 83550 IRON BINDING TEST: CPT

## 2019-03-30 PROCEDURE — 1200000000 HC SEMI PRIVATE

## 2019-03-30 PROCEDURE — 85610 PROTHROMBIN TIME: CPT

## 2019-03-30 PROCEDURE — 36592 COLLECT BLOOD FROM PICC: CPT

## 2019-03-30 PROCEDURE — 2500000003 HC RX 250 WO HCPCS: Performed by: STUDENT IN AN ORGANIZED HEALTH CARE EDUCATION/TRAINING PROGRAM

## 2019-03-30 PROCEDURE — 85520 HEPARIN ASSAY: CPT

## 2019-03-30 PROCEDURE — 83735 ASSAY OF MAGNESIUM: CPT

## 2019-03-30 PROCEDURE — 80069 RENAL FUNCTION PANEL: CPT

## 2019-03-30 PROCEDURE — 2580000003 HC RX 258: Performed by: STUDENT IN AN ORGANIZED HEALTH CARE EDUCATION/TRAINING PROGRAM

## 2019-03-30 PROCEDURE — 85025 COMPLETE CBC W/AUTO DIFF WBC: CPT

## 2019-03-30 PROCEDURE — 82728 ASSAY OF FERRITIN: CPT

## 2019-03-30 PROCEDURE — 83540 ASSAY OF IRON: CPT

## 2019-03-30 PROCEDURE — 6360000002 HC RX W HCPCS: Performed by: STUDENT IN AN ORGANIZED HEALTH CARE EDUCATION/TRAINING PROGRAM

## 2019-03-30 PROCEDURE — 85045 AUTOMATED RETICULOCYTE COUNT: CPT

## 2019-03-30 RX ADMIN — DEXTROSE AND SODIUM CHLORIDE: 5; 450 INJECTION, SOLUTION INTRAVENOUS at 23:04

## 2019-03-30 RX ADMIN — DEXTROSE AND SODIUM CHLORIDE: 5; 450 INJECTION, SOLUTION INTRAVENOUS at 03:05

## 2019-03-30 RX ADMIN — LEVOTHYROXINE SODIUM ANHYDROUS 37.5 MCG: 100 INJECTION, POWDER, LYOPHILIZED, FOR SOLUTION INTRAVENOUS at 09:15

## 2019-03-30 RX ADMIN — MUPIROCIN: 20 OINTMENT TOPICAL at 09:14

## 2019-03-30 RX ADMIN — SODIUM CHLORIDE, PRESERVATIVE FREE 5 ML: 5 INJECTION INTRAVENOUS at 09:36

## 2019-03-30 RX ADMIN — INSULIN LISPRO 6 UNITS: 100 INJECTION, SOLUTION INTRAVENOUS; SUBCUTANEOUS at 18:19

## 2019-03-30 RX ADMIN — PANTOPRAZOLE SODIUM 40 MG: 40 INJECTION, POWDER, FOR SOLUTION INTRAVENOUS at 20:37

## 2019-03-30 RX ADMIN — DEXTROSE AND SODIUM CHLORIDE: 5; 450 INJECTION, SOLUTION INTRAVENOUS at 09:27

## 2019-03-30 RX ADMIN — INSULIN LISPRO 6 UNITS: 100 INJECTION, SOLUTION INTRAVENOUS; SUBCUTANEOUS at 13:32

## 2019-03-30 RX ADMIN — Medication 10 ML: at 09:16

## 2019-03-30 RX ADMIN — Medication 10 ML: at 20:38

## 2019-03-30 RX ADMIN — DEXTROSE AND SODIUM CHLORIDE: 5; 450 INJECTION, SOLUTION INTRAVENOUS at 16:23

## 2019-03-30 RX ADMIN — PANTOPRAZOLE SODIUM 40 MG: 40 INJECTION, POWDER, FOR SOLUTION INTRAVENOUS at 09:15

## 2019-03-30 RX ADMIN — INSULIN LISPRO 6 UNITS: 100 INJECTION, SOLUTION INTRAVENOUS; SUBCUTANEOUS at 09:06

## 2019-03-30 RX ADMIN — INSULIN LISPRO 2 UNITS: 100 INJECTION, SOLUTION INTRAVENOUS; SUBCUTANEOUS at 20:37

## 2019-03-30 ASSESSMENT — PAIN SCALES - GENERAL
PAINLEVEL_OUTOF10: 0

## 2019-03-30 NOTE — PROGRESS NOTES
GI Progress Note    Kinza Canales is a 66 y.o. male patient. 1. Hypothermia, initial encounter    2. Acute renal failure, unspecified acute renal failure type ([facility] [address]    3. Sepsis, due to unspecified organism ([facility] [address]    4. Malnutrition  5. GI bleed    Admit Date: 3/26/2019    Subjective:       Seen in ICU  Not taking thickened clear liquid  Very poor oral intake  High aspiration risk  Hg 7.3 after blood transfusion          ROS:  Cardiovascular ROS: no chest pain or dyspnea on exertion  Gastrointestinal ROS: no abdominal pain.   Respiratory ROS: no cough, shortness of breath, or wheezing    Scheduled Meds:   [START ON 3/31/2019] polyethylene glycol  4,000 mL Per NG tube Once    pantoprazole  40 mg Intravenous BID    insulin lispro  0-18 Units Subcutaneous TID WC    insulin lispro  0-9 Units Subcutaneous Nightly    mupirocin   Topical Daily    levothyroxine  37.5 mcg Intravenous Daily    And    sodium chloride (PF)  5 mL Intravenous Daily    sodium chloride flush  10 mL Intravenous 2 times per day       Continuous Infusions:   dextrose 5 % and 0.45 % NaCl 150 mL/hr at 03/30/19 0927    dextrose         PRN Meds:  glucose, dextrose, glucagon (rDNA), dextrose      Objective:       Patient Vitals for the past 24 hrs:   BP Temp Temp src Pulse Resp SpO2 Weight   03/30/19 1200 135/79 96.3 °F (35.7 °C) Temporal (!) 47 11 -- --   03/30/19 1100 102/70 -- -- 54 8 -- --   03/30/19 1000 (!) 148/85 -- -- 67 16 -- --   03/30/19 0900 129/76 96.6 °F (35.9 °C) Temporal 66 18 100 % --   03/30/19 0800 (!) 159/65 -- -- 52 11 100 % --   03/30/19 0700 -- -- -- 64 12 -- --   03/30/19 0600 -- -- -- -- -- -- 108 lb 0.4 oz (49 kg)   03/30/19 0400 121/69 -- -- 70 15 97 % --   03/30/19 0300 -- 97 °F (36.1 °C) Temporal 65 11 -- --   03/30/19 0000 114/63 95 °F (35 °C) CORE 60 13 98 % --   03/29/19 2000 105/60 -- -- 65 12 99 % --   03/29/19 1939 -- 95.5 °F (35.3 °C) CORE 71 14 97 % --   03/29/19 1600 135/70 96.4 °F (35.8 °C) CORE 77 13 97 % --       Exam:    VITALS:  /79   Pulse (!) 47   Temp 96.3 °F (35.7 °C) (Temporal)   Resp 11   Ht 5' 5\" (1.651 m)   Wt 108 lb 0.4 oz (49 kg)   SpO2 100%   BMI 17.98 kg/m²   TEMPERATURE:  Current - Temp: 96.3 °F (35.7 °C); Max - Temp  Av.1 °F (35.6 °C)  Min: 95 °F (35 °C)  Max: 97 °F (36.1 °C)    NAD  General appearance: alert, appears stated age, cooperative and no distress  Head: Normocephalic, without obvious abnormality, atraumatic  Neck: supple, symmetrical, trachea midline and thyroid not enlarged, symmetric, no tenderness/mass/nodules  CVS:  RRR, Nl s1s2  Lungs CTA Bilaterally, normal effort  Abdomen: soft, non-tender; bowel sounds normal; no masses,  no organomegaly  AAOx3, No asterixis or encephalopathy  Extremities: No edema. Lab Data:  Recent Labs     19  0343  19  0408  19  1730 19  2343 19  0552   WBC 6.2  --  6.0  --   --   --  4.6   HGB 7.2*   < > 7.5*   < > 7.7* 7.5* 7.3*   HCT 21.2*   < > 22.2*   < > 23.2* 22.3* 22.3*   MCV 91.5  --  90.8  --   --   --  92.4   *  --  105*  --   --   --  87*    < > = values in this interval not displayed. Recent Labs     19  1730 19  2344 19  0552    142 140   K 3.8 3.7 3.6   * 116* 112*   CO2 18* 17* 17*   PHOS 2.8 2.5 2.6   BUN 63* 60* 60*   CREATININE 3.7* 3.5* 3.5*     No results for input(s): AST, ALT, ALB, BILIDIR, BILITOT, ALKPHOS in the last 72 hours. No results for input(s): LIPASE, AMYLASE in the last 72 hours. Recent Labs     19  0408 19  0552   INR 1.82* 1.25*     No results for input(s): PTT in the last 72 hours. No results for input(s): OCCULTBLD in the last 72 hours.     Radiology review: as in record    Assessment:       Principal Problem:    Hypothermia  Active Problems:    Acute renal failure ([facility]    possible sepsis    SANDY (acute kidney injury) ([facility] [address]    Metabolic acidosis    Acute metabolic encephalopathy    Altered mental status Oropharyngeal dysphagia    Weakness    SOB (shortness of breath)    Goals of care, counseling/discussion    DNR (do not resuscitate) discussion    Encounter for palliative care    Severe malnutrition ([facility] [address]    Acquired hypothyroidism  Resolved Problems:    * No resolved hospital problems. *    GI bleed  Malnutrition  Anemia    INR 1.25         Recommendations:       Discussed with patients all options.  After lengthy discussion he agrees to have NG tube inserted to give golytely     Scheduled at 7 am on Monday 4-1-19 for egd and colonoscopy    Discussed with RN      Goldy Demarco MD  3/30/2019  1:06 PM  [id number]

## 2019-03-30 NOTE — PLAN OF CARE
Problem: Risk for Impaired Skin Integrity  Goal: Tissue integrity - skin and mucous membranes  Description  Structural intactness and normal physiological function of skin and  mucous membranes. Outcome: Ongoing  Note:   Pt at risk for skin breakdown. See Larry score. Pt remains on bedrest. Unable to reposition self in bed. Heels elevated off bed. Sacral heart mepilex intact to protect,  site inspected and intact underneath. Will continue to turn and reposition patient every two hours and as needed. Will continue to keep patient clean and dry, applying skin care cream as needed. Pillows used for repositioning q2hs. Will continue to monitor and assess for skin breakdown. Problem: Falls - Risk of:  Goal: Will remain free from falls  Description  Will remain free from falls  Outcome: Ongoing  Note:   Pt is free of falls at this time. Bed wheels are locked, bed alarm is on, bed is in lowest position. Call light is within reach. Pt is aware of the risk for falls. Will continue hourly rounding to monitor.     Goal: Absence of physical injury  Description  Absence of physical injury  Outcome: Ongoing     Problem: Nutrition  Goal: Optimal nutrition therapy  3/29/2019 2125 by Jose Hernandez RN  Outcome: Ongoing  3/29/2019 1106 by Norbreto Bee RD, LD  Outcome: Ongoing     Problem: Nutritional:  Goal: Ability to tolerate tube feedings without aspirating will improve  Description  Ability to tolerate tube feedings without aspirating will improve  Outcome: Ongoing  Goal: Consumption of food in small portions  Description  Consumption of food in small portions  Outcome: Ongoing  Goal: Consumption of liquid of appropriate consistency  Description  Consumption of liquid of appropriate consistency  Outcome: Ongoing     Problem: Respiratory:  Goal: Ability to maintain a clear airway will improve  Description  Ability to maintain a clear airway will improve  Outcome: Ongoing  Goal: Will remain free from infection  Description  Will remain free from infection  Outcome: Ongoing  Goal: Absence of aspiration  Description  Absence of aspiration  Outcome: Ongoing     Problem: Safety:  Goal: Ability to chew and swallow food without choking will improve  Description  Ability to chew and swallow food without choking will improve  Outcome: Ongoing  Goal: Ability to demonstrate good, daily oral hygiene techniques will improve  Description  Ability to demonstrate good, daily oral hygiene techniques will improve  Outcome: Ongoing  Goal: Maintenance of upright position during and after feeding  Description  Maintenance of upright position during and after feeding  Outcome: Ongoing

## 2019-03-30 NOTE — PROGRESS NOTES
Nephrology Progress Note  [phone]  [phone]   [email or web address]    Patient:  Cara Breaux   : 1945    CC:  SANDY   This is a patient with significant past medical history of SANDY,CKD prior CVA ,dysphagia with recent admission here for SANDY and Pneumonia , Cr was 2.4 at discharge  who presents with volume depletion very poor po intake and SANDY, Cr >5, hypernatremia . Initially hypotensive now improved, off pressors. Renal function improved. Subjective:  More alert today interactive     ROS:   No dyspnea, still not taking much po,     SHx:  He has visitors today. Meds:  Reviewed     Vitals:  BP (!) 178/98   Pulse 74   Temp 96.3 °F (35.7 °C) (Temporal)   Resp 16   Ht 5' 5\" (1.651 m)   Wt 108 lb 0.4 oz (49 kg)   SpO2 100%   BMI 17.98 kg/m²     Physical Exam:  Gen: Resting in bed, NAD. HEENT: MMM, OP clear. CV: RRR no rub noted   Lungs: good respiratory effort and clear air entry   Abd: S/NT +BS  Ext: No edema, no cyanosis  Skin: Warm. No rashes appreciated. Labs:  CBC:   Lab Results   Component Value Date    WBC 4.6 2019    RBC 2.41 2019    HGB 7.3 2019    HCT 22.3 2019    MCV 92.4 2019    MCH 30.5 2019    MCHC 33.0 2019    RDW 15.3 2019    PLT 87 2019    MPV 9.3 2019     BMP:    Lab Results   Component Value Date     2019    K 3.6 2019    K 5.3 2019     2019    CO2 17 2019    BUN 60 2019    LABALBU 1.6 2019    CREATININE 3.5 2019    CALCIUM 7.4 2019    GFRAA 21 2019    LABGLOM 17 2019    GLUCOSE 191 2019       Assessment/Plan:  1. SANDY /CKD due to volume depletion better with IV fluids   2. Hypernatremia improved with increase in  IV fluids   3. Needs tube feeds   4. PAF on AC   5.  DM2 monitor       Marylene Kaska, MD

## 2019-03-30 NOTE — PLAN OF CARE
Respiratory:  Goal: Ability to maintain a clear airway will improve  Description  Ability to maintain a clear airway will improve  Outcome: Ongoing  Goal: Will remain free from infection  Description  Will remain free from infection  Outcome: Ongoing  Goal: Absence of aspiration  Description  Absence of aspiration  Outcome: Ongoing     Problem: Safety:  Goal: Ability to chew and swallow food without choking will improve  Description  Ability to chew and swallow food without choking will improve  Outcome: Ongoing  Goal: Ability to demonstrate good, daily oral hygiene techniques will improve  Description  Ability to demonstrate good, daily oral hygiene techniques will improve  Outcome: Ongoing  Goal: Maintenance of upright position during and after feeding  Description  Maintenance of upright position during and after feeding  Outcome: Ongoing     Problem: Risk for Impaired Skin Integrity  Goal: Tissue integrity - skin and mucous membranes  Description  Structural intactness and normal physiological function of skin and  mucous membranes. Outcome: Ongoing  Intervention: SKIN ASSESSMENT  Note:   Pt skin is warm, dry, flaky and color consistent with ethnicity. Healing site of removed PEG (family states removed approx 3mo ago) to RUQ. Skin is thin and fragile. Pt turns self, assisting with q2hr turns and repositioning with pillow support. Heels elevated off bed. Sacral heart in place for prevention. Sacrum is intact. Will continue to monitor. Problem: Falls - Risk of:  Goal: Will remain free from falls  Description  Will remain free from falls  Outcome: Ongoing  Note:   Pt is a fall risk. Fall risk protocol in place. See Lila Reasoner Fall Score. Pt bed is in low position, bed alarm is on, side rails up, fall risk bracelet applied. , non-skid footwear in use. Patient/family educated on fall risk protocol, instructed to call for assistance when needed and belongings are in reach. assistance.  Will continue with hourly rounds for po intake, pain needs, toileting and repositioning as needed. Will continue to monitor for needs. Goal: Absence of physical injury  Description  Absence of physical injury  Outcome: Ongoing     Problem: Nutrition  Goal: Optimal nutrition therapy  Outcome: Ongoing     Problem: Nutritional:  Goal: Ability to tolerate tube feedings without aspirating will improve  Description  Ability to tolerate tube feedings without aspirating will improve  Outcome: Ongoing  Goal: Consumption of food in small portions  Description  Consumption of food in small portions  Outcome: Ongoing  Note:   Pt has poor appetite and PO intake is poor. GI following, plans PEG placement after scope for optimal nutrition.    Goal: Consumption of liquid of appropriate consistency  Description  Consumption of liquid of appropriate consistency  Outcome: Ongoing     Problem: Respiratory:  Goal: Ability to maintain a clear airway will improve  Description  Ability to maintain a clear airway will improve  Outcome: Ongoing  Goal: Will remain free from infection  Description  Will remain free from infection  Outcome: Ongoing  Goal: Absence of aspiration  Description  Absence of aspiration  Outcome: Ongoing     Problem: Safety:  Goal: Ability to chew and swallow food without choking will improve  Description  Ability to chew and swallow food without choking will improve  Outcome: Ongoing  Goal: Ability to demonstrate good, daily oral hygiene techniques will improve  Description  Ability to demonstrate good, daily oral hygiene techniques will improve  Outcome: Ongoing  Goal: Maintenance of upright position during and after feeding  Description  Maintenance of upright position during and after feeding  Outcome: Ongoing

## 2019-03-30 NOTE — PROGRESS NOTES
Full range of motion without deformity. Skin: Skin color, texture, turgor normal.  No rashes or lesions. Neurologic:  Neurovascularly intact without any focal sensory/motor deficits. Decreased strength on R side, Cranial nerves: II-XII intact, grossly non-focal.  Psychiatric: Alert and oriented x 3, thought content appropriate, normal insight  Capillary Refill: Brisk,< 3 seconds   Peripheral Pulses: +2 palpable, equal bilaterally       Labs:   Recent Labs     03/28/19  0343  03/29/19  0408  03/29/19  1730 03/29/19  2343 03/30/19  0552   WBC 6.2  --  6.0  --   --   --  4.6   HGB 7.2*   < > 7.5*   < > 7.7* 7.5* 7.3*   HCT 21.2*   < > 22.2*   < > 23.2* 22.3* 22.3*   *  --  105*  --   --   --  87*    < > = values in this interval not displayed. Recent Labs     03/29/19  1730 03/29/19  2344 03/30/19  0552    142 140   K 3.8 3.7 3.6   * 116* 112*   CO2 18* 17* 17*   BUN 63* 60* 60*   CREATININE 3.7* 3.5* 3.5*   CALCIUM 7.6* 7.4* 7.4*   PHOS 2.8 2.5 2.6       Recent Labs     03/29/19  0408 03/30/19  0552   INR 1.82* 1.25*     Urinalysis:      Lab Results   Component Value Date    NITRU Negative 03/26/2019    WBCUA 6-10 03/26/2019    BACTERIA 2+ 03/26/2019    RBCUA 0-2 03/26/2019    BLOODU Negative 03/26/2019    SPECGRAV 1.025 03/26/2019    GLUCOSEU Negative 03/26/2019       Radiology:  FL MODIFIED BARIUM SWALLOW W VIDEO   Final Result      Silent aspiration with honey, puree and thin consistencies. No definite penetration or aspiration with nectar. XR CHEST PORTABLE   Final Result   Impression: Patchy infiltrate of the right lung base is increased in the interval. Stable blunting of the right costophrenic angle. XR CHEST PORTABLE   Final Result     Right IJ central line in the SVC. No pneumothorax. XR ABDOMEN (KUB) (SINGLE AP VIEW)   Final Result      Feeding tube with tip possibly within the right mainstem bronchus.  The tube should be removed and replaced      Findings called as a critical result to the floor by Dr. Amos Feldman at the time of the dictation 9:33 PM 3/26/2019. CT Head WO Contrast   Final Result      1. No acute stroke, mass, or hemorrhage. 2. Remote infarcts in the bilateral cerebellar and cerebral hemispheres as above. 3. Moderate chronic small vessel ischemic white matter disease. 4. Mild diffuse atrophy. 5. Moderate right mastoid effusion. XR CHEST PORTABLE   Final Result      Right lower lung opacity present previously improved likely relates to improvement of the atelectasis and/or pneumonitis. Right lower lobe bronchiectasis present previously. Assessment/Plan:    Pamela Darling is a 66 y.o. male PMH ischemic MCA stroke, CKD, FTT, presents from NH with electrolyte imbalances, found to be hypothermic, hypernatremic, hyperkalemic with SANDY on CKD. Hypotension. Concern for sepsis, volume depletion secondary to poor oral intake. Active Hospital Problems    Diagnosis Date Noted    Severe malnutrition ([facility] [address] [E43] 03/27/2019    Acquired hypothyroidism [E03.9] 03/27/2019    Altered mental status [R41.82]     Oropharyngeal dysphagia [R13.12]     Weakness [R53.1]     SOB (shortness of breath) [R06.02]     Goals of care, counseling/discussion [Z71.89]     DNR (do not resuscitate) discussion [Z71.89]     Encounter for palliative care [Z51.5]     possible sepsis [A41.9] 03/26/2019    Hypothermia [T68. XXXA] 03/26/2019    SANDY (acute kidney injury) ([facility] [address] [N17.9] 94/03/3123    Metabolic acidosis [D89.3] 03/26/2019    Acute metabolic encephalopathy [T24.64] 03/26/2019    Acute renal failure (HCC) [N17.9]      Acute metabolic encephalopathy   stroke in January with residual deficits, Head CT no acute changes on this admission 3/26/2019 (no MRI record)   - mildly improving with fluids, probably a component of malnutrition, hypothyroidism  -cont D51/2 NS @ 150   -Clear liquid diet with supplements      Hypotension   Likely 2/2 hypovolemia and hypothermia 2/2 dec PO intake, FOBT+ w/ decreased hemoglobin   -GI consulted-EGD possibly Monday, will need to address how to give colonoscopy prep   -cont D51/2 NS @ 150   -Clear liquid diet with supplements      Paroxysmal Afib   NSR  -holding Heparin drip in setting of FOBT+  - holding home coreg because of concerns of hypotension and NPO     CHFpEF  - HOLD coreg see above  - Strict I/O, daily wts     CAD s/p cardiac arrest  - hold aspirin and heparin gtt in setting of FOBT+   - hold Plavix     SANDY on CKD (baseline Cr approx. 2)   Prerenal vs ATN, was seeing improvement with hydration creatine more stable overnight , UA didn't show any casts on admission, renal US (wnl last admission)  -Nephrology consulted   - Increase fluids to 150 ml/hr D5 with 1/2 normal saline      IDDMII (glood glucose control)  - hold home metformin 500 BID  - continue SSI and Lantus 5 units nightly  - Hypoglycemia protocol  - HDSSI     Hypothroidism  - elevated TSH   - IV levo increased to 37.5     Oral Dysphagia 2/2 CVA w/ sev protein calorie malnutrition 2/2 stroke   -clear liquid for anticipation of EGD and colonoscopy   -will likely need PEG tube in the future      Anemia   FOBT+ in setting of decreased Hgb, heparin gtt stopped  -GI consulted-EGD and colonoscopy anticipated       DVT Prophylaxis: SCDs  Diet: Dietary Nutrition Supplements: Standard High Calorie Oral Supplement, Frozen Oral Supplement  DIET CLEAR LIQUID; Nectar Thick  Code Status: Full Code    PT/OT Eval Status: pending     Dispo - GM[facility]     Ashley Lane MD    I will discuss the patient with the senior resident and MD Ashley Peng M.D.   Internal Medicine PGY-1  Pager: [phone]

## 2019-03-31 ENCOUNTER — ANESTHESIA EVENT (OUTPATIENT)
Dept: ENDOSCOPY | Age: 74
DRG: 987 | End: 2019-03-31
Payer: MEDICARE

## 2019-03-31 ENCOUNTER — APPOINTMENT (OUTPATIENT)
Dept: GENERAL RADIOLOGY | Age: 74
DRG: 987 | End: 2019-03-31
Payer: MEDICARE

## 2019-03-31 LAB
ALBUMIN SERPL-MCNC: 1.8 G/DL (ref 3.4–5)
ANION GAP SERPL CALCULATED.3IONS-SCNC: 11 MMOL/L (ref 3–16)
BASOPHILS ABSOLUTE: 0 K/UL (ref 0–0.2)
BASOPHILS RELATIVE PERCENT: 0.4 %
BLOOD CULTURE, ROUTINE: NORMAL
BUN BLDV-MCNC: 48 MG/DL (ref 7–20)
CALCIUM SERPL-MCNC: 7.5 MG/DL (ref 8.3–10.6)
CHLORIDE BLD-SCNC: 111 MMOL/L (ref 99–110)
CO2: 18 MMOL/L (ref 21–32)
CREAT SERPL-MCNC: 3 MG/DL (ref 0.8–1.3)
CULTURE, BLOOD 2: NORMAL
EOSINOPHILS ABSOLUTE: 0.3 K/UL (ref 0–0.6)
EOSINOPHILS RELATIVE PERCENT: 5.7 %
GFR AFRICAN AMERICAN: 25
GFR NON-AFRICAN AMERICAN: 20
GLUCOSE BLD-MCNC: 143 MG/DL (ref 70–99)
GLUCOSE BLD-MCNC: 166 MG/DL (ref 70–99)
GLUCOSE BLD-MCNC: 170 MG/DL (ref 70–99)
GLUCOSE BLD-MCNC: 178 MG/DL (ref 70–99)
GLUCOSE BLD-MCNC: 185 MG/DL (ref 70–99)
HCT VFR BLD CALC: 23.6 % (ref 40.5–52.5)
HEMOGLOBIN: 8 G/DL (ref 13.5–17.5)
INR BLD: 1.13 (ref 0.86–1.14)
LYMPHOCYTES ABSOLUTE: 0.5 K/UL (ref 1–5.1)
LYMPHOCYTES RELATIVE PERCENT: 10.1 %
MAGNESIUM: 1.7 MG/DL (ref 1.8–2.4)
MCH RBC QN AUTO: 30.6 PG (ref 26–34)
MCHC RBC AUTO-ENTMCNC: 34 G/DL (ref 31–36)
MCV RBC AUTO: 89.9 FL (ref 80–100)
MONOCYTES ABSOLUTE: 0.3 K/UL (ref 0–1.3)
MONOCYTES RELATIVE PERCENT: 6 %
NEUTROPHILS ABSOLUTE: 3.7 K/UL (ref 1.7–7.7)
NEUTROPHILS RELATIVE PERCENT: 77.8 %
PDW BLD-RTO: 15.4 % (ref 12.4–15.4)
PERFORMED ON: ABNORMAL
PHOSPHORUS: 2.4 MG/DL (ref 2.5–4.9)
PLATELET # BLD: 87 K/UL (ref 135–450)
PMV BLD AUTO: 9.3 FL (ref 5–10.5)
POTASSIUM SERPL-SCNC: 3.7 MMOL/L (ref 3.5–5.1)
PROTHROMBIN TIME: 12.9 SEC (ref 9.8–13)
RBC # BLD: 2.63 M/UL (ref 4.2–5.9)
SODIUM BLD-SCNC: 140 MMOL/L (ref 136–145)
WBC # BLD: 4.7 K/UL (ref 4–11)

## 2019-03-31 PROCEDURE — C9113 INJ PANTOPRAZOLE SODIUM, VIA: HCPCS | Performed by: STUDENT IN AN ORGANIZED HEALTH CARE EDUCATION/TRAINING PROGRAM

## 2019-03-31 PROCEDURE — 6370000000 HC RX 637 (ALT 250 FOR IP): Performed by: INTERNAL MEDICINE

## 2019-03-31 PROCEDURE — 1200000000 HC SEMI PRIVATE

## 2019-03-31 PROCEDURE — 80069 RENAL FUNCTION PANEL: CPT

## 2019-03-31 PROCEDURE — 6360000002 HC RX W HCPCS: Performed by: STUDENT IN AN ORGANIZED HEALTH CARE EDUCATION/TRAINING PROGRAM

## 2019-03-31 PROCEDURE — 2580000003 HC RX 258: Performed by: STUDENT IN AN ORGANIZED HEALTH CARE EDUCATION/TRAINING PROGRAM

## 2019-03-31 PROCEDURE — 36592 COLLECT BLOOD FROM PICC: CPT

## 2019-03-31 PROCEDURE — 2500000003 HC RX 250 WO HCPCS: Performed by: STUDENT IN AN ORGANIZED HEALTH CARE EDUCATION/TRAINING PROGRAM

## 2019-03-31 PROCEDURE — 6360000002 HC RX W HCPCS: Performed by: INTERNAL MEDICINE

## 2019-03-31 PROCEDURE — 74018 RADEX ABDOMEN 1 VIEW: CPT

## 2019-03-31 PROCEDURE — 85025 COMPLETE CBC W/AUTO DIFF WBC: CPT

## 2019-03-31 PROCEDURE — 85610 PROTHROMBIN TIME: CPT

## 2019-03-31 PROCEDURE — 83735 ASSAY OF MAGNESIUM: CPT

## 2019-03-31 PROCEDURE — 94760 N-INVAS EAR/PLS OXIMETRY 1: CPT

## 2019-03-31 RX ORDER — MAGNESIUM SULFATE IN WATER 40 MG/ML
2 INJECTION, SOLUTION INTRAVENOUS ONCE
Status: COMPLETED | OUTPATIENT
Start: 2019-03-31 | End: 2019-03-31

## 2019-03-31 RX ADMIN — POLYETHYLENE GLYCOL-3350 AND ELECTROLYTES 4000 ML: 236; 6.74; 5.86; 2.97; 22.74 POWDER, FOR SOLUTION ORAL at 13:55

## 2019-03-31 RX ADMIN — LEVOTHYROXINE SODIUM ANHYDROUS 37.5 MCG: 100 INJECTION, POWDER, LYOPHILIZED, FOR SOLUTION INTRAVENOUS at 08:50

## 2019-03-31 RX ADMIN — INSULIN LISPRO 3 UNITS: 100 INJECTION, SOLUTION INTRAVENOUS; SUBCUTANEOUS at 09:05

## 2019-03-31 RX ADMIN — DEXTROSE AND SODIUM CHLORIDE: 5; 450 INJECTION, SOLUTION INTRAVENOUS at 23:38

## 2019-03-31 RX ADMIN — PANTOPRAZOLE SODIUM 40 MG: 40 INJECTION, POWDER, FOR SOLUTION INTRAVENOUS at 20:22

## 2019-03-31 RX ADMIN — DEXTROSE AND SODIUM CHLORIDE: 5; 450 INJECTION, SOLUTION INTRAVENOUS at 16:49

## 2019-03-31 RX ADMIN — PANTOPRAZOLE SODIUM 40 MG: 40 INJECTION, POWDER, FOR SOLUTION INTRAVENOUS at 08:50

## 2019-03-31 RX ADMIN — INSULIN LISPRO 2 UNITS: 100 INJECTION, SOLUTION INTRAVENOUS; SUBCUTANEOUS at 20:22

## 2019-03-31 RX ADMIN — SODIUM CHLORIDE, PRESERVATIVE FREE 5 ML: 5 INJECTION INTRAVENOUS at 08:50

## 2019-03-31 RX ADMIN — MUPIROCIN: 20 OINTMENT TOPICAL at 09:09

## 2019-03-31 RX ADMIN — MAGNESIUM SULFATE HEPTAHYDRATE 2 G: 40 INJECTION, SOLUTION INTRAVENOUS at 08:50

## 2019-03-31 RX ADMIN — Medication 10 ML: at 09:09

## 2019-03-31 RX ADMIN — INSULIN LISPRO 3 UNITS: 100 INJECTION, SOLUTION INTRAVENOUS; SUBCUTANEOUS at 12:46

## 2019-03-31 RX ADMIN — Medication 10 ML: at 20:23

## 2019-03-31 ASSESSMENT — PAIN SCALES - GENERAL
PAINLEVEL_OUTOF10: 0

## 2019-03-31 NOTE — ANESTHESIA PRE PROCEDURE
Dose    dextrose 5 % and 0.45 % sodium chloride infusion   Intravenous Continuous Eben Sims  mL/hr at 03/31/19 1649      pantoprazole (PROTONIX) injection 40 mg  40 mg Intravenous BID Sissy Prior My Ashlee Elizondo MD   40 mg at 03/31/19 0850    insulin lispro (HUMALOG) injection pen 0-18 Units  0-18 Units Subcutaneous TID WC Sissy Prior My Ashlee Elizondo MD   3 Units at 03/31/19 1246    insulin lispro (HUMALOG) injection pen 0-9 Units  0-9 Units Subcutaneous Nightly Sissy Prior My Ashlee Elizondo MD   2 Units at 03/30/19 2037    mupirocin (BACTROBAN) 2 % ointment   Topical Daily Eben Sims MD       [facility] levothyroxine (SYNTHROID) injection 37.5 mcg  37.5 mcg Intravenous Daily Eben Sims MD   37.5 mcg at 03/31/19 0850    And    sodium chloride (PF) 0.9 % injection 5 mL  5 mL Intravenous Daily Eben Sims MD   5 mL at 03/31/19 0850    sodium chloride flush 0.9 % injection 10 mL  10 mL Intravenous 2 times per day Eben Sims MD   10 mL at 03/31/19 0909    glucose (GLUTOSE) 40 % oral gel 15 g  15 g Oral PRN Eben Sims MD        dextrose 50 % solution 12.5 g  12.5 g Intravenous PRN Eben Sims MD        glucagon (rDNA) injection 1 mg  1 mg Intramuscular PRN Eben Sims MD        dextrose 5 % solution  100 mL/hr Intravenous PRN Eben Sims MD           Allergies:  No Known Allergies    Problem List:    Patient Active Problem List   Diagnosis Code    Vitamin D deficiency E55.9    Vitamin B deficiency E53.9    Type 2 diabetes mellitus ([facility] E11.9    Angiopathy, peripheral ([facility] I73.9    Noncompliance with medication regimen Z91.14    Gonadotropin deficiency ([facility] E23.0    Essential hypertension I10    Long term current use of insulin ([facility]) Z79.4    Dyslipidemia E78.5    Carotid artery narrowing I65.29    Atherosclerosis of coronary artery I25.10    Acute renal failure (HCC) N17.9    Paroxysmal A-fib ([facility]) I48.0    Coronary artery disease involving native coronary artery of native heart without angina pectoris I25.10    Cardiomyopathy ([facility] I42.9    Uncontrolled type 2 diabetes mellitus with hyperglycemia, with long-term current use of insulin ([facility]) E11.65, Z79.4    S/P hip hemiarthroplasty Z96.649    Acute ischemic right MCA stroke ([facility] I63.511    SANDY (acute kidney injury) ([facility] N17.9    Normocytic anemia D64.9    Severe protein-calorie malnutrition ([facility] E43    S/P percutaneous endoscopic gastrostomy (PEG) tube placement ([facility]) Z93.1    Right to left cardiac shunt ([facility]) I28.0    H/O ischemic multifocal multiple vascular territories stroke Z86.73    Anticoagulated Z79.01    Vertebral artery stenosis, right I65.01    Hypoglycemia, exogenous insulin, in setting of SANDY E15    possible sepsis A41.9    Hypothermia T68. XXXA    SANDY (acute kidney injury) ([facility] M98.8    Metabolic acidosis J97.7    Acute metabolic encephalopathy D15.40    Altered mental status R41.82    Oropharyngeal dysphagia R13.12    Weakness R53.1    SOB (shortness of breath) R06.02    Goals of care, counseling/discussion Z71.89    DNR (do not resuscitate) discussion Z70.80    Encounter for palliative care Z51.5    Severe malnutrition ([facility] E43    Acquired hypothyroidism E03.9       Past Medical History:        Diagnosis Date    Acute renal failure (HCC)     Acute respiratory failure with hypoxia ([facility])     Angina pectoris (HCC)     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HCC)     CAD (coronary artery disease)     Cardiac arrest ([facility])     Cardiac arrest ([facility]     Chronic systolic heart failure ([facility])     Diabetes mellitus ([facility]     Diabetes mellitus ([facility] 10/9/2012    Diabetes mellitus type II, uncontrolled ([facility]     4/1017 A1c = 13    DKA (diabetic ketoacidoses) ([facility] 04/2017    Elevated LFTs     Femoral neck fracture ([facility] 10/17/2017    HTN (hypertension) 10/17/2017    Hyperkalemia     Hyperlipidemia     Hypertension     PAF (paroxysmal atrial fibrillation) (HCC)     Paroxysmal A-fib (HCC)     PEA (Pulseless electrical activity) ([facility] 2017    Pneumonia 2017    LIKELY PNEUMOCOCCAL    Pneumonia due to organism     Pneumonia of right lower lobe due to Streptococcus pneumoniae (HCC)     Protein-calorie malnutrition, severe ([facility] 2017    BMI = 19    S/P hip hemiarthroplasty 2017    Septic shock (HCC)     Systolic CHF ([facility]     EF 30%    Thrombocytopenia (HCC)     Thrombocytopenia (HCC)     Type 2 diabetes mellitus with hyperglycemia ([facility] 2016       Past Surgical History:        Procedure Laterality Date    GASTROSTOMY TUBE CHANGE N/A 2018    GASTRIC TUBE REMOVAL performed by Bobbi Maldonado MD at [address] [place] [address]      G-tube placement    JOINT REPLACEMENT      hip       Social History:    Social History     Tobacco Use    Smoking status: Former Smoker     Last attempt to quit: 2018     Years since quittin.1    Smokeless tobacco: Never Used   Substance Use Topics    Alcohol use: No                                Counseling given: Not Answered      Vital Signs (Current):   Vitals:    19 0804 19 0830 19 1230 19 1613   BP:  (!) 148/72 133/83 (!) 154/94   Pulse:  56  70   Resp:  12  15   Temp:  95.8 °F (35.4 °C) 95.8 °F (35.4 °C) 96.1 °F (35.6 °C)   TempSrc:  Temporal Temporal Temporal   SpO2: 98% 98%  93%   Weight:       Height:                                                  BP Readings from Last 3 Encounters:   19 (!) 154/94   19 132/76   19 (!) 154/67       NPO Status:                                                                                 BMI:   Wt Readings from Last 3 Encounters:   19 113 lb 5.1 oz (51.4 kg)   19 102 lb 9.6 oz (46.5 kg)   19 113 lb 1.5 oz (51.3 kg)     Body mass index is 18.86 kg/m².     CBC:   Lab Results   Component Value Date    WBC 4.7 2019    RBC 2.63 2019    HGB 8.0 2019    HCT 23.6 2019    MCV 89.9 2019    RDW 15.4 2019    PLT 87 03/31/2019       CMP:   Lab Results   Component Value Date     03/31/2019    K 3.7 03/31/2019    K 5.3 02/27/2019     03/31/2019    CO2 18 03/31/2019    BUN 48 03/31/2019    CREATININE 3.0 03/31/2019    GFRAA 25 03/31/2019    AGRATIO 1.0 01/18/2019    LABGLOM 20 03/31/2019    GLUCOSE 143 03/31/2019    PROT 4.7 03/26/2019    CALCIUM 7.5 03/31/2019    BILITOT <0.2 03/26/2019    ALKPHOS 169 03/26/2019    AST 29 03/26/2019     03/26/2019       POC Tests:   Recent Labs     03/31/19  1844   POCGLU 178*       Coags:   Lab Results   Component Value Date    PROTIME 12.9 03/31/2019    INR 1.13 03/31/2019    APTT 37.1 03/29/2019       HCG (If Applicable): No results found for: PREGTESTUR, PREGSERUM, HCG, HCGQUANT     ABGs:   Lab Results   Component Value Date    PHART 7.308 03/26/2019    PO2ART 42.4 03/26/2019    ZQY6NLJ 34.0 03/26/2019    NOS0OVR 17.0 03/26/2019    BEART -9 03/26/2019    P6TNDFQU 74 03/26/2019        Type & Screen (If Applicable):  No results found for: LABABO, [address]    Anesthesia Evaluation  Patient summary reviewed and Nursing notes reviewed no history of anesthetic complications:   Airway: Mallampati: III  TM distance: >3 FB   Neck ROM: full  Mouth opening: > = 3 FB Dental: normal exam   (+) edentulous      Pulmonary:normal exam  breath sounds clear to auscultation  (+) pneumonia:                            ROS comment: Hx of Acute resp failure  Aspergillus   Cardiovascular:    (+) hypertension:, angina:, CAD:, dysrhythmias: atrial fibrillation, hyperlipidemia        Rhythm: regular  Rate: normal           Beta Blocker:  Dose within 24 Hrs         Neuro/Psych:   (+) CVA: residual symptoms,              ROS comment: Expressive aphasia GI/Hepatic/Renal: Neg GI/Hepatic/Renal ROS  (+) liver disease:, renal disease: ARF,      (-) hiatal hernia and GERD      ROS comment: GIB.    Endo/Other:    (+) DiabetesType II DM, , hypothyroidism::., .                 Abdominal:           Vascular: negative vascular ROS. ROS comment: Angiopathy, peripheral.                             Anesthesia Plan      MAC     ASA 4       Induction: intravenous. MIPS: Postoperative opioids intended. Anesthetic plan and risks discussed with patient. Plan discussed with CRNA.     Attending anesthesiologist reviewed and agrees with Jason Darnell MD   3/31/2019

## 2019-03-31 NOTE — PROGRESS NOTES
Resident Progress Note      Admit Date: 3/26/2019    PCP: Escobar Garner MD                  : 1945  MRN: [id number]    Chief Complaint:  Abnormal Labs    Subjective:   Seen and examined at bedside  No acute events overnight, no apparent distress  Denied chest pain or SOB  Will need to undergo EGD and Colonoscopy for PEG placement in future      Diet: Dietary Nutrition Supplements: Standard High Calorie Oral Supplement, Frozen Oral Supplement  DIET CLEAR LIQUID; Nectar Thick      Data:   Scheduled Meds:   magnesium sulfate  2 g Intravenous Once    polyethylene glycol  4,000 mL Per NG tube Once    pantoprazole  40 mg Intravenous BID    insulin lispro  0-18 Units Subcutaneous TID WC    insulin lispro  0-9 Units Subcutaneous Nightly    mupirocin   Topical Daily    levothyroxine  37.5 mcg Intravenous Daily    And    sodium chloride (PF)  5 mL Intravenous Daily    sodium chloride flush  10 mL Intravenous 2 times per day     Continuous Infusions:   dextrose 5 % and 0.45 % NaCl 150 mL/hr at 19 2304    dextrose       PRN Meds:glucose, dextrose, glucagon (rDNA), dextrose  I/O last 3 completed shifts: In: 3867.4 [P.O.:320; I.V.:3547.4]  Out: 1925 [Urine:1925]  No intake/output data recorded. Intake/Output Summary (Last 24 hours) at 3/31/2019 1028  Last data filed at 3/31/2019 0700  Gross per 24 hour   Intake 3732.4 ml   Output 1750 ml   Net 1982.4 ml           Objective:     Vitals: BP (!) 148/72   Pulse 56   Temp 95.8 °F (35.4 °C) (Temporal)   Resp 12   Ht 5' 5\" (1.651 m)   Wt 113 lb 5.1 oz (51.4 kg)   SpO2 98%   BMI 18.86 kg/m²     Physical Exam  General appearance: Thin appearing AA M, No apparent distress, appears stated age and cooperative. HEENT: Pupils equal, round, and reactive to light. Conjunctivae/corneas clear. Neck: Supple, with full range of motion. No jugular venous distention. Trachea midline. Respiratory:  Normal respiratory effort.  Decreased lung sounds but BACTERIA, CLARITYU, SPECGRAV, LEUKOCYTESUR, UROBILINOGEN, BILIRUBINUR, BLOODU, GLUCOSEU, AMORPHOUS in the last 72 hours. Invalid input(s): Hank Sale   -----------------------------------------------------------------  RAD:   FL MODIFIED BARIUM SWALLOW W VIDEO   Final Result      Silent aspiration with honey, puree and thin consistencies. No definite penetration or aspiration with nectar. XR CHEST PORTABLE   Final Result   Impression: Patchy infiltrate of the right lung base is increased in the interval. Stable blunting of the right costophrenic angle. XR CHEST PORTABLE   Final Result     Right IJ central line in the SVC. No pneumothorax. XR ABDOMEN (KUB) (SINGLE AP VIEW)   Final Result      Feeding tube with tip possibly within the right mainstem bronchus. The tube should be removed and replaced      Findings called as a critical result to the floor by Dr. Chavez Standing at the time of the dictation 9:33 PM 3/26/2019. CT Head WO Contrast   Final Result      1. No acute stroke, mass, or hemorrhage. 2. Remote infarcts in the bilateral cerebellar and cerebral hemispheres as above. 3. Moderate chronic small vessel ischemic white matter disease. 4. Mild diffuse atrophy. 5. Moderate right mastoid effusion. XR CHEST PORTABLE   Final Result      Right lower lung opacity present previously improved likely relates to improvement of the atelectasis and/or pneumonitis. Right lower lobe bronchiectasis present previously.           Assessment/Plan:     # Acute metabolic encephalopathy   - mildly improving with fluids, probably a component of malnutrition, hypothyroidism  -Clear liquid diet with supplements     # Acute Blood Loss Anemia  FOBT+ in setting of decreased Hgb, heparin gtt stopped  -GI consulted-EGD and colonoscopy anticipated      # Hypotension   -GI consulted-EGD possibly Monday, will need to address how to give colonoscopy prep   -cont D51/2 NS @ 150   -Clear liquid diet with

## 2019-03-31 NOTE — PROGRESS NOTES
Report received from [facility]. Golytely infusing via Kangaroo pump per order. Pt tolerating well, denies nausea. Will continue to monitor.

## 2019-03-31 NOTE — PLAN OF CARE
continue with hourly rounds for po intake, pain needs, toileting and repositioning as needed. Will continue to monitor for needs. Goal: Absence of physical injury  Description  Absence of physical injury  3/30/2019 2042 by Juan Aguilar RN  Outcome: Ongoing  3/30/2019 1848 by Rehana Dominguez RN  Outcome: Ongoing     Problem: Nutrition  Goal: Optimal nutrition therapy  3/30/2019 2042 by Juan Aguilar RN  Outcome: Ongoing  3/30/2019 1848 by Rehana Dominguez RN  Outcome: Ongoing     Problem: Nutritional:  Goal: Ability to tolerate tube feedings without aspirating will improve  Description  Ability to tolerate tube feedings without aspirating will improve  3/30/2019 2042 by Juan Aguilar RN  Outcome: Ongoing  3/30/2019 1848 by Rehana Dominguez RN  Outcome: Ongoing  Goal: Consumption of food in small portions  Description  Consumption of food in small portions  3/30/2019 2042 by Juan Aguilar RN  Outcome: Ongoing  3/30/2019 1848 by Rehana Dominguez RN  Outcome: Ongoing  Note:   Pt has poor appetite and PO intake is poor. GI following, plans PEG placement after scope for optimal nutrition.    Goal: Consumption of liquid of appropriate consistency  Description  Consumption of liquid of appropriate consistency  3/30/2019 2042 by Juan Aguilar RN  Outcome: Ongoing  3/30/2019 1848 by Rehana Dominguez RN  Outcome: Ongoing     Problem: Respiratory:  Goal: Ability to maintain a clear airway will improve  Description  Ability to maintain a clear airway will improve  3/30/2019 2042 by Juan Aguilar RN  Outcome: Ongoing  3/30/2019 1848 by Rehana Dominguez RN  Outcome: Ongoing  Goal: Will remain free from infection  Description  Will remain free from infection  3/30/2019 2042 by Juan Aguilar RN  Outcome: Ongoing  3/30/2019 1848 by Rehana Dominguez RN  Outcome: Ongoing  Goal: Absence of aspiration  Description  Absence of aspiration  3/30/2019 2042 by Ar Gore

## 2019-03-31 NOTE — PROGRESS NOTES
GI Progress Note    Pamela Darling is a 66 y.o. male patient. 1. Hypothermia, initial encounter    2. Acute renal failure, unspecified acute renal failure type ([facility]    3.  Sepsis, due to unspecified organism [facility])        Admit Date: 3/26/2019    Subjective:       No major change   NG tube inserted , awaiting radiologic cinfirmation    Hb 8  hct 23.6      ROS:  Cardiovascular ROS: no chest pain or dyspnea on exertion  Respiratory ROS: no cough, shortness of breath, or wheezing    Scheduled Meds:   polyethylene glycol  4,000 mL Per NG tube Once    pantoprazole  40 mg Intravenous BID    insulin lispro  0-18 Units Subcutaneous TID WC    insulin lispro  0-9 Units Subcutaneous Nightly    mupirocin   Topical Daily    levothyroxine  37.5 mcg Intravenous Daily    And    sodium chloride (PF)  5 mL Intravenous Daily    sodium chloride flush  10 mL Intravenous 2 times per day       Continuous Infusions:   dextrose 5 % and 0.45 % NaCl 150 mL/hr at 03/30/19 2304    dextrose         PRN Meds:  glucose, dextrose, glucagon (rDNA), dextrose      Objective:       Patient Vitals for the past 24 hrs:   BP Temp Temp src Pulse Resp SpO2 Weight   03/31/19 1230 133/83 95.8 °F (35.4 °C) Temporal -- -- -- --   03/31/19 0830 (!) 148/72 95.8 °F (35.4 °C) Temporal 56 12 98 % --   03/31/19 0804 -- -- -- -- -- 98 % --   03/31/19 0500 -- -- -- -- -- -- 113 lb 5.1 oz (51.4 kg)   03/31/19 0400 (!) 148/65 96.1 °F (35.6 °C) Temporal 61 14 99 % --   03/31/19 0000 120/70 96 °F (35.6 °C) Temporal 50 11 99 % --   03/30/19 2200 -- -- -- 54 12 -- --   03/30/19 2100 -- -- -- 57 12 -- --   03/30/19 2000 112/73 -- -- 56 11 98 % --   03/30/19 1944 -- 96.5 °F (35.8 °C) Temporal 58 12 98 % --   03/30/19 1900 123/73 -- -- 63 14 -- --   03/30/19 1700 (!) 152/89 -- -- 65 12 -- --   03/30/19 1600 (!) 145/82 96.5 °F (35.8 °C) Temporal 65 13 99 % --   03/30/19 1500 134/81 -- -- 67 13 -- --   03/30/19 1400 (!) 149/91 -- -- 70 14 -- --       Exam:    VITALS:  BP 133/83   Pulse 56   Temp 95.8 °F (35.4 °C) (Temporal)   Resp 12   Ht 5' 5\" (1.651 m)   Wt 113 lb 5.1 oz (51.4 kg)   SpO2 98%   BMI 18.86 kg/m²   TEMPERATURE:  Current - Temp: 95.8 °F (35.4 °C); Max - Temp  Av.1 °F (35.6 °C)  Min: 95.8 °F (35.4 °C)  Max: 96.5 °F (35.8 °C)    NAD  General appearance: alert, appears stated age, cooperative and no distress  Head: Normocephalic, without obvious abnormality, atraumatic  Neck: supple, symmetrical, trachea midline and thyroid not enlarged, symmetric, no tenderness/mass/nodules  CVS:  RRR, Nl s1s2  Lungs CTA Bilaterally, normal effort  Abdomen: soft, non-tender; bowel sounds normal; no masses,  no organomegaly  AAOx3, No asterixis or encephalopathy  Extremities: No edema. Lab Data:  Recent Labs     1954 19  0516   WBC 6.0  --   --  4.6 4.7   HGB 7.5*   < > 7.5* 7.3* 8.0*   HCT 22.2*   < > 22.3* 22.3* 23.6*   MCV 90.8  --   --  92.4 89.9   *  --   --  87* 87*    < > = values in this interval not displayed. Recent Labs     19  0516    140 140   K 3.7 3.6 3.7   * 112* 111*   CO2 17* 17* 18*   PHOS 2.5 2.6 2.4*   BUN 60* 60* 48*   CREATININE 3.5* 3.5* 3.0*     No results for input(s): AST, ALT, ALB, BILIDIR, BILITOT, ALKPHOS in the last 72 hours. No results for input(s): LIPASE, AMYLASE in the last 72 hours. Recent Labs     19  0552   INR 1.82* 1.25*     No results for input(s): PTT in the last 72 hours. No results for input(s): OCCULTBLD in the last 72 hours.       Assessment:       Principal Problem:    Hypothermia  Active Problems:    Acute renal failure (HCC)    possible sepsis    SANDY (acute kidney injury) (HCC)    Metabolic acidosis    Acute metabolic encephalopathy    Altered mental status    Oropharyngeal dysphagia    Weakness    SOB (shortness of breath)    Goals of care, counseling/discussion    DNR (do not resuscitate) discussion    Encounter for palliative care    Severe malnutrition ([facility]    Acquired hypothyroidism  Resolved Problems:    * No resolved hospital problems.  *    Gi bleed  anemia    Recommendations:       egd and colonoscopy in am  Discussed with David Parra MD  3/31/2019  1:48 PM

## 2019-03-31 NOTE — PROGRESS NOTES
Morning assessment complete. Morning meds given. Pt informed about todays events. Pt agreed about NG and medications. Pt had x1 soft BM, brown in color. Pt not taking in much fluids. Will cont to monitor.

## 2019-04-01 ENCOUNTER — ANESTHESIA (OUTPATIENT)
Dept: ENDOSCOPY | Age: 74
DRG: 987 | End: 2019-04-01
Payer: MEDICARE

## 2019-04-01 VITALS — SYSTOLIC BLOOD PRESSURE: 130 MMHG | DIASTOLIC BLOOD PRESSURE: 88 MMHG | OXYGEN SATURATION: 99 %

## 2019-04-01 LAB
ALBUMIN SERPL-MCNC: 1.8 G/DL (ref 3.4–5)
ANION GAP SERPL CALCULATED.3IONS-SCNC: 11 MMOL/L (ref 3–16)
BASOPHILS ABSOLUTE: 0 K/UL (ref 0–0.2)
BASOPHILS RELATIVE PERCENT: 0.4 %
BUN BLDV-MCNC: 35 MG/DL (ref 7–20)
CALCIUM SERPL-MCNC: 7.5 MG/DL (ref 8.3–10.6)
CHLORIDE BLD-SCNC: 109 MMOL/L (ref 99–110)
CO2: 18 MMOL/L (ref 21–32)
CREAT SERPL-MCNC: 2.5 MG/DL (ref 0.8–1.3)
EOSINOPHILS ABSOLUTE: 0.2 K/UL (ref 0–0.6)
EOSINOPHILS RELATIVE PERCENT: 4.7 %
GFR AFRICAN AMERICAN: 31
GFR NON-AFRICAN AMERICAN: 25
GLUCOSE BLD-MCNC: 206 MG/DL (ref 70–99)
GLUCOSE BLD-MCNC: 282 MG/DL (ref 70–99)
GLUCOSE BLD-MCNC: 303 MG/DL (ref 70–99)
GLUCOSE BLD-MCNC: 311 MG/DL (ref 70–99)
GLUCOSE BLD-MCNC: 355 MG/DL (ref 70–99)
HCT VFR BLD CALC: 24.6 % (ref 40.5–52.5)
HEMOGLOBIN: 8.3 G/DL (ref 13.5–17.5)
INR BLD: 1.18 (ref 0.86–1.14)
LYMPHOCYTES ABSOLUTE: 0.4 K/UL (ref 1–5.1)
LYMPHOCYTES RELATIVE PERCENT: 8.9 %
MAGNESIUM: 2 MG/DL (ref 1.8–2.4)
MCH RBC QN AUTO: 30.4 PG (ref 26–34)
MCHC RBC AUTO-ENTMCNC: 33.8 G/DL (ref 31–36)
MCV RBC AUTO: 89.8 FL (ref 80–100)
MONOCYTES ABSOLUTE: 0.2 K/UL (ref 0–1.3)
MONOCYTES RELATIVE PERCENT: 5 %
NEUTROPHILS ABSOLUTE: 3.3 K/UL (ref 1.7–7.7)
NEUTROPHILS RELATIVE PERCENT: 81 %
PDW BLD-RTO: 15 % (ref 12.4–15.4)
PERFORMED ON: ABNORMAL
PHOSPHORUS: 1.9 MG/DL (ref 2.5–4.9)
PLATELET # BLD: 82 K/UL (ref 135–450)
PMV BLD AUTO: 9.2 FL (ref 5–10.5)
POTASSIUM SERPL-SCNC: 3.3 MMOL/L (ref 3.5–5.1)
PROTHROMBIN TIME: 13.5 SEC (ref 9.8–13)
RBC # BLD: 2.73 M/UL (ref 4.2–5.9)
SODIUM BLD-SCNC: 138 MMOL/L (ref 136–145)
WBC # BLD: 4.1 K/UL (ref 4–11)

## 2019-04-01 PROCEDURE — 3609009500 HC COLONOSCOPY DIAGNOSTIC OR SCREENING: Performed by: INTERNAL MEDICINE

## 2019-04-01 PROCEDURE — 3700000000 HC ANESTHESIA ATTENDED CARE: Performed by: INTERNAL MEDICINE

## 2019-04-01 PROCEDURE — 51798 US URINE CAPACITY MEASURE: CPT

## 2019-04-01 PROCEDURE — 85025 COMPLETE CBC W/AUTO DIFF WBC: CPT

## 2019-04-01 PROCEDURE — 6360000002 HC RX W HCPCS: Performed by: STUDENT IN AN ORGANIZED HEALTH CARE EDUCATION/TRAINING PROGRAM

## 2019-04-01 PROCEDURE — 2580000003 HC RX 258: Performed by: NURSE ANESTHETIST, CERTIFIED REGISTERED

## 2019-04-01 PROCEDURE — 0DB98ZX EXCISION OF DUODENUM, VIA NATURAL OR ARTIFICIAL OPENING ENDOSCOPIC, DIAGNOSTIC: ICD-10-PCS | Performed by: INTERNAL MEDICINE

## 2019-04-01 PROCEDURE — 2580000003 HC RX 258: Performed by: STUDENT IN AN ORGANIZED HEALTH CARE EDUCATION/TRAINING PROGRAM

## 2019-04-01 PROCEDURE — 2500000003 HC RX 250 WO HCPCS: Performed by: INTERNAL MEDICINE

## 2019-04-01 PROCEDURE — 51701 INSERT BLADDER CATHETER: CPT

## 2019-04-01 PROCEDURE — C9113 INJ PANTOPRAZOLE SODIUM, VIA: HCPCS | Performed by: STUDENT IN AN ORGANIZED HEALTH CARE EDUCATION/TRAINING PROGRAM

## 2019-04-01 PROCEDURE — 0DB68ZX EXCISION OF STOMACH, VIA NATURAL OR ARTIFICIAL OPENING ENDOSCOPIC, DIAGNOSTIC: ICD-10-PCS | Performed by: INTERNAL MEDICINE

## 2019-04-01 PROCEDURE — 2500000003 HC RX 250 WO HCPCS: Performed by: NURSE ANESTHETIST, CERTIFIED REGISTERED

## 2019-04-01 PROCEDURE — 94761 N-INVAS EAR/PLS OXIMETRY MLT: CPT

## 2019-04-01 PROCEDURE — 1200000000 HC SEMI PRIVATE

## 2019-04-01 PROCEDURE — 7100000011 HC PHASE II RECOVERY - ADDTL 15 MIN: Performed by: INTERNAL MEDICINE

## 2019-04-01 PROCEDURE — 36592 COLLECT BLOOD FROM PICC: CPT

## 2019-04-01 PROCEDURE — 83735 ASSAY OF MAGNESIUM: CPT

## 2019-04-01 PROCEDURE — 2500000003 HC RX 250 WO HCPCS: Performed by: STUDENT IN AN ORGANIZED HEALTH CARE EDUCATION/TRAINING PROGRAM

## 2019-04-01 PROCEDURE — 80069 RENAL FUNCTION PANEL: CPT

## 2019-04-01 PROCEDURE — 99223 1ST HOSP IP/OBS HIGH 75: CPT | Performed by: SURGERY

## 2019-04-01 PROCEDURE — 3609012400 HC EGD TRANSORAL BIOPSY SINGLE/MULTIPLE: Performed by: INTERNAL MEDICINE

## 2019-04-01 PROCEDURE — 2709999900 HC NON-CHARGEABLE SUPPLY: Performed by: INTERNAL MEDICINE

## 2019-04-01 PROCEDURE — 2580000003 HC RX 258: Performed by: INTERNAL MEDICINE

## 2019-04-01 PROCEDURE — 7100000010 HC PHASE II RECOVERY - FIRST 15 MIN: Performed by: INTERNAL MEDICINE

## 2019-04-01 PROCEDURE — 6360000002 HC RX W HCPCS: Performed by: NURSE ANESTHETIST, CERTIFIED REGISTERED

## 2019-04-01 PROCEDURE — 88305 TISSUE EXAM BY PATHOLOGIST: CPT

## 2019-04-01 PROCEDURE — 85610 PROTHROMBIN TIME: CPT

## 2019-04-01 PROCEDURE — 3700000001 HC ADD 15 MINUTES (ANESTHESIA): Performed by: INTERNAL MEDICINE

## 2019-04-01 PROCEDURE — 2700000000 HC OXYGEN THERAPY PER DAY

## 2019-04-01 PROCEDURE — 36415 COLL VENOUS BLD VENIPUNCTURE: CPT

## 2019-04-01 PROCEDURE — 0DJD8ZZ INSPECTION OF LOWER INTESTINAL TRACT, VIA NATURAL OR ARTIFICIAL OPENING ENDOSCOPIC: ICD-10-PCS | Performed by: INTERNAL MEDICINE

## 2019-04-01 PROCEDURE — 6370000000 HC RX 637 (ALT 250 FOR IP): Performed by: INTERNAL MEDICINE

## 2019-04-01 RX ORDER — GLYCOPYRROLATE 0.2 MG/ML
INJECTION INTRAMUSCULAR; INTRAVENOUS PRN
Status: DISCONTINUED | OUTPATIENT
Start: 2019-04-01 | End: 2019-04-01 | Stop reason: SDUPTHER

## 2019-04-01 RX ORDER — SODIUM CHLORIDE, SODIUM LACTATE, POTASSIUM CHLORIDE, CALCIUM CHLORIDE 600; 310; 30; 20 MG/100ML; MG/100ML; MG/100ML; MG/100ML
INJECTION, SOLUTION INTRAVENOUS CONTINUOUS PRN
Status: DISCONTINUED | OUTPATIENT
Start: 2019-04-01 | End: 2019-04-01 | Stop reason: SDUPTHER

## 2019-04-01 RX ORDER — EPHEDRINE SULFATE 50 MG/ML
INJECTION, SOLUTION INTRAVENOUS PRN
Status: DISCONTINUED | OUTPATIENT
Start: 2019-04-01 | End: 2019-04-01 | Stop reason: SDUPTHER

## 2019-04-01 RX ORDER — PROPOFOL 10 MG/ML
INJECTION, EMULSION INTRAVENOUS PRN
Status: DISCONTINUED | OUTPATIENT
Start: 2019-04-01 | End: 2019-04-01 | Stop reason: SDUPTHER

## 2019-04-01 RX ADMIN — EPHEDRINE SULFATE 10 MG: 50 INJECTION, SOLUTION INTRAMUSCULAR; INTRAVENOUS; SUBCUTANEOUS at 08:21

## 2019-04-01 RX ADMIN — INSULIN LISPRO 15 UNITS: 100 INJECTION, SOLUTION INTRAVENOUS; SUBCUTANEOUS at 13:06

## 2019-04-01 RX ADMIN — PROPOFOL 10 MG: 10 INJECTION, EMULSION INTRAVENOUS at 08:31

## 2019-04-01 RX ADMIN — POTASSIUM PHOSPHATE, MONOBASIC AND POTASSIUM PHOSPHATE, DIBASIC 20 MMOL: 224; 236 INJECTION, SOLUTION, CONCENTRATE INTRAVENOUS at 06:52

## 2019-04-01 RX ADMIN — GLYCOPYRROLATE 0.2 MG: 0.2 INJECTION, SOLUTION INTRAMUSCULAR; INTRAVENOUS at 08:15

## 2019-04-01 RX ADMIN — EPHEDRINE SULFATE 5 MG: 50 INJECTION, SOLUTION INTRAMUSCULAR; INTRAVENOUS; SUBCUTANEOUS at 08:31

## 2019-04-01 RX ADMIN — INSULIN LISPRO 6 UNITS: 100 INJECTION, SOLUTION INTRAVENOUS; SUBCUTANEOUS at 21:04

## 2019-04-01 RX ADMIN — PROPOFOL 10 MG: 10 INJECTION, EMULSION INTRAVENOUS at 08:51

## 2019-04-01 RX ADMIN — INSULIN LISPRO 12 UNITS: 100 INJECTION, SOLUTION INTRAVENOUS; SUBCUTANEOUS at 18:27

## 2019-04-01 RX ADMIN — PANTOPRAZOLE SODIUM 40 MG: 40 INJECTION, POWDER, FOR SOLUTION INTRAVENOUS at 10:29

## 2019-04-01 RX ADMIN — INSULIN GLARGINE 5 UNITS: 100 INJECTION, SOLUTION SUBCUTANEOUS at 22:51

## 2019-04-01 RX ADMIN — MUPIROCIN: 20 OINTMENT TOPICAL at 10:33

## 2019-04-01 RX ADMIN — DEXTROSE AND SODIUM CHLORIDE: 5; 450 INJECTION, SOLUTION INTRAVENOUS at 16:51

## 2019-04-01 RX ADMIN — EPHEDRINE SULFATE 15 MG: 50 INJECTION, SOLUTION INTRAMUSCULAR; INTRAVENOUS; SUBCUTANEOUS at 08:35

## 2019-04-01 RX ADMIN — SODIUM CHLORIDE, SODIUM LACTATE, POTASSIUM CHLORIDE, AND CALCIUM CHLORIDE: 600; 310; 30; 20 INJECTION, SOLUTION INTRAVENOUS at 08:15

## 2019-04-01 RX ADMIN — PROPOFOL 10 MG: 10 INJECTION, EMULSION INTRAVENOUS at 08:24

## 2019-04-01 RX ADMIN — POTASSIUM PHOSPHATE, MONOBASIC AND POTASSIUM PHOSPHATE, DIBASIC 20 MMOL: 224; 236 INJECTION, SOLUTION, CONCENTRATE INTRAVENOUS at 10:19

## 2019-04-01 RX ADMIN — DEXTROSE AND SODIUM CHLORIDE: 5; 450 INJECTION, SOLUTION INTRAVENOUS at 06:17

## 2019-04-01 RX ADMIN — PANTOPRAZOLE SODIUM 40 MG: 40 INJECTION, POWDER, FOR SOLUTION INTRAVENOUS at 21:00

## 2019-04-01 RX ADMIN — SODIUM CHLORIDE, PRESERVATIVE FREE 5 ML: 5 INJECTION INTRAVENOUS at 10:30

## 2019-04-01 RX ADMIN — INSULIN LISPRO 9 UNITS: 100 INJECTION, SOLUTION INTRAVENOUS; SUBCUTANEOUS at 10:17

## 2019-04-01 RX ADMIN — LIDOCAINE HYDROCHLORIDE 60 MG: 20 INJECTION, SOLUTION INTRAVENOUS at 08:15

## 2019-04-01 RX ADMIN — Medication 10 ML: at 10:30

## 2019-04-01 RX ADMIN — PROPOFOL 40 MG: 10 INJECTION, EMULSION INTRAVENOUS at 08:15

## 2019-04-01 RX ADMIN — EPHEDRINE SULFATE 10 MG: 50 INJECTION, SOLUTION INTRAMUSCULAR; INTRAVENOUS; SUBCUTANEOUS at 08:20

## 2019-04-01 RX ADMIN — LEVOTHYROXINE SODIUM ANHYDROUS 37.5 MCG: 100 INJECTION, POWDER, LYOPHILIZED, FOR SOLUTION INTRAVENOUS at 10:30

## 2019-04-01 RX ADMIN — PHENYLEPHRINE HYDROCHLORIDE 40 MCG: 10 INJECTION, SOLUTION INTRAMUSCULAR; INTRAVENOUS; SUBCUTANEOUS at 08:48

## 2019-04-01 RX ADMIN — PROPOFOL 10 MG: 10 INJECTION, EMULSION INTRAVENOUS at 08:26

## 2019-04-01 ASSESSMENT — PULMONARY FUNCTION TESTS
PIF_VALUE: 0
PIF_VALUE: 1
PIF_VALUE: 0
PIF_VALUE: 1
PIF_VALUE: 0
PIF_VALUE: 1
PIF_VALUE: 1
PIF_VALUE: 0

## 2019-04-01 ASSESSMENT — PAIN SCALES - GENERAL
PAINLEVEL_OUTOF10: 0

## 2019-04-01 NOTE — PROGRESS NOTES
Speech Language Pathology  Dysphagia - hold    Chart reviewed, d/w RN. Pt off floor for GI procedures. Will follow up as pt able and  Schedule allows. Eric Rao M.S./[facility] #[id number]  Pg. # [phone]      Addendum:  D/w RN who states pt needs to remain NPO per MD's for possible further testing. Will follow up as pt able and schedule allows. Eric Rao M.S./[facility] #[id number]  Pg.  # [phone] 1:25 PM

## 2019-04-01 NOTE — CONSULTS
General Surgery   Resident Consult Note    Reason for consult: possible adenoma of colon    Chief Complaint: GI bleed    History obtained from: patient, daughter, chart review    History of Present Illness :    Luis Dukes is a 66 y.o. male who presented to the hospital on 3/26 with hypotension and hypothermia thought to be secondary to poor PO intake and question of GI bleed. Patient was found with precipitous drop of Hgb to 6.7 on 3/28 from initial Hgb of 11.1 on 3/26. Patient was found with +FOBT. Patient denies any grossly bloody BMs. He received 1U of PRBCs on 3/28, and his Hgb has been stable between 7-8 since then. He denies any abdominal pain. No family history of colorectal cancer or IBD. Patient has never had a colonoscopy in the past. His stool caliber has not recently changed. He says that his weight has fluctuated, but he does not believe he has had a net loss in his weight. No fevers or chills. He underwent EGD/colonoscopy today, revealing two gastric ulcers, severe duodenitis, and erythematous rectum concerning for superficial adenoma. General surgery is consulted for further evaluation as biopsies were not obtained from this procedure. Echo was obtained on 1/18 which revealed ejection fraction of 55-60% and indeterminate diastolic function.     Past Medical History:        Diagnosis Date    Acute renal failure (HCC)     Acute respiratory failure with hypoxia (HCC)     Angina pectoris (HCC)     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HCC)     CAD (coronary artery disease)     [facility] arrest [facility]     Cardiac arrest (HCC)     Chronic systolic heart failure (HCC)     Diabetes mellitus ([facility]     Diabetes mellitus ([facility] [address] 10/9/2012    Diabetes mellitus type II, uncontrolled ([address]     4/1017 A1c = 13    DKA (diabetic ketoacidoses) ([address] 04/2017    Elevated LFTs     Femoral neck fracture ([address] 10/17/2017    HTN (hypertension) 10/17/2017    Hyperkalemia     Hyperlipidemia     Hypertension     PAF (paroxysmal atrial fibrillation) (HCC)     [facility]ox[facility]mal [facility]-fib ([facility]     PEA (Pulseless electrical activity) ([facility] [facility] 04/13/2017    Pneumonia 04/2017    LIKELY PNEUMOCOCCAL    Pneumonia due to organism     Pneumonia of right lower lobe due to Streptococcus pneumoniae ([facility])     Protein-calorie malnutrition, severe ([facility] [address] 04/2017    BMI = 19    S/P hip hemiarthroplasty 12/29/2017    Septic shock ([facility])     Systolic CHF ([facility] [address]     EF 30%    Thrombocytopenia ([facility])     Thrombocytopenia ([facility])     Type 2 diabetes mellitus with hyperglycemia ([facility] [address] 11/28/2016       Past Surgical History:           Procedure Laterality Date    COLONOSCOPY N/A 4/1/2019    COLONOSCOPY performed by Magnus Marítnez MD at [address] N/A 12/14/2018    GASTRIC TUBE REMOVAL performed by Reid Fuchs MD at [facility]      G-tube placement    JOINT REPLACEMENT      hip    UPPER GASTROINTESTINAL ENDOSCOPY N/A 4/1/2019    EGD BIOPSY performed by Magnus Martínez MD at [facility] ENDOSCOPY       Allergies:  Patient has no known allergies. Medications:   Home Meds  No current facility-administered medications on file prior to encounter.       Current Outpatient Medications on File Prior to Encounter   Medication Sig Dispense Refill    carvedilol (COREG) 6.25 MG tablet Take 6.25 mg by mouth 2 times daily (with meals)      vitamin D (ERGOCALCIFEROL) 31579 units CAPS capsule Take 50,000 Units by mouth Twice a Week Give on Monday and Thursday      lisinopril (PRINIVIL;ZESTRIL) 5 MG tablet Take 5 mg by mouth daily      megestrol (MEGACE) 40 MG/ML suspension Take 400 mg by mouth daily      mirtazapine (REMERON) 15 MG tablet Take 15 mg by mouth nightly      apixaban (ELIQUIS) 2.5 MG TABS tablet Take 1 tablet by mouth 2 times daily 60 tablet 2    insulin glargine (TOUJEO MAX SOLOSTAR) 300 UNIT/ML injection pen Inject 5 Units into the skin nightly 3 pen 3    metFORMIN (GLUCOPHAGE) 500 MG tablet Take 500 mg by mouth 2 times daily (with meals)       insulin aspart (NOVOLOG) 100 UNIT/ML injection vial Inject 0-4 Units into the skin 3 times daily (before meals) Inject as per sliding scale      ferrous sulfate 325 (65 Fe) MG tablet Take 325 mg by mouth 3 times daily (with meals)       amLODIPine (NORVASC) 5 MG tablet Take 5 mg by mouth daily       aspirin 81 MG tablet Take 81 mg by mouth daily        Current Meds    dextrose 5 % and 0.45 % sodium chloride infusion Continuous   pantoprazole (PROTONIX) injection 40 mg BID   insulin lispro (HUMALOG) injection pen 0-18 Units TID WC   insulin lispro (HUMALOG) injection pen 0-9 Units Nightly   mupirocin (BACTROBAN) 2 % ointment Daily   levothyroxine (SYNTHROID) injection 37.5 mcg Daily   And    sodium chloride (PF) 0.9 % injection 5 mL Daily   sodium chloride flush 0.9 % injection 10 mL 2 times per day   glucose (GLUTOSE) 40 % oral gel 15 g PRN   dextrose 50 % solution 12.5 g PRN   glucagon (rDNA) injection 1 mg PRN   dextrose 5 % solution PRN       Family History:   History reviewed. No pertinent family history. Social History:   TOBACCO:   reports that he quit smoking about 14 months ago. He has never used smokeless tobacco.  ETOH:   reports that he does not drink alcohol. DRUGS:   reports that he does not use drugs. ROS:   A 14 point review of systems was conducted, significant findings as noted in HPI. All other systems negative. Constitutional: Negative for chills and fever. HENT: Negative for congestion, facial swelling, and voice change. Eyes: Negative for photophobia and visual disturbance. Respiratory: Negative for apnea, cough, chest tightness and shortness of breath. Cardiovascular: Negative for chest pain and palpitations. Gastrointestinal: Negative for dysphagia and early satiety. Genitourinary: Negative for difficulty urinating, dysuria, flank pain, frequency and hematuria.    Musculoskeletal: Negative for new gait problem, joint Assessment/Plan: This is a 66 y.o. male with Hx of DMII, HTN, CHF, A-fib with +FOBT and findings on colonoscopy concerning for adenomatous lesion. This will need further evaluation with biopsy to determine diagnosis.  Will plan for exam under anesthesia with possible flexible sigmoidoscopy and tissue biopsy 4/2.     - NPO at midnight  - continue IVF  - Transfuse as necessary  - Discussed with staff    Emily Lai MD PGY-1  General Surgery Resident  04/01/19  1:57 PM  [phone]

## 2019-04-01 NOTE — PROGRESS NOTES
Internal Medicine PGY-1 Resident Progress Note        PCP: Janie Helm MD    Date of Admission: 3/26/2019    Chief Complaint: Abnormal Labs     Subjective: EGD and colonoscopy this AM. Little bit lethargic this AM, but denies CP or SOB       Medications:  Reviewed    Infusion Medications    dextrose 5 % and 0.45 % NaCl 150 mL/hr at 04/01/19 0617    dextrose       Scheduled Medications    potassium phosphate IVPB  20 mmol Intravenous Q2H    pantoprazole  40 mg Intravenous BID    insulin lispro  0-18 Units Subcutaneous TID WC    insulin lispro  0-9 Units Subcutaneous Nightly    mupirocin   Topical Daily    levothyroxine  37.5 mcg Intravenous Daily    And    sodium chloride (PF)  5 mL Intravenous Daily    sodium chloride flush  10 mL Intravenous 2 times per day     PRN Meds: glucose, dextrose, glucagon (rDNA), dextrose      Intake/Output Summary (Last 24 hours) at 4/1/2019 1013  Last data filed at 4/1/2019 0855  Gross per 24 hour   Intake 7819.6 ml   Output 1075 ml   Net 6744.6 ml       Physical Exam Performed:    /76   Pulse 83   Temp 96 °F (35.6 °C) (Temporal)   Resp 13   Ht 5' 5\" (1.651 m)   Wt 113 lb 5.1 oz (51.4 kg)   SpO2 97%   BMI 18.86 kg/m²     General appearance: Thin appearing AA M, No apparent distress, appears stated age and cooperative. HEENT: Pupils equal, round, and reactive to light. Conjunctivae/corneas clear. Neck: Supple, with full range of motion. No jugular venous distention. Trachea midline. Respiratory:  Normal respiratory effort. Decreased lung sounds but grossly clear to auscultation, bilaterally without Rales/Wheezes/Rhonchi. Cardiovascular: Regular rate and rhythm with normal S1/S2 without murmurs, rubs or gallops. Abdomen: Soft, non-tender, non-distended with normal bowel sounds. Musculoskeletal: No clubbing, cyanosis or edema bilaterally. Full range of motion without deformity.   Skin: Skin color, texture, turgor normal.  No rashes or lesions. Neurologic:  Neurovascularly intact without any focal sensory/motor deficits. Decreased strength on R side, Cranial nerves: II-XII intact, grossly non-focal.  Psychiatric: Alert and oriented x 3, thought content appropriate, normal insight  Capillary Refill: Brisk,< 3 seconds   Peripheral Pulses: +2 palpable, equal bilaterally       Labs:   Recent Labs     03/30/19  0552 03/31/19  0516 04/01/19  0443   WBC 4.6 4.7 4.1   HGB 7.3* 8.0* 8.3*   HCT 22.3* 23.6* 24.6*   PLT 87* 87* 82*     Recent Labs     03/30/19  0552 03/31/19  0516 04/01/19  0443    140 138   K 3.6 3.7 3.3*   * 111* 109   CO2 17* 18* 18*   BUN 60* 48* 35*   CREATININE 3.5* 3.0* 2.5*   CALCIUM 7.4* 7.5* 7.5*   PHOS 2.6 2.4* 1.9*       Recent Labs     03/30/19  0552 03/31/19  1424   INR 1.25* 1.13     Urinalysis:      Lab Results   Component Value Date    NITRU Negative 03/26/2019    WBCUA 6-10 03/26/2019    BACTERIA 2+ 03/26/2019    RBCUA 0-2 03/26/2019    BLOODU Negative 03/26/2019    SPECGRAV 1.025 03/26/2019    GLUCOSEU Negative 03/26/2019       Radiology:  XR ABDOMEN (KUB) (SINGLE AP VIEW)   Final Result      NG tube appears to project in the fundus of the stomach. FL MODIFIED BARIUM SWALLOW W VIDEO   Final Result      Silent aspiration with honey, puree and thin consistencies. No definite penetration or aspiration with nectar. XR CHEST PORTABLE   Final Result   Impression: Patchy infiltrate of the right lung base is increased in the interval. Stable blunting of the right costophrenic angle. XR CHEST PORTABLE   Final Result     Right IJ central line in the SVC. No pneumothorax. XR ABDOMEN (KUB) (SINGLE AP VIEW)   Final Result      Feeding tube with tip possibly within the right mainstem bronchus. The tube should be removed and replaced      Findings called as a critical result to the floor by Dr. Alverto Short at the time of the dictation 9:33 PM 3/26/2019. CT Head WO Contrast   Final Result      1. No acute stroke, mass, or hemorrhage. 2. Remote infarcts in the bilateral cerebellar and cerebral hemispheres as above. 3. Moderate chronic small vessel ischemic white matter disease. 4. Mild diffuse atrophy. 5. Moderate right mastoid effusion. XR CHEST PORTABLE   Final Result      Right lower lung opacity present previously improved likely relates to improvement of the atelectasis and/or pneumonitis. Right lower lobe bronchiectasis present previously. Assessment/Plan:    Bao Rachel is a 66 y.o. male PMH ischemic MCA stroke, CKD, FTT, presents from NH with electrolyte imbalances, found to be hypothermic, hypernatremic, hyperkalemic with SANDY on CKD. Hypotension. Concern for sepsis, volume depletion secondary to poor oral intake. Active Hospital Problems    Diagnosis Date Noted    Severe malnutrition ([facility] [address] [E43] 03/27/2019    Acquired hypothyroidism [E03.9] 03/27/2019    Altered mental status [R41.82]     Oropharyngeal dysphagia [R13.12]     Weakness [R53.1]     SOB (shortness of breath) [R06.02]     Goals of care, counseling/discussion [Z71.89]     DNR (do not resuscitate) discussion [Z71.89]     Encounter for palliative care [Z51.5]     possible sepsis [A41.9] 03/26/2019    Hypothermia [T68. XXXA] 03/26/2019    SANDY (acute kidney injury) ([facility] [address] [N17.9] 29/56/5633    Metabolic acidosis [P13.8] 03/26/2019    Acute metabolic encephalopathy [A37.32] 03/26/2019    Acute renal failure (HCC) [N17.9]      Acute metabolic encephalopathy (resolved)   stroke in January with residual deficits, Head CT no acute changes on this admission 3/26/2019 (no MRI record)   - mildly improving with fluids, probably a component of malnutrition, hypothyroidism  -cont D51/2 NS @ 150   -Clear liquid diet with supplements      Hypotension   Likely 2/2 hypovolemia and hypothermia 2/2 dec PO intake, FOBT+ w/ decreased hemoglobin   -GI consulted-EGD and colonoscopy today: gastric ulcers x 2 and duodenitis, distal rectum reddening may be concerning for adenoma, colorectal surgery will be consulted    -cont D51/2 NS @ 150   -Protonix   -H/H daily     Paroxysmal Afib   NSR  -holding Heparin drip in setting of FOBT+  - holding home coreg because of concerns of hypotension and NPO     CHFpEF  - HOLD coreg see above  - Strict I/O, daily wts     CAD s/p cardiac arrest  - hold aspirin and heparin gtt in setting of FOBT+   - hold Plavix     SANDY on CKD (baseline Cr approx. 2)   Prerenal vs ATN, was seeing improvement with hydration creatine more stable overnight , UA didn't show any casts on admission, renal US (wnl last admission)  -Nephrology consulted   - Increase fluids to 150 ml/hr D5 with 1/2 normal saline      IDDMII (glood glucose control)  - hold home metformin 500 BID  - continue SSI TID WC  - Hypoglycemia protocol  - HDSSI     Hypothroidism  - elevated TSH   - IV levo increased to 37.5     Oral Dysphagia 2/2 CVA w/ sev protein calorie malnutrition 2/2 stroke   -will likely need PEG tube in the future      Anemia   FOBT+ in setting of decreased Hgb, heparin gtt stopped  -GI consulted-EGD and colonoscopy today as above       DVT Prophylaxis: SCDs  Diet: Diet NPO Effective Now  Code Status: Full Code    PT/OT Eval Status: pending     Dispo - GMF     Jacque Shirley MD    I will discuss the patient with the senior resident and MD Jacque Carl M.D.   Internal Medicine PGY-1  Pager: [phone]

## 2019-04-01 NOTE — ANESTHESIA POSTPROCEDURE EVALUATION
Department of Anesthesiology  Postprocedure Note    Patient: Vicente Sanchez  MRN: [id number]  YOB: 1945  Date of evaluation: 4/1/2019  Time:  9:54 AM     Procedure Summary     Date:  04/01/19 Room / Location:  [facility] ENDO 02 / [facility] ENDOSCOPY    Anesthesia Start:  0807 Anesthesia Stop:  6804    Procedures:       COLONOSCOPY (N/A )      EGD BIOPSY (N/A ) Diagnosis:  (GI BLEED)    Surgeon:  Luis Broderick MD Responsible Provider:  Mitch Holley MD    Anesthesia Type:  MAC ASA Status:  4          Anesthesia Type: MAC    Bruna Phase I: Bruna Score: 9    Bruna Phase II: Bruna Score: 10    Last vitals: Reviewed and per EMR flowsheets.        Anesthesia Post Evaluation    Patient location during evaluation: PACU  Patient participation: complete - patient participated  Level of consciousness: awake and alert  Airway patency: patent  Nausea & Vomiting: no nausea and no vomiting  Complications: no  Cardiovascular status: blood pressure returned to baseline  Respiratory status: acceptable  Hydration status: stable

## 2019-04-01 NOTE — PROGRESS NOTES
GI Progress Note    Abhay Garcias is a 66 y.o. male patient. 1. Hypothermia, initial encounter    2. Acute renal failure, unspecified acute renal failure type ([facility] [address]    3. Sepsis, due to unspecified organism [facility])        Admit Date: 3/26/2019    Subjective:             ROS:  Cardiovascular ROS: no chest pain or dyspnea on exertion  Gastrointestinal ROS: no abdominal pain, change in bowel habits, or black or bloody stools  Respiratory ROS: no cough, shortness of breath, or wheezing    Scheduled Meds:   potassium phosphate IVPB  20 mmol Intravenous Q2H    pantoprazole  40 mg Intravenous BID    insulin lispro  0-18 Units Subcutaneous TID WC    insulin lispro  0-9 Units Subcutaneous Nightly    mupirocin   Topical Daily    levothyroxine  37.5 mcg Intravenous Daily    And    sodium chloride (PF)  5 mL Intravenous Daily    sodium chloride flush  10 mL Intravenous 2 times per day       Continuous Infusions:   dextrose 5 % and 0.45 % NaCl 150 mL/hr at 19 0617    dextrose         PRN Meds:  glucose, dextrose, glucagon (rDNA), dextrose      Objective:       Patient Vitals for the past 24 hrs:   BP Temp Temp src Pulse Resp SpO2   19 0800 -- -- -- -- -- 100 %   19 0759 (!) 147/76 -- -- -- -- --   19 0400 (!) 142/90 96 °F (35.6 °C) Temporal 60 10 95 %   19 0000 (!) 161/76 -- -- 58 10 --   19 2323 -- 95.7 °F (35.4 °C) Temporal 73 14 96 %   19 2000 (!) 147/90 -- -- 66 11 --   19 1944 -- 95.9 °F (35.5 °C) Temporal 75 18 95 %   19 1613 (!) 154/94 96.1 °F (35.6 °C) Temporal 70 15 93 %   19 1230 133/83 95.8 °F (35.4 °C) Temporal -- -- --   19 0830 (!) 148/72 95.8 °F (35.4 °C) Temporal 56 12 98 %       Exam:    VITALS:  BP (!) 147/76   Pulse 60   Temp 96 °F (35.6 °C) (Temporal)   Resp 10   Ht 5' 5\" (1.651 m)   Wt 113 lb 5.1 oz (51.4 kg)   SpO2 100%   BMI 18.86 kg/m²   TEMPERATURE:  Current - Temp: 96 °F (35.6 °C);  Max - Temp  Av.9 °F (35.5 °C)  Min:

## 2019-04-01 NOTE — PROGRESS NOTES
Nephrology Progress Note  [phone]  [phone]   [email or web address]    Patient:  Nely Covarrubias   : 1945    CC:  SANDY   This is a patient with significant past medical history of SANDY,CKD prior CVA ,dysphagia with recent admission here for SANDY and Pneumonia , Cr was 2.4 at discharge  who presents with volume depletion very poor po intake and SANDY, Cr >5, hypernatremia . Initially hypotensive now improved, off pressors. Renal function improving towards his prior baseline. Subjective:  Had EGD and colonoscopy this AM Findings noted    ROS:   No dyspnea, no pain . SHx:  No visitors today. Meds:  Reviewed     Vitals:  /76   Pulse 83   Temp 96 °F (35.6 °C) (Temporal)   Resp 13   Ht 5' 5\" (1.651 m)   Wt 113 lb 5.1 oz (51.4 kg)   SpO2 97%   BMI 18.86 kg/m²     Physical Exam:  Gen: Resting in bed, NAD. HEENT: +pallor  CV: RRR no rub noted   Lungs: good respiratory effort and clear air entry   Abd: S/NT +BS  Ext: No edema, no cyanosis  Skin: Warm. No rashes appreciated. Labs:  CBC:   Lab Results   Component Value Date    WBC 4.1 2019    RBC 2.73 2019    HGB 8.3 2019    HCT 24.6 2019    MCV 89.8 2019    MCH 30.4 2019    MCHC 33.8 2019    RDW 15.0 2019    PLT 82 2019    MPV 9.2 2019     BMP:    Lab Results   Component Value Date     2019    K 3.3 2019    K 5.3 2019     2019    CO2 18 2019    BUN 35 2019    LABALBU 1.8 2019    CREATININE 2.5 2019    CALCIUM 7.5 2019    GFRAA 31 2019    LABGLOM 25 2019    GLUCOSE 206 2019       Assessment/Plan:  1. SANDY /CKD due to volume depletion better. Cr down to 2.5 mg Decrease IVF rate to 100 ml/hr  2. Hypernatremia resolved. 3. Hypokalemia/Hypophosphatemia being replaced  4. PAF on AC   5. DM2 monitor   6.  Severe Gastric gastric ulcers and severe duodenitis on PPI      Kevin Caldwell MD

## 2019-04-01 NOTE — PROGRESS NOTES
Pt stated he needed to urniate. This nurse helped him. Pt stated it felt like he needed to go but could not. This nurse noted swelling around his penis, as discussed in hand off report this am. Swelling appears to be somewhat more. Pt was only able to void 50ml. This nurse upon bladder scanning the Pt found  To have >650ml present in the bladder. Paging floor resident for straight cath/link order and to inform of increase in swelling. Awaiting call back.

## 2019-04-01 NOTE — PROGRESS NOTES
Nutrition Assessment    Type and Reason for Visit: Reassess    Nutrition Recommendations:  Continue NPO   Start clear liquid diet when clinically appropriate and advance to low fat, low fiber diet as tolerated. If oral intake is not tolerated, recommend starting TF, recs given below:  · Start Glucerna @ 25 ml/hr, increasing by 10 ml q 4 hrs to goal rate  · Goal rate: Glucerna @ 50 ml/hr providing 1200 ml TV, 1440 kcal, 72 g protein and 966 ml free water. · Water flushes @ 100 ml q 4 hrs  Monitor BG labs, nutrition progression and improvement in nutrition status. Nutrition Assessment: Pt follow-up for diet advancement/ PEG. Pt remains nutritionally compromised d/t NPO status r/t severe gastritis w/ gastric ulcers and severe duodenitis w/ duodenal ulcers s/p EGD and colonoscopy and diagnosis of severe malnutrition by RD. Pt is sedated post-op. Noted  BG labs high (8.1 HbA1C on 1/19), and Mg, K, Phos labs low, will monitor lytes. Recommend starting pt on clear liquid diet and advancing to a low fat, low fiber diet as tolerated. If oral diet is not an option, recommend starting TF. Will provide TF recommendations and monitor patient wishes for POC. Malnutrition Assessment:  · Malnutrition Status: Meets the criteria for severe malnutrition  · Context: Chronic illness  · Findings of the 6 clinical characteristics of malnutrition (Minimum of 2 out of 6 clinical characteristics is required to make the diagnosis of moderate or severe Protein Calorie Malnutrition based on AND/ASPEN Guidelines):  1. Energy Intake-Less than or equal to 75% of estimated energy requirement, Greater than or equal to 1 month    2. Weight Loss-10% loss or greater, in 3 months  3. Fat Loss-Severe subcutaneous fat loss,    4. Muscle Loss-Severe muscle mass loss,    5. Fluid Accumulation-No significant fluid accumulation,    6.   Strength-Not measured    Nutrition Risk Level: High    Nutrient Needs:  · Estimated Daily Total Kcal: 7517-9812  · Estimated Daily Protein (g): 66-79  · Estimated Daily Total Fluid (ml/day): 1540 ml    Nutrition Diagnosis:   · Problem: Severe malnutrition  · Etiology: related to Insufficient energy/nutrient consumption     Signs and symptoms:  as evidenced by Diet history of poor intake, Weight loss greater than or equal to 7.5% in 3 months, Severe loss of subcutaneous fat, Severe muscle loss    Objective Information:  · Nutrition-Focused Physical Findings: Watery BM 4/1, no edema, sedated post-op  · Wound Type: None  · Current Nutrition Therapies:  · Oral Diet Orders: NPO   · Oral Diet intake: NPO  · Oral Nutrition Supplement (ONS) Orders: None  · ONS intake: NPO  · Anthropometric Measures:  · Ht: 5' 5\" (165.1 cm)   · Current Body Wt: 113 lb 5.1 oz (51.4 kg)(+18 L simce adm)  · Admission Body Wt: 89 lb (40.4 kg)  · % Weight Change: 5% loss ~1 month, 19% ~3 months  · Ideal Body Wt: 136 lb (61.7 kg)   · BMI Classification: BMI 18.5 - 24.9 Normal Weight    Nutrition Interventions:   Continue NPO, Start oral diet  Continued Inpatient Monitoring    Nutrition Evaluation:   · Evaluation: Goals set   · Goals: Pt will tolerate appropriate form of nutrition to meet 100% of nutrient needs    · Monitoring: Nutrition Progression, Weight, Pertinent Labs, Diarrhea      Electronically signed by Serena Vega on 4/1/19 at 10:46 AM    Contact Number: [phone]

## 2019-04-01 NOTE — OP NOTE
[address] [address], [address]                                OPERATIVE REPORT    PATIENT NAME: Shey Rizo                       :        1945  MED REC NO:   [id number]                          ROOM:       4507  ACCOUNT NO:   [de-identified]                           ADMIT DATE: 2019  PROVIDER:     Abbie Burnette MD    DATE OF PROCEDURE:  2019    INDICATION FOR PROCEDURE:  GI bleed. PROCEDURE:  EGD. SURGEON:  Abbie Burnette MD    DESCRIPTION OF PROCEDURE:  With the patient in the left lateral  position, and after IV Diprivan, the Olympus video endoscope was  introduced into the esophagus and advanced toward the GE junction. The  esophagus was normal.  Hiatus hernia was identified. Stomach revealed  two active gastric ulcers with severe gastritis. Biopsies from the  antrum were obtained for Helicobacter pylori. The duodenum revealed  severe duodenitis with three postbulbar ulcers. One of them was  biopsied. Scope was then removed without complications. COLONOSCOPY:  The Olympus video colonoscope was inserted into the rectum  and advanced through a tortuous, redundant colon all the way to the  cecum. There was no abnormality seen except in the distal rectum that  appeared reddened and irregular. We attempted to take biopsy, but this  area started bleeding and to avoid complications, we did not. Scope was  then removed without complications. IMPRESSION:  1. Hiatus hernia. 2.  Gastric ulcer with severe gastritis. 3.  Severe duodenitis with three postbulbar ulcers. 4.  Normal colonoscopy except for the distal rectum which appeared  inflamed and friable and considerably could be adenomatous tissue in it.         Navneet Glover MD    D: 2019 9:15:19       T: 2019 12:20:58     [id number]  Job#: [id number]     Doc#: [id number]    CC:

## 2019-04-01 NOTE — PLAN OF CARE
Problem: Risk for Impaired Skin Integrity  Goal: Tissue integrity - skin and mucous membranes  Description  Structural intactness and normal physiological function of skin and  mucous membranes. Outcome: Ongoing  Note:   Pt at risk for skin breakdown. See Larry score. Pt remains on bedrest. Unable to reposition self in bed. Heels elevated off bed. Sacral heart mepilex intact to protect,  site inspected and intact underneath. Will continue to turn and reposition patient every two hours and as needed. Will continue to keep patient clean and dry, applying skin care cream as needed. Pillows used for repositioning q2hs. Will continue to monitor and assess for skin breakdown. Problem: Falls - Risk of:  Goal: Will remain free from falls  Description  Will remain free from falls  Outcome: Ongoing  Note:   Pt is free of falls at this time. Bed wheels are locked, bed alarm is on, bed is in lowest position. Call light is within reach. Pt is aware of the risk for falls. Will continue hourly rounding to monitor.     Goal: Absence of physical injury  Description  Absence of physical injury  Outcome: Ongoing     Problem: Nutrition  Goal: Optimal nutrition therapy  Outcome: Ongoing     Problem: Nutritional:  Goal: Ability to tolerate tube feedings without aspirating will improve  Description  Ability to tolerate tube feedings without aspirating will improve  Outcome: Ongoing  Goal: Consumption of food in small portions  Description  Consumption of food in small portions  Outcome: Ongoing  Goal: Consumption of liquid of appropriate consistency  Description  Consumption of liquid of appropriate consistency  Outcome: Ongoing     Problem: Respiratory:  Goal: Ability to maintain a clear airway will improve  Description  Ability to maintain a clear airway will improve  Outcome: Ongoing  Goal: Will remain free from infection  Description  Will remain free from infection  Outcome: Ongoing  Goal: Absence of aspiration  Description  Absence of aspiration  Outcome: Ongoing     Problem: Safety:  Goal: Ability to chew and swallow food without choking will improve  Description  Ability to chew and swallow food without choking will improve  Outcome: Ongoing  Goal: Ability to demonstrate good, daily oral hygiene techniques will improve  Description  Ability to demonstrate good, daily oral hygiene techniques will improve  Outcome: Ongoing  Goal: Maintenance of upright position during and after feeding  Description  Maintenance of upright position during and after feeding  Outcome: Ongoing

## 2019-04-01 NOTE — OP NOTE
EGD:  Two gastric ulcers with severe gastritis  Severe duodenitis with three post bulbar ulcers  HH    Colonoscopy:  Distal rectum appears reddened . I can not rule out diffuse superficial adenoma.  Could not obtain bx    Will ask Dr Yarelis Sousa to see     Paolo Richard m.d.  4-1-19

## 2019-04-01 NOTE — PROGRESS NOTES
Pt connected to Tele box 4012. Pt taken down to endo for colonoscopy. RN and transporter at bedside.

## 2019-04-02 ENCOUNTER — ANESTHESIA EVENT (OUTPATIENT)
Dept: OPERATING ROOM | Age: 74
DRG: 987 | End: 2019-04-02
Payer: MEDICARE

## 2019-04-02 ENCOUNTER — ANESTHESIA (OUTPATIENT)
Dept: OPERATING ROOM | Age: 74
DRG: 987 | End: 2019-04-02
Payer: MEDICARE

## 2019-04-02 VITALS — DIASTOLIC BLOOD PRESSURE: 66 MMHG | SYSTOLIC BLOOD PRESSURE: 100 MMHG | OXYGEN SATURATION: 58 %

## 2019-04-02 LAB
ALBUMIN SERPL-MCNC: 1.8 G/DL (ref 3.4–5)
ANION GAP SERPL CALCULATED.3IONS-SCNC: 12 MMOL/L (ref 3–16)
BASOPHILS ABSOLUTE: 0 K/UL (ref 0–0.2)
BASOPHILS RELATIVE PERCENT: 0.6 %
BUN BLDV-MCNC: 28 MG/DL (ref 7–20)
CALCIUM SERPL-MCNC: 7.5 MG/DL (ref 8.3–10.6)
CHLORIDE BLD-SCNC: 109 MMOL/L (ref 99–110)
CO2: 17 MMOL/L (ref 21–32)
CREAT SERPL-MCNC: 2.3 MG/DL (ref 0.8–1.3)
EOSINOPHILS ABSOLUTE: 0.1 K/UL (ref 0–0.6)
EOSINOPHILS RELATIVE PERCENT: 2.3 %
GFR AFRICAN AMERICAN: 34
GFR NON-AFRICAN AMERICAN: 28
GLUCOSE BLD-MCNC: 101 MG/DL (ref 70–99)
GLUCOSE BLD-MCNC: 107 MG/DL (ref 70–99)
GLUCOSE BLD-MCNC: 115 MG/DL (ref 70–99)
GLUCOSE BLD-MCNC: 119 MG/DL (ref 70–99)
GLUCOSE BLD-MCNC: 135 MG/DL (ref 70–99)
GLUCOSE BLD-MCNC: 99 MG/DL (ref 70–99)
HCT VFR BLD CALC: 23.1 % (ref 40.5–52.5)
HEMOGLOBIN: 7.9 G/DL (ref 13.5–17.5)
INR BLD: 1.13 (ref 0.86–1.14)
LYMPHOCYTES ABSOLUTE: 0.3 K/UL (ref 1–5.1)
LYMPHOCYTES RELATIVE PERCENT: 7 %
MAGNESIUM: 1.7 MG/DL (ref 1.8–2.4)
MCH RBC QN AUTO: 30.7 PG (ref 26–34)
MCHC RBC AUTO-ENTMCNC: 34.3 G/DL (ref 31–36)
MCV RBC AUTO: 89.3 FL (ref 80–100)
MONOCYTES ABSOLUTE: 0.3 K/UL (ref 0–1.3)
MONOCYTES RELATIVE PERCENT: 5.4 %
NEUTROPHILS ABSOLUTE: 4.2 K/UL (ref 1.7–7.7)
NEUTROPHILS RELATIVE PERCENT: 84.7 %
PDW BLD-RTO: 15.3 % (ref 12.4–15.4)
PERFORMED ON: ABNORMAL
PERFORMED ON: NORMAL
PHOSPHORUS: 3.8 MG/DL (ref 2.5–4.9)
PLATELET # BLD: 78 K/UL (ref 135–450)
PMV BLD AUTO: 9.5 FL (ref 5–10.5)
POTASSIUM SERPL-SCNC: 3.6 MMOL/L (ref 3.5–5.1)
PROTHROMBIN TIME: 12.9 SEC (ref 9.8–13)
RBC # BLD: 2.58 M/UL (ref 4.2–5.9)
SODIUM BLD-SCNC: 138 MMOL/L (ref 136–145)
WBC # BLD: 4.9 K/UL (ref 4–11)

## 2019-04-02 PROCEDURE — 2580000003 HC RX 258: Performed by: INTERNAL MEDICINE

## 2019-04-02 PROCEDURE — 7100000001 HC PACU RECOVERY - ADDTL 15 MIN: Performed by: SURGERY

## 2019-04-02 PROCEDURE — 2500000003 HC RX 250 WO HCPCS: Performed by: NURSE ANESTHETIST, CERTIFIED REGISTERED

## 2019-04-02 PROCEDURE — 80069 RENAL FUNCTION PANEL: CPT

## 2019-04-02 PROCEDURE — 3600000013 HC SURGERY LEVEL 3 ADDTL 15MIN: Performed by: SURGERY

## 2019-04-02 PROCEDURE — 2500000003 HC RX 250 WO HCPCS: Performed by: STUDENT IN AN ORGANIZED HEALTH CARE EDUCATION/TRAINING PROGRAM

## 2019-04-02 PROCEDURE — 94761 N-INVAS EAR/PLS OXIMETRY MLT: CPT

## 2019-04-02 PROCEDURE — 88305 TISSUE EXAM BY PATHOLOGIST: CPT

## 2019-04-02 PROCEDURE — 6360000002 HC RX W HCPCS: Performed by: SURGERY

## 2019-04-02 PROCEDURE — 6360000002 HC RX W HCPCS: Performed by: STUDENT IN AN ORGANIZED HEALTH CARE EDUCATION/TRAINING PROGRAM

## 2019-04-02 PROCEDURE — 45100 BIOPSY OF RECTUM: CPT | Performed by: SURGERY

## 2019-04-02 PROCEDURE — 2580000003 HC RX 258: Performed by: STUDENT IN AN ORGANIZED HEALTH CARE EDUCATION/TRAINING PROGRAM

## 2019-04-02 PROCEDURE — 0DBP0ZX EXCISION OF RECTUM, OPEN APPROACH, DIAGNOSTIC: ICD-10-PCS | Performed by: SURGERY

## 2019-04-02 PROCEDURE — C9290 INJ, BUPIVACAINE LIPOSOME: HCPCS | Performed by: SURGERY

## 2019-04-02 PROCEDURE — 36592 COLLECT BLOOD FROM PICC: CPT

## 2019-04-02 PROCEDURE — 1200000000 HC SEMI PRIVATE

## 2019-04-02 PROCEDURE — 2709999900 HC NON-CHARGEABLE SUPPLY: Performed by: SURGERY

## 2019-04-02 PROCEDURE — 6360000002 HC RX W HCPCS: Performed by: NURSE ANESTHETIST, CERTIFIED REGISTERED

## 2019-04-02 PROCEDURE — 3600000003 HC SURGERY LEVEL 3 BASE: Performed by: SURGERY

## 2019-04-02 PROCEDURE — C9113 INJ PANTOPRAZOLE SODIUM, VIA: HCPCS | Performed by: STUDENT IN AN ORGANIZED HEALTH CARE EDUCATION/TRAINING PROGRAM

## 2019-04-02 PROCEDURE — 3700000000 HC ANESTHESIA ATTENDED CARE: Performed by: SURGERY

## 2019-04-02 PROCEDURE — 85610 PROTHROMBIN TIME: CPT

## 2019-04-02 PROCEDURE — 83735 ASSAY OF MAGNESIUM: CPT

## 2019-04-02 PROCEDURE — 7100000000 HC PACU RECOVERY - FIRST 15 MIN: Performed by: SURGERY

## 2019-04-02 PROCEDURE — 85025 COMPLETE CBC W/AUTO DIFF WBC: CPT

## 2019-04-02 PROCEDURE — 3700000001 HC ADD 15 MINUTES (ANESTHESIA): Performed by: SURGERY

## 2019-04-02 RX ORDER — DEXTROSE AND SODIUM CHLORIDE 5; .45 G/100ML; G/100ML
INJECTION, SOLUTION INTRAVENOUS CONTINUOUS
Status: DISCONTINUED | OUTPATIENT
Start: 2019-04-02 | End: 2019-04-06

## 2019-04-02 RX ORDER — FENTANYL CITRATE 50 UG/ML
25 INJECTION, SOLUTION INTRAMUSCULAR; INTRAVENOUS EVERY 5 MIN PRN
Status: DISCONTINUED | OUTPATIENT
Start: 2019-04-02 | End: 2019-04-02 | Stop reason: HOSPADM

## 2019-04-02 RX ORDER — MAGNESIUM SULFATE IN WATER 40 MG/ML
2 INJECTION, SOLUTION INTRAVENOUS ONCE
Status: COMPLETED | OUTPATIENT
Start: 2019-04-02 | End: 2019-04-02

## 2019-04-02 RX ORDER — LABETALOL HYDROCHLORIDE 5 MG/ML
5 INJECTION, SOLUTION INTRAVENOUS EVERY 10 MIN PRN
Status: DISCONTINUED | OUTPATIENT
Start: 2019-04-02 | End: 2019-04-02 | Stop reason: HOSPADM

## 2019-04-02 RX ORDER — ROCURONIUM BROMIDE 10 MG/ML
INJECTION, SOLUTION INTRAVENOUS PRN
Status: DISCONTINUED | OUTPATIENT
Start: 2019-04-02 | End: 2019-04-02 | Stop reason: SDUPTHER

## 2019-04-02 RX ORDER — FENTANYL CITRATE 50 UG/ML
INJECTION, SOLUTION INTRAMUSCULAR; INTRAVENOUS PRN
Status: DISCONTINUED | OUTPATIENT
Start: 2019-04-02 | End: 2019-04-02 | Stop reason: SDUPTHER

## 2019-04-02 RX ORDER — ONDANSETRON 2 MG/ML
4 INJECTION INTRAMUSCULAR; INTRAVENOUS
Status: DISCONTINUED | OUTPATIENT
Start: 2019-04-02 | End: 2019-04-02 | Stop reason: HOSPADM

## 2019-04-02 RX ORDER — HYDRALAZINE HYDROCHLORIDE 20 MG/ML
5 INJECTION INTRAMUSCULAR; INTRAVENOUS EVERY 10 MIN PRN
Status: DISCONTINUED | OUTPATIENT
Start: 2019-04-02 | End: 2019-04-02 | Stop reason: HOSPADM

## 2019-04-02 RX ORDER — FENTANYL CITRATE 50 UG/ML
50 INJECTION, SOLUTION INTRAMUSCULAR; INTRAVENOUS EVERY 5 MIN PRN
Status: DISCONTINUED | OUTPATIENT
Start: 2019-04-02 | End: 2019-04-02 | Stop reason: HOSPADM

## 2019-04-02 RX ORDER — PROMETHAZINE HYDROCHLORIDE 25 MG/ML
6.25 INJECTION, SOLUTION INTRAMUSCULAR; INTRAVENOUS
Status: DISCONTINUED | OUTPATIENT
Start: 2019-04-02 | End: 2019-04-02 | Stop reason: HOSPADM

## 2019-04-02 RX ORDER — ONDANSETRON 2 MG/ML
INJECTION INTRAMUSCULAR; INTRAVENOUS PRN
Status: DISCONTINUED | OUTPATIENT
Start: 2019-04-02 | End: 2019-04-02 | Stop reason: SDUPTHER

## 2019-04-02 RX ORDER — MAGNESIUM SULFATE 1 G/100ML
1 INJECTION INTRAVENOUS ONCE
Status: DISCONTINUED | OUTPATIENT
Start: 2019-04-02 | End: 2019-04-02

## 2019-04-02 RX ORDER — OXYCODONE HYDROCHLORIDE AND ACETAMINOPHEN 5; 325 MG/1; MG/1
1 TABLET ORAL
Status: DISCONTINUED | OUTPATIENT
Start: 2019-04-02 | End: 2019-04-02 | Stop reason: HOSPADM

## 2019-04-02 RX ORDER — TAMSULOSIN HYDROCHLORIDE 0.4 MG/1
0.4 CAPSULE ORAL
Status: DISCONTINUED | OUTPATIENT
Start: 2019-04-02 | End: 2019-04-07 | Stop reason: HOSPADM

## 2019-04-02 RX ORDER — POTASSIUM CHLORIDE 7.45 MG/ML
10 INJECTION INTRAVENOUS
Status: COMPLETED | OUTPATIENT
Start: 2019-04-02 | End: 2019-04-02

## 2019-04-02 RX ORDER — PROPOFOL 10 MG/ML
INJECTION, EMULSION INTRAVENOUS PRN
Status: DISCONTINUED | OUTPATIENT
Start: 2019-04-02 | End: 2019-04-02 | Stop reason: SDUPTHER

## 2019-04-02 RX ORDER — ACETAMINOPHEN 325 MG/1
650 TABLET ORAL EVERY 4 HOURS PRN
Status: DISCONTINUED | OUTPATIENT
Start: 2019-04-02 | End: 2019-04-07 | Stop reason: HOSPADM

## 2019-04-02 RX ADMIN — POTASSIUM CHLORIDE 10 MEQ: 10 INJECTION, SOLUTION INTRAVENOUS at 08:04

## 2019-04-02 RX ADMIN — SODIUM CHLORIDE, PRESERVATIVE FREE 5 ML: 5 INJECTION INTRAVENOUS at 07:58

## 2019-04-02 RX ADMIN — POTASSIUM CHLORIDE 10 MEQ: 10 INJECTION, SOLUTION INTRAVENOUS at 10:03

## 2019-04-02 RX ADMIN — ONDANSETRON 4 MG: 2 INJECTION INTRAMUSCULAR; INTRAVENOUS at 18:40

## 2019-04-02 RX ADMIN — LEVOTHYROXINE SODIUM ANHYDROUS 37.5 MCG: 100 INJECTION, POWDER, LYOPHILIZED, FOR SOLUTION INTRAVENOUS at 07:57

## 2019-04-02 RX ADMIN — PHENYLEPHRINE HYDROCHLORIDE 80 MCG: 10 INJECTION, SOLUTION INTRAMUSCULAR; INTRAVENOUS; SUBCUTANEOUS at 18:47

## 2019-04-02 RX ADMIN — PHENYLEPHRINE HYDROCHLORIDE 100 MCG: 10 INJECTION, SOLUTION INTRAMUSCULAR; INTRAVENOUS; SUBCUTANEOUS at 18:29

## 2019-04-02 RX ADMIN — DEXTROSE AND SODIUM CHLORIDE: 5; 450 INJECTION, SOLUTION INTRAVENOUS at 02:35

## 2019-04-02 RX ADMIN — LIDOCAINE HYDROCHLORIDE 100 MG: 20 INJECTION, SOLUTION INTRAVENOUS at 18:21

## 2019-04-02 RX ADMIN — SUGAMMADEX 200 MG: 100 INJECTION, SOLUTION INTRAVENOUS at 18:44

## 2019-04-02 RX ADMIN — MUPIROCIN: 20 OINTMENT TOPICAL at 08:07

## 2019-04-02 RX ADMIN — PANTOPRAZOLE SODIUM 40 MG: 40 INJECTION, POWDER, FOR SOLUTION INTRAVENOUS at 21:30

## 2019-04-02 RX ADMIN — PROPOFOL 150 MG: 10 INJECTION, EMULSION INTRAVENOUS at 18:21

## 2019-04-02 RX ADMIN — Medication 10 ML: at 07:57

## 2019-04-02 RX ADMIN — MAGNESIUM SULFATE HEPTAHYDRATE 2 G: 40 INJECTION, SOLUTION INTRAVENOUS at 07:58

## 2019-04-02 RX ADMIN — Medication 10 ML: at 21:34

## 2019-04-02 RX ADMIN — INSULIN GLARGINE 5 UNITS: 100 INJECTION, SOLUTION SUBCUTANEOUS at 21:28

## 2019-04-02 RX ADMIN — POTASSIUM CHLORIDE 10 MEQ: 10 INJECTION, SOLUTION INTRAVENOUS at 10:58

## 2019-04-02 RX ADMIN — DEXTROSE AND SODIUM CHLORIDE: 5; 450 INJECTION, SOLUTION INTRAVENOUS at 13:23

## 2019-04-02 RX ADMIN — POTASSIUM CHLORIDE 10 MEQ: 10 INJECTION, SOLUTION INTRAVENOUS at 09:13

## 2019-04-02 RX ADMIN — PANTOPRAZOLE SODIUM 40 MG: 40 INJECTION, POWDER, FOR SOLUTION INTRAVENOUS at 07:57

## 2019-04-02 RX ADMIN — FENTANYL CITRATE 50 MCG: 50 INJECTION INTRAMUSCULAR; INTRAVENOUS at 18:21

## 2019-04-02 RX ADMIN — DEXTROSE MONOHYDRATE AND SODIUM CHLORIDE: 5; .45 INJECTION, SOLUTION INTRAVENOUS at 21:27

## 2019-04-02 RX ADMIN — ROCURONIUM BROMIDE 30 MG: 10 INJECTION, SOLUTION INTRAVENOUS at 18:21

## 2019-04-02 ASSESSMENT — PULMONARY FUNCTION TESTS
PIF_VALUE: 22
PIF_VALUE: 23
PIF_VALUE: 14
PIF_VALUE: 1
PIF_VALUE: 0
PIF_VALUE: 0
PIF_VALUE: 24
PIF_VALUE: 4
PIF_VALUE: 36
PIF_VALUE: 14
PIF_VALUE: 23
PIF_VALUE: 13
PIF_VALUE: 0
PIF_VALUE: 14
PIF_VALUE: 2
PIF_VALUE: 3
PIF_VALUE: 2
PIF_VALUE: 21
PIF_VALUE: 23
PIF_VALUE: 18
PIF_VALUE: 2
PIF_VALUE: 0
PIF_VALUE: 24
PIF_VALUE: 25
PIF_VALUE: 27
PIF_VALUE: 24
PIF_VALUE: 2
PIF_VALUE: 23
PIF_VALUE: 1
PIF_VALUE: 0
PIF_VALUE: 3
PIF_VALUE: 1
PIF_VALUE: 2
PIF_VALUE: 22
PIF_VALUE: 23
PIF_VALUE: 15
PIF_VALUE: 26
PIF_VALUE: 14
PIF_VALUE: 3
PIF_VALUE: 28
PIF_VALUE: 23
PIF_VALUE: 3
PIF_VALUE: 15
PIF_VALUE: 23
PIF_VALUE: 2
PIF_VALUE: 2
PIF_VALUE: 23
PIF_VALUE: 22
PIF_VALUE: 1
PIF_VALUE: 14

## 2019-04-02 ASSESSMENT — PAIN SCALES - GENERAL
PAINLEVEL_OUTOF10: 0

## 2019-04-02 NOTE — ANESTHESIA PRE PROCEDURE
Department of Anesthesiology  Preprocedure Note       Name:  Anabel Cisneros   Age:  66 y.o.  :  1945                                          MRN:  [id number]         Date:  2019      Surgeon: Sakina Morales):  Desiree Morrison MD    Procedure: Tory Uriostegui UNDER ANESTHESIA, RECTAL BIOPSY (N/A )  FLEXIBLE SIGMOIDOSCOPY (N/A )    Medications prior to admission:   Prior to Admission medications    Medication Sig Start Date End Date Taking? Authorizing Provider   carvedilol (COREG) 6.25 MG tablet Take 6.25 mg by mouth 2 times daily (with meals)    Historical Provider, MD   vitamin D (ERGOCALCIFEROL) 91119 units CAPS capsule Take 50,000 Units by mouth Twice a Week Give on Monday and Thursday    Historical Provider, MD   lisinopril (PRINIVIL;ZESTRIL) 5 MG tablet Take 5 mg by mouth daily    Historical Provider, MD   megestrol (MEGACE) 40 MG/ML suspension Take 400 mg by mouth daily    Historical Provider, MD   mirtazapine (REMERON) 15 MG tablet Take 15 mg by mouth nightly    Historical Provider, MD   apixaban (ELIQUIS) 2.5 MG TABS tablet Take 1 tablet by mouth 2 times daily 3/7/19   Kun Reddy MD   insulin glargine (TOUJEO MAX SOLOSTAR) 300 UNIT/ML injection pen Inject 5 Units into the skin nightly 19   Catarino Avalos MD   metFORMIN (GLUCOPHAGE) 500 MG tablet Take 500 mg by mouth 2 times daily (with meals)     Historical Provider, MD   insulin aspart (NOVOLOG) 100 UNIT/ML injection vial Inject 0-4 Units into the skin 3 times daily (before meals) Inject as per sliding scale    Historical Provider, MD   ferrous sulfate 325 (65 Fe) MG tablet Take 325 mg by mouth 3 times daily (with meals)     Historical Provider, MD   amLODIPine (NORVASC) 5 MG tablet Take 5 mg by mouth daily     Historical Provider, MD   aspirin 81 MG tablet Take 81 mg by mouth daily     Historical Provider, MD       Current medications:    No current facility-administered medications for this visit. No current outpatient medications on file. I10    Long term current use of insulin ([facility]) Z79.4    Dyslipidemia E78.5    Carotid artery narrowing I65.29    Atherosclerosis of coronary artery I25.10    Acute renal failure (HCC) N17.9    Paroxysmal A-fib (HCC) I48.0    Coronary artery disease involving native coronary artery of native heart without angina pectoris I25.10    Cardiomyopathy ([address] I42.9    Uncontrolled type 2 diabetes mellitus with hyperglycemia, with long-term current use of insulin ([facility]) E11.65, Z79.4    S/P hip hemiarthroplasty Z96.649    Acute ischemic right MCA stroke ([facility] I63.511    SANDY (acute kidney injury) ([facility] N17.9    Normocytic anemia D64.9    Severe protein-calorie malnutrition ([facility]) E43    S/P percutaneous endoscopic gastrostomy (PEG) tube placement ([facility]) Z93.1    Right to left cardiac shunt ([facility]) I28.0    H/O ischemic multifocal multiple vascular territories stroke Z86.73    Anticoagulated Z79.01    Vertebral artery stenosis, right I65.01    Hypoglycemia, exogenous insulin, in setting of SANDY E15    possible sepsis A41.9    Hypothermia T68. XXXA    SANDY (acute kidney injury) ([address] W27.9    Metabolic acidosis L98.7    Acute metabolic encephalopathy P79.83    Altered mental status R41.82    Oropharyngeal dysphagia R13.12    Weakness R53.1    SOB (shortness of breath) R06.02    Goals of care, counseling/discussion Z71.89    DNR (do not resuscitate) discussion Z70.80    Encounter for palliative care Z51.5    Severe malnutrition ([address] E43    Acquired hypothyroidism E03.9       Past Medical History:        Diagnosis Date    Acute renal failure (HCC)     Acute respiratory failure with hypoxia (HCC)     Angina pectoris (HCC)     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HCC)     CAD (coronary artery disease)     Cardiac arrest ([facility]     Cardiac arrest ([address]     Chronic systolic heart failure (HCC)     Diabetes mellitus ([address]     Diabetes mellitus ([facility] 10/9/2012    Diabetes mellitus type II, uncontrolled ([address]     4/1017 A1c = 01/21/19 113 lb 1.5 oz (51.3 kg)     There is no height or weight on file to calculate BMI.    CBC:   Lab Results   Component Value Date    WBC 4.9 04/02/2019    RBC 2.58 04/02/2019    HGB 7.9 04/02/2019    HCT 23.1 04/02/2019    MCV 89.3 04/02/2019    RDW 15.3 04/02/2019    PLT 78 04/02/2019       CMP:   Lab Results   Component Value Date     04/02/2019    K 3.6 04/02/2019    K 5.3 02/27/2019     04/02/2019    CO2 17 04/02/2019    BUN 28 04/02/2019    CREATININE 2.3 04/02/2019    GFRAA 34 04/02/2019    AGRATIO 1.0 01/18/2019    LABGLOM 28 04/02/2019    GLUCOSE 107 04/02/2019    PROT 4.7 03/26/2019    CALCIUM 7.5 04/02/2019    BILITOT <0.2 03/26/2019    ALKPHOS 169 03/26/2019    AST 29 03/26/2019     03/26/2019       POC Tests:   Recent Labs     04/02/19  1654   POCGLU 101*       Coags:   Lab Results   Component Value Date    PROTIME 12.9 04/02/2019    INR 1.13 04/02/2019    APTT 37.1 03/29/2019       HCG (If Applicable): No results found for: PREGTESTUR, PREGSERUM, HCG, HCGQUANT     ABGs:   Lab Results   Component Value Date    PHART 7.308 03/26/2019    PO2ART 42.4 03/26/2019    KYA6OZS 34.0 03/26/2019    ZWK4RFO 17.0 03/26/2019    BEART -9 03/26/2019    Z8OMDAPD 74 03/26/2019        Type & Screen (If Applicable):  No results found for: [facility]    Anesthesia Evaluation  Patient summary reviewed and Nursing notes reviewed no history of anesthetic complications:   Airway: Mallampati: III  TM distance: >3 FB   Neck ROM: full  Mouth opening: > = 3 FB Dental: normal exam   (+) edentulous      Pulmonary:normal exam  breath sounds clear to auscultation  (+) pneumonia:                            ROS comment: Hx of Acute resp failure  Aspergillus   Cardiovascular:    (+) hypertension:, angina:, CAD:, dysrhythmias: atrial fibrillation, hyperlipidemia        Rhythm: regular  Rate: normal           Beta Blocker:  Dose within 24 Hrs         Neuro/Psych:   (+) CVA: residual symptoms,              ROS comment: Expressive aphasia GI/Hepatic/Renal: Neg GI/Hepatic/Renal ROS  (+) liver disease:, renal disease: ARF,      (-) hiatal hernia and GERD      ROS comment: GIB. Endo/Other:    (+) DiabetesType II DM, , hypothyroidism::., .                 Abdominal:           Vascular: negative vascular ROS. ROS comment: Angiopathy, peripheral.                                 Anesthesia Plan      MAC     ASA 4       Induction: intravenous. MIPS: Postoperative opioids intended. Anesthetic plan and risks discussed with patient. Plan discussed with CRNA.     Attending anesthesiologist reviewed and agrees with Pre Eval content              Kimberly Huang DO   4/2/2019

## 2019-04-02 NOTE — PROGRESS NOTES
Internal Medicine PGY-1 Resident Progress Note        PCP: Ronnie Leonard MD    Date of Admission: 3/26/2019    Chief Complaint: Abnormal Labs     Subjective: NAEO. Patient is wondering when colorectal surgery will do procedure, later in afternoon. Denies CP or SOB. Medications:  Reviewed    Infusion Medications    dextrose 5 % and 0.45 % NaCl 100 mL/hr at 04/02/19 1323    dextrose       Scheduled Medications    tamsulosin  0.4 mg Oral Dinner    insulin glargine  5 Units Subcutaneous Nightly    pantoprazole  40 mg Intravenous BID    insulin lispro  0-18 Units Subcutaneous TID WC    insulin lispro  0-9 Units Subcutaneous Nightly    mupirocin   Topical Daily    levothyroxine  37.5 mcg Intravenous Daily    And    sodium chloride (PF)  5 mL Intravenous Daily    sodium chloride flush  10 mL Intravenous 2 times per day     PRN Meds: glucose, dextrose, glucagon (rDNA), dextrose      Intake/Output Summary (Last 24 hours) at 4/2/2019 1447  Last data filed at 4/2/2019 1324  Gross per 24 hour   Intake 4025 ml   Output 2525 ml   Net 1500 ml       Physical Exam Performed:    /68   Pulse 70   Temp 95.4 °F (35.2 °C) (Temporal) Comment: Olya hugger applied  Resp 16   Ht 5' 5\" (1.651 m)   Wt 122 lb 5.7 oz (55.5 kg)   SpO2 99%   BMI 20.36 kg/m²     General appearance: Thin appearing AA M, No apparent distress, appears stated age and cooperative. HEENT: Pupils equal, round, and reactive to light. Conjunctivae/corneas clear. Neck: Supple, with full range of motion. No jugular venous distention. Trachea midline. Respiratory:  Normal respiratory effort. Decreased lung sounds but grossly clear to auscultation, bilaterally without Rales/Wheezes/Rhonchi. Cardiovascular: Regular rate and rhythm with normal S1/S2 without murmurs, rubs or gallops. Abdomen: Soft, non-tender, non-distended with normal bowel sounds. Musculoskeletal: No clubbing, cyanosis or edema bilaterally.   Full range of motion without deformity. Skin: Skin color, texture, turgor normal.  No rashes or lesions. Neurologic:  Neurovascularly intact without any focal sensory/motor deficits. Decreased strength on R side, Cranial nerves: II-XII intact, grossly non-focal.  Psychiatric: Alert and oriented x 3, thought content appropriate, normal insight  Capillary Refill: Brisk,< 3 seconds   Peripheral Pulses: +2 palpable, equal bilaterally       Labs:   Recent Labs     03/31/19  0516 04/01/19  0443 04/02/19  0431   WBC 4.7 4.1 4.9   HGB 8.0* 8.3* 7.9*   HCT 23.6* 24.6* 23.1*   PLT 87* 82* 78*     Recent Labs     03/31/19  0516 04/01/19  0443 04/02/19  0431    138 138   K 3.7 3.3* 3.6   * 109 109   CO2 18* 18* 17*   BUN 48* 35* 28*   CREATININE 3.0* 2.5* 2.3*   CALCIUM 7.5* 7.5* 7.5*   PHOS 2.4* 1.9* 3.8       Recent Labs     03/31/19  1424 04/01/19  1007 04/02/19  0431   INR 1.13 1.18* 1.13     Urinalysis:      Lab Results   Component Value Date    NITRU Negative 03/26/2019    WBCUA 6-10 03/26/2019    BACTERIA 2+ 03/26/2019    RBCUA 0-2 03/26/2019    BLOODU Negative 03/26/2019    SPECGRAV 1.025 03/26/2019    GLUCOSEU Negative 03/26/2019       Radiology:  XR ABDOMEN (KUB) (SINGLE AP VIEW)   Final Result      NG tube appears to project in the fundus of the stomach. FL MODIFIED BARIUM SWALLOW W VIDEO   Final Result      Silent aspiration with honey, puree and thin consistencies. No definite penetration or aspiration with nectar. XR CHEST PORTABLE   Final Result   Impression: Patchy infiltrate of the right lung base is increased in the interval. Stable blunting of the right costophrenic angle. XR CHEST PORTABLE   Final Result     Right IJ central line in the SVC. No pneumothorax. XR ABDOMEN (KUB) (SINGLE AP VIEW)   Final Result      Feeding tube with tip possibly within the right mainstem bronchus.  The tube should be removed and replaced      Findings called as a critical result to the floor by Dr. Alcon Menchaca -Protonix   -H/H daily     Paroxysmal Afib   NSR  -holding Heparin drip in setting of FOBT+  - holding home coreg because of concerns of hypotension and NPO     CHFpEF  - HOLD coreg see above  - Strict I/O, daily wts     CAD s/p cardiac arrest  - hold aspirin and heparin gtt in setting of FOBT+   - hold Plavix     SANDY on CKD (baseline Cr approx. 2)   Prerenal vs ATN, was seeing improvement with hydration creatine more stable overnight , UA didn't show any casts on admission, renal US (wnl last admission)  -Nephrology consulted   - IVF to 100 ml/hr D5 with 1/2 normal saline      IDDMII (glood glucose control)  - hold home metformin 500 BID  -Lantus 5 U nightly   - HDSSI   - Hypoglycemia protocol     Hypothroidism  - elevated TSH   - IV levo increased to 37.5     Oral Dysphagia 2/2 CVA w/ sev protein calorie malnutrition 2/2 stroke   -PC consulted  -will likely need PEG tube in the future      Anemia   FOBT+ in setting of decreased Hgb, heparin gtt stopped  -GI consulted-EGD and colonoscopy today as above       DVT Prophylaxis: SCDs  Diet: Dietary Nutrition Supplements: Standard High Calorie Oral Supplement, Frozen Oral Supplement  Diet NPO, After Midnight  Code Status: Full Code    PT/OT Eval Status: pending     Dispo - F     Georgiana Prior My Ashlee Elizondo MD    I will discuss the patient with the senior resident and MD Georgiana Gifford Prior Arlette Elizondo M.D.   Internal Medicine PGY-1  Pager: [phone]

## 2019-04-02 NOTE — PROGRESS NOTES
Palliative Care Chart Review  and Check in Note:     NAME:  Anthony De La Vega  Admit Date: 3/26/2019  Hospital Day:  Hospital Day: 8   Current Code status: Full Code    Palliative care is continuing to following Mr. Suarez for symptom management,  and goals of care discussion as needed. Patient's chart reviewed today 4/2/19. The following are the currently established goals/code status, and Symptom management. Goals of care: Patient resting today, reviewed case with Dr. Dustin Richard.  Will await results of biopsy. Surgery following as well and will do biopsy later today. Code status: Full code       Discharge plan: lives at [address]     Traci Moreno, APRN/CNP  Palliative Care  [phone]

## 2019-04-02 NOTE — FLOWSHEET NOTE
04/02/19 1427   Encounter Summary   Services provided to: Patient   Referral/Consult From: Rounding   Continue Visiting   (Seen 4/2/19, ADRY. )   Complexity of Encounter Moderate   Length of Encounter 15 minutes   Routine   Type Follow up   Assessment Approachable   Intervention Nurtured hope

## 2019-04-02 NOTE — PLAN OF CARE
Pt at risk for impaired skin integrity. Turned q2h. Sacral Heart in place. PT Will remain free of falls throughout LOS. Bed lowest position. Bed alarm on. Bed lowest position. 3/4  Side rails up. Pt oriented to room. Call light within reach. Non skid footwear applied. Fall flag and fall risk sign placed. Pt notified of when to call RN. Will continue to monitor and assess.

## 2019-04-02 NOTE — PROGRESS NOTES
Speech Language Pathology  Dysphagia - hold    Chart reviewed, d/w ALEN Hubbard. Pt is NPO for procedure this afternoon. Will follow up next session as pt ben Benites M.S./CCC-SLP #[id number]  Pg.  # [phone]

## 2019-04-02 NOTE — BRIEF OP NOTE
Brief Postoperative Note  ______________________________________________________________    Patient: Tolu Francois  YOB: 1945  MRN: [id number]  Date of Procedure: 4/2/2019    Pre-Op Diagnosis: POSSIBLE ADENOMA    Post-Op Diagnosis: Same       Procedure(s):  EXAM UNDER ANESTHESIA, RECTAL BIOPSY X 2    Anesthesia: Monitor Anesthesia Care    Surgeon(s):  Rita Kessler MD    Assistant: Baron Da Silva PGY1    Estimated Blood Loss (mL): <3PR    Complications: None    Specimens:   ID Type Source Tests Collected by Time Destination   A : A. POSTERIOR RECTAL BIOPSY Tissue Tissue SURGICAL PATHOLOGY Rita Kessler MD 4/2/2019 1839    B : B. ANTERIOR RECTAL BIOPSY Tissue Tissue SURGICAL PATHOLOGY Rita Kessler MD 4/2/2019 1841        Implants:  * No implants in log *      Drains:   Gastrostomy/Enterostomy Percutaneous endoscopic gastrostomy (PEG) RUQ 1 20 fr (Active)       Urethral Catheter Non-latex (Active)   Catheter Indications Acute urinary retention/obstruction 4/2/2019  3:27 PM   Site Assessment No urethral drainage 4/2/2019  3:27 PM   Urine Color Colorless 4/2/2019  3:27 PM   Urine Appearance Clear 4/2/2019  3:27 PM   Output (mL) 0 mL 4/2/2019  3:27 PM       [REMOVED] NG/OG/NJ/NE Tube Nasogastric 18 fr Right nostril (Removed)   Surrounding Skin Dry; Intact 3/31/2019  7:44 PM   Securement device Yes 3/31/2019  7:44 PM   Status Other (Comment) 3/31/2019  7:44 PM   Placement Verified by External Catheter Length 3/31/2019  7:44 PM   NG/OG/NJ/NE External Measurement (cm) 60 cm 3/31/2019  7:44 PM   Tube Feeding Intake (mL) 705 ml 3/31/2019  9:00 PM   Free Water Flush (mL) 100 mL 3/31/2019  1:30 PM       [REMOVED] Urethral Catheter Temperature probe 16 fr (Removed)   Catheter Indications Need for fluid management in critically ill patients in a critical care setting not able to be managed by other means such as BSC with hat, bedpan, urinal, condom catheter, or short term intermittent urethral catherization 3/30/2019 4:00 AM   Site Assessment No urethral drainage 3/30/2019  4:00 AM   Urine Color Yellow 3/30/2019  4:00 AM   Urine Appearance Clear 3/30/2019  4:00 AM   Output (mL) 200 mL 3/30/2019  6:00 AM       Findings: See op note, no acute findings    Linda Monahan MD  Date: 4/2/2019  Time: 6:45 PM

## 2019-04-02 NOTE — ANESTHESIA POSTPROCEDURE EVALUATION
Department of Anesthesiology  Postprocedure Note    Patient: Jhoan Agudelo  MRN: [id number]  YOB: 1945  Date of evaluation: 4/2/2019  Time:  7:45 PM     Procedure Summary     Date:  04/02/19 Room / Location:  [facility] OR 03 / [facility]    Anesthesia Start:  1815 Anesthesia Stop:  1914    Procedure:  EXAM UNDER ANESTHESIA, RECTAL BIOPSY X 2 (N/A ) Diagnosis:  (POSSIBLE ADENOMA)    Surgeon:  Kita Billings MD Responsible Provider:  Perry Wilson DO    Anesthesia Type:  MAC ASA Status:  4          Anesthesia Type: MAC    Bruna Phase I: Bruna Score: 9    Bruna Phase II: Bruna Score: 10    Last vitals: Reviewed and per EMR flowsheets.        Anesthesia Post Evaluation    Patient location during evaluation: PACU  Patient participation: complete - patient participated  Level of consciousness: awake and alert  Airway patency: patent  Nausea & Vomiting: no nausea and no vomiting  Cardiovascular status: blood pressure returned to baseline  Respiratory status: acceptable  Hydration status: euvolemic

## 2019-04-02 NOTE — OP NOTE
OPERATIVE NOTE     Jyl Deal  1945  [id number]    DATE OF PROCEDURE: 04/02/19     PREOPERATIVE DIAGNOSES: Rectal bleeding     POSTOPERATIVE DIAGNOSES: Mild proctitis     PROCEDURE:  Rectal biopsy      SURGEON: MD Mika Harris, PGY-1, resident     ANESTHESIA: GET.      COMPLICATIONS: None. EBL: 5 cc    SPECIMEN: Rectal biopsy x 2     FINDINGS: mild proctitis distal rectum, anterior and posterior surfaces.     INDICATIONS: The patient is a 59-year-old male who has had symptoms of rectal bleeding. Colonoscopy was performed by GI showing questionable adenoma vs inflammation of distal rectum. Patient was recommended to undergo EUA for further evaluation and biopsy. The risks, benefits, and alternatives of procedure were explained to the patient including risks of bleeding, infection, pelvic sepsis, urinary retention, anal stenosis, and potential  sphincter damage and dysfunction. Patient understood all of these risks and agreed to  proceed. Typical postoperative course was discussed, including pain and likely need for up to 3 weeks of recovery.     PROCEDURE DETAILS:   The patient was brought to the operating theater. The patient was placed in the prone harrison-knife position after induction of anesthesia. Buttocks were taped apart carefully using tape. The patient's perianal region was prepped and draped in usual sterile fashion using Betadine prep solution. Time-out was performed confirming the patient's identity as well as the operative site. Antibiotics were not indicated. SCDs were on and functioning. All safety points were followed.      Digital rectal exam and anoscopy was performed in all 4 quadrants using lighted anal retractor, noting mild proctitis distal rectum, anterior and posterior surfaces.      Partial thickness 1 cm x 1 cm distal rectal biopsies obtained in both aspects with cautery.      Anoscopy was then performed again, wounds were irrigated, confirming complete hemostasis. 20 cc of Expirel anesthetic was injected for perianal nerve block.      The patient tolerated the procedure well, was woken up and brought to the PACU in stable condition. All counts were correct x2 at the end of the procedure. No complications. Juan Ramon Black.  Marion Morris MD    Electronically signed by Carita Harada, MD on 4/2/2019 at 6:49 PM

## 2019-04-03 LAB
ALBUMIN SERPL-MCNC: 1.7 G/DL (ref 3.4–5)
ANION GAP SERPL CALCULATED.3IONS-SCNC: 10 MMOL/L (ref 3–16)
BASOPHILS ABSOLUTE: 0 K/UL (ref 0–0.2)
BASOPHILS RELATIVE PERCENT: 0.2 %
BUN BLDV-MCNC: 24 MG/DL (ref 7–20)
CALCIUM SERPL-MCNC: 7.4 MG/DL (ref 8.3–10.6)
CHLORIDE BLD-SCNC: 111 MMOL/L (ref 99–110)
CO2: 18 MMOL/L (ref 21–32)
CREAT SERPL-MCNC: 2.3 MG/DL (ref 0.8–1.3)
EOSINOPHILS ABSOLUTE: 0.1 K/UL (ref 0–0.6)
EOSINOPHILS RELATIVE PERCENT: 1.4 %
GFR AFRICAN AMERICAN: 34
GFR NON-AFRICAN AMERICAN: 28
GLUCOSE BLD-MCNC: 103 MG/DL (ref 70–99)
GLUCOSE BLD-MCNC: 68 MG/DL (ref 70–99)
GLUCOSE BLD-MCNC: 68 MG/DL (ref 70–99)
GLUCOSE BLD-MCNC: 69 MG/DL (ref 70–99)
GLUCOSE BLD-MCNC: 72 MG/DL (ref 70–99)
GLUCOSE BLD-MCNC: 79 MG/DL (ref 70–99)
GLUCOSE BLD-MCNC: 80 MG/DL (ref 70–99)
GLUCOSE BLD-MCNC: 86 MG/DL (ref 70–99)
GLUCOSE BLD-MCNC: 94 MG/DL (ref 70–99)
HCT VFR BLD CALC: 21.4 % (ref 40.5–52.5)
HEMOGLOBIN: 7.2 G/DL (ref 13.5–17.5)
LYMPHOCYTES ABSOLUTE: 0.2 K/UL (ref 1–5.1)
LYMPHOCYTES RELATIVE PERCENT: 3.7 %
MAGNESIUM: 1.8 MG/DL (ref 1.8–2.4)
MCH RBC QN AUTO: 30.6 PG (ref 26–34)
MCHC RBC AUTO-ENTMCNC: 33.8 G/DL (ref 31–36)
MCV RBC AUTO: 90.4 FL (ref 80–100)
MONOCYTES ABSOLUTE: 0.1 K/UL (ref 0–1.3)
MONOCYTES RELATIVE PERCENT: 2.5 %
NEUTROPHILS ABSOLUTE: 5.1 K/UL (ref 1.7–7.7)
NEUTROPHILS RELATIVE PERCENT: 92.2 %
PDW BLD-RTO: 15.6 % (ref 12.4–15.4)
PERFORMED ON: ABNORMAL
PERFORMED ON: NORMAL
PHOSPHORUS: 3.3 MG/DL (ref 2.5–4.9)
PLATELET # BLD: 74 K/UL (ref 135–450)
PMV BLD AUTO: 9 FL (ref 5–10.5)
POTASSIUM SERPL-SCNC: 4.4 MMOL/L (ref 3.5–5.1)
RBC # BLD: 2.37 M/UL (ref 4.2–5.9)
SODIUM BLD-SCNC: 139 MMOL/L (ref 136–145)
WBC # BLD: 5.5 K/UL (ref 4–11)

## 2019-04-03 PROCEDURE — 85025 COMPLETE CBC W/AUTO DIFF WBC: CPT

## 2019-04-03 PROCEDURE — 6370000000 HC RX 637 (ALT 250 FOR IP): Performed by: STUDENT IN AN ORGANIZED HEALTH CARE EDUCATION/TRAINING PROGRAM

## 2019-04-03 PROCEDURE — 1200000000 HC SEMI PRIVATE

## 2019-04-03 PROCEDURE — 2580000003 HC RX 258: Performed by: STUDENT IN AN ORGANIZED HEALTH CARE EDUCATION/TRAINING PROGRAM

## 2019-04-03 PROCEDURE — C9113 INJ PANTOPRAZOLE SODIUM, VIA: HCPCS | Performed by: STUDENT IN AN ORGANIZED HEALTH CARE EDUCATION/TRAINING PROGRAM

## 2019-04-03 PROCEDURE — 36592 COLLECT BLOOD FROM PICC: CPT

## 2019-04-03 PROCEDURE — 6360000002 HC RX W HCPCS: Performed by: STUDENT IN AN ORGANIZED HEALTH CARE EDUCATION/TRAINING PROGRAM

## 2019-04-03 PROCEDURE — 83735 ASSAY OF MAGNESIUM: CPT

## 2019-04-03 PROCEDURE — 80069 RENAL FUNCTION PANEL: CPT

## 2019-04-03 PROCEDURE — 99232 SBSQ HOSP IP/OBS MODERATE 35: CPT | Performed by: NURSE PRACTITIONER

## 2019-04-03 PROCEDURE — 2060000000 HC ICU INTERMEDIATE R&B

## 2019-04-03 PROCEDURE — 2500000003 HC RX 250 WO HCPCS: Performed by: STUDENT IN AN ORGANIZED HEALTH CARE EDUCATION/TRAINING PROGRAM

## 2019-04-03 PROCEDURE — 92526 ORAL FUNCTION THERAPY: CPT

## 2019-04-03 RX ADMIN — PANTOPRAZOLE SODIUM 40 MG: 40 INJECTION, POWDER, FOR SOLUTION INTRAVENOUS at 08:27

## 2019-04-03 RX ADMIN — Medication 10 ML: at 22:06

## 2019-04-03 RX ADMIN — TAMSULOSIN HYDROCHLORIDE 0.4 MG: 0.4 CAPSULE ORAL at 18:54

## 2019-04-03 RX ADMIN — Medication 15 G: at 10:10

## 2019-04-03 RX ADMIN — MUPIROCIN: 20 OINTMENT TOPICAL at 08:36

## 2019-04-03 RX ADMIN — LEVOTHYROXINE SODIUM ANHYDROUS 37.5 MCG: 100 INJECTION, POWDER, LYOPHILIZED, FOR SOLUTION INTRAVENOUS at 08:28

## 2019-04-03 RX ADMIN — Medication 10 ML: at 09:00

## 2019-04-03 RX ADMIN — PANTOPRAZOLE SODIUM 40 MG: 40 INJECTION, POWDER, FOR SOLUTION INTRAVENOUS at 22:06

## 2019-04-03 RX ADMIN — DEXTROSE MONOHYDRATE AND SODIUM CHLORIDE: 5; .45 INJECTION, SOLUTION INTRAVENOUS at 17:37

## 2019-04-03 RX ADMIN — SODIUM CHLORIDE, PRESERVATIVE FREE 5 ML: 5 INJECTION INTRAVENOUS at 08:28

## 2019-04-03 ASSESSMENT — PAIN SCALES - GENERAL
PAINLEVEL_OUTOF10: 0

## 2019-04-03 NOTE — PROGRESS NOTES
Pt arrived to U from [address] via bed. VS taken, assessment complete, pt oriented to room and educated on call light. Pt rectal temp of 92.1. Pt placed on Bare hugger. Pt comfortable and demies any other needs at this time.

## 2019-04-03 NOTE — PROGRESS NOTES
PACU Transfer Note    Vitals:    04/02/19 2032   BP: (!) 145/80 B/P Meets Bruna discharge criteria   Pulse: 75   Resp: 24   Temp: 97.1 °F (36.2 °C)   SpO2: 100%       In: 150 [I.V.:150]  Out: 0     Pain assessment:  none  Pain Level: 0(denied when asked)    Report given to Receiving unit ALEN Figueredo with PACU RN ORLÉANS    4/2/2019 8:48 PM

## 2019-04-03 NOTE — PROGRESS NOTES
Patient being transferred to the 6th floor. Vitals stable at this time. Temperature rechecked and is 96 degrees temporally. Report given to Erlin Olvera with no further questions at this time.

## 2019-04-03 NOTE — PROGRESS NOTES
Speech Language Pathology  Facility/Department: [facility] ICU  Dysphagia Daily Treatment Note    NAME: Ashley Krishnan  : 1945  MRN: [id number]    Patient Diagnosis(es):   Patient Active Problem List    Diagnosis Date Noted    Rectal mass     Rectal bleeding     Severe malnutrition ([address] 2019    Acquired hypothyroidism 2019    Altered mental status     Oropharyngeal dysphagia     Weakness     SOB (shortness of breath)     Goals of care, counseling/discussion     DNR (do not resuscitate) discussion     Encounter for palliative care     possible sepsis 2019    Hypothermia 2019    SANDY (acute kidney injury) ([address]     Metabolic acidosis     Acute metabolic encephalopathy     Hypoglycemia, exogenous insulin, in setting of SANDY 2019    Vertebral artery stenosis, right 2019    SANDY (acute kidney injury) ([address] 2019    Normocytic anemia 2019    Severe protein-calorie malnutrition ([address] 2019    S/P percutaneous endoscopic gastrostomy (PEG) tube placement ([address] 2019    Right to left cardiac shunt ([address] 2019    H/O ischemic multifocal multiple vascular territories stroke 2019    Anticoagulated 2019    Acute ischemic right MCA stroke ([address] 2019    S/P hip hemiarthroplasty 2017    Uncontrolled type 2 diabetes mellitus with hyperglycemia, with long-term current use of insulin ([address] 10/17/2017    Paroxysmal A-fib ([address]     Coronary artery disease involving native coronary artery of native heart without angina pectoris     Cardiomyopathy ([address]     Acute renal failure ([address]     Noncompliance with medication regimen 10/17/2016    Type 2 diabetes mellitus ([address] 2016    Essential hypertension 2016    Vitamin B deficiency 2012    Atherosclerosis of coronary artery 2011    Long term current use of insulin ([address] 2011    Vitamin D deficiency 2010    Angiopathy, peripheral ([facility] 09/17/2010    Gonadotropin deficiency ([facility] 09/17/2010    Dyslipidemia 02/12/2010    Carotid artery narrowing 02/12/2010     Allergies: No Known Allergies    Type of Study: Repeat MBS  Recent Chest Xray 3/27/19      Right IJ central line in the SVC.  No pneumothorax.       Final Result       Right lower lung opacity present previously improved likely relates to improvement of the atelectasis and/or pneumonitis.       Right lower lobe bronchiectasis present previously.             CT of head 3/27/19      1. No acute stroke, mass, or hemorrhage. 2. Remote infarcts in the bilateral cerebellar and cerebral hemispheres as above. 3. Moderate chronic small vessel ischemic white matter disease. 4. Mild diffuse atrophy. 5. Moderate right mastoid effusion.         Prior dysphagia history:  Previous MBS (#2) 1/18/19  Oral Phase: Mild-moderate oral phase deficits. Pt edentulous and reported that dentures currently at nursing home when questioned and reported \"I can chew anything with or without them\".  Pt [facility] for pureed with fairly timely oral phase with no oral residue.  With cracker trial, pt with slow  A-P tranport and prolonged mastication with minimal residue on the left due to reduced left lingual strength and ROM.     Pharyngeal phase:  Pt with moderate pharyngeal deficits characterized by delay in swallow initiation with most po trials with spillover into pyriform sinus prior to swallow initiation with most po trials.  Minimal to no pharyngeal residue.  Due to reduced laryngeal excursion and elevation, pt with silent aspiration during the swallow with nectar via cup and aspiration with delayed cough response with nectar via cup with chin tuck.  Chin tuck did not eliminate aspiration. No aspiration/penetration with puree x2 trials, honey thick liquids via cup X2 or spoon or with nectar via spoon (only one trial given).  Did not attempt thins due to deficits with nectar.    Previous MBS #1- 4/21/17  Oral Phase:   1. Moderate-severe oral stage dysphagia characterized by suspected decreased mastication and decreased lingual manipulation/control.    2. Prolonged bolus prep and oral transit with lingual mashing.    3. Reduced bolus control with premature bolus loss to the pharynx with all trials.    4. Lingual residue with all trials is cleared with cued repeat swallow. Pharyngeal:   1. Severe pharyngeal stage dysphagia characterized by delayed swallow, decreased laryngeal elevation, and reduced pharyngeal peristalsis.    2. Premature spillage to the valleculae with all trials and to the pyriform with nectar and thin trials.    3. Intermittent, flash penetration with nectar and honey trials.    4. Deep penetration with thin; residue lining laryngeal vestibule evident post-penetration of thin.    5. Vallecular and pyriform residue (vallecular > pyriform) with all trials.    6. Clears liquid residue with cued repeat swallow.    7. Unable to clear puree residue using repeat swallows and/or liquid wash.    8. Patient at severe risk for aspiration after the swallow of observed residues during study. Upper Esophageal Screen:   1. No apparent upper esophageal involvements per screening  Dysphagia Outcome Severity Scale: Level 2: Moderate Severe dysphagia- Maximum assistance or maximum use of strategies with partial PO only     MBS results 2/28/19:  Pt presents with moderate oral and pharyngeal phase dysphagia  Oral- pt with poor A-P transport with all consistencies. Mastication with cracker was prolonged. pt unable to draw honey thick barium up the straw. Pharyngeal- pt presents with incomplete epiglottal closure which resulted in deep penetration of nectar thick barium without spontaneous clearing. Nectar thick barium would have been aspirated it pt was not instructed to cough and clear throat several times.  Pt also silently aspirated thin barium during the swallow.  Did not attempt chin tuck as question pt's ability to recall and perform independently as well as this strategy was not beneficial in the past. Pt with no aspiration or penetration with pureed, cracker or honey thick liquids by cup. Pt with minimal residue remaining in the valleculae and pyriform after the swallow with honey thick liquids and to a slightly greater degree with pureed and cracker. Pt able to clear residue when instructed to dry swallow.         [facility] [place]:  Impressions:  Pt exhibits slow propulsion of bolus posteriorly in oral cavity. Lingual residue of cracker noted after study completed. Pt with reduced tongue base strength and reduced laryngeal elevation resulting in mod amount of residue in valleculae and pyriform sinuses with puree and honey thick liquid textures. This then resulted in spillover into airway with deep penetration and eventual SILENT aspiration. Pt did not clear residue spontaneously, no extra swallows were noted. Pt asked if he felt anything, which he denied. Pt then asked to swallow again, which did not clear residue. Pt then presented with nectar thick liquid trials via cup/straw/tsp, where no penetration or aspiration was identified. Thin liquids was presented via tsp and cup, where deep penetration occurred during the swallow, without spontaneous clearing and no cough reflex. In summary, nectar thick liquids were the only texture that pt did not penetrate or aspirate. Would trial nectar thick clear liquid diet with close monitoring for s/s of aspiration. Pending pt wishes for quality of life, will follow up for diet advancement. Solid texture poses a risk due to pt being edentulous, oral residue noted after study as well as pt report of choking on turkey sandwich prior to admit.     Chart reviewed.     Medical Diagnosis: sepsis  Treatment Diagnosis: dysphagia     Pain: None indicated    Current Diet : Nectar thick / clear liquids    Treatment:  Pt seen bedside to address the following goals:  1-The patient will tolerate recommended diet without observed clinical signs of aspiration  3/27: RN Sandor Amin notified SLP pt having difficulty with lunch. Gonsalo Guardado pt briefly with lunch, pt holding throat and sounding wet/congested.  D/w RN and will make pt NPO this date and follow up next session for repeat BS assessment and follow up with repeat MBS as appropriate. 3/28: pt analyzed with portion of lunch. Pt consuming nectar thick clears with no immediate coughing or throat clearing, voice remained clear. After finished with tray, occasional mild cough noted. Cont goal  3/29: Pt only agreeable to x1 trial of nectar thick liquids which results in delayed cough. RN reports lungs are okay but pt continued with productive cough and has very poor PO intake. Daughter present and endorses pt was eating \"nothing\" at nursing home. Continue goal.   4/3 - Pt has been unable to be seen last few days secondary to NPO status for procedures. Pt asleep in bed with tray in front of him upon arrival. RN reports breath sounds as diminished only. Pt with increased appetite this date and reports enjoyment from PO. Patient tolerating nectar thick liquids via tsp and cup without overt s/s of aspiration. Pt scheduled for PEG tomorrow due to severe oropharyngeal dysphagia and limited PO intake. Continue goal.     2- The patient Arnaldo [place] will verbalize/demonstrate understanding of dysphagia recommendations  3/27: Educated pt to purpose of visit, s/s of aspiration, concern if aspiration occurs, rationale for diet recommendation/strategies to reduce risk for aspiration and instruction to notify staff if any signs emerge. Pt stated understanding. Cont goal  3/28: pt educated to results of MBS, rationale for diet recommendations and strategies to improve safety of swallow. Explained cont risk for aspiration and pt cont to state he would not want a feeding tube. Pt will benefit from cont education  Cont goal  3/28:   Pt /family member educated again to results of MBS study. Explained high risk for aspiration of PO, however nectar thick liquids were consumed without aspiration. Also educated to strategies to aide in increasing safety of PO. Discussed cont this nectar clear liquid diet vs advancing for quality of life,  however this would increase risk for aspiration due to residue that was noted on MBS. Pt did not state his wishes regarding this, however did reiterate that he would not want a feeding tube. Pt / family will benefit from cont education  Cont goal  3/29: Patient's daughter present and reports she was not present for results of MBS study. Explained risk of aspiration with PO but no aspiration with nectars per MBS. Educated on importance of oral care in reducing risk for aspiration pneumonia. Educated on recommendation for nectar thick liquids only at this time to reduced risk of aspiration and choices would be for alternative means or diet advancement for quality of life. Patient was not able to verbalize any information educated on or indicate desire/aversion for feeding tube as previously documented. Daughter indicated she would want feeding tube placed for nourishment and reducing aspiration. Educated that tube placement would not eliminate possibility of aspiration. Per chart review, there is a man listed as the patients legal guardian. Pt / family would benefit from ongoing education. Continue goal.    4/3: No family present. Patient unable to recall being seen by SLP, results of MBS (indicating no dysphagia and no history of ever having trouble swallowing), endorses PEG placement tomorrow but when asked where it is placed the patient is not able to recall. Did recall after delay that indication is due to trouble swallowing. Pt able to indicate that consequence of aspiration is pneumonia. Educated on importance of adequate oral care in deceasing risk of pneumonia.  Intermittent confusion re: dysphagia as described. Reviewed compensatory strategies with patient. Continue goal.      3- Pt will participate in [address]  3/28: goal met     4- pt will participate in swallowing exercises with mod cues  3/28: not addressed at this time  Cont goal  3/29: Goal not targeted this session. Continue goal.   4/3: Effortful swallows completed x10 using with moistened spoon, max encouragement and significantly extra time, Lingual retraction against resistance x10, sustained ~3 seconds each. Continue goal.     Patient/Family/Caregiver Education:  No family present. Education documented above. Compensatory Strategies:  Supervision: 1:1  Compensatory Swallowing Strategies:   Small bites/sips  Assist feed;  Upright as possible for all oral intake  Remain upright for 30-45 minutes after meals      Plan:  Continued daily Dysphagia treatment with goals per plan of care. Diet recommendations: Nectar thick clear liquids   Consider advance of solids (to puree or D2)  for QOL, pending pt/family wishes  DC recommendation: pt will most likely benefit from ongoing treatment upon dc  Treatment: 30 minutes   D/W nursing, Jelly Harper  Needs met prior to leaving room, call button in reach. Rl Martinez   Speech-Language Pathology Graduate [employer]    Belle Millan M.A., Sheeba Light [id number]  Speech-Language Pathologist  Pg. # [phone]    The speech-language pathologist was present, directed the patient's care, made skilled judgment and was responsible for assessment and treatment.     If patient is discharged prior to next treatment, this note will serve as the discharge summary

## 2019-04-03 NOTE — PROGRESS NOTES
Patient arrived from PACU, alert and oriented and denies pain. Salmon in place, central line, skin assessment performed. Patient has small open area on buttocks, above sacrum. Will continue to monitor. SCDs in place.

## 2019-04-03 NOTE — PROGRESS NOTES
Nutrition Assessment    Type and Reason for Visit: Reassess    Nutrition Recommendations:   Advance diet to full liquids. Modify current ONS regimen to Ensure Enlive twice daily and Magic Cup once daily. Monitor BG labs, BM and diet advancement. Nutrition Assessment: RD triggered for pt follow-up for nutrition progression. Pt is improving from a nutritional standpoint d/t pt diet advanced from NPO to clear liquids. Pt is at risk for nutritional compromise d/t increased needs d/t diagnosis of severe malnutrition. Pt is s/p proctoscopy r/t rectal bleeding w/ hx of severe gastritis w/ gastric ulcers and severe duodenitis w/ duodenal ulcers s/p EGD and colonoscopy. Noted  BG and lytes labs WNL now. Recommend advancing pt to full liquid diet and advancing to a low fat, low fiber diet as tolerated. Will modify Ensure Enlive to twice per day and Magic Cup to once daily per pt request. Will monitor diet tolerance, improvement in nutrition status and pt wishes for POC (PEG). Malnutrition Assessment:  · Malnutrition Status: Meets the criteria for severe malnutrition  · Context: Chronic illness  · Findings of the 6 clinical characteristics of malnutrition (Minimum of 2 out of 6 clinical characteristics is required to make the diagnosis of moderate or severe Protein Calorie Malnutrition based on AND/ASPEN Guidelines):  1. Energy Intake-Less than or equal to 75% of estimated energy requirement, Greater than or equal to 1 month    2. Weight Loss-10% loss or greater, in 3 months  3. Fat Loss-Severe subcutaneous fat loss,    4. Muscle Loss-Severe muscle mass loss,    5. Fluid Accumulation-No significant fluid accumulation,    6.   Strength-Not measured    Nutrition Risk Level: High    Nutrient Needs:  · Estimated Daily Total Kcal: 5979-3148  · Estimated Daily Protein (g): 66-79  · Estimated Daily Total Fluid (ml/day): 1540 ml    Nutrition Diagnosis:   · Problem: Severe malnutrition  · Etiology: related to Insufficient energy/nutrient consumption     Signs and symptoms:  as evidenced by Diet history of poor intake, Weight loss greater than or equal to 7.5% in 3 months, Severe loss of subcutaneous fat, Severe muscle loss    Objective Information:  · Nutrition-Focused Physical Findings: Watery LBM 4/1, no edema  · Wound Type: Surgical Wound  · Current Nutrition Therapies:  · Oral Diet Orders: Clear Liquid   · Oral Diet intake: 1-25%, 26-50%  · Oral Nutrition Supplement (ONS) Orders: Standard High Calorie Oral Supplement, Frozen Oral Supplement  · ONS intake: 26-50%  · Anthropometric Measures:  · Ht: 5' 5\" (165.1 cm)   · Current Body Wt: 122 lb 5.7 oz (55.5 kg)  · Admission Body Wt: 89 lb (40.4 kg)  · % Weight Change5% loss ~1 month, 19% ~3 months  · Ideal Body Wt: 136 lb (61.7 kg)  · BMI Classification: BMI 18.5 - 24.9 Normal Weight    Nutrition Interventions:   Modify current diet, Modify current ONS  Continued Inpatient Monitoring    Nutrition Evaluation:   · Evaluation: Progressing toward goals   · Goals: Pt will tolerate appropriate form of nutrition to meet 100% of nutrient needs    · Monitoring: Nutrition Progression, Meal Intake, Supplement Intake, Diet Tolerance, Weight, Pertinent Labs, Monitor Bowel Function      Electronically signed by Miroslava Mai on 4/3/19 at 11:15 AM    Contact Number: [phone]

## 2019-04-03 NOTE — CARE COORDINATION
Case Management Daily Note:    Current Plan of Care: biopsy pending      PT AM-PAC: pending  OT AM-PAC:  pending    DME needs:  N/A      Discharge Plan:  Return to [facility] SNF    Tentative Discharge Date:  End of week? Current Barriers to Discharge:  Biopsy pending    Resources/Information given:  N/A      Case Management Notes:     Spoke with guardian with update and confirm Ryan Roblero is still the plan. Spoke with Ivon Burrell at [facility] ([phone]). Patient can return to SNF at any point.

## 2019-04-03 NOTE — PROGRESS NOTES
Palliative Medicine Progress Note    Admit Date: 3/26/2019  Hospital Day:  Hospital Day: 9     CC: GI bleed  HPI: HPI PMH of CAD, cardiac arrest, DM, HTN, HLD, CHF EF 30% who presented with Cr 5.9, Na 150, lactate >2, hypotension, hypovolemia, facing PEG (has had PEG in the past), developed GI bleed, underwent EGD showing two gastric ulcers with severe gastritis and severe duodenitis, underwent rectal biopsy and results pending. PEG placement planned. Recommendations:     Called pt's nephew John Rivera to request documentation of health care power of . John Rivera states he is pt's poa and NOT his legal guardian, and he will fax the paperwork this afternoon. He says his uncle is agreeable to the PEG from his and pt's children's conversations with pt. Spoke with pt this afternoon regarding PEG placement. He is alert and oriented other than not knowing the name of this hospital. He recalls having a PEG in the past and says it was removed in Dec 2018. Although he is not excited about having another placed, he does want to go ahead with it. D/w RN Alison Kendrick and Dr. Estfeania Day.    1. Goals of Care/Advanced Care planning/Code status: Full code. Pt and family want to proceed with PEG placement. 2. Pain: patient denies pain and appears comfortable at this time. 3. SOB: patient denies SOB and is breathing comfortably on room air. 4. Dysphagia: modified barium swallow test showed severe oropharyngeal dysphagia and pt continues on clear nectar liquids. Pt and nephew John Rivera are in favor of proceeding with PEG. 5. Malnutrition: pt and family want to proceed with PEG. 6. Disposition: from formerly [facility]. 7. AMS changes: pt is alert and oriented to person, time, does not know the name of this hospital, has limited insight into his medical problems but does remember having a PEG in the past and that it was removed in December 2018. I believe he can consent to the PEG himself.     Subjective:     Scheduled Meds:   tamsulosin 0.4 mg Oral Dinner    insulin glargine  5 Units Subcutaneous Nightly    pantoprazole  40 mg Intravenous BID    insulin lispro  0-18 Units Subcutaneous TID WC    insulin lispro  0-9 Units Subcutaneous Nightly    mupirocin   Topical Daily    levothyroxine  37.5 mcg Intravenous Daily    And    sodium chloride (PF)  5 mL Intravenous Daily    sodium chloride flush  10 mL Intravenous 2 times per day       Continuous Infusions:   dextrose 5 % and 0.45 % NaCl 50 mL/hr at 04/02/19 2127    dextrose         PRN Meds:acetaminophen, glucose, dextrose, glucagon (rDNA), dextrose    Review of Systems. Review of Systems - History obtained from the patient  Psychological ROS: negative  ENT ROS: negative  Hematological and Lymphatic ROS: negative  Respiratory ROS: no cough, shortness of breath, or wheezing  Cardiovascular ROS: no chest pain or dyspnea on exertion  Gastrointestinal ROS: no abdominal pain, change in bowel habits, or black or bloody stools  Genito-Urinary ROS: no dysuria, trouble voiding, or hematuria  Musculoskeletal ROS: positive for - muscular weakness  Neurological ROS: no TIA or stroke symptoms    Performance status 30%    Objective:     Patient Vitals for the past 8 hrs:   BP Temp Temp src Pulse Resp SpO2   04/03/19 1240 -- 96 °F (35.6 °C) -- -- -- --   04/03/19 1145 122/65 95.8 °F (35.4 °C) Temporal -- 20 100 %   04/03/19 0836 (!) 141/82 96.2 °F (35.7 °C) Temporal 56 20 100 %     I/O last 3 completed shifts:   In: 0067 [I.V.:1831]  Out: 1975 [GPLNR:4397]  I/O this shift:  In: -   Out: 375 [Urine:375]    General appearance: alert, appears stated age and cooperative  Lungs: diminished breath sounds bilaterally  Heart: regular rate and rhythm  Abdomen: soft, non-tender; bowel sounds normal; no masses,  no organomegaly  Extremities: extremities normal, atraumatic, no cyanosis or edema  Skin: Skin color, texture, turgor normal. No rashes or lesions  Neurologic: Mental status: alertness: alert, orientation: time, date, person    WBC/Hgb/Hct/Plts:  5.5/7.2/21.4/74 (04/03 0359)           Assessment:     Principal Problem:    Hypothermia  Active Problems:    Acute renal failure (HCC)    possible sepsis    SANDY (acute kidney injury) ([facility] [address]    Metabolic acidosis    Acute metabolic encephalopathy    Altered mental status    Oropharyngeal dysphagia    Weakness    SOB (shortness of breath)    Goals of care, counseling/discussion    DNR (do not resuscitate) discussion    Encounter for palliative care    Severe malnutrition ([facility]    Acquired hypothyroidism    Rectal mass    Rectal bleeding  Resolved Problems:    * No resolved hospital problems. *    Family [phone], Pt [phone]  Time spent with patient and/or family: 13  Time reviewing records: 5  Time communicating with providers: 10    A total of 30 minutes spent with the patient and family on unit greater than 50% face to face time in counseling regarding palliative care and goals of care for the patient.      Jair Davila, NP  [address]

## 2019-04-03 NOTE — PROGRESS NOTES
Nephrology Progress Note  [phone]   [email or web address]    Patient:  Amie Kirby   : 1945    CC:  SANDY   This is a patient with significant past medical history of SANDY,CKD prior CVA ,dysphagia with recent admission here for SANDY and Pneumonia , Cr was 2.4 at discharge  who presents with volume depletion very poor po intake and SANDY, Cr >5, hypernatremia           Subjective:  Had flex sig and EUA BX done  Had urine retention and required Salmon placement 19   Started Flomax   Oral intake remains poor    ROS:   No dyspnea, no pain . SHx:  No visitors today. Meds:  Reviewed     Vitals:  BP (!) 154/72   Pulse 61   Temp (!) 91.9 °F (33.3 °C) (Rectal)   Resp 18   Ht 5' 5\" (1.651 m)   Wt 122 lb 5.7 oz (55.5 kg)   SpO2 100%   BMI 20.36 kg/m²     Physical Exam:  Gen: Resting in bed, NAD. HEENT: +pallor  CV: RRR no rub noted   Lungs: good respiratory effort and clear air entry   Abd: S/NT +BS Salmon in place  Ext: No edema, no cyanosis  Skin: Warm. No rashes appreciated. Labs:  CBC:   Lab Results   Component Value Date    WBC 5.5 2019    RBC 2.37 2019    HGB 7.2 2019    HCT 21.4 2019    MCV 90.4 2019    MCH 30.6 2019    MCHC 33.8 2019    RDW 15.6 2019    PLT 74 2019    MPV 9.0 2019     BMP:    Lab Results   Component Value Date     2019    K 4.4 2019    K 5.3 2019     2019    CO2 18 2019    BUN 24 2019    LABALBU 1.7 2019    CREATININE 2.3 2019    CALCIUM 7.4 2019    GFRAA 34 2019    LABGLOM 28 2019    GLUCOSE 79 2019       Assessment/Plan:  1. SANDY /CKD due to volume depletion better. Cr improved and stable at  2.3 mg likely his baseline level, Continue gentle  IVF's as PO intake remains poor  2. Hypernatremia resolved. Na+ 139 meq  3. Hypokalemia/Hypophosphatemia corrected  4. PAF on AC   5. DM2 monitor   6.  Severe Gastric gastric ulcers and severe duodenitis, erythematous rectum concerning for superficial adenoma Had EUA and flex sig BX pending   7. U Retention Started Flomax Keep link for now Will need a voiding trial      Johnny Bland MD

## 2019-04-03 NOTE — PROGRESS NOTES
Patient arrived from OR to PACU # 6 s/p EXAM UNDER ANESTHESIA, RECTAL BIOPSY X 2 per . Attached to PACU monitoring device, no problems reported intraoperatively, VSS.

## 2019-04-03 NOTE — PROGRESS NOTES
Department of Surgery:  Post-op Note      Procedure(s) Performed: Rectal bx    Subjective:   Patient denies pain or rectal bleed. Reports been sleeping comfortably. He denies nausea or emesis. Has not been OOB, has not taken in any liquids, and denies flatus or BM at this time. He has link in place. Objective:  Anesthesia type: General      I/O    Intra op    Post op     Fluids  150 191     EBL 0 0     Urine - 400     Exam:BP (!) 147/84   Pulse 68   Temp 97.1 °F (36.2 °C) (Temporal)   Resp 24   Ht 5' 5\" (1.651 m)   Wt 122 lb 5.7 oz (55.5 kg)   SpO2 100%   BMI 20.36 kg/m²   Post-op vital signs:  Stable   Exam:General appearance: alert, appears stated age and cooperative  Lungs: no increased work of breathing, on 2L NC  Heart: regular rate and rhythm, S1, S2 normal, no murmur, click, rub or gallop  Abdomen: soft, thin, non tender, non distended  Rectum - hemostatic, no active bleeding  : link in place - clear yellow urine  Extremities: no edema or cyanosis    Assessment and Plan  Pt is a 66y.o. year old male presented with rectal bleeding s/p rectal bx, consistent with mild proctitis POD #0    Pain management: tylenol PRN  Cardiovasc: hemodynamically stable, will continue to monitor  Respiratory:  IS ordered to bedside, encourage hourly IS and deep breathing, wean oxygen as tolerated  FeNa: Fluids MIVF 50 ml/hr, Diet: clears  :  Urine output is adequate link in place  Ambulation: OOB to chair, encourage ambulation  Prophylaxis: SCDs, Protonix, NO DVT ppx.    Abx: N/A    Tia Owen MD  General Surgery PGY 1  4/2/2019 10:11 PM  Pager [phone]

## 2019-04-03 NOTE — PROGRESS NOTES
99 % --   19 1000 -- -- -- 66 -- 100 % --   19 0900 -- -- -- 60 -- 100 % --   19 0800 (!) 135/90 96 °F (35.6 °C) Temporal 72 16 98 % --   19 0545 -- -- -- -- -- -- 122 lb 5.7 oz (55.5 kg)   19 0400 130/71 96.2 °F (35.7 °C) Temporal 62 -- 97 % --   19 0345 -- -- -- 67 -- -- --   19 0330 -- -- -- 68 -- -- --   19 0315 -- -- -- 59 -- -- --   19 0243 -- -- -- -- 16 98 % --   19 0000 (!) 135/92 96.3 °F (35.7 °C) Temporal 60 -- 98 % --   19 2345 -- -- -- 59 -- -- --   19 2330 -- -- -- 63 -- -- --   19 2315 -- -- -- 61 -- -- --   19 2313 -- -- -- -- 16 97 % --   19 2300 -- -- -- 65 -- -- --       Exam:    VITALS:  /61   Pulse 74   Temp 96.8 °F (36 °C) (Temporal)   Resp 10   Ht 5' 5\" (1.651 m)   Wt 122 lb 5.7 oz (55.5 kg)   SpO2 100%   BMI 20.36 kg/m²   TEMPERATURE:  Current - Temp: 96.8 °F (36 °C); Max - Temp  Av.3 °F (35.7 °C)  Min: 95.4 °F (35.2 °C)  Max: 97 °F (36.1 °C)    NAD  General appearance: alert, appears stated age, cooperative and no distress  Head: Normocephalic, without obvious abnormality, atraumatic  Neck: supple, symmetrical, trachea midline and thyroid not enlarged, symmetric, no tenderness/mass/nodules  CVS:  RRR, Nl s1s2  Lungs CTA Bilaterally, normal effort  Abdomen: soft, non-tender; bowel sounds normal; no masses,  no organomegaly  AAOx3, No asterixis or encephalopathy  Extremities: No edema. Lab Data:  Recent Labs     193 19  0431   WBC 4.7 4.1 4.9   HGB 8.0* 8.3* 7.9*   HCT 23.6* 24.6* 23.1*   MCV 89.9 89.8 89.3   PLT 87* 82* 78*     Recent Labs     193 19  0431    138 138   K 3.7 3.3* 3.6   * 109 109   CO2 18* 18* 17*   PHOS 2.4* 1.9* 3.8   BUN 48* 35* 28*   CREATININE 3.0* 2.5* 2.3*     No results for input(s): AST, ALT, ALB, BILIDIR, BILITOT, ALKPHOS in the last 72 hours.   No results for input(s): LIPASE, AMYLASE in the last 72 hours. Recent Labs     03/31/19  1424 04/01/19  1007 04/02/19  0431   INR 1.13 1.18* 1.13     No results for input(s): PTT in the last 72 hours. No results for input(s): OCCULTBLD in the last 72 hours. Assessment:       Principal Problem:    Hypothermia  Active Problems:    Acute renal failure (HCC)    possible sepsis    SANDY (acute kidney injury) (HCC)    Metabolic acidosis    Acute metabolic encephalopathy    Altered mental status    Oropharyngeal dysphagia    Weakness    SOB (shortness of breath)    Goals of care, counseling/discussion    DNR (do not resuscitate) discussion    Encounter for palliative care    Severe malnutrition ([facility] [address]    Acquired hypothyroidism    Rectal mass    Rectal bleeding  Resolved Problems:    * No resolved hospital problems. *    Gi bleed  anemia    Recommendations:        Will await pathology findings  Thank you Dr Reggie Garay MD  4/2/2019  8:41 PM

## 2019-04-03 NOTE — PROGRESS NOTES
Internal Medicine PGY-1 Resident Progress Note        PCP: Dontrell Kelly MD    Date of Admission: 3/26/2019    Chief Complaint: Abnormal Labs     Subjective: NAEO. Had EUA and tissue biopsy with surgery yesterday. Medications:  Reviewed    Infusion Medications    dextrose 5 % and 0.45 % NaCl 50 mL/hr at 04/02/19 2127    dextrose       Scheduled Medications    tamsulosin  0.4 mg Oral Dinner    insulin glargine  5 Units Subcutaneous Nightly    pantoprazole  40 mg Intravenous BID    insulin lispro  0-18 Units Subcutaneous TID WC    insulin lispro  0-9 Units Subcutaneous Nightly    mupirocin   Topical Daily    levothyroxine  37.5 mcg Intravenous Daily    And    sodium chloride (PF)  5 mL Intravenous Daily    sodium chloride flush  10 mL Intravenous 2 times per day     PRN Meds: acetaminophen, glucose, dextrose, glucagon (rDNA), dextrose      Intake/Output Summary (Last 24 hours) at 4/3/2019 1145  Last data filed at 4/3/2019 0840  Gross per 24 hour   Intake 1831 ml   Output 1575 ml   Net 256 ml       Physical Exam Performed:    BP (!) 141/82   Pulse 56   Temp 96.2 °F (35.7 °C) (Temporal)   Resp 20   Ht 5' 5\" (1.651 m)   Wt 122 lb 5.7 oz (55.5 kg)   SpO2 100%   BMI 20.36 kg/m²     General appearance: Thin appearing AA M, No apparent distress, appears stated age and cooperative. HEENT: Pupils equal, round, and reactive to light. Conjunctivae/corneas clear. Neck: Supple, with full range of motion. No jugular venous distention. Trachea midline. Respiratory:  Normal respiratory effort. Decreased lung sounds but grossly clear to auscultation, bilaterally without Rales/Wheezes/Rhonchi. Cardiovascular: Regular rate and rhythm with normal S1/S2 without murmurs, rubs or gallops. Abdomen: Soft, non-tender, non-distended with normal bowel sounds. Musculoskeletal: No clubbing, cyanosis or edema bilaterally. Full range of motion without deformity.   Skin: Skin color, texture, turgor normal.  No Contrast   Final Result      1. No acute stroke, mass, or hemorrhage. 2. Remote infarcts in the bilateral cerebellar and cerebral hemispheres as above. 3. Moderate chronic small vessel ischemic white matter disease. 4. Mild diffuse atrophy. 5. Moderate right mastoid effusion. XR CHEST PORTABLE   Final Result      Right lower lung opacity present previously improved likely relates to improvement of the atelectasis and/or pneumonitis. Right lower lobe bronchiectasis present previously. Assessment/Plan:    Anabel Cisneros is a 66 y.o. male PMH ischemic MCA stroke, CKD, FTT, presents from NH with electrolyte imbalances, found to be hypothermic, hypernatremic, hyperkalemic with SANDY on CKD. Hypotension. Concern for sepsis, volume depletion secondary to poor oral intake. Active Hospital Problems    Diagnosis Date Noted    Rectal mass [K62.9]     Rectal bleeding [K62.5]     Severe malnutrition ([facility] [address] [E43] 03/27/2019    Acquired hypothyroidism [E03.9] 03/27/2019    Altered mental status [R41.82]     Oropharyngeal dysphagia [R13.12]     Weakness [R53.1]     SOB (shortness of breath) [R06.02]     Goals of care, counseling/discussion [Z71.89]     DNR (do not resuscitate) discussion [Z71.89]     Encounter for palliative care [Z51.5]     possible sepsis [A41.9] 03/26/2019    Hypothermia [T68. XXXA] 03/26/2019    SANDY (acute kidney injury) ([facility] [address] [N17.9] 70/19/2407    Metabolic acidosis [Y09.2] 03/26/2019    Acute metabolic encephalopathy [V93.08] 03/26/2019    Acute renal failure (HCC) [N17.9]         Hypotension (improving)   Likely 2/2 hypovolemia and hypothermia 2/2 dec PO intake, FOBT+ w/ decreased hemoglobin   -GI consulted-EGD and colonoscopy today: gastric ulcers x 2 and duodenitis, distal rectum reddening may be concerning for adenoma, colorectal surgery will be consulted    -Colorectal surgery-awaiting tissue biopsy  -cont D51/2 NS @ 50  -will need to address PEG

## 2019-04-03 NOTE — PROGRESS NOTES
Pt arrived to floor unable to obtain oral temp x 3 ax x 2 was 92.2  Rectal 91.8,  recheck 91. 9.  notified Charge nurse and paged DR Jacque Quiroga

## 2019-04-04 LAB
ALBUMIN SERPL-MCNC: 1.7 G/DL (ref 3.4–5)
ANION GAP SERPL CALCULATED.3IONS-SCNC: 7 MMOL/L (ref 3–16)
BASOPHILS ABSOLUTE: 0 K/UL (ref 0–0.2)
BASOPHILS RELATIVE PERCENT: 0.5 %
BUN BLDV-MCNC: 23 MG/DL (ref 7–20)
CALCIUM SERPL-MCNC: 7.8 MG/DL (ref 8.3–10.6)
CHLORIDE BLD-SCNC: 113 MMOL/L (ref 99–110)
CO2: 19 MMOL/L (ref 21–32)
CREAT SERPL-MCNC: 2.4 MG/DL (ref 0.8–1.3)
EOSINOPHILS ABSOLUTE: 0.2 K/UL (ref 0–0.6)
EOSINOPHILS RELATIVE PERCENT: 3.2 %
GFR AFRICAN AMERICAN: 32
GFR NON-AFRICAN AMERICAN: 26
GLUCOSE BLD-MCNC: 147 MG/DL (ref 70–99)
GLUCOSE BLD-MCNC: 56 MG/DL (ref 70–99)
GLUCOSE BLD-MCNC: 65 MG/DL (ref 70–99)
GLUCOSE BLD-MCNC: 87 MG/DL (ref 70–99)
GLUCOSE BLD-MCNC: 92 MG/DL (ref 70–99)
HCT VFR BLD CALC: 22.4 % (ref 40.5–52.5)
HEMOGLOBIN: 7.6 G/DL (ref 13.5–17.5)
INR BLD: 1.2 (ref 0.86–1.14)
LYMPHOCYTES ABSOLUTE: 0.4 K/UL (ref 1–5.1)
LYMPHOCYTES RELATIVE PERCENT: 8.5 %
MAGNESIUM: 1.7 MG/DL (ref 1.8–2.4)
MCH RBC QN AUTO: 30.2 PG (ref 26–34)
MCHC RBC AUTO-ENTMCNC: 33.8 G/DL (ref 31–36)
MCV RBC AUTO: 89.4 FL (ref 80–100)
MONOCYTES ABSOLUTE: 0.3 K/UL (ref 0–1.3)
MONOCYTES RELATIVE PERCENT: 5.5 %
NEUTROPHILS ABSOLUTE: 4.3 K/UL (ref 1.7–7.7)
NEUTROPHILS RELATIVE PERCENT: 82.3 %
PDW BLD-RTO: 15.3 % (ref 12.4–15.4)
PERFORMED ON: ABNORMAL
PERFORMED ON: ABNORMAL
PERFORMED ON: NORMAL
PERFORMED ON: NORMAL
PHOSPHORUS: 3.1 MG/DL (ref 2.5–4.9)
PLATELET # BLD: 85 K/UL (ref 135–450)
PMV BLD AUTO: 9.1 FL (ref 5–10.5)
POTASSIUM SERPL-SCNC: 4.7 MMOL/L (ref 3.5–5.1)
PROTHROMBIN TIME: 13.7 SEC (ref 9.8–13)
RBC # BLD: 2.51 M/UL (ref 4.2–5.9)
SODIUM BLD-SCNC: 139 MMOL/L (ref 136–145)
WBC # BLD: 5.2 K/UL (ref 4–11)

## 2019-04-04 PROCEDURE — 6370000000 HC RX 637 (ALT 250 FOR IP): Performed by: STUDENT IN AN ORGANIZED HEALTH CARE EDUCATION/TRAINING PROGRAM

## 2019-04-04 PROCEDURE — 6360000002 HC RX W HCPCS: Performed by: STUDENT IN AN ORGANIZED HEALTH CARE EDUCATION/TRAINING PROGRAM

## 2019-04-04 PROCEDURE — 2580000003 HC RX 258: Performed by: STUDENT IN AN ORGANIZED HEALTH CARE EDUCATION/TRAINING PROGRAM

## 2019-04-04 PROCEDURE — 80069 RENAL FUNCTION PANEL: CPT

## 2019-04-04 PROCEDURE — 97116 GAIT TRAINING THERAPY: CPT

## 2019-04-04 PROCEDURE — 97166 OT EVAL MOD COMPLEX 45 MIN: CPT

## 2019-04-04 PROCEDURE — 92526 ORAL FUNCTION THERAPY: CPT

## 2019-04-04 PROCEDURE — 97535 SELF CARE MNGMENT TRAINING: CPT

## 2019-04-04 PROCEDURE — 2500000003 HC RX 250 WO HCPCS: Performed by: STUDENT IN AN ORGANIZED HEALTH CARE EDUCATION/TRAINING PROGRAM

## 2019-04-04 PROCEDURE — 2060000000 HC ICU INTERMEDIATE R&B

## 2019-04-04 PROCEDURE — C9113 INJ PANTOPRAZOLE SODIUM, VIA: HCPCS | Performed by: STUDENT IN AN ORGANIZED HEALTH CARE EDUCATION/TRAINING PROGRAM

## 2019-04-04 PROCEDURE — 97162 PT EVAL MOD COMPLEX 30 MIN: CPT

## 2019-04-04 PROCEDURE — 85610 PROTHROMBIN TIME: CPT

## 2019-04-04 PROCEDURE — 97530 THERAPEUTIC ACTIVITIES: CPT

## 2019-04-04 PROCEDURE — 83735 ASSAY OF MAGNESIUM: CPT

## 2019-04-04 PROCEDURE — 85025 COMPLETE CBC W/AUTO DIFF WBC: CPT

## 2019-04-04 RX ORDER — CARVEDILOL 6.25 MG/1
6.25 TABLET ORAL 2 TIMES DAILY WITH MEALS
Status: DISCONTINUED | OUTPATIENT
Start: 2019-04-04 | End: 2019-04-07 | Stop reason: HOSPADM

## 2019-04-04 RX ORDER — MAGNESIUM SULFATE 1 G/100ML
1 INJECTION INTRAVENOUS ONCE
Status: COMPLETED | OUTPATIENT
Start: 2019-04-04 | End: 2019-04-04

## 2019-04-04 RX ADMIN — Medication 10 ML: at 08:31

## 2019-04-04 RX ADMIN — PANTOPRAZOLE SODIUM 40 MG: 40 INJECTION, POWDER, FOR SOLUTION INTRAVENOUS at 20:51

## 2019-04-04 RX ADMIN — INSULIN LISPRO 1 UNITS: 100 INJECTION, SOLUTION INTRAVENOUS; SUBCUTANEOUS at 20:57

## 2019-04-04 RX ADMIN — DEXTROSE MONOHYDRATE AND SODIUM CHLORIDE: 5; .45 INJECTION, SOLUTION INTRAVENOUS at 13:10

## 2019-04-04 RX ADMIN — LEVOTHYROXINE SODIUM ANHYDROUS 37.5 MCG: 100 INJECTION, POWDER, LYOPHILIZED, FOR SOLUTION INTRAVENOUS at 08:30

## 2019-04-04 RX ADMIN — MUPIROCIN: 20 OINTMENT TOPICAL at 08:34

## 2019-04-04 RX ADMIN — MAGNESIUM SULFATE HEPTAHYDRATE 1 G: 1 INJECTION, SOLUTION INTRAVENOUS at 08:35

## 2019-04-04 RX ADMIN — Medication 10 ML: at 20:52

## 2019-04-04 RX ADMIN — PANTOPRAZOLE SODIUM 40 MG: 40 INJECTION, POWDER, FOR SOLUTION INTRAVENOUS at 08:31

## 2019-04-04 RX ADMIN — SODIUM CHLORIDE, PRESERVATIVE FREE 5 ML: 5 INJECTION INTRAVENOUS at 08:33

## 2019-04-04 ASSESSMENT — PAIN SCALES - GENERAL
PAINLEVEL_OUTOF10: 0

## 2019-04-04 NOTE — PROGRESS NOTES
Internal Medicine PGY-1 Resident Progress Note        PCP: Omer Phelps MD    Date of Admission: 3/26/2019    Chief Complaint: Abnormal Labs     Subjective: NAEO. Sleeping comfortably this AM, denies any CP or SOB. Medications:  Reviewed    Infusion Medications    dextrose 5 % and 0.45 % NaCl 50 mL/hr at 04/03/19 1737    dextrose       Scheduled Medications    insulin lispro  0-6 Units Subcutaneous TID WC    insulin lispro  0-3 Units Subcutaneous Nightly    tamsulosin  0.4 mg Oral Dinner    pantoprazole  40 mg Intravenous BID    mupirocin   Topical Daily    levothyroxine  37.5 mcg Intravenous Daily    And    sodium chloride (PF)  5 mL Intravenous Daily    sodium chloride flush  10 mL Intravenous 2 times per day     PRN Meds: acetaminophen, glucose, dextrose, glucagon (rDNA), dextrose      Intake/Output Summary (Last 24 hours) at 4/4/2019 1015  Last data filed at 4/4/2019 0829  Gross per 24 hour   Intake 2255.64 ml   Output 1575 ml   Net 680.64 ml       Physical Exam Performed:    BP (!) 156/75   Pulse 72   Temp 97.3 °F (36.3 °C) (Temporal)   Resp 18   Ht 5' 5\" (1.651 m)   Wt 121 lb 7.6 oz (55.1 kg)   SpO2 100%   BMI 20.21 kg/m²     General appearance: Thin appearing AA M, No apparent distress, appears stated age and cooperative. HEENT: Pupils equal, round, and reactive to light. Conjunctivae/corneas clear. Neck: Supple, with full range of motion. No jugular venous distention. Trachea midline. Respiratory:  Normal respiratory effort. Decreased lung sounds but grossly clear to auscultation, bilaterally without Rales/Wheezes/Rhonchi. Cardiovascular: Regular rate and rhythm with normal S1/S2 without murmurs, rubs or gallops. Abdomen: Soft, non-tender, non-distended with normal bowel sounds. Musculoskeletal: No clubbing, cyanosis or edema bilaterally. Full range of motion without deformity. Skin: Skin color, texture, turgor normal.  No rashes or lesions.   Neurologic: Neurovascularly intact without any focal sensory/motor deficits. Decreased strength on R side, Cranial nerves: II-XII intact, grossly non-focal.  Psychiatric: Alert and oriented x 3, thought content appropriate, normal insight  Capillary Refill: Brisk,< 3 seconds   Peripheral Pulses: +2 palpable, equal bilaterally       Labs:   Recent Labs     04/02/19 0431 04/03/19 0359 04/04/19 0450   WBC 4.9 5.5 5.2   HGB 7.9* 7.2* 7.6*   HCT 23.1* 21.4* 22.4*   PLT 78* 74* 85*     Recent Labs     04/02/19 0431 04/03/19 0359 04/04/19 0450    139 139   K 3.6 4.4 4.7    111* 113*   CO2 17* 18* 19*   BUN 28* 24* 23*   CREATININE 2.3* 2.3* 2.4*   CALCIUM 7.5* 7.4* 7.8*   PHOS 3.8 3.3 3.1       Recent Labs     04/02/19 0431 04/04/19 0450   INR 1.13 1.20*     Urinalysis:      Lab Results   Component Value Date    NITRU Negative 03/26/2019    WBCUA 6-10 03/26/2019    BACTERIA 2+ 03/26/2019    RBCUA 0-2 03/26/2019    BLOODU Negative 03/26/2019    SPECGRAV 1.025 03/26/2019    GLUCOSEU Negative 03/26/2019       Radiology:  XR ABDOMEN (KUB) (SINGLE AP VIEW)   Final Result      NG tube appears to project in the fundus of the stomach. FL MODIFIED BARIUM SWALLOW W VIDEO   Final Result      Silent aspiration with honey, puree and thin consistencies. No definite penetration or aspiration with nectar. XR CHEST PORTABLE   Final Result   Impression: Patchy infiltrate of the right lung base is increased in the interval. Stable blunting of the right costophrenic angle. XR CHEST PORTABLE   Final Result     Right IJ central line in the SVC. No pneumothorax. XR ABDOMEN (KUB) (SINGLE AP VIEW)   Final Result      Feeding tube with tip possibly within the right mainstem bronchus. The tube should be removed and replaced      Findings called as a critical result to the floor by Dr. Brianne Kelley at the time of the dictation 9:33 PM 3/26/2019. CT Head WO Contrast   Final Result      1.  No acute stroke, mass, or hemorrhage. 2. Remote infarcts in the bilateral cerebellar and cerebral hemispheres as above. 3. Moderate chronic small vessel ischemic white matter disease. 4. Mild diffuse atrophy. 5. Moderate right mastoid effusion. XR CHEST PORTABLE   Final Result      Right lower lung opacity present previously improved likely relates to improvement of the atelectasis and/or pneumonitis. Right lower lobe bronchiectasis present previously. Assessment/Plan:    Caitlyn Davis is a 66 y.o. male PMH ischemic MCA stroke, CKD, FTT, presents from NH with electrolyte imbalances, found to be hypothermic, hypernatremic, hyperkalemic with SANDY on CKD. Hypotension. Concern for sepsis, volume depletion secondary to poor oral intake. Active Hospital Problems    Diagnosis Date Noted    Rectal mass [K62.9]     Rectal bleeding [K62.5]     Severe malnutrition ([facility] [address] [E43] 03/27/2019    Acquired hypothyroidism [E03.9] 03/27/2019    Altered mental status [R41.82]     Oropharyngeal dysphagia [R13.12]     Weakness [R53.1]     SOB (shortness of breath) [R06.02]     Goals of care, counseling/discussion [Z71.89]     DNR (do not resuscitate) discussion [Z71.89]     Encounter for palliative care [Z51.5]     possible sepsis [A41.9] 03/26/2019    Hypothermia [T68. XXXA] 03/26/2019    SANDY (acute kidney injury) ([facility] [address] [N17.9] 47/27/6498    Metabolic acidosis [H76.9] 03/26/2019    Acute metabolic encephalopathy [M54.28] 03/26/2019    Acute renal failure (HCC) [N17.9]         Anemia (POA)  Likely 2/2 hypovolemia and hypothermia 2/2 dec PO intake, FOBT+ w/ decreased hemoglobin, EGD and colonoscopy revealed gastric ulcers, duodenitis, neg tissue biopsy on EUA w/ colorectal for adenoma    -GI consulted-PEG tomorrow   -Colorectal surgery consulted   -cont D51/2 NS @ 50  -Protonix BID   -H/H daily     Paroxysmal Afib   NSR  -holding Heparin drip in setting of FOBT+  - restart Coreg    HTN  -hold Lisinopril and

## 2019-04-04 NOTE — PLAN OF CARE
Problem: Risk for Impaired Skin Integrity  Goal: Tissue integrity - skin and mucous membranes  Description  Structural intactness and normal physiological function of skin and  mucous membranes. Note:   Patient is free from skin breakdown this shift. Skin is kept C/D/I. Will continue to promote good tissue integrity. Problem: Falls - Risk of:  Goal: Will remain free from falls  Description  Will remain free from falls  4/4/2019 1542 by Allison William RN  Note:   Patient is free from falls this shift. Will continue to promote safety. 4/4/2019 0634 by Alejandra Paz RN  Outcome: Ongoing  Note:   Patient remained free from falls overnight. Made no attempts to get out of bed overnight. Bed alarm remains active, bed in low/locked position, non-skid footwear in place, and call light in reach. Will continue fall risk protocol. Alejandra Paz 4/4/2019      Problem: Nutrition  Goal: Optimal nutrition therapy  Note:   Patient is schedule to get PEG tube placed tomorrow for nutritional therapy. Patient has signed consent & has orders for NPO after midnight. Problem: Nutritional:  Goal: Consumption of food in small portions  Description  Consumption of food in small portions  4/4/2019 0634 by Alejandra Paz RN  Outcome: Ongoing  Note:   Patient refused dinner, Glucose 69 at 2157. Insulin held and MD made aware. Patient given juice and urged to have some of dinner tray, patient drank some of juice but would not eat anything. Glucose 87 at 0047. Will continue to monitor.     Alejandra Paz 4/4/2019

## 2019-04-04 NOTE — PROGRESS NOTES
Occupational Therapy   Occupational Therapy Initial Assessment and Treatment  Date: 2019   Patient Name: Vimal Walker  MRN: [id number]     : 1945    Date of Service: 2019    Discharge Recommendations: Vimal Walker scored a 15/24 on the AM-PAC ADL Inpatient form. Current research shows that an AM-PAC score of 17 or less is typically not associated with a discharge to the patient's home setting. Based on the patients AM-PAC score and their current ADL deficits, it is recommended that the patient have 3-5 sessions per week of Occupational Therapy at d/c to increase the patients independence. Assessment: Pt's functional independence appears below baseline; though it is difficult to formally assess as pt is somewhat poor historian. Pt admitted from [address] [place] [address] at [facility], where reports he is able to toilet himself 90% of the time and receives assist for remainder of ADLs. Pt is actually moving quite well considering events of this admission. Will follow while here to maximize functional independence. OT Equipment Recommendations  Equipment Needed: No    Assessment   Performance deficits / Impairments: Decreased functional mobility ; Decreased ADL status; Decreased strength;Decreased balance;Decreased endurance    Treatment Diagnosis: Impaired ADLs, transfers, and mobility    Patient Education: Role of OT, activity promotion- pt verb understanding    REQUIRES OT FOLLOW UP: Yes    Activity Tolerance: Patient Tolerated treatment well;Patient limited by fatigue    Safety Devices  Safety Devices in place: Yes  Type of devices: Left in chair;Call light within reach; Chair alarm in place;Nurse notified(assist level on board- A x1 with TAYA FONTANEZC for toileting)         Treatment Diagnosis: Impaired ADLs, transfers, and mobility      Restrictions  Position Activity Restriction  Other position/activity restrictions: up with assist    Subjective   General  Chart Reviewed:  Yes    Additional Pertinent Hx: 66 y.o. M Independent;Setup  Toileting: Moderate assistance(with setup, pt able to partially assist with leander care, assist for thoroughness; link)    Bed mobility  Supine to Sit: Minimal assistance(HOB up, use of rail. Cues for sequencing.)    Transfers  Sit to stand: Minimal assistance(from eob and chair, cues for hand placement)  Stand to sit: Minimal assistance(cues to reach  back for chair)    Cognition  Cognition Comment: Overall WFL, difficult to understand at times. LUE AROM (degrees)  LUE AROM : WFL  RUE AROM (degrees)  RUE AROM : WFL           Plan   Plan  Times per week: 2-5x  Times per day: Daily  If patient discharges prior to next treatment, this note will serve as discharge summary. Will continue per POC if patient does not discharge. AM-PAC Score        AM-PAC Inpatient Daily Activity Raw Score: 15  AM-PAC Inpatient ADL T-Scale Score : 34.69  ADL Inpatient CMS 0-100% Score: 56.46  ADL Inpatient CMS G-Code Modifier : CK    Goals  Short term goals  Time Frame for Short term goals: By d/c   Short term goal 1: Toilet transfer with SBA   Short term goal 2: Standing x3 mins with CGA during ADLs and/or mobility  Short term goal 3: Chair push ups x10 reps to increase overall activity tolerance    Patient Goals   Patient goals :  To get around better        Therapy Time   Individual Concurrent Group Co-treatment   Time In 0935         Time Out 1015         Minutes 40         Timed Code Treatment Minutes:   25    Total Treatment Minutes:  Courtney 54 Kaity Messer

## 2019-04-04 NOTE — PROGRESS NOTES
fracture ([address] HTN (hypertension), Hyperkalemia, Hyperlipidemia, Hypertension, PAF (paroxysmal atrial fibrillation) (HCC), Paroxysmal A-fib ([address] PEA (Pulseless electrical activity) ([address] Pneumonia, Pneumonia due to organism, Pneumonia of right lower lobe due to Streptococcus pneumoniae ([address] Protein-calorie malnutrition, severe ([address] S/P hip hemiarthroplasty, Septic shock ([address] Systolic CHF ([address] Thrombocytopenia ([address] Thrombocytopenia ([address] and Type 2 diabetes mellitus with hyperglycemia ([address] has a past surgical history that includes Gastrostomy tube placement; joint replacement; gastrostomy tube change (N/A, 12/14/2018); Colonoscopy (N/A, 4/1/2019); Upper gastrointestinal endoscopy (N/A, 4/1/2019); and Anus surgery (N/A, 4/2/2019). Restrictions  Position Activity Restriction  Other position/activity restrictions: up with assist     Vision/Hearing  Vision: Within Functional Limits  Hearing: Within functional limits       Subjective  General  Chart Reviewed: Yes  Additional Pertinent Hx: 65 yo admitted to ICU 3/26/19 for sepsis; txfer to floor 4/3/19. 4/1 EGD/colonoscopy positive for severe gastritis/ulcers; PEG planned 4/5/19. Pmhx:  DM, THR, CHF, afib, ARF. Family / Caregiver Present: No  Diagnosis: sepsis  Follows Commands: Within Functional Limits  Subjective  Subjective: Pt found supine in bed and agreeable to PT. \" I tried to get up earlier this week and they wouldn't let me.  \"  Pain Screening  Patient Currently in Pain: Denies          Orientation  Orientation  Overall Orientation Status: Impaired(oriented to \"hospital\")  Orientation Level: Oriented to person     Social/Functional History  Social/Functional History  Type of Home: Facility([facility] since Sept [facility])  ADL Assistance: (assist with dressing/bathing)  Homemaking Assistance: (pt did not perform)  Ambulation Assistance: Needs assistance  Transfer Assistance: Needs assistance  Active : No  Occupation: Retired  Additional Comments: Pt is questionable historian but reports he could toilet self approx 90% of time utilizing rolling walker; he had assist for dressing/bathing.         Objective          AROM RLE (degrees)  RLE AROM: WNL  AROM LLE (degrees)  LLE AROM : WNL     Strength RLE  Strength RLE: WFL  Strength LLE  Strength LLE: WFL        Bed mobility  Supine to Sit: Minimal assistance(HOB up and use of rail; slow and effortful; vc to initiate)     Transfers  Sit to Stand: Minimal Assistance(x2 trials with vc for hand placement; slow and effortful)  Stand to sit: Minimal Assistance(x2 trials with vc for hand placement)     Ambulation  Device: Rolling Walker  Assistance: Minimal assistance  Quality of Gait: forward posture with slow yajaira and narrow DENNIS; slight LOB posterior and pt fatigued quickly  Distance: 4 steps to chair; 3 steps fwd/backward              Plan   Plan  Times per week: 2-5  Current Treatment Recommendations: Functional Mobility Training, Gait Training, Endurance Training, Transfer Training  Safety Devices  Type of devices: Nurse notified, Call light within reach, Chair alarm in place, Left in chair(pt reclined)                          AM-PAC Score  AM-PAC Inpatient Mobility Raw Score : 16  AM-PAC Inpatient T-Scale Score : 40.78  Mobility Inpatient CMS 0-100% Score: 54.16  Mobility Inpatient CMS G-Code Modifier : CK          Goals  Short term goals  Time Frame for Short term goals: discharge  Short term goal 1: sit to/from supine supervision  Short term goal 2: sit to/from stand supervision  Short term goal 3: ambulate 50 ft with rolling walker supervision  Patient Goals   Patient goals : eventual return home       Therapy Time   Individual Concurrent Group Co-treatment   Time In 0935         Time Out 1015         Minutes 40           Timed Code Treatment Minutes:30       Total Treatment Minutes:  40    This note will serve as a discharge summary if patient is discharged from hospital before next treatment session.        Pau Demarco, PT

## 2019-04-04 NOTE — PROGRESS NOTES
GI Progress Note    Caitlyn Davis is a 66 y.o. male patient. 1. Hypothermia, initial encounter    2. Acute renal failure, unspecified acute renal failure type ([facility]    3. Sepsis, due to unspecified organism ([facility]    4.      malnutrition    Admit Date: 3/26/2019    Subjective:       Can not swallow regular food.   Not eating even thickened clear liquid  On iv protonix for ulcers  Renal status as per Dr Eddi Garcia        Scheduled Meds:   insulin lispro  0-6 Units Subcutaneous TID WC    insulin lispro  0-3 Units Subcutaneous Nightly    [START ON 4/5/2019] ampicillin-sulbactam  3 g Intravenous On Call    carvedilol  6.25 mg Oral BID WC    tamsulosin  0.4 mg Oral Dinner    pantoprazole  40 mg Intravenous BID    mupirocin   Topical Daily    levothyroxine  37.5 mcg Intravenous Daily    And    sodium chloride (PF)  5 mL Intravenous Daily    sodium chloride flush  10 mL Intravenous 2 times per day       Continuous Infusions:   dextrose 5 % and 0.45 % NaCl 50 mL/hr at 04/04/19 1310    dextrose         PRN Meds:  acetaminophen, glucose, dextrose, glucagon (rDNA), dextrose      Objective:       Patient Vitals for the past 24 hrs:   BP Temp Temp src Pulse Resp SpO2 Weight   04/04/19 1714 -- -- -- 80 -- -- --   04/04/19 1540 131/74 97.6 °F (36.4 °C) Oral 76 18 99 % --   04/04/19 1522 -- -- -- 63 -- -- --   04/04/19 1211 (!) 149/82 97 °F (36.1 °C) Temporal 71 18 100 % --   04/04/19 1055 -- -- -- 60 -- -- --   04/04/19 0829 (!) 156/75 97.3 °F (36.3 °C) Temporal 72 18 100 % --   04/04/19 0600 -- -- -- -- -- -- 121 lb 7.6 oz (55.1 kg)   04/04/19 0445 (!) 166/91 96.3 °F (35.7 °C) Temporal 75 18 100 % --   04/04/19 0047 (!) 149/80 96.8 °F (36 °C) Oral 80 18 100 % --   04/03/19 2330 -- 97.2 °F (36.2 °C) Oral -- -- -- --   04/03/19 2036 132/78 97.4 °F (36.3 °C) Oral 76 18 98 % --       Exam:    VITALS:  /74   Pulse 80   Temp 97.6 °F (36.4 °C) (Oral)   Resp 18   Ht 5' 5\" (1.651 m)   Wt 121 lb 7.6 oz (55.1 kg)   SpO2 99%   BMI 20.21 kg/m²   TEMPERATURE:  Current - Temp: 97.6 °F (36.4 °C); Max - Temp  Av.1 °F (36.2 °C)  Min: 96.3 °F (35.7 °C)  Max: 97.6 °F (36.4 °C)    NAD  General appearance: alert, appears stated age, cooperative and no distress  Head: Normocephalic, without obvious abnormality, atraumatic  Neck: supple, symmetrical, trachea midline and thyroid not enlarged, symmetric, no tenderness/mass/nodules  CVS:  RRR, Nl s1s2  Lungs CTA Bilaterally, normal effort  Abdomen: soft, non-tender; bowel sounds normal; no masses,  no organomegaly  AAOx3, No asterixis or encephalopathy  Extremities: No edema. Lab Data:  Recent Labs     19   WBC 4.9 5.5 5.2   HGB 7.9* 7.2* 7.6*   HCT 23.1* 21.4* 22.4*   MCV 89.3 90.4 89.4   PLT 78* 74* 85*     Recent Labs     19    139 139   K 3.6 4.4 4.7    111* 113*   CO2 17* 18* 19*   PHOS 3.8 3.3 3.1   BUN 28* 24* 23*   CREATININE 2.3* 2.3* 2.4*     No results for input(s): AST, ALT, ALB, BILIDIR, BILITOT, ALKPHOS in the last 72 hours. No results for input(s): LIPASE, AMYLASE in the last 72 hours. Recent Labs     19   INR 1.13 1.20*     No results for input(s): PTT in the last 72 hours. No results for input(s): OCCULTBLD in the last 72 hours. Assessment:       Principal Problem:    Hypothermia  Active Problems:    Acute renal failure (HCC)    possible sepsis    SANDY (acute kidney injury) (HCC)    Metabolic acidosis    Acute metabolic encephalopathy    Altered mental status    Oropharyngeal dysphagia    Weakness    SOB (shortness of breath)    Goals of care, counseling/discussion    DNR (do not resuscitate) discussion    Encounter for palliative care    Severe malnutrition ([address]    Acquired hypothyroidism    Rectal mass    Rectal bleeding  Resolved Problems:    * No resolved hospital problems. *    Malnutrition  Anemia  Thrombocytopenia.  Platelet count is adequate to do peg    Recommendations:       egd and peg in am    Juan Bajwa MD  4/4/2019  5:37 PM

## 2019-04-04 NOTE — CARE COORDINATION
Case Management Daily Note:    Current Plan of Care: Patient to get PEG tube on 4/5 due to dysphagia    PT AM-PAC: 15/24  Per last eval on: 4-4-19    OT AM-PAC: 16/24 Per last eval on:4-4-19    DME needs: none      Discharge Plan: Back to [facility]    Tentative Discharge Date: TBD    Current Barriers to Discharge: medical instability    Resources/Information given: no    Case Management Notes: Patient is from [address] [place] [address] (resident there since 2011). Patient presented with dehydration and hypotension. Patient lost 70 pounds in approximate ly 3 mos. Duration. Patient stated that he did not feel like eating or drinking liquids much. Patient was hypothermic in the ED. Electrolyte abnormality. Dysphagia and aspiration risk throughout this hospital stay. Having PEG tomorrow and then, CM will follow for discharge needs.  Electronically signed by Brown Kuhn RN on 4/4/2019 at 3:51 PM

## 2019-04-04 NOTE — PROGRESS NOTES
Speech Language Pathology  [facility]  Dysphagia Daily Treatment Note- late entry from ~ 12:00    NAME: Deena Miller  : 1945  MRN: [id number]    Patient Diagnosis(es):   Patient Active Problem List    Diagnosis Date Noted    Rectal mass     Rectal bleeding     Severe malnutrition ([address] 2019    Acquired hypothyroidism 2019    Altered mental status     Oropharyngeal dysphagia     Weakness     SOB (shortness of breath)     Goals of care, counseling/discussion     DNR (do not resuscitate) discussion     Encounter for palliative care     possible sepsis 2019    Hypothermia 2019    SANDY (acute kidney injury) ([address]     Metabolic acidosis     Acute metabolic encephalopathy     Hypoglycemia, exogenous insulin, in setting of SANDY 2019    Vertebral artery stenosis, right 2019    SANDY (acute kidney injury) ([address] 2019    Normocytic anemia 2019    Severe protein-calorie malnutrition ([address] 2019    S/P percutaneous endoscopic gastrostomy (PEG) tube placement ([address] 2019    Right to left cardiac shunt ([address] 2019    H/O ischemic multifocal multiple vascular territories stroke 2019    Anticoagulated 2019    Acute ischemic right MCA stroke ([address] 2019    S/P hip hemiarthroplasty 2017    Uncontrolled type 2 diabetes mellitus with hyperglycemia, with long-term current use of insulin ([address] 10/17/2017    Paroxysmal A-fib ([address]     Coronary artery disease involving native coronary artery of native heart without angina pectoris     Cardiomyopathy ([address]     Acute renal failure ([address]     Noncompliance with medication regimen 10/17/2016    Type 2 diabetes mellitus ([address] 2016    Essential hypertension 2016    Vitamin B deficiency 2012    Atherosclerosis of coronary artery 2011    Long term current use of insulin ([address] 2011    Vitamin D deficiency 09/17/2010    Angiopathy, peripheral ([facility] 09/17/2010    Gonadotropin deficiency ([facility] [address] 09/17/2010    Dyslipidemia 02/12/2010    Carotid artery narrowing 02/12/2010     Allergies: No Known Allergies    Type of Study: Repeat MBS  Recent Chest Xray 3/27/19      Right IJ central line in the SVC.  No pneumothorax.       Final Result       Right lower lung opacity present previously improved likely relates to improvement of the atelectasis and/or pneumonitis.       Right lower lobe bronchiectasis present previously.             CT of head 3/27/19      1. No acute stroke, mass, or hemorrhage. 2. Remote infarcts in the bilateral cerebellar and cerebral hemispheres as above. 3. Moderate chronic small vessel ischemic white matter disease. 4. Mild diffuse atrophy. 5. Moderate right mastoid effusion.         Prior dysphagia history:  Previous MBS (#2) 1/18/19  Oral Phase: Mild-moderate oral phase deficits. Pt edentulous and reported that dentures currently at nursing home when questioned and reported \"I can chew anything with or without them\".  Pt [place] [facility] for pureed with fairly timely oral phase with no oral residue.  With cracker trial, pt with slow  A-P tranport and prolonged mastication with minimal residue on the left due to reduced left lingual strength and ROM.     Pharyngeal phase:  Pt with moderate pharyngeal deficits characterized by delay in swallow initiation with most po trials with spillover into pyriform sinus prior to swallow initiation with most po trials.  Minimal to no pharyngeal residue.  Due to reduced laryngeal excursion and elevation, pt with silent aspiration during the swallow with nectar via cup and aspiration with delayed cough response with nectar via cup with chin tuck.  Chin tuck did not eliminate aspiration.  No aspiration/penetration with puree x2 trials, honey thick liquids via cup X2 or spoon or with nectar via spoon (only one trial given).  Did not attempt thins due to deficits with nectar.      Previous MBS #1- 4/21/17  Oral Phase:   1. Moderate-severe oral stage dysphagia characterized by suspected decreased mastication and decreased lingual manipulation/control.    2. Prolonged bolus prep and oral transit with lingual mashing.    3. Reduced bolus control with premature bolus loss to the pharynx with all trials.    4. Lingual residue with all trials is cleared with cued repeat swallow. Pharyngeal:   1. Severe pharyngeal stage dysphagia characterized by delayed swallow, decreased laryngeal elevation, and reduced pharyngeal peristalsis.    2. Premature spillage to the valleculae with all trials and to the pyriform with nectar and thin trials.    3. Intermittent, flash penetration with nectar and honey trials.    4. Deep penetration with thin; residue lining laryngeal vestibule evident post-penetration of thin.    5. Vallecular and pyriform residue (vallecular > pyriform) with all trials.    6. Clears liquid residue with cued repeat swallow.    7. Unable to clear puree residue using repeat swallows and/or liquid wash.    8. Patient at severe risk for aspiration after the swallow of observed residues during study. Upper Esophageal Screen:   1. No apparent upper esophageal involvements per screening  Dysphagia Outcome Severity Scale: Level 2: Moderate Severe dysphagia- Maximum assistance or maximum use of strategies with partial PO only     MBS results 2/28/19:  Pt presents with moderate oral and pharyngeal phase dysphagia  Oral- pt with poor A-P transport with all consistencies. Mastication with cracker was prolonged. pt unable to draw honey thick barium up the straw. Pharyngeal- pt presents with incomplete epiglottal closure which resulted in deep penetration of nectar thick barium without spontaneous clearing. Nectar thick barium would have been aspirated it pt was not instructed to cough and clear throat several times.  Pt also silently aspirated thin barium during the swallow.  Did not attempt chin tuck as question pt's ability to recall and perform independently as well as this strategy was not beneficial in the past. Pt with no aspiration or penetration with pureed, cracker or honey thick liquids by cup. Pt with minimal residue remaining in the valleculae and pyriform after the swallow with honey thick liquids and to a slightly greater degree with pureed and cracker. Pt able to clear residue when instructed to dry swallow.         [facility] 3/28/19:  Impressions:  Pt exhibits slow propulsion of bolus posteriorly in oral cavity. Lingual residue of cracker noted after study completed. Pt with reduced tongue base strength and reduced laryngeal elevation resulting in mod amount of residue in valleculae and pyriform sinuses with puree and honey thick liquid textures. This then resulted in spillover into airway with deep penetration and eventual SILENT aspiration. Pt did not clear residue spontaneously, no extra swallows were noted. Pt asked if he felt anything, which he denied. Pt then asked to swallow again, which did not clear residue. Pt then presented with nectar thick liquid trials via cup/straw/tsp, where no penetration or aspiration was identified. Thin liquids was presented via tsp and cup, where deep penetration occurred during the swallow, without spontaneous clearing and no cough reflex. In summary, nectar thick liquids were the only texture that pt did not penetrate or aspirate. Would trial nectar thick clear liquid diet with close monitoring for s/s of aspiration. Pending pt wishes for quality of life, will follow up for diet advancement. Solid texture poses a risk due to pt being edentulous, oral residue noted after study as well as pt report of choking on turkey sandwich prior to admit.     Chart reviewed.     Medical Diagnosis: sepsis  Treatment Diagnosis: dysphagia     Pain: None indicated    Current Diet : Nectar thick / clear liquids    Treatment:  Pt seen bedside to address the following goals:  1-The patient will tolerate recommended diet without observed clinical signs of aspiration  3/27: ALEN Bell notified SLP pt having difficulty with lunch. Sameer Martinez pt briefly with lunch, pt holding throat and sounding wet/congested.  D/w RN and will make pt NPO this date and follow up next session for repeat BS assessment and follow up with repeat MBS as appropriate. 3/28: pt analyzed with portion of lunch. Pt consuming nectar thick clears with no immediate coughing or throat clearing, voice remained clear. After finished with tray, occasional mild cough noted. Cont goal  3/29: Pt only agreeable to x1 trial of nectar thick liquids which results in delayed cough. RN reports lungs are okay but pt continued with productive cough and has very poor PO intake. Daughter present and endorses pt was eating \"nothing\" at nursing home. Continue goal.   4/3 - Pt has been unable to be seen last few days secondary to NPO status for procedures. Pt asleep in bed with tray in front of him upon arrival. RN reports breath sounds as diminished only. Pt with increased appetite this date and reports enjoyment from PO. Patient tolerating nectar thick liquids via tsp and cup without overt s/s of aspiration. Pt scheduled for PEG tomorrow due to severe oropharyngeal dysphagia and limited PO intake. Continue goal.   4/4: pt sitting up in chair with lunch tray, consisting of many different nectar thick liquid options. Pt states there are too many items and he doesn't like to waste them. Pt took min amount of nectar thick trials via tsp/cup, with no overt signs of aspiration. No respiratory decline has been noted per chart review.   Pt scheduled for PEG placement 4/5 per GI note  Goal met    2- The patient Anali Buck will verbalize/demonstrate understanding of dysphagia recommendations  3/27: Educated pt to purpose of visit, s/s of aspiration, concern if aspiration occurs, rationale for diet recommendation/strategies to reduce risk for aspiration and instruction to notify staff if any signs emerge. Pt stated understanding. Cont goal  3/28: pt educated to results of MBS, rationale for diet recommendations and strategies to improve safety of swallow. Explained cont risk for aspiration and pt cont to state he would not want a feeding tube. Pt will benefit from cont education  Cont goal  3/28:  Pt /family member educated again to results of MBS study. Explained high risk for aspiration of PO, however nectar thick liquids were consumed without aspiration. Also educated to strategies to aide in increasing safety of PO. Discussed cont this nectar clear liquid diet vs advancing for quality of life,  however this would increase risk for aspiration due to residue that was noted on MBS. Pt did not state his wishes regarding this, however did reiterate that he would not want a feeding tube. Pt / family will benefit from cont education  Cont goal  3/29: Patient's daughter present and reports she was not present for results of MBS study. Explained risk of aspiration with PO but no aspiration with nectars per MBS. Educated on importance of oral care in reducing risk for aspiration pneumonia. Educated on recommendation for nectar thick liquids only at this time to reduced risk of aspiration and choices would be for alternative means or diet advancement for quality of life. Patient was not able to verbalize any information educated on or indicate desire/aversion for feeding tube as previously documented. Daughter indicated she would want feeding tube placed for nourishment and reducing aspiration. Educated that tube placement would not eliminate possibility of aspiration. Per chart review, there is a man listed as the patients legal guardian. Pt / family would benefit from ongoing education. Continue goal.    4/3: No family present.  Patient unable to recall being seen by SLP, results of MBS (indicating no dysphagia and no history of ever having trouble swallowing), endorses PEG placement tomorrow but when asked where it is placed the patient is not able to recall. Did recall after delay that indication is due to trouble swallowing. Pt able to indicate that consequence of aspiration is pneumonia. Educated on importance of adequate oral care in deceasing risk of pneumonia. Intermittent confusion re: dysphagia as described. Reviewed compensatory strategies with patient. Continue goal.    4/4: attempted to educate pt again to options for PO - cont nectar thick liquids vs advancing to increased textures for quality of life. Pt did not respond to questions asked. Discussed pt getting PEG tube tomorrow, pt states yes he is. Question pt full comprehension, no family present  Cont goal    3- Pt will participate in [facility]  3/28: goal met     4- pt will participate in swallowing exercises with mod cues  3/28: not addressed at this time  Cont goal  3/29: Goal not targeted this session. Continue goal.   4/3: Effortful swallows completed x10 using with moistened spoon, max encouragement and significantly extra time, Lingual retraction against resistance x10, sustained ~3 seconds each. Continue goal.   4/4: not addressed at this time, pt with meal tray and participating minimally. Cont goal    Patient/Family/Caregiver Education:  No family present. Education documented above. Compensatory Strategies:  Supervision: 1:1  Compensatory Swallowing Strategies:   Small bites/sips  Assist feed;  Upright as possible for all oral intake  Remain upright for 30-45 minutes after meals      Plan:  Continued daily Dysphagia treatment with goals per plan of care.   Diet recommendations: Nectar thick clear liquids   Consider advance of solids (to puree or D2)  for QOL, pending pt/family wishes  DC recommendation: pt will most likely benefit from ongoing treatment upon dc  Treatment: 14 minutes   D/W nursing,   Needs met prior to leaving room,

## 2019-04-04 NOTE — PROGRESS NOTES
GI Progress Note    Kalee Florian is a 66 y.o. male patient. 1. Hypothermia, initial encounter    2. Acute renal failure, unspecified acute renal failure type ([facility] [address]    3. Sepsis, due to unspecified organism ([facility]    4. Gi bleed  5. Oropharyngeal dysphagia    Admit Date: 3/26/2019    Subjective:       Oropharyngeal dysphagia with aspiration risk  No melena or hematochezia  No abdominal pain  Hg 7. 2. hc 21.4      ROS:  Cardiovascular ROS: no chest pain or dyspnea on exertion  Gastrointestinal ROS: as above  Respiratory ROS: no cough, shortness of breath, or wheezing    Scheduled Meds:   tamsulosin  0.4 mg Oral Dinner    insulin glargine  5 Units Subcutaneous Nightly    pantoprazole  40 mg Intravenous BID    insulin lispro  0-18 Units Subcutaneous TID WC    insulin lispro  0-9 Units Subcutaneous Nightly    mupirocin   Topical Daily    levothyroxine  37.5 mcg Intravenous Daily    And    sodium chloride (PF)  5 mL Intravenous Daily    sodium chloride flush  10 mL Intravenous 2 times per day       Continuous Infusions:   dextrose 5 % and 0.45 % NaCl 50 mL/hr at 04/03/19 1737    dextrose         PRN Meds:  acetaminophen, glucose, dextrose, glucagon (rDNA), dextrose      Objective:       Patient Vitals for the past 24 hrs:   BP Temp Temp src Pulse Resp SpO2   04/03/19 2036 132/78 97.4 °F (36.3 °C) Oral 76 18 98 %   04/03/19 1628 125/68 96 °F (35.6 °C) Temporal 61 18 99 %   04/03/19 1422 (!) 155/77 92.1 °F (33.4 °C) Rectal 58 16 100 %   04/03/19 1340 -- 96.1 °F (35.6 °C) Temporal -- -- --   04/03/19 1328 -- (!) 91.9 °F (33.3 °C) Rectal -- -- --   04/03/19 1321 (!) 154/72 (!) 91.8 °F (33.2 °C) Rectal 61 18 --   04/03/19 1320 -- 92.2 °F (33.4 °C) Axillary -- -- --   04/03/19 1240 -- 96 °F (35.6 °C) Temporal -- -- --   04/03/19 1145 122/65 95.8 °F (35.4 °C) Temporal 54 20 100 %   04/03/19 0836 (!) 141/82 96.2 °F (35.7 °C) Temporal 56 20 100 %   04/03/19 0030 -- -- -- 68 -- 99 %   04/03/19 0015 -- -- -- 79 hours.      Assessment:       Principal Problem:    Hypothermia  Active Problems:    Acute renal failure (HCC)    possible sepsis    SANDY (acute kidney injury) (HCC)    Metabolic acidosis    Acute metabolic encephalopathy    Altered mental status    Oropharyngeal dysphagia    Weakness    SOB (shortness of breath)    Goals of care, counseling/discussion    DNR (do not resuscitate) discussion    Encounter for palliative care    Severe malnutrition ([facility] [facility]    Acquired hypothyroidism    Rectal mass    Rectal bleeding  Resolved Problems:    * No resolved hospital problems. *    Malnutrition.  hypoalbuminemia    Recommendations:       PEG on Friday     James Bell MD  4/3/2019  9:26 PM

## 2019-04-04 NOTE — PROGRESS NOTES
Nephrology Progress Note  [phone]   [email or web address]    Patient:  Isaiah Torres   : 1945    CC:  SANDY   This is a patient with significant past medical history of SANDY,CKD prior CVA ,dysphagia with recent admission here for SANDY and Pneumonia , Cr was 2.4 at discharge  who presents with volume depletion very poor po intake and SANDY, Cr >5, hypernatremia           Subjective:  Had flex sig and EUA BX   Had urine retention and required Salmon placement 19   Started Flomax   Oral intake remains poor  Plan for PEG tomorrow noted    ROS:   No dyspnea, no pain . Sitting up  UOP 1825 ml/24 hours    SHx:  No visitors today. Meds:  Reviewed     Vitals:  BP (!) 156/75   Pulse 72   Temp 97.3 °F (36.3 °C) (Temporal)   Resp 18   Ht 5' 5\" (1.651 m)   Wt 121 lb 7.6 oz (55.1 kg)   SpO2 100%   BMI 20.21 kg/m²     Physical Exam:  Gen: Resting in bed, NAD. HEENT: +pallor  CV: RRR no rub noted   Lungs: good respiratory effort and clear air entry   Abd: S/NT +BS Salmon in place  Ext: No edema, no cyanosis  Skin: Warm. No rashes appreciated. Labs:  CBC:   Lab Results   Component Value Date    WBC 5.2 2019    RBC 2.51 2019    HGB 7.6 2019    HCT 22.4 2019    MCV 89.4 2019    MCH 30.2 2019    MCHC 33.8 2019    RDW 15.3 2019    PLT 85 2019    MPV 9.1 2019     BMP:    Lab Results   Component Value Date     2019    K 4.7 2019    K 5.3 2019     2019    CO2 19 2019    BUN 23 2019    LABALBU 1.7 2019    CREATININE 2.4 2019    CALCIUM 7.8 2019    GFRAA 32 2019    LABGLOM 26 2019    GLUCOSE 56 2019       Assessment/Plan:  1. SANDY /CKD due to volume depletion better. Cr improved and stable at   2.4 mg likely at  his baseline level, Continue gentle  IVF's as PO intake remains poor  2. Hypernatremia resolved. Na+ 139 meq  3. Hypokalemia/Hypophosphatemia corrected  4.  PAF on AC   5. DM2 monitor   6. Severe Gastric gastric ulcers and severe duodenitis, erythematous rectum concerning for superficial adenoma Had EUA and flex sig BX pending   7. U Retention Started Flomax Keep link for now Will need a voiding trial  8.  Dysphagia plan for PEG tomorrow noted      Jordi Bedoya MD

## 2019-04-04 NOTE — DISCHARGE INSTR - COC
Continuity of Care Form    Patient Name: Jhoan Agudelo   :  1945  MRN:  [id number]    Admit date:  3/26/2019  Discharge date:  2019    Code Status Order: Full Code   Advance Directives:   Advance Care Flowsheet Documentation     Date/Time Healthcare Directive Type of Healthcare Directive Copy in [address] Agent's Name Healthcare Agent's Phone Number    19 [phone]  No, patient does not have an advance directive for healthcare treatment -- -- -- -- --    19  No, patient does not have an advance directive for healthcare treatment -- -- -- -- --          Admitting Physician:  Winston Ceron MD  PCP: Judi Chase MD    Discharging Nurse: [facility] Unit/Room#: 7720/6797-86  Discharging Unit Phone Number: ***    Emergency Contact:   Extended Emergency Contact Information  Primary Emergency Contact: [address] [facility] Phone: [phone]  Relation: Niece/Nephew  Secondary Emergency Contact: [address] [facility] Phone: [phone]  Relation: Other    Past Surgical History:  Past Surgical History:   Procedure Laterality Date    ANUS SURGERY N/A 2019    EXAM UNDER ANESTHESIA, RECTAL BIOPSY X 2 performed by Kita Billings MD at [facility] [facility] N/A 2019    COLONOSCOPY performed by Farhana Sabillon MD at [address] [place] [address] N/A 2018    GASTRIC TUBE REMOVAL performed by Angelica Berman MD at [facility]      G-tube placement    JOINT REPLACEMENT      hip    UPPER GASTROINTESTINAL ENDOSCOPY N/A 2019    EGD BIOPSY performed by Farhana Sabillon MD at [facility] ENDOSCOPY       Immunization History:   Immunization History   Administered Date(s) Administered    Influenza Virus Vaccine 10/15/2012, 10/18/2013, 2016, 10/01/2018    Pneumococcal 13-valent Conjugate ([id number]) 2015    Pneumococcal Polysaccharide ([id number]) 2006, 10/15/2012       Active Assessment:  Last Vital Signs: BP (!) 156/75   Pulse 72   Temp 97.3 °F (36.3 °C) (Temporal)   Resp 18   Ht 5' 5\" (1.651 m)   Wt 121 lb 7.6 oz (55.1 kg)   SpO2 100%   BMI 20.21 kg/m²     Last documented pain score (0-10 scale): Pain Level: 0  Last Weight:   Wt Readings from Last 1 Encounters:   04/04/19 121 lb 7.6 oz (55.1 kg)     Mental Status:  oriented    IV Access:  - None    Nursing Mobility/ADLs:  Walking   Assisted with walker  Transfer  Assisted  Bathing  Assisted  Dressing  Assisted  Toileting  Assisted  Feeding  Independent  Med Admin  Assisted  Med Delivery   prefers mixed with applesauce    Wound Care Documentation and Therapy:  Wound 01/18/19 Venous ulcer Pretibial Right Venous stasis ulcer (Active)   Number of days: 76       Wound 04/21/17 Skin tear Buttocks Left 1x1cm  (Active)   Number of days: 712       Wound 04/21/17 Skin tear Coccyx Mid 3.4x2.2 (Active)   Number of days: 712       Incision 04/24/17 Abdomen Mid;Upper (Active)   Number of days: 709       Incision 10/16/17 Hip Right (Active)   Number of days: 534        Elimination:  Continence:   · Bowel: Yes  · Bladder: Yes  Urinary Catheter: Removal Date 4/6/19   Colostomy/Ileostomy/Ileal Conduit: No       Date of Last BM:     Intake/Output Summary (Last 24 hours) at 4/4/2019 0847  Last data filed at 4/4/2019 0611  Gross per 24 hour   Intake 2015.64 ml   Output 1575 ml   Net 440.64 ml     I/O last 3 completed shifts: In: 2015.6 [P.O.:360; I.V.:1655.6]  Out: 1825 [Urine:1825]    Safety Concerns: At Risk for Falls;  At Risk for Aspiration    Impairments/Disabilities:      None    Nutrition Therapy:  Current Nutrition Therapy:   - Tube Feedings:  Jevity 1.2; water flush 30ml every 4 hours    Routes of Feeding: oral and PEG tube  Liquids: Converse Thick Liquids  Daily Fluid Restriction: no  Last Modified Barium Swallow with Video (Video Swallowing Test):    Treatments at the Time of Hospital Discharge:   Respiratory Treatments: none  Oxygen Therapy:  is not on home oxygen therapy. Ventilator:    - No ventilator support    Rehab Therapies:   Weight Bearing Status/Restrictions: No weight bearing restirctions  Other Medical Equipment (for information only, NOT a DME order):  none  Other Treatments:     Patient's personal belongings (please select all that are sent with patient):  None    RN SIGNATURE:  Electronically signed by Truman Medina RN on 4/7/19 at 4:17 PM    CASE MANAGEMENT/SOCIAL WORK SECTION    Inpatient Status Date: 03/26/2019    Readmission Risk Assessment Score:  Readmission Risk              Risk of Unplanned Readmission:        33           Discharging to Facility/ Agency   · Name: [facility]  · Address: [address] [place] [address]  · Phone: [phone]  · Fax: [phone]       / signature: Electronically signed by WIL Arias on 4/7/19 at 1:31 PM    PHYSICIAN SECTION    Prognosis: Good    Condition at Discharge: Stable    Rehab Potential (if transferring to Rehab): Good    Recommended Labs or Other Treatments After Discharge:   . Emmett Ott, [facility] [place] [address]  [phone]    Schedule an appointment as soon as possible for a visit in 4 weeks  TSH repeat and follow up    Diana CARTAGE[facility])  [address]  [phone]    In 1 week      Erlin Perez MD  [address] [place] [address]  [phone]    In 1 week  PUD follow up, PPI        Physician Certification: I certify the above information and transfer of Earnestine Villegas  is necessary for the continuing treatment of the diagnosis listed and that he requires [facility] for greater 30 days.      Update Admission H&P: No change in H&P    PHYSICIAN SIGNATURE:  Electronically signed by Parisa De La Fuente MD on 4/7/19 at 1:38 PM

## 2019-04-04 NOTE — DISCHARGE SUMMARY
Hospital Medicine Discharge Summary    Patient ID: Anthony De La Vega   Gender: male  : 1945   Age: 66 y.o. MRN: [id number]  Code Status: Full Code    Patient's PCP: Stephan Sampson MD    Admit Date: 3/26/2019     Discharge Date:   19    Admitting Physician: Melita Elise MD     Discharge Physician: Thalia Maharaj MD, Phoebe Pacheco MD    Discharge Diagnoses:  - Anemia, acute on chronic  - Acute metabolic encephalopathy  - Hypothermia  - SANDY on CKD  - dysphagia w severe protein calorie malnutrition 2/2 stroke  - DM2  - hypothyroidism  - pAfib  - HTN  - diastolic heart failure  - rectal mass and bleeding       The patient was seen and examined on day of discharge and this discharge summary is in conjunction with any daily progress note from day of discharge. Hospital Course:   66 y.o. male who presented to [facility]. from [facility] w/ abnormal labs. Per daughter, pt had been staying in bed a lot over the last week [facility] only [facility] for occasional therapy. Per the nursing staff, pt had been having a lot of difficulty swallowing and speech therapy was seeing him with a mechanically altered diet and thickened liquids. Nursing was also saying that his fluid intake was greatly decreased d/t this. Pt initially had a g-tube that was removed in 2018, but had a stroke and has had dysphagia since then. He was recently admitted for SANDY and PNA that was treated and he returned to his SNF. Per report from nursing home today, pt had some lab abnormalities including hypernatremia, SANDY, worsening LFTs, which appear to be recurring issues for him.     In the ED, pt found to be hypothermic to the 91 F, Cr 5.9 Na 150, CBC w.o leukocytosis, but lactate>2. He was given 1L NS bolus and started on tracey hugger, he was also given vanc and cefepime for c/f sepsis of pulm src. Infxn w/u was started.  He admitted for electrolyte abnormalities, hypotension, and hypothermia w/ c/f sepsis. Anemia (POA)  With associated hypotension Likely 2/2 hypovolemia and hypothermia 2/2 dec PO intake was temporarily on pressors and then improved, FOBT+ w/ decreased hemoglobin, EGD and colonoscopy revealed gastric ulcers, duodenitis, neg tissue biopsy on EUA w/ colorectal for adenoma, patient maintained on IV Protonix and IVF. Discharged on protonix BID w GI follow up     Paroxysmal Afib   NSR, was on oral AC started on heparing gtt but was stopped after FOBT+, on telemetry, Coreg was held in setting of hypotension but was restarted when patient recovered. Eliquis restarted on DC    HTN  Amlodipine, Lisinopril and Coreg initially held for hypotension then restarted. Diastolic heart failure HFpEF  Coreg held initially in setting of hypotension      CAD s/p cardiac arrest  Was on aspirin and Plavix but held due to GI bleed as above      SANDY on CKD (baseline Cr approx. 2.3-2.4)   Prerenal vs ATN, was seeing improvement with hydration creatine more stable overnight , UA didn't show any casts on admission, renal US (wnl last admission). Nephro consulted for additional hypernatremia that improved.      IDDMII (glood glucose control)  - hold home metformin 500 BID  -LDSSI in setting of hypoglycemia and hypothermia   - Hypoglycemia protocol     Hypothroidism  - elevated TSH and IV levo increased to 37.5, discharged on PO synthroid 75 mg PO daily  - follow up w PCP in 2-4 wks for repeat TSH and dose adjustment as necessary    Severe malnutrition in setting of poor PO intake 2/2 dysphagia  - PEG placed and TFs started, tolerating well    Disposition:  Skilled Facility    Physical Exam Performed:     /64   Pulse 81   Temp 97.9 °F (36.6 °C) (Tympanic)   Resp 16   Ht 5' 5\" (1.651 m)   Wt 119 lb 11.4 oz (54.3 kg)   SpO2 99%   BMI 19.92 kg/m²       General appearance:  No apparent distress, appears stated age and cooperative.   HEENT:  Normal cephalic, atraumatic without obvious deformity. Pupils equal, round, and reactive to light. Extra ocular muscles intact. Conjunctivae/corneas clear. Neck: Supple, with full range of motion. No jugular venous distention. Trachea midline. Respiratory:  Normal respiratory effort. Clear to auscultation, bilaterally without Rales/Wheezes/Rhonchi. Cardiovascular:  Regular rate and rhythm with normal S1/S2 without murmurs, rubs or gallops. Abdomen: Soft, non-tender, non-distended with normal bowel sounds. Musculoskeletal:  No clubbing, cyanosis or edema bilaterally. Full range of motion without deformity. Skin: Skin color, texture, turgor normal.  No rashes or lesions. Neurologic:  Neurovascularly intact without any focal sensory/motor deficits. Cranial nerves: II-XII intact, grossly non-focal.  Psychiatric:  Alert and oriented, thought content appropriate, normal insight  Capillary Refill: Brisk,< 3 seconds   Peripheral Pulses: +2 palpable, equal bilaterally       Labs: For convenience and continuity at follow-up the following most recent labs are provided:      CBC:    Lab Results   Component Value Date    WBC 5.7 04/07/2019    HGB 7.2 04/07/2019    HCT 21.2 04/07/2019    PLT 91 04/07/2019       Renal:    Lab Results   Component Value Date     04/07/2019    K 4.7 04/07/2019    K 5.3 02/27/2019     04/07/2019    CO2 19 04/07/2019    BUN 20 04/07/2019    CREATININE 2.6 04/07/2019    CALCIUM 8.1 04/07/2019    PHOS 3.2 04/07/2019         Significant Diagnostic Studies    Radiology:   XR ABDOMEN (KUB) (SINGLE AP VIEW)   Final Result   Impression:    Normal bowel gas pattern. XR ABDOMEN (KUB) (SINGLE AP VIEW)   Final Result      NG tube appears to project in the fundus of the stomach. FL MODIFIED BARIUM SWALLOW W VIDEO   Final Result      Silent aspiration with honey, puree and thin consistencies. No definite penetration or aspiration with nectar.       XR CHEST PORTABLE   Final Result   Impression: Patchy infiltrate of the right lung base is increased in the interval. Stable blunting of the right costophrenic angle. XR CHEST PORTABLE   Final Result     Right IJ central line in the SVC. No pneumothorax. XR ABDOMEN (KUB) (SINGLE AP VIEW)   Final Result      Feeding tube with tip possibly within the right mainstem bronchus. The tube should be removed and replaced      Findings called as a critical result to the floor by Dr. Kaity Doty at the time of the dictation 9:33 PM 3/26/2019. CT Head WO Contrast   Final Result      1. No acute stroke, mass, or hemorrhage. 2. Remote infarcts in the bilateral cerebellar and cerebral hemispheres as above. 3. Moderate chronic small vessel ischemic white matter disease. 4. Mild diffuse atrophy. 5. Moderate right mastoid effusion. XR CHEST PORTABLE   Final Result      Right lower lung opacity present previously improved likely relates to improvement of the atelectasis and/or pneumonitis. Right lower lobe bronchiectasis present previously.              Consults:     IP CONSULT TO CRITICAL CARE  IP CONSULT TO HOSPITALIST  IP CONSULT TO CRITICAL CARE  IP CONSULT TO PALLIATIVE CARE  IP CONSULT TO DIETITIAN  IP CONSULT TO SOCIAL WORK  IP CONSULT TO CASE MANAGEMENT  IP CONSULT TO NEPHROLOGY  IP CONSULT TO GI  IP CONSULT TO COLORECTAL SURGERY  IP CONSULT TO DIETITIAN    Disposition:  [address]     Condition at Discharge: Stable    Discharge Instructions/Follow-up:    - GI Dr Maylin Billy in 1-2 wks  - PCP in 2-4 wks for TSH, T4 follow up  - CBC in 3 days for H/H follow up    Code Status:  Full Code     Activity: activity as tolerated    Diet: clear liquid diet, nectar thick + TFs      Discharge Medications:     Current Discharge Medication List           Details   carvedilol (COREG) 6.25 MG tablet Take 6.25 mg by mouth 2 times daily (with meals)      vitamin D (ERGOCALCIFEROL) 33022 units CAPS capsule Take 50,000 Units by mouth Twice a Week Give on Monday and Thursday lisinopril (PRINIVIL;ZESTRIL) 5 MG tablet Take 5 mg by mouth daily      megestrol (MEGACE) 40 MG/ML suspension Take 400 mg by mouth daily      mirtazapine (REMERON) 15 MG tablet Take 15 mg by mouth nightly      apixaban (ELIQUIS) 2.5 MG TABS tablet Take 1 tablet by mouth 2 times daily  Qty: 60 tablet, Refills: 2      insulin glargine (TOUJEO MAX SOLOSTAR) 300 UNIT/ML injection pen Inject 5 Units into the skin nightly  Qty: 3 pen, Refills: 3      metFORMIN (GLUCOPHAGE) 500 MG tablet Take 500 mg by mouth 2 times daily (with meals)       insulin aspart (NOVOLOG) 100 UNIT/ML injection vial Inject 0-4 Units into the skin 3 times daily (before meals) Inject as per sliding scale      ferrous sulfate 325 (65 Fe) MG tablet Take 325 mg by mouth 3 times daily (with meals)       amLODIPine (NORVASC) 5 MG tablet Take 5 mg by mouth daily       aspirin 81 MG tablet Take 81 mg by mouth daily              Time Spent on discharge is more than 30 minutes in the examination, evaluation, counseling and review of medications and discharge plan. Signed:    Kandy Wright MD, PGY-2  4/7/2019      Thank you Ricardo Greene MD for the opportunity to be involved in this patient's care. Patient seen and evaluated with the medical resident. I was physically present during the critical portions of the service when performed by the resident including the assessment and management of the patient. I agree with the findings and plans as described. My time spent reviewing discharge plan and follow ups with resident, along with performing independent review of discharge medicines along with counseling the patient exceeds 35 minutes.     Lindy Modi MD  2:34 PM 4/7/2019

## 2019-04-04 NOTE — PLAN OF CARE
Patient oral temp 97.4 at 2036, warming blanket turned from medium to low. At 2330 oral temp was 97.2, warming blanket removed. In AM had difficulty getting temp probe to read orally, temporal was 96.3 at 0445. Will continue to monitor. Problem: Falls - Risk of:  Goal: Will remain free from falls  Description  Will remain free from falls  Outcome: Ongoing  Note:   Patient remained free from falls overnight. Made no attempts to get out of bed overnight. Bed alarm remains active, bed in low/locked position, non-skid footwear in place, and call light in reach. Will continue fall risk protocol. Asia Wade 4/4/2019      Problem: Nutritional:  Goal: Consumption of food in small portions  Description  Consumption of food in small portions  Outcome: Ongoing  Note:   Patient refused dinner, Glucose 69 at 2157. Insulin held and MD made aware. Patient given juice and urged to have some of dinner tray, patient drank some of juice but would not eat anything. Glucose 87 at 0047. Will continue to monitor.     Asia Wade 4/4/2019

## 2019-04-05 ENCOUNTER — ANESTHESIA (OUTPATIENT)
Dept: ENDOSCOPY | Age: 74
DRG: 987 | End: 2019-04-05
Payer: MEDICARE

## 2019-04-05 ENCOUNTER — ANESTHESIA EVENT (OUTPATIENT)
Dept: ENDOSCOPY | Age: 74
DRG: 987 | End: 2019-04-05
Payer: MEDICARE

## 2019-04-05 VITALS — SYSTOLIC BLOOD PRESSURE: 117 MMHG | OXYGEN SATURATION: 99 % | DIASTOLIC BLOOD PRESSURE: 70 MMHG

## 2019-04-05 LAB
ALBUMIN SERPL-MCNC: 1.9 G/DL (ref 3.4–5)
ANION GAP SERPL CALCULATED.3IONS-SCNC: 10 MMOL/L (ref 3–16)
BASOPHILS ABSOLUTE: 0 K/UL (ref 0–0.2)
BASOPHILS RELATIVE PERCENT: 0.9 %
BUN BLDV-MCNC: 21 MG/DL (ref 7–20)
CALCIUM SERPL-MCNC: 7.9 MG/DL (ref 8.3–10.6)
CHLORIDE BLD-SCNC: 111 MMOL/L (ref 99–110)
CO2: 18 MMOL/L (ref 21–32)
CREAT SERPL-MCNC: 2.5 MG/DL (ref 0.8–1.3)
EOSINOPHILS ABSOLUTE: 0.1 K/UL (ref 0–0.6)
EOSINOPHILS RELATIVE PERCENT: 2.8 %
GFR AFRICAN AMERICAN: 31
GFR NON-AFRICAN AMERICAN: 25
GLUCOSE BLD-MCNC: 100 MG/DL (ref 70–99)
GLUCOSE BLD-MCNC: 108 MG/DL (ref 70–99)
GLUCOSE BLD-MCNC: 109 MG/DL (ref 70–99)
GLUCOSE BLD-MCNC: 172 MG/DL (ref 70–99)
HCT VFR BLD CALC: 22.7 % (ref 40.5–52.5)
HEMOGLOBIN: 7.8 G/DL (ref 13.5–17.5)
INR BLD: 1.11 (ref 0.86–1.14)
LYMPHOCYTES ABSOLUTE: 0.5 K/UL (ref 1–5.1)
LYMPHOCYTES RELATIVE PERCENT: 10.9 %
MAGNESIUM: 1.7 MG/DL (ref 1.8–2.4)
MCH RBC QN AUTO: 30.8 PG (ref 26–34)
MCHC RBC AUTO-ENTMCNC: 34.3 G/DL (ref 31–36)
MCV RBC AUTO: 89.9 FL (ref 80–100)
MONOCYTES ABSOLUTE: 0.3 K/UL (ref 0–1.3)
MONOCYTES RELATIVE PERCENT: 5.9 %
NEUTROPHILS ABSOLUTE: 3.5 K/UL (ref 1.7–7.7)
NEUTROPHILS RELATIVE PERCENT: 79.5 %
PDW BLD-RTO: 15.1 % (ref 12.4–15.4)
PERFORMED ON: ABNORMAL
PHOSPHORUS: 3.1 MG/DL (ref 2.5–4.9)
PLATELET # BLD: 81 K/UL (ref 135–450)
PMV BLD AUTO: 9.3 FL (ref 5–10.5)
POTASSIUM SERPL-SCNC: 4.8 MMOL/L (ref 3.5–5.1)
PROTHROMBIN TIME: 12.6 SEC (ref 9.8–13)
RBC # BLD: 2.53 M/UL (ref 4.2–5.9)
SODIUM BLD-SCNC: 139 MMOL/L (ref 136–145)
WBC # BLD: 4.3 K/UL (ref 4–11)

## 2019-04-05 PROCEDURE — 7100000010 HC PHASE II RECOVERY - FIRST 15 MIN: Performed by: INTERNAL MEDICINE

## 2019-04-05 PROCEDURE — 0DH68UZ INSERTION OF FEEDING DEVICE INTO STOMACH, VIA NATURAL OR ARTIFICIAL OPENING ENDOSCOPIC: ICD-10-PCS | Performed by: INTERNAL MEDICINE

## 2019-04-05 PROCEDURE — 83735 ASSAY OF MAGNESIUM: CPT

## 2019-04-05 PROCEDURE — 80069 RENAL FUNCTION PANEL: CPT

## 2019-04-05 PROCEDURE — 6360000002 HC RX W HCPCS: Performed by: NURSE ANESTHETIST, CERTIFIED REGISTERED

## 2019-04-05 PROCEDURE — 6360000002 HC RX W HCPCS: Performed by: STUDENT IN AN ORGANIZED HEALTH CARE EDUCATION/TRAINING PROGRAM

## 2019-04-05 PROCEDURE — 2709999900 HC NON-CHARGEABLE SUPPLY: Performed by: INTERNAL MEDICINE

## 2019-04-05 PROCEDURE — 2580000003 HC RX 258: Performed by: INTERNAL MEDICINE

## 2019-04-05 PROCEDURE — 85025 COMPLETE CBC W/AUTO DIFF WBC: CPT

## 2019-04-05 PROCEDURE — 36592 COLLECT BLOOD FROM PICC: CPT

## 2019-04-05 PROCEDURE — 6370000000 HC RX 637 (ALT 250 FOR IP): Performed by: STUDENT IN AN ORGANIZED HEALTH CARE EDUCATION/TRAINING PROGRAM

## 2019-04-05 PROCEDURE — 2500000003 HC RX 250 WO HCPCS: Performed by: NURSE ANESTHETIST, CERTIFIED REGISTERED

## 2019-04-05 PROCEDURE — 2060000000 HC ICU INTERMEDIATE R&B

## 2019-04-05 PROCEDURE — 2500000003 HC RX 250 WO HCPCS: Performed by: STUDENT IN AN ORGANIZED HEALTH CARE EDUCATION/TRAINING PROGRAM

## 2019-04-05 PROCEDURE — 3609013300 HC EGD TUBE PLACEMENT: Performed by: INTERNAL MEDICINE

## 2019-04-05 PROCEDURE — 3700000001 HC ADD 15 MINUTES (ANESTHESIA): Performed by: INTERNAL MEDICINE

## 2019-04-05 PROCEDURE — 2580000003 HC RX 258: Performed by: STUDENT IN AN ORGANIZED HEALTH CARE EDUCATION/TRAINING PROGRAM

## 2019-04-05 PROCEDURE — 85610 PROTHROMBIN TIME: CPT

## 2019-04-05 PROCEDURE — 3700000000 HC ANESTHESIA ATTENDED CARE: Performed by: INTERNAL MEDICINE

## 2019-04-05 PROCEDURE — 6360000002 HC RX W HCPCS: Performed by: INTERNAL MEDICINE

## 2019-04-05 PROCEDURE — 7100000011 HC PHASE II RECOVERY - ADDTL 15 MIN: Performed by: INTERNAL MEDICINE

## 2019-04-05 PROCEDURE — 97110 THERAPEUTIC EXERCISES: CPT

## 2019-04-05 PROCEDURE — 97116 GAIT TRAINING THERAPY: CPT

## 2019-04-05 PROCEDURE — C9113 INJ PANTOPRAZOLE SODIUM, VIA: HCPCS | Performed by: STUDENT IN AN ORGANIZED HEALTH CARE EDUCATION/TRAINING PROGRAM

## 2019-04-05 RX ORDER — MORPHINE SULFATE 4 MG/ML
1 INJECTION, SOLUTION INTRAMUSCULAR; INTRAVENOUS EVERY 5 MIN PRN
Status: DISCONTINUED | OUTPATIENT
Start: 2019-04-05 | End: 2019-04-05 | Stop reason: HOSPADM

## 2019-04-05 RX ORDER — OXYCODONE HYDROCHLORIDE 5 MG/1
10 TABLET ORAL PRN
Status: DISCONTINUED | OUTPATIENT
Start: 2019-04-05 | End: 2019-04-05 | Stop reason: HOSPADM

## 2019-04-05 RX ORDER — GLYCOPYRROLATE 0.2 MG/ML
INJECTION INTRAMUSCULAR; INTRAVENOUS PRN
Status: DISCONTINUED | OUTPATIENT
Start: 2019-04-05 | End: 2019-04-05 | Stop reason: SDUPTHER

## 2019-04-05 RX ORDER — MAGNESIUM SULFATE 1 G/100ML
1 INJECTION INTRAVENOUS ONCE
Status: COMPLETED | OUTPATIENT
Start: 2019-04-05 | End: 2019-04-05

## 2019-04-05 RX ORDER — METOCLOPRAMIDE HYDROCHLORIDE 5 MG/ML
10 INJECTION INTRAMUSCULAR; INTRAVENOUS
Status: DISCONTINUED | OUTPATIENT
Start: 2019-04-05 | End: 2019-04-05 | Stop reason: HOSPADM

## 2019-04-05 RX ORDER — HYDRALAZINE HYDROCHLORIDE 20 MG/ML
5 INJECTION INTRAMUSCULAR; INTRAVENOUS EVERY 10 MIN PRN
Status: DISCONTINUED | OUTPATIENT
Start: 2019-04-05 | End: 2019-04-05 | Stop reason: HOSPADM

## 2019-04-05 RX ORDER — DIPHENHYDRAMINE HYDROCHLORIDE 50 MG/ML
12.5 INJECTION INTRAMUSCULAR; INTRAVENOUS
Status: DISCONTINUED | OUTPATIENT
Start: 2019-04-05 | End: 2019-04-05 | Stop reason: HOSPADM

## 2019-04-05 RX ORDER — MEPERIDINE HYDROCHLORIDE 25 MG/ML
12.5 INJECTION INTRAMUSCULAR; INTRAVENOUS; SUBCUTANEOUS EVERY 5 MIN PRN
Status: DISCONTINUED | OUTPATIENT
Start: 2019-04-05 | End: 2019-04-05 | Stop reason: HOSPADM

## 2019-04-05 RX ORDER — OXYCODONE HYDROCHLORIDE 5 MG/1
5 TABLET ORAL PRN
Status: DISCONTINUED | OUTPATIENT
Start: 2019-04-05 | End: 2019-04-05 | Stop reason: HOSPADM

## 2019-04-05 RX ORDER — PROPOFOL 10 MG/ML
INJECTION, EMULSION INTRAVENOUS PRN
Status: DISCONTINUED | OUTPATIENT
Start: 2019-04-05 | End: 2019-04-05 | Stop reason: SDUPTHER

## 2019-04-05 RX ORDER — LABETALOL HYDROCHLORIDE 5 MG/ML
5 INJECTION, SOLUTION INTRAVENOUS EVERY 10 MIN PRN
Status: DISCONTINUED | OUTPATIENT
Start: 2019-04-05 | End: 2019-04-05 | Stop reason: HOSPADM

## 2019-04-05 RX ORDER — PROMETHAZINE HYDROCHLORIDE 25 MG/ML
6.25 INJECTION, SOLUTION INTRAMUSCULAR; INTRAVENOUS
Status: DISCONTINUED | OUTPATIENT
Start: 2019-04-05 | End: 2019-04-05 | Stop reason: HOSPADM

## 2019-04-05 RX ADMIN — DEXTROSE MONOHYDRATE AND SODIUM CHLORIDE: 5; .45 INJECTION, SOLUTION INTRAVENOUS at 08:23

## 2019-04-05 RX ADMIN — TAMSULOSIN HYDROCHLORIDE 0.4 MG: 0.4 CAPSULE ORAL at 17:51

## 2019-04-05 RX ADMIN — CARVEDILOL 6.25 MG: 6.25 TABLET, FILM COATED ORAL at 08:03

## 2019-04-05 RX ADMIN — PANTOPRAZOLE SODIUM 40 MG: 40 INJECTION, POWDER, FOR SOLUTION INTRAVENOUS at 08:03

## 2019-04-05 RX ADMIN — CARVEDILOL 6.25 MG: 6.25 TABLET, FILM COATED ORAL at 17:50

## 2019-04-05 RX ADMIN — LEVOTHYROXINE SODIUM ANHYDROUS 37.5 MCG: 100 INJECTION, POWDER, LYOPHILIZED, FOR SOLUTION INTRAVENOUS at 08:23

## 2019-04-05 RX ADMIN — AMPICILLIN SODIUM AND SULBACTAM SODIUM 3 G: 2; 1 INJECTION, POWDER, FOR SOLUTION INTRAMUSCULAR; INTRAVENOUS at 15:49

## 2019-04-05 RX ADMIN — DEXTROSE MONOHYDRATE AND SODIUM CHLORIDE: 5; .45 INJECTION, SOLUTION INTRAVENOUS at 17:49

## 2019-04-05 RX ADMIN — DEXTROSE MONOHYDRATE AND SODIUM CHLORIDE: 5; .45 INJECTION, SOLUTION INTRAVENOUS at 16:03

## 2019-04-05 RX ADMIN — GLYCOPYRROLATE 0.2 MG: 0.2 INJECTION INTRAMUSCULAR; INTRAVENOUS at 16:10

## 2019-04-05 RX ADMIN — PROPOFOL 10 MG: 10 INJECTION, EMULSION INTRAVENOUS at 16:15

## 2019-04-05 RX ADMIN — PANTOPRAZOLE SODIUM 40 MG: 40 INJECTION, POWDER, FOR SOLUTION INTRAVENOUS at 22:54

## 2019-04-05 RX ADMIN — SODIUM CHLORIDE, PRESERVATIVE FREE 5 ML: 5 INJECTION INTRAVENOUS at 08:24

## 2019-04-05 RX ADMIN — Medication 10 ML: at 08:03

## 2019-04-05 RX ADMIN — Medication 10 ML: at 22:54

## 2019-04-05 RX ADMIN — PROPOFOL 20 MG: 10 INJECTION, EMULSION INTRAVENOUS at 16:10

## 2019-04-05 RX ADMIN — INSULIN LISPRO 1 UNITS: 100 INJECTION, SOLUTION INTRAVENOUS; SUBCUTANEOUS at 17:50

## 2019-04-05 RX ADMIN — MAGNESIUM SULFATE HEPTAHYDRATE 1 G: 1 INJECTION, SOLUTION INTRAVENOUS at 08:03

## 2019-04-05 ASSESSMENT — PULMONARY FUNCTION TESTS
PIF_VALUE: 0

## 2019-04-05 ASSESSMENT — PAIN SCALES - GENERAL
PAINLEVEL_OUTOF10: 0
PAINLEVEL_OUTOF10: 0
PAINLEVEL_OUTOF10: 10
PAINLEVEL_OUTOF10: 0

## 2019-04-05 ASSESSMENT — PAIN DESCRIPTION - ORIENTATION: ORIENTATION: RIGHT;LEFT;MID

## 2019-04-05 ASSESSMENT — PAIN DESCRIPTION - PAIN TYPE: TYPE: ACUTE PAIN;SURGICAL PAIN

## 2019-04-05 ASSESSMENT — PAIN DESCRIPTION - DESCRIPTORS: DESCRIPTORS: ACHING;SORE

## 2019-04-05 ASSESSMENT — PAIN DESCRIPTION - FREQUENCY: FREQUENCY: CONTINUOUS

## 2019-04-05 ASSESSMENT — ENCOUNTER SYMPTOMS: SHORTNESS OF BREATH: 1

## 2019-04-05 ASSESSMENT — PAIN DESCRIPTION - ONSET: ONSET: ON-GOING

## 2019-04-05 ASSESSMENT — PAIN DESCRIPTION - LOCATION: LOCATION: ABDOMEN

## 2019-04-05 NOTE — PROGRESS NOTES
Rectal temp checked per MD nursing communication. 92.1 degrees rectally, was 95.6 temporally. Pt back to bed with RN assist, Olya chener applied per order @0252. Will cont to monitor, let MD know via perfect serve and let endo RN know as well.    Electronically signed by Tiffany Farfan RN on 4/5/2019 at 12:47 PM       Update: 92.3 rectal temp after olya hugger has been on 1 hr. Updated MD.  Electronically signed by Tiffany Farfan RN on 4/5/2019 at 1:28 PM

## 2019-04-05 NOTE — PROGRESS NOTES
GI Progress Note    Bao Rachel is a 66 y.o. male patient. 1. Hypothermia, initial encounter    2. Acute renal failure, unspecified acute renal failure type ([facility]    3. [facility], [facility] unspecified [facility]    4.      GI bleed  5. Malnutrition  6.        Oropharyngeal dysphagia    Admit Date: 3/26/2019    Subjective:       Oropharyngeal dysphagia persists  No abdominal pain  No active gi bleed  On iv protonix for multiple gastric and duodenal ulcers, likely stressed induced      ROS:  Cardiovascular ROS: no chest pain or dyspnea   Respiratory ROS: no cough, shortness of breath, or wheezing    Scheduled Meds:   insulin lispro  0-6 Units Subcutaneous TID WC    insulin lispro  0-3 Units Subcutaneous Nightly    carvedilol  6.25 mg Oral BID WC    tamsulosin  0.4 mg Oral Dinner    pantoprazole  40 mg Intravenous BID    mupirocin   Topical Daily    levothyroxine  37.5 mcg Intravenous Daily    And    sodium chloride (PF)  5 mL Intravenous Daily    sodium chloride flush  10 mL Intravenous 2 times per day       Continuous Infusions:   dextrose 5 % and 0.45 % NaCl 50 mL/hr at 04/05/19 0823    dextrose         PRN Meds:  HYDROmorphone, HYDROmorphone, morphine, HYDROmorphone, oxyCODONE **OR** oxyCODONE, diphenhydrAMINE, metoclopramide, promethazine, labetalol, hydrALAZINE, meperidine, acetaminophen, glucose, dextrose, glucagon (rDNA), dextrose      Objective:       Patient Vitals for the past 24 hrs:   BP Temp Temp src Pulse Resp SpO2 Weight   04/05/19 1350 -- 96 °F (35.6 °C) Temporal -- -- -- --   04/05/19 1349 -- (!) 91.6 °F (33.1 °C) Rectal -- -- -- --   04/05/19 1325 -- 92.3 °F (33.5 °C) Rectal -- -- -- --   04/05/19 1220 -- 92.1 °F (33.4 °C) Rectal -- -- -- --   04/05/19 1120 128/81 95.6 °F (35.3 °C) Temporal 55 16 98 % --   04/05/19 0749 -- -- -- 67 -- -- --   04/05/19 0728 (!) 164/77 95.7 °F (35.4 °C) Temporal 65 14 100 % --   04/05/19 0412 (!) 171/81 96.1 °F (35.6 °C) Temporal 65 16 100 % 116 lb 6.5 oz (52.8 kg)   19 2345 (!) 160/84 96.1 °F (35.6 °C) Temporal 65 16 100 % --   19 2045 (!) 172/84 96.2 °F (35.7 °C) Temporal 70 18 100 % --   19 1714 -- -- -- 80 -- -- --       Exam:    VITALS:  /81   Pulse 55   Temp 96 °F (35.6 °C) (Temporal)   Resp 16   Ht 5' 5\" (1.651 m)   Wt 116 lb 6.5 oz (52.8 kg)   SpO2 98%   BMI 19.37 kg/m²   TEMPERATURE:  Current - Temp: 96 °F (35.6 °C); Max - Temp  Av.6 °F (34.8 °C)  Min: 91.6 °F (33.1 °C)  Max: 96.2 °F (35.7 °C)    NAD  General appearance: alert, appears stated age, cooperative and no distress  Head: Normocephalic, without obvious abnormality, atraumatic  Neck: supple, symmetrical, trachea midline and thyroid not enlarged, symmetric, no tenderness/mass/nodules  CVS:  RRR, Nl s1s2  Lungs CTA Bilaterally, normal effort  Abdomen: soft, non-tender; bowel sounds normal; no masses,  no organomegaly  AAOx3, No asterixis or encephalopathy  Extremities: No edema. Lab Data:  Recent Labs     19  03519  0430   WBC 5.5 5.2 4.3   HGB 7.2* 7.6* 7.8*   HCT 21.4* 22.4* 22.7*   MCV 90.4 89.4 89.9   PLT 74* 85* 81*     Recent Labs     19  03519  0450 19  0430    139 139   K 4.4 4.7 4.8   * 113* 111*   CO2 18* 19* 18*   PHOS 3.3 3.1 3.1   BUN 24* 23* 21*   CREATININE 2.3* 2.4* 2.5*     No results for input(s): AST, ALT, ALB, BILIDIR, BILITOT, ALKPHOS in the last 72 hours. No results for input(s): LIPASE, AMYLASE in the last 72 hours. Recent Labs     19  0450 19  0430   INR 1.20* 1.11     No results for input(s): PTT in the last 72 hours. No results for input(s): OCCULTBLD in the last 72 hours.     Radiology review: as in chart    Assessment:       Principal Problem:    Hypothermia  Active Problems:    Acute renal failure ([facility]    possible sepsis    SANDY (acute kidney injury) ([facility]    Metabolic acidosis    Acute metabolic encephalopathy    Altered mental status    Oropharyngeal dysphagia    Weakness    SOB (shortness of breath)    Goals of care, counseling/discussion    DNR (do not resuscitate) discussion    Encounter for palliative care    Severe malnutrition ([facility]    Acquired hypothyroidism    Rectal mass    Rectal bleeding  Resolved Problems:    * No resolved hospital problems. *        Recommendations:       INR 1.2  Will proceed with PEG today.  All medical problems stated above are stable to proceed  unasyne 3 gram IV prior to procedure  Discussed with medical resident  Discussed with patient    Thomas Nino MD  4/5/2019  4:47 PM

## 2019-04-05 NOTE — CARE COORDINATION
Case Management Daily Note:    Current Plan of Care:    Assessment/Plan:  1. SANDY /CKD due to volume depletion better. Cr improved and stable at 2.5 mg likely at  his baseline level, Continue gentle  IVF's as PO intake remains poor  2. Hypernatremia resolved. Na+ 139 meq  3. Hypokalemia/Hypophosphatemia corrected  4. PAF on AC   5. DM2 monitor   6. Severe Gastric gastric ulcers and severe duodenitis, erythematous rectum concerning for superficial adenoma Had EUA and flex sig BX pending   7. U Retention Started Flomax Keep link for now Will need a voiding trial  8. Dysphagia plan for PEG today             PT AM-PAC: 16/24  Per last eval on: 4/5    OT AM-PAC: 15//24 Per last eval on:4/4    DME needs: facility to provide      Discharge Plan: back to [facility] no precert needed    Tentative Discharge Date: when medically stable     Current Barriers to Discharge: peg placement    Resources/Information given: n/a      Case Management Notes: Pt to return to [facility] at discharge.     Jessica Cheng RN, BSN, [id number]  Case Management Department  [phone]

## 2019-04-05 NOTE — ANESTHESIA PRE PROCEDURE
Department of Anesthesiology  Preprocedure Note       Name:  Diamond Lopes   Age:  66 y.o.  :  1945                                          MRN:  [id number]         Date:  2019      Surgeon: Jaron Arana):  Martha Victor MD    Procedure: ESOPHAGOGASTRODUODENOSCOPY PEG TUBE INSERTION WITH MAC (N/A )    Medications prior to admission:   Prior to Admission medications    Medication Sig Start Date End Date Taking?  Authorizing Provider   carvedilol (COREG) 6.25 MG tablet Take 6.25 mg by mouth 2 times daily (with meals)   Yes Historical Provider, MD   vitamin D (ERGOCALCIFEROL) 25543 units CAPS capsule Take 50,000 Units by mouth Twice a Week Give on Monday and Thursday   Yes Historical Provider, MD   lisinopril (PRINIVIL;ZESTRIL) 5 MG tablet Take 5 mg by mouth daily   Yes Historical Provider, MD   megestrol (MEGACE) 40 MG/ML suspension Take 400 mg by mouth daily   Yes Historical Provider, MD   mirtazapine (REMERON) 15 MG tablet Take 15 mg by mouth nightly   Yes Historical Provider, MD   apixaban (ELIQUIS) 2.5 MG TABS tablet Take 1 tablet by mouth 2 times daily 3/7/19  Yes Bobbi Palacios MD   insulin glargine (TOUJEO MAX SOLOSTAR) 300 UNIT/ML injection pen Inject 5 Units into the skin nightly 19  Yes Raquel Epperson MD   metFORMIN (GLUCOPHAGE) 500 MG tablet Take 500 mg by mouth 2 times daily (with meals)    Yes Historical Provider, MD   insulin aspart (NOVOLOG) 100 UNIT/ML injection vial Inject 0-4 Units into the skin 3 times daily (before meals) Inject as per sliding scale   Yes Historical Provider, MD   ferrous sulfate 325 (65 Fe) MG tablet Take 325 mg by mouth 3 times daily (with meals)    Yes Historical Provider, MD   amLODIPine (NORVASC) 5 MG tablet Take 5 mg by mouth daily    Yes Historical Provider, MD   aspirin 81 MG tablet Take 81 mg by mouth daily    Yes Historical Provider, MD       Current medications:    Current Facility-Administered Medications   Medication Dose Route Frequency Provider Last Rate Last Dose    HYDROmorphone (DILAUDID) injection 0.25 mg  0.25 mg Intravenous Q5 Min PRN Funmilayo Found, MD        HYDROmorphone (DILAUDID) injection 0.5 mg  0.5 mg Intravenous Q5 Min PRN Funmilayo Found, MD        morphine injection 1 mg  1 mg Intravenous Q5 Min PRN Funmilayo Found, MD        HYDROmorphone (DILAUDID) injection 0.5 mg  0.5 mg Intravenous Q5 Min PRN Funmilayo Found, MD        oxyCODONE (ROXICODONE) immediate release tablet 5 mg  5 mg Oral PRN Funmilayo Found, MD        Or    oxyCODONE (ROXICODONE) immediate release tablet 10 mg  10 mg Oral PRN Funmilayo Found, MD        diphenhydrAMINE (BENADRYL) injection 12.5 mg  12.5 mg Intravenous Once PRN Funmilayo Found, MD        metoclopramide (REGLAN) injection 10 mg  10 mg Intravenous Once PRN Funmilayo Found, MD        promethazine (PHENERGAN) injection 6.25 mg  6.25 mg Intravenous Once PRN Funmilayo Found, MD        labetalol (NORMODYNE;TRANDATE) injection 5 mg  5 mg Intravenous Q10 Min PRN Funmilayo Found, MD        hydrALAZINE (APRESOLINE) injection 5 mg  5 mg Intravenous Q10 Min PRN Funmilayo Found, MD        meperidine (DEMEROL) injection 12.5 mg  12.5 mg Intravenous Q5 Min PRN Funmilayo Found, MD        insulin lispro (HUMALOG) injection pen 0-6 Units  0-6 Units Subcutaneous TID SHANNAN Osman MD   Stopped at 04/04/19 0898    insulin lispro (HUMALOG) injection pen 0-3 Units  0-3 Units Subcutaneous Nightly Dedra Arlette Osman MD   1 Units at 04/04/19 2057    ampicillin-sulbactam (UNASYN) 3 g ivpb minibag  3 g Intravenous On Call Pete Fong MD        carvedilol (COREG) tablet 6.25 mg  6.25 mg Oral BID WC Juan Osman MD   6.25 mg at 04/05/19 0803    tamsulosin (FLOMAX) capsule 0.4 mg  0.4 mg Oral Dinner Rosy Paulino MD   0.4 mg at 04/03/19 1854    dextrose 5 % and 0.45 % sodium chloride infusion   Intravenous Continuous Rosy Paulino MD 50 mL/hr at 04/05/19 0823      acetaminophen (TYLENOL) tablet 650 mg  650 mg Oral Q4H PRN Fan Moctezuma MD        pantoprazole (PROTONIX) injection 40 mg  40 mg Intravenous BID Fan Mcotezuma MD   40 mg at 04/05/19 0803    mupirocin (BACTROBAN) 2 % ointment   Topical Daily Fan Moctezuma MD        levothyroxine (SYNTHROID) injection 37.5 mcg  37.5 mcg Intravenous Daily Fan Moctezuma MD   37.5 mcg at 04/05/19 5442    And    sodium chloride (PF) 0.9 % injection 5 mL  5 mL Intravenous Daily Fan Moctezuma MD   5 mL at 04/05/19 9929    sodium chloride flush 0.9 % injection 10 mL  10 mL Intravenous 2 times per day Fan Moctezuma MD   10 mL at 04/05/19 0803    glucose (GLUTOSE) 40 % oral gel 15 g  15 g Oral PRN Fan Moctezuma MD   15 g at 04/03/19 1010    dextrose 50 % solution 12.5 g  12.5 g Intravenous PRN Fan Moctezuma MD        glucagon (rDNA) injection 1 mg  1 mg Intramuscular PRN Fan Moctezuma MD        dextrose 5 % solution  100 mL/hr Intravenous PRN Fan Moctezuma MD           Allergies:  No Known Allergies    Problem List:    Patient Active Problem List   Diagnosis Code    Vitamin D deficiency E55.9    Vitamin B deficiency E53.9    Type 2 diabetes mellitus ([facility] E11.9    Angiopathy, peripheral ([facility] I73.9    Noncompliance with medication regimen Z91.14    Gonadotropin deficiency ([facility] E23.0    Essential hypertension I10    Long term current use of insulin ([facility]) Z79.4    Dyslipidemia E78.5    Carotid artery narrowing I65.29    Atherosclerosis of coronary artery I25.10    Acute renal failure (HCC) N17.9    Paroxysmal A-fib ([facility]) I48.0    Coronary artery disease involving native coronary artery of native heart without angina pectoris I25.10    Cardiomyopathy ([facility] I42.9    Uncontrolled type 2 diabetes mellitus with hyperglycemia, with long-term current use of insulin ([facility]) E11.65, Z79.4    S/P hip hemiarthroplasty Z96.649    Acute ischemic right MCA stroke ([facility] I63.511    SANDY (acute kidney injury) ([facility] N17.9    Normocytic anemia D64.9    Severe protein-calorie malnutrition ([facility] E43    S/P percutaneous endoscopic gastrostomy (PEG) tube placement ([facility]) Z93.1    Right to left cardiac shunt (HCC) I28.0    H/O ischemic multifocal multiple vascular territories stroke Z86.73    Anticoagulated Z79.01    Vertebral artery stenosis, right I65.01    Hypoglycemia, exogenous insulin, in setting of SANDY E15    possible sepsis A41.9    Hypothermia T68. XXXA    SANDY (acute kidney injury) ([facility] Q16.6    Metabolic acidosis L01.4    Acute metabolic encephalopathy R17.30    Altered mental status R41.82    Oropharyngeal dysphagia R13.12    Weakness R53.1    SOB (shortness of breath) R06.02    Goals of care, counseling/discussion Z71.89    DNR (do not resuscitate) discussion Z70.80    Encounter for palliative care Z51.5    Severe malnutrition ([facility] E43    Acquired hypothyroidism E03.9    Rectal mass K62.9    Rectal bleeding K62.5       Past Medical History:        Diagnosis Date    Acute renal failure ([facility])     Acute respiratory failure with hypoxia ([facility])     Angina pectoris ([facility])     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[facility])     CAD (coronary artery disease)     Cardiac arrest ([facility]     Cardiac arrest ([facility]     Chronic systolic heart failure ([facility])     Diabetes mellitus ([address]     Diabetes mellitus ([facility] 10/9/2012    Diabetes mellitus type II, uncontrolled ([address]     4/1017 A1c = 13    DKA (diabetic ketoacidoses) ([address] 04/2017    Elevated LFTs     Femoral neck fracture ([address] 10/17/2017    HTN (hypertension) 10/17/2017    Hyperkalemia     Hyperlipidemia     Hypertension     PAF (paroxysmal atrial fibrillation) ([facility])     Paroxysmal A-fib ([facility])     PEA (Pulseless electrical activity) ([address] 04/13/2017    Pneumonia 04/2017    LIKELY PNEUMOCOCCAL    Pneumonia due to organism     Pneumonia of right lower lobe due to Streptococcus pneumoniae ([facility])     Protein-calorie malnutrition, severe ([address] 04/2017    BMI = 19    S/P hip hemiarthroplasty 12/29/2017    Septic shock (HCC)     Systolic CHF (HCC)     EF 30%    Thrombocytopenia (HCC)     Thrombocytopenia (HCC)     Type 2 diabetes mellitus with hyperglycemia ([facility] 2016       Past Surgical History:        Procedure Laterality Date    ANUS SURGERY N/A 2019    EXAM UNDER ANESTHESIA, RECTAL BIOPSY X 2 performed by Joni Peoples MD at [facility] 2019    COLONOSCOPY performed by Chuy Valente MD at [address] N/A 2018    GASTRIC TUBE REMOVAL performed by Negro Motta MD at [facility]      G-tube placement    JOINT REPLACEMENT      hip    UPPER GASTROINTESTINAL ENDOSCOPY N/A 2019    EGD BIOPSY performed by Chuy Valente MD at [address] [facility] History:    Social History     Tobacco Use    Smoking status: Former Smoker     Last attempt to quit: 2018     Years since quittin.2    Smokeless tobacco: Never Used   Substance Use Topics    Alcohol use: No                                Counseling given: Not Answered      Vital Signs (Current):   Vitals:    19 1220 19 1325 19 1349 19 1350   BP:       Pulse:       Resp:       Temp: 92.1 °F (33.4 °C) 92.3 °F (33.5 °C) (!) 91.6 °F (33.1 °C) 96 °F (35.6 °C)   TempSrc: Rectal Rectal Rectal Temporal   SpO2:       Weight:       Height:                                                  BP Readings from Last 3 Encounters:   19 128/81   19 100/66   19 130/88       NPO Status: Time of last liquid consumption: 0000                        Time of last solid consumption: 1200                        Date of last liquid consumption: 19                        Date of last solid food consumption: 19    BMI:   Wt Readings from Last 3 Encounters:   19 116 lb 6.5 oz (52.8 kg)   19 102 lb 9.6 oz (46.5 kg)   19 113 lb 1.5 oz (51.3 kg)     Body mass index is 19.37 kg/m².     CBC:   Lab Results Component Value Date    WBC 4.3 04/05/2019    RBC 2.53 04/05/2019    HGB 7.8 04/05/2019    HCT 22.7 04/05/2019    MCV 89.9 04/05/2019    RDW 15.1 04/05/2019    PLT 81 04/05/2019       CMP:   Lab Results   Component Value Date     04/05/2019    K 4.8 04/05/2019    K 5.3 02/27/2019     04/05/2019    CO2 18 04/05/2019    BUN 21 04/05/2019    CREATININE 2.5 04/05/2019    GFRAA 31 04/05/2019    AGRATIO 1.0 01/18/2019    LABGLOM 25 04/05/2019    GLUCOSE 109 04/05/2019    PROT 4.7 03/26/2019    CALCIUM 7.9 04/05/2019    BILITOT <0.2 03/26/2019    ALKPHOS 169 03/26/2019    AST 29 03/26/2019     03/26/2019       POC Tests:   Recent Labs     04/05/19  0725   POCGLU 108*       Coags:   Lab Results   Component Value Date    PROTIME 12.6 04/05/2019    INR 1.11 04/05/2019    APTT 37.1 03/29/2019       HCG (If Applicable): No results found for: PREGTESTUR, PREGSERUM, HCG, HCGQUANT     ABGs:   Lab Results   Component Value Date    PHART 7.308 03/26/2019    PO2ART 42.4 03/26/2019    CUI1BIF 34.0 03/26/2019    KTG9SKB 17.0 03/26/2019    BEART -9 03/26/2019    U7ZTQPKW 74 03/26/2019        Type & Screen (If Applicable):  No results found for: [facility]    Anesthesia Evaluation  Patient summary reviewed and Nursing notes reviewed no history of anesthetic complications:   Airway: Mallampati: II  TM distance: >3 FB   Neck ROM: full  Mouth opening: > = 3 FB Dental:    (+) edentulous      Pulmonary:   (+) shortness of breath:                             Cardiovascular:    (+) hypertension:, angina:, CAD:, CHF:,                   Neuro/Psych:   (+) CVA:,             GI/Hepatic/Renal:             Endo/Other:    (+) DiabetesType II DM, , hypothyroidism::., .                 Abdominal:           Vascular:                                        Anesthesia Plan      general     ASA 1    (75-year-old male presents for EGD with PEG tube insertion. Plan general anesthesia with ASA standard monitors. Questions answered. Patient agreeable with anesthetic plan.  )  Induction: intravenous. Anesthetic plan and risks discussed with patient. Plan discussed with CRNA.     Attending anesthesiologist reviewed and agrees with Pre Eval content        Mikayla Kang MD   4/5/2019

## 2019-04-05 NOTE — PROGRESS NOTES
Pneumonia due to organism, Pneumonia of right lower lobe due to Streptococcus pneumoniae ([facility] Protein-calorie malnutrition, severe ([address] S/P hip hemiarthroplasty, Septic shock ([address] Systolic CHF ([address] Thrombocytopenia ([address] Thrombocytopenia ([address] and Type 2 diabetes mellitus with hyperglycemia ([facility] has a past surgical history that includes Gastrostomy tube placement; joint replacement; gastrostomy tube change (N/A, 12/14/2018); Colonoscopy (N/A, 4/1/2019); Upper gastrointestinal endoscopy (N/A, 4/1/2019); and Anus surgery (N/A, 4/2/2019). Restrictions  Position Activity Restriction  Other position/activity restrictions: up with assist  Subjective   General  Chart Reviewed: Yes  Additional Pertinent Hx: 65 yo admitted to ICU 3/26/19 for sepsis; txfer to floor 4/3/19. 4/1 EGD/colonoscopy positive for severe gastritis/ulcers; PEG planned 4/5/19. Pmhx:  DM, THR, CHF, afib, ARF. Family / Caregiver Present: No  Subjective  Subjective: Patient supine in bed upon arrival, sleeping but easily arounsed. Patient agreeable to PT treatment. Pain Screening  Patient Currently in Pain: Denies  Vital Signs  Patient Currently in Pain: Denies       Orientation  Orientation  Overall Orientation Status: Impaired(oriented to \"hospital\")  Orientation Level: Oriented to person  Cognition   Cognition  Overall Cognitive Status: Exceptions  Arousal/Alertness: Appropriate responses to stimuli  Following Commands: Follows one step commands with increased time; Follows one step commands with repetition  Attention Span: Appears intact  Memory: Decreased recall of biographical Information;Decreased short term memory  Safety Judgement: Decreased awareness of need for assistance;Decreased awareness of need for safety  Insights: Decreased awareness of deficits  Initiation: Requires cues for some  Sequencing: Requires cues for some  Objective   Bed mobility  Supine to Sit: Stand by assistance(head of bed elevated, use of bedrail, limited by endurance     OutComes Score                                                  AM-PAC Score  AM-PAC Inpatient Mobility Raw Score : 16  AM-PAC Inpatient T-Scale Score : 40.78  Mobility Inpatient CMS 0-100% Score: 54.16  Mobility Inpatient CMS G-Code Modifier : CK          Goals  Short term goals  Time Frame for Short term goals: discharge  Short term goal 1: sit to/from supine supervision - ongoing 4/5  Short term goal 2: sit to/from stand supervision - ongoing 4/5  Short term goal 3: ambulate 50 ft with rolling walker supervision - ongoing 4/5  Patient Goals   Patient goals : eventual return home    Plan    Plan  Times per week: 2-5  Current Treatment Recommendations: Functional Mobility Training, Gait Training, Endurance Training, Transfer Training  Safety Devices  Type of devices: Nurse notified, Call light within reach, Chair alarm in place, Left in chair(LEs reclined)     Therapy Time   Individual Concurrent Group Co-treatment   Time In 1003         Time Out 1028         Minutes 25         Timed Code Treatment Minutes: 25 Minutes     If patient is discharged from the hospital prior to next treatment session, this note will serve as the discharge summary.    [address]

## 2019-04-05 NOTE — PROGRESS NOTES
Internal Medicine PGY-1 Resident Progress Note        PCP: Ruth Bowling MD    Date of Admission: 3/26/2019    Chief Complaint: Abnormal Labs     Subjective: NAEO. Sleeping comfortably this AM, awaiting PEG today. Patient has been noted to have hypothermic readings with some on 6 S and some on PCU rectal temps have been fluctuating based on probes, when rechecked with different machine, is always higher than 95F. Medications:  Reviewed    Infusion Medications    dextrose 5 % and 0.45 % NaCl 50 mL/hr at 04/05/19 0823    dextrose       Scheduled Medications    insulin lispro  0-6 Units Subcutaneous TID WC    insulin lispro  0-3 Units Subcutaneous Nightly    ampicillin-sulbactam  3 g Intravenous On Call    carvedilol  6.25 mg Oral BID WC    tamsulosin  0.4 mg Oral Dinner    pantoprazole  40 mg Intravenous BID    mupirocin   Topical Daily    levothyroxine  37.5 mcg Intravenous Daily    And    sodium chloride (PF)  5 mL Intravenous Daily    sodium chloride flush  10 mL Intravenous 2 times per day     PRN Meds: acetaminophen, glucose, dextrose, glucagon (rDNA), dextrose      Intake/Output Summary (Last 24 hours) at 4/5/2019 0954  Last data filed at 4/5/2019 0823  Gross per 24 hour   Intake 2055 ml   Output 1550 ml   Net 505 ml       Physical Exam Performed:    BP (!) 164/77   Pulse 67   Temp 95.7 °F (35.4 °C) (Temporal)   Resp 14   Ht 5' 5\" (1.651 m)   Wt 116 lb 6.5 oz (52.8 kg)   SpO2 100%   BMI 19.37 kg/m²     General appearance: Thin appearing AA M, No apparent distress, appears stated age and cooperative. HEENT: Pupils equal, round, and reactive to light. Conjunctivae/corneas clear. Neck: Supple, with full range of motion. No jugular venous distention. Trachea midline. Respiratory:  Normal respiratory effort. Decreased lung sounds but grossly clear to auscultation, bilaterally without Rales/Wheezes/Rhonchi.   Cardiovascular: Regular rate and rhythm with normal S1/S2 without murmurs, rubs or gallops. Abdomen: Soft, non-tender, non-distended with normal bowel sounds. Musculoskeletal: No clubbing, cyanosis or edema bilaterally. Full range of motion without deformity. Skin: Skin color, texture, turgor normal.  No rashes or lesions. Neurologic:  Neurovascularly intact without any focal sensory/motor deficits. Decreased strength on R side, Cranial nerves: II-XII intact, grossly non-focal.  Psychiatric: Alert and oriented x 3, thought content appropriate, normal insight  Capillary Refill: Brisk,< 3 seconds   Peripheral Pulses: +2 palpable, equal bilaterally       Labs:   Recent Labs     04/03/19 0359 04/04/19 0450 04/05/19 0430   WBC 5.5 5.2 4.3   HGB 7.2* 7.6* 7.8*   HCT 21.4* 22.4* 22.7*   PLT 74* 85* 81*     Recent Labs     04/03/19 0359 04/04/19 0450 04/05/19  0430    139 139   K 4.4 4.7 4.8   * 113* 111*   CO2 18* 19* 18*   BUN 24* 23* 21*   CREATININE 2.3* 2.4* 2.5*   CALCIUM 7.4* 7.8* 7.9*   PHOS 3.3 3.1 3.1       Recent Labs     04/04/19 0450 04/05/19  0430   INR 1.20* 1.11     Urinalysis:      Lab Results   Component Value Date    NITRU Negative 03/26/2019    WBCUA 6-10 03/26/2019    BACTERIA 2+ 03/26/2019    RBCUA 0-2 03/26/2019    BLOODU Negative 03/26/2019    SPECGRAV 1.025 03/26/2019    GLUCOSEU Negative 03/26/2019       Radiology:  XR ABDOMEN (KUB) (SINGLE AP VIEW)   Final Result      NG tube appears to project in the fundus of the stomach. FL MODIFIED BARIUM SWALLOW W VIDEO   Final Result      Silent aspiration with honey, puree and thin consistencies. No definite penetration or aspiration with nectar. XR CHEST PORTABLE   Final Result   Impression: Patchy infiltrate of the right lung base is increased in the interval. Stable blunting of the right costophrenic angle. XR CHEST PORTABLE   Final Result     Right IJ central line in the SVC. No pneumothorax.           XR ABDOMEN (KUB) (SINGLE AP VIEW)   Final Result      Feeding tube with tip possibly within the right mainstem bronchus. The tube should be removed and replaced      Findings called as a critical result to the floor by Dr. Alcon Menchaca at the time of the dictation 9:33 PM 3/26/2019. CT Head WO Contrast   Final Result      1. No acute stroke, mass, or hemorrhage. 2. Remote infarcts in the bilateral cerebellar and cerebral hemispheres as above. 3. Moderate chronic small vessel ischemic white matter disease. 4. Mild diffuse atrophy. 5. Moderate right mastoid effusion. XR CHEST PORTABLE   Final Result      Right lower lung opacity present previously improved likely relates to improvement of the atelectasis and/or pneumonitis. Right lower lobe bronchiectasis present previously. Assessment/Plan:    Amie Kirby is a 66 y.o. male PMH ischemic MCA stroke, CKD, FTT, presents from NH with electrolyte imbalances, found to be hypothermic, hypernatremic, hyperkalemic with SANDY on CKD. Hypotension. Concern for sepsis, volume depletion secondary to poor oral intake. Active Hospital Problems    Diagnosis Date Noted    Rectal mass [K62.9]     Rectal bleeding [K62.5]     Severe malnutrition ([facility] [E43] 03/27/2019    Acquired hypothyroidism [E03.9] 03/27/2019    Altered mental status [R41.82]     Oropharyngeal dysphagia [R13.12]     Weakness [R53.1]     SOB (shortness of breath) [R06.02]     Goals of care, counseling/discussion [Z71.89]     DNR (do not resuscitate) discussion [Z71.89]     Encounter for palliative care [Z51.5]     possible sepsis [A41.9] 03/26/2019    Hypothermia [T68. XXXA] 03/26/2019    SANDY (acute kidney injury) ([facility] [N17.9] 42/56/9535    Metabolic acidosis [Y23.8] 03/26/2019    Acute metabolic encephalopathy [G83.16] 03/26/2019    Acute renal failure (HCC) [N17.9]         Anemia (POA)  Likely 2/2 hypovolemia and hypothermia 2/2 dec PO intake, FOBT+ w/ decreased hemoglobin, EGD and colonoscopy revealed gastric ulcers, duodenitis, neg tissue biopsy on EUA w/ colorectal for adenoma    -GI consulted-PEG today  -Colorectal surgery consulted   -cont D51/2 NS @ 50  -Protonix BID   -H/H daily     Hypothermia  Has had mixed readings since transferred out of ICU, repeats have always been above 95F. Rectal temps have the most mixed readings.   -cont to monitor     Paroxysmal Afib   NSR  -holding Heparin drip in setting of FOBT+  - restart Coreg    HTN  -hold Lisinopril and Amlodipine      CHFpEF  - restart coreg   -hold Lisinopril, Amlodipine   - Strict I/O, daily wts     CAD s/p cardiac arrest  - hold aspirin and heparin gtt in setting of FOBT+   - hold Plavix     SANDY on CKD (baseline Cr approx. 2)   Prerenal vs ATN, was seeing improvement with hydration creatine more stable overnight , UA didn't show any casts on admission, renal US (wnl last admission)  -Nephrology consulted   - IVF to 50 ml/hr D5 with 1/2 normal saline      IDDMII (glood glucose control)  - hold home metformin 500 BID  -LDSSI in setting of hypoglycemia and hypothermia   - Hypoglycemia protocol     Hypothroidism  - elevated TSH   - IV levo increased to 37.5     Oral Dysphagia 2/2 CVA w/ sev protein calorie malnutrition 2/2 stroke   -PC consulted  -PEG w/ GI today      DVT Prophylaxis: SCDs  Diet: Dietary Nutrition Supplements: Frozen Oral Supplement  Dietary Nutrition Supplements: Standard High Calorie Oral Supplement  Diet NPO, After Midnight  Code Status: Full Code    PT/OT Eval Status: 16/24, 15/24    Dispo - [facility]     Estela Gonzalez MD    I will discuss the patient with the senior resident and MD Estela Chan M.D.   Internal Medicine PGY-1  Pager: [phone]

## 2019-04-05 NOTE — PROGRESS NOTES
GI:    Rectal biopsy showed an adenoma.  Needs FU FFS if not completely removed by Dr Yo Garcia  If so laser ablation can be done as op    BARBARA ny m.d.  4-5-19

## 2019-04-05 NOTE — PROGRESS NOTES
Abdomen soft around PEG tube. Dressing is clean,dry and intact. Patient denies pain at this time. Report called to [facility] on PCU.

## 2019-04-05 NOTE — PROGRESS NOTES
Paged resident MD for luigi marquez parameters. No parameters in chart.    Electronically signed by Diana Partida RN on 4/5/2019 at 11:28 AM

## 2019-04-05 NOTE — PROGRESS NOTES
luigi marquez remains in place, pt's rectal temp is 91.6 but temporal 96. Updated MD.  Electronically signed by Marcos Vega RN on 4/5/2019 at 1:53 PM

## 2019-04-05 NOTE — PLAN OF CARE
Problem: Respiratory:  Goal: Absence of aspiration  Description  Absence of aspiration  Outcome: Ongoing  Note:   Pt showed no s/s of aspiration this shift. Problem: Nutrition  Goal: Optimal nutrition therapy  4/5/2019 0002 by Stuart Carroll RN  Outcome: Not Met This Shift  Note:   Pt with decreased PO intake this shift. D5 .45% NS administered at 50 ml/hr this shift to aid in nutritional status.

## 2019-04-05 NOTE — PROGRESS NOTES
Pt off floor to [facility], signed consent on chart, MD aware of temps.   Electronically signed by Alis Corey RN on 4/5/2019 at 3:26 PM

## 2019-04-05 NOTE — PROGRESS NOTES
Nutrition Assessment     Type and Reason for Visit: Reassess    Nutrition Recommendations:     **NPO- monitor ability to restart PO diet, recommend full nectar thick liquids per SLP recommendations. Monitor ability to start EN following PEG placement, recommendations provided below. ENTERAL NUTRITION  1. Recommend order \"Diet: Tube feed continuous/ with diet\". Initiate Jevity 1.2 formula at 20 mL/hr and as tolerated, increase by 20 mL/hr q 4 hours until goal of 60 mL/hr is met per PEG. 2. Suggest 100 mL H20 q 4 hours for 100% fluid recommendations when no IVF. 3. Monitor closely and correct lytes (K, Phos, Mg) before progressing TF to goal d/t risk of refeeding syndrome (hx extended inadequate nutritional intake). 4. Monitor BG and insulin regimen. 5. Monitor for tolerance (bowel habits, N/V, cramping, abdominal distention). 6. Monitor for need to convert continuous feeding to nocturnal / prn with diet to encourage po intake, lifestyle. Nutrition Assessment: Patient remains nutritionally compromised as he neets ASPEN criteria for severe PCM and has had minimal po intake since admission. SLP continues to follow patient and recommedns thickened liquids as only safe texture for patient to consume. Patient is refusing meals on opnly consuming sips of liquids even with much encouragement. Patient is currently NPO as he has agreed to PEG placement today. Will provide EN recommendations and continue to monitor nutritional status. Malnutrition Assessment:  · Malnutrition Status: Meets the criteria for severe malnutrition  · Context: Chronic illness  · Findings of the 6 clinical characteristics of malnutrition (Minimum of 2 out of 6 clinical characteristics is required to make the diagnosis of moderate or severe Protein Calorie Malnutrition based on AND/ASPEN Guidelines):  1. Energy Intake-Less than or equal to 75% of estimated energy requirement, Greater than or equal to 1 month    2.  Weight Loss-10% loss or greater, in 3 months  3. Fat Loss-Severe subcutaneous fat loss,    4. Muscle Loss-Severe muscle mass loss,    5. Fluid Accumulation-No significant fluid accumulation,    6.  Strength-Not measured    Nutrition Risk Level: High    Nutrition Needs:  · Estimated Daily Total Kcal: 0505-7006  · Estimated Daily Protein (g): 66-88  · Estimated Daily Fluid (ml/day): 1540 ml     Nutrition Diagnosis:   · Problem: Severe malnutrition  · Etiology: related to Insufficient energy/nutrient consumption     Signs and symptoms:  as evidenced by Diet history of poor intake, Weight loss greater than or equal to 7.5% in 3 months, Severe loss of subcutaneous fat, Severe muscle loss    Objective Information:  · Nutrition-Focused Physical Findings: LBM 4/1  · Wound Type: Surgical Wound  · Current Nutrition Therapies:  · Oral Diet Orders: NPO   · Oral Diet intake: NPO  · Oral Nutrition Supplement (ONS) Orders: None  · ONS intake: NPO  · Tube Feeding (TF) Orders:   · Goal TF & Flush Orders Provides: Jevity 1.2 at goal rate of 60 ml/hr will provide 1440 ml TV, 1728 kcal, 80 gm protein, and 1162 ml free water.  Water flush 100 ml Q4 hrs  · Additional Calories: N/A  · Anthropometric Measures:  · Ht: 5' 5\" (165.1 cm)   · Current Body Wt: 116 lb (52.6 kg)(+20 L since admission)  · Admission Body Wt: 89 lb (40.4 kg)  · Weight Change: 5% loss ~1 month, 19% ~3 months   · Ideal Body Wt: 136 lb (61.7 kg),    · BMI Classification: BMI 18.5 - 24.9 Normal Weight    Nutrition Interventions:   Continue NPO, Start Tube Feeding(Pending PEG placement)  Continued Inpatient Monitoring    Nutrition Evaluation:   · Evaluation: Progressing toward goals   · Goals: Pt will tolerate appropriate form of nutrition to meet 100% of nutrient needs   · Monitoring: Nutrition Progression, Diet Tolerance, TF Tolerance, Weight, Pertinent Labs, Monitor Bowel Function      Electronically signed by Savanna Curtis RD, LD on 4/5/19 at 9:39 AM    Contact Number: [phone]

## 2019-04-06 ENCOUNTER — APPOINTMENT (OUTPATIENT)
Dept: GENERAL RADIOLOGY | Age: 74
DRG: 987 | End: 2019-04-06
Payer: MEDICARE

## 2019-04-06 LAB
ALBUMIN SERPL-MCNC: 1.8 G/DL (ref 3.4–5)
ANION GAP SERPL CALCULATED.3IONS-SCNC: 9 MMOL/L (ref 3–16)
BASOPHILS ABSOLUTE: 0 K/UL (ref 0–0.2)
BASOPHILS RELATIVE PERCENT: 0.2 %
BUN BLDV-MCNC: 19 MG/DL (ref 7–20)
CALCIUM SERPL-MCNC: 7.8 MG/DL (ref 8.3–10.6)
CHLORIDE BLD-SCNC: 110 MMOL/L (ref 99–110)
CO2: 19 MMOL/L (ref 21–32)
CREAT SERPL-MCNC: 2.3 MG/DL (ref 0.8–1.3)
EOSINOPHILS ABSOLUTE: 0.1 K/UL (ref 0–0.6)
EOSINOPHILS RELATIVE PERCENT: 0.9 %
GFR AFRICAN AMERICAN: 34
GFR NON-AFRICAN AMERICAN: 28
GLUCOSE BLD-MCNC: 107 MG/DL (ref 70–99)
GLUCOSE BLD-MCNC: 115 MG/DL (ref 70–99)
GLUCOSE BLD-MCNC: 134 MG/DL (ref 70–99)
GLUCOSE BLD-MCNC: 169 MG/DL (ref 70–99)
GLUCOSE BLD-MCNC: 194 MG/DL (ref 70–99)
HCT VFR BLD CALC: 21.6 % (ref 40.5–52.5)
HEMOGLOBIN: 7.4 G/DL (ref 13.5–17.5)
LYMPHOCYTES ABSOLUTE: 0.2 K/UL (ref 1–5.1)
LYMPHOCYTES RELATIVE PERCENT: 3.4 %
MAGNESIUM: 1.7 MG/DL (ref 1.8–2.4)
MCH RBC QN AUTO: 30.7 PG (ref 26–34)
MCHC RBC AUTO-ENTMCNC: 34.3 G/DL (ref 31–36)
MCV RBC AUTO: 89.4 FL (ref 80–100)
MONOCYTES ABSOLUTE: 0.2 K/UL (ref 0–1.3)
MONOCYTES RELATIVE PERCENT: 2.6 %
NEUTROPHILS ABSOLUTE: 5.6 K/UL (ref 1.7–7.7)
NEUTROPHILS RELATIVE PERCENT: 92.9 %
PDW BLD-RTO: 15.6 % (ref 12.4–15.4)
PERFORMED ON: ABNORMAL
PHOSPHORUS: 3 MG/DL (ref 2.5–4.9)
PLATELET # BLD: 88 K/UL (ref 135–450)
PMV BLD AUTO: 8.9 FL (ref 5–10.5)
POTASSIUM SERPL-SCNC: 4.6 MMOL/L (ref 3.5–5.1)
RBC # BLD: 2.42 M/UL (ref 4.2–5.9)
SODIUM BLD-SCNC: 138 MMOL/L (ref 136–145)
WBC # BLD: 6.1 K/UL (ref 4–11)

## 2019-04-06 PROCEDURE — 6370000000 HC RX 637 (ALT 250 FOR IP): Performed by: STUDENT IN AN ORGANIZED HEALTH CARE EDUCATION/TRAINING PROGRAM

## 2019-04-06 PROCEDURE — 83735 ASSAY OF MAGNESIUM: CPT

## 2019-04-06 PROCEDURE — 2060000000 HC ICU INTERMEDIATE R&B

## 2019-04-06 PROCEDURE — 80069 RENAL FUNCTION PANEL: CPT

## 2019-04-06 PROCEDURE — 2580000003 HC RX 258: Performed by: STUDENT IN AN ORGANIZED HEALTH CARE EDUCATION/TRAINING PROGRAM

## 2019-04-06 PROCEDURE — 36592 COLLECT BLOOD FROM PICC: CPT

## 2019-04-06 PROCEDURE — 6360000002 HC RX W HCPCS: Performed by: STUDENT IN AN ORGANIZED HEALTH CARE EDUCATION/TRAINING PROGRAM

## 2019-04-06 PROCEDURE — 74018 RADEX ABDOMEN 1 VIEW: CPT

## 2019-04-06 PROCEDURE — 85025 COMPLETE CBC W/AUTO DIFF WBC: CPT

## 2019-04-06 PROCEDURE — C9113 INJ PANTOPRAZOLE SODIUM, VIA: HCPCS | Performed by: STUDENT IN AN ORGANIZED HEALTH CARE EDUCATION/TRAINING PROGRAM

## 2019-04-06 RX ORDER — MAGNESIUM SULFATE 1 G/100ML
1 INJECTION INTRAVENOUS ONCE
Status: COMPLETED | OUTPATIENT
Start: 2019-04-06 | End: 2019-04-06

## 2019-04-06 RX ADMIN — CARVEDILOL 6.25 MG: 6.25 TABLET, FILM COATED ORAL at 10:37

## 2019-04-06 RX ADMIN — Medication 10 ML: at 10:38

## 2019-04-06 RX ADMIN — TAMSULOSIN HYDROCHLORIDE 0.4 MG: 0.4 CAPSULE ORAL at 19:58

## 2019-04-06 RX ADMIN — PANTOPRAZOLE SODIUM 40 MG: 40 INJECTION, POWDER, FOR SOLUTION INTRAVENOUS at 20:35

## 2019-04-06 RX ADMIN — CARVEDILOL 6.25 MG: 6.25 TABLET, FILM COATED ORAL at 19:57

## 2019-04-06 RX ADMIN — MAGNESIUM SULFATE HEPTAHYDRATE 1 G: 1 INJECTION, SOLUTION INTRAVENOUS at 10:37

## 2019-04-06 RX ADMIN — PANTOPRAZOLE SODIUM 40 MG: 40 INJECTION, POWDER, FOR SOLUTION INTRAVENOUS at 10:37

## 2019-04-06 RX ADMIN — INSULIN LISPRO 1 UNITS: 100 INJECTION, SOLUTION INTRAVENOUS; SUBCUTANEOUS at 21:53

## 2019-04-06 RX ADMIN — Medication 10 ML: at 20:38

## 2019-04-06 ASSESSMENT — PAIN SCALES - GENERAL
PAINLEVEL_OUTOF10: 0

## 2019-04-06 NOTE — PROGRESS NOTES
Progress Note  PGY-2    Hospital Day: 12                                                           Code:Full Code  Admit Date: 3/26/2019                                             PCP: Jaspal Mccurdy MD                                SUBJECTIVE:     Chief Complaint   Patient presents with    Fatigue       Interval Hx:     Hypothermic to 91.8 yesterday, up to 95 overnight w Olya cynthiagger. Got PEG yesterday, not getting TFs yet. Denies pain, n/v/d, SOB. Understands that he got a tube placed yesterday because he does not eat enough. MEDICATIONS:   Scheduled Meds:   magnesium sulfate  1 g Intravenous Once    insulin lispro  0-6 Units Subcutaneous TID WC    insulin lispro  0-3 Units Subcutaneous Nightly    carvedilol  6.25 mg Oral BID WC    tamsulosin  0.4 mg Oral Dinner    pantoprazole  40 mg Intravenous BID    mupirocin   Topical Daily    levothyroxine  37.5 mcg Intravenous Daily    And    sodium chloride (PF)  5 mL Intravenous Daily    sodium chloride flush  10 mL Intravenous 2 times per day      Continuous Infusions:   dextrose 5 % and 0.45 % NaCl 50 mL/hr at 04/05/19 1749    dextrose       PRN Meds:acetaminophen, glucose, dextrose, glucagon (rDNA), dextrose    Allergies: No Known Allergies    PHYSICAL EXAM:       Vitals: BP (!) 152/84   Pulse 70   Temp 94.7 °F (34.8 °C) (Rectal)   Resp 16   Ht 5' 5\" (1.651 m)   Wt 113 lb 8.6 oz (51.5 kg)   SpO2 100%   BMI 18.89 kg/m²     I/O:      Intake/Output Summary (Last 24 hours) at 4/6/2019 0937  Last data filed at 4/6/2019 0402  Gross per 24 hour   Intake 1702 ml   Output 1300 ml   Net 402 ml     No intake/output data recorded. I/O last 3 completed shifts: In: 1702 [P. O.:480; I.V.:1222]  Out: 1300 [Urine:1300]    Physical Examination:   General appearance: Thin appearing AA M, No apparent distress, appears stated age and cooperative. HEENT: Pupils equal, round, and reactive to light. Conjunctivae/corneas clear.   Neck: Supple, with full range of motion. No jugular venous distention. Trachea midline. Respiratory:  Normal respiratory effort. Decreased lung sounds but grossly clear to auscultation, bilaterally without Rales/Wheezes/Rhonchi. Cardiovascular: Regular rate and rhythm with normal S1/S2 without murmurs, rubs or gallops. Abdomen: Soft, non-tender, non-distended with normal bowel sounds. Abd wrapped. Musculoskeletal: No clubbing, cyanosis or edema bilaterally. Full range of motion without deformity. Skin: Skin color, texture, turgor normal.  No rashes or lesions. Neurologic:  Neurovascularly intact without any focal sensory/motor deficits. Decreased strength on R side, Cranial nerves: II-XII intact, grossly non-focal.  Psychiatric: Alert and oriented x 3, thought content appropriate, normal insight  Capillary Refill: Brisk,< 3 seconds   Peripheral Pulses: +2 palpable, equal bilaterally   · Lines: PIV, PEG    DATA:       Labs:  CBC:   Recent Labs     04/04/19 0450 04/05/19 0430 04/06/19  0517   WBC 5.2 4.3 6.1   HGB 7.6* 7.8* 7.4*   HCT 22.4* 22.7* 21.6*   PLT 85* 81* 88*       BMP:   Recent Labs     04/04/19 0450 04/05/19 0430 04/06/19  0516    139 138   K 4.7 4.8 4.6   * 111* 110   CO2 19* 18* 19*   BUN 23* 21* 19   CREATININE 2.4* 2.5* 2.3*   GLUCOSE 56* 109* 107*     LFT's: No results for input(s): AST, ALT, ALB, BILITOT, ALKPHOS in the last 72 hours. Troponin: No results for input(s): TROPONINI in the last 72 hours. BNP: No results for input(s): BNP in the last 72 hours. ABGs: No results for input(s): PHART, GSH1JDG, PO2ART in the last 72 hours. INR:   Recent Labs     04/04/19 0450 04/05/19 0430   INR 1.20* 1.11     Lipids: No results for input(s): CHOL, HDL in the last 72 hours.     Invalid input(s): LDLCALCU    U/A:No results for input(s): NITRITE, COLORU, PHUR, LABCAST, WBCUA, RBCUA, MUCUS, TRICHOMONAS, YEAST, BACTERIA, CLARITYU, SPECGRAV, LEUKOCYTESUR, UROBILINOGEN, BILIRUBINUR, BLOODU, GLUCOSEU, AMORPHOUS in the last 72 hours. Invalid input(s): Forrestine Holliei     Micro:   ASSESSMENT AND PLAN:   Saad Lehman a 66 y. o. male PMH ischemic MCA stroke, CKD, FTT, presents from [facility] with electrolyte imbalances, found to be hypothermic, hypernatremic, hyperkalemic with SANDY on CKD. Hypotension. Concern for sepsis, volume depletion secondary to poor oral intake. Anemia (POA)  Likely 2/2 dec PO intake given hypovolemia and hypothermia, FOBT+ w/ decreased hemoglobin, EGD and colonoscopy revealed gastric ulcers, duodenitis, neg tissue biopsy on EUA w/ colorectal for adenoma    -GI consulted-PEG 4/5, should be starting TFs soon   -Colorectal surgery consulted for adenoma  -cont D5 1/2 NS @ 50  -Protonix BID   -H/H daily      Hypothermia  Has had mixed readings since transferred out of ICU, repeats have always been above 95F. Rectal temps have the most mixed readings.   -cont to monitor      Paroxysmal Afib   NSR  -holding Heparin drip in setting of FOBT+  - cont Coreg     HTN  -hold Lisinopril and Amlodipine, will consider restarting amlodipine today given elevated BPs     HFpEF  - restart coreg   - hold Lisinopril, Amlodipine   - Strict I/O, daily wts     CAD s/p cardiac arrest  - hold aspirin and heparin gtt in setting of FOBT+   - hold Plavix      SANDY on CKD (baseline Cr approx.  2)   Prerenal vs ATN, was seeing improvement with hydration creatine more stable overnight , UA didn't show any casts on admission, renal US (wnl last admission)  -Nephrology consulted   - IVF 50 ml/hr D5 with 1/2 normal saline      IDDMII (glood glucose control)  - hold home metformin 500 BID  -LDSSI in setting of hypoglycemia and hypothermia   - Hypoglycemia protocol     Hypothroidism  - elevated TSH   - IV levo increased to 37.5 on 3/27, will continue     Oral Dysphagia 2/2 CVA w/ sev protein calorie malnutrition 2/2 stroke   -PC consulted  -PEG w/ GI done 4/5  - should be starting TFs soon       Code Status:Full Code  FEN: nectar thick clear liquid diet  PPX: SCDs  DISPO: BEATRIS SAMUELS back to [facility] when medically ready    Will discuss with attending physician,  Christine Rosales MD   -----------------------------  Reuben Moritz, M.D. PGY-2, 9:37 AM

## 2019-04-06 NOTE — PLAN OF CARE
Problem: Risk for Impaired Skin Integrity  Goal: Tissue integrity - skin and mucous membranes  Description  Structural intactness and normal physiological function of skin and  mucous membranes. 4/6/2019 0222 by Vitor Martinez RN  Outcome: Ongoing  Note:   Pt turning and repositioning appropriately this shift. Problem: Nutrition  Goal: Optimal nutrition therapy  Outcome: Not Met This Shift  Note:   Pt with decreased PO intake this shift.

## 2019-04-06 NOTE — ANESTHESIA POSTPROCEDURE EVALUATION
Department of Anesthesiology  Postprocedure Note    Patient: Zaina Elaine  MRN: [id number]  YOB: 1945  Date of evaluation: 4/5/2019  Time:  8:21 PM     Procedure Summary     Date:  04/05/19 Room / Location:  [facility] ENDO 03 / [facility] ENDOSCOPY    Anesthesia Start:  1603 Anesthesia Stop:  6849    Procedure:  EGD PEG TUBE PLACEMENT successful tube placement (N/A ) Diagnosis:  (PEG FAILURE)    Surgeon:  Gerda Higgins MD Responsible Provider:  Marquis Bello MD    Anesthesia Type:  general ASA Status:  3          Anesthesia Type: general    Bruna Phase I: Bruna Score: 9    Bruna Phase II: Bruna Score: 9    Last vitals: Reviewed and per EMR flowsheets.        Anesthesia Post Evaluation    Patient location during evaluation: PACU  Patient participation: complete - patient participated  Level of consciousness: awake and alert  Pain score: 0  Airway patency: patent  Nausea & Vomiting: no nausea and no vomiting  Complications: no  Cardiovascular status: hemodynamically stable  Respiratory status: acceptable  Hydration status: euvolemic

## 2019-04-06 NOTE — PROGRESS NOTES
Nephrology Progress Note  [phone]   [email or web address]    Patient:  Pamela Darling   : 1945    CC:  SANDY   This is a patient with significant past medical history of SANDY,CKD prior CVA ,dysphagia with recent admission here for SANDY and Pneumonia , Cr was 2.4 at discharge  who presents with volume depletion very poor po intake and SANDY, Cr >5, hypernatremia           Subjective:  Had urine retention and required Salmon placement 19   Started Flomax   Oral intake remains poor  PEG placed - per Gi notes ok to use      ROS:   No dyspnea, no pain . Sitting up    SHx:  No visitors today. Meds:  Reviewed     Vitals:  /70   Pulse 77   Temp 97.1 °F (36.2 °C) (Tympanic)   Resp 16   Ht 5' 5\" (1.651 m)   Wt 113 lb 8.6 oz (51.5 kg)   SpO2 95%   BMI 18.89 kg/m²     Physical Exam:  Gen: Resting in bed, NAD. HEENT: +pallor  CV: RRR no rub noted   Lungs: good respiratory effort and clear air entry   Abd: S/NT +BS. Taj [place] in place. Salmon in place  Ext: No edema, no cyanosis  Skin: Warm. No rashes appreciated. Labs:  CBC:   Lab Results   Component Value Date    WBC 6.1 2019    RBC 2.42 2019    HGB 7.4 2019    HCT 21.6 2019    MCV 89.4 2019    MCH 30.7 2019    MCHC 34.3 2019    RDW 15.6 2019    PLT 88 2019    MPV 8.9 2019     BMP:    Lab Results   Component Value Date     2019    K 4.6 2019    K 5.3 2019     2019    CO2 19 2019    BUN 19 2019    LABALBU 1.8 2019    CREATININE 2.3 2019    CALCIUM 7.8 2019    GFRAA 34 2019    LABGLOM 28 2019    GLUCOSE 107 2019       Assessment/Plan:  1. SANDY /CKD due to volume depletion better. Cr improved and stable at 2.3 mg likely at  his baseline level, stop IVF  2. Hypernatremia resolved. Na+ 138 meq  3. Hypokalemia/Hypophosphatemia corrected  4. PAF on AC   5. DM2 monitor   6.  Severe Gastric gastric ulcers and severe duodenitis, erythematous rectum concerning for superficial adenoma Had EUA and flex sig    7. U Retention Started Flomax. D/c link for voiding trial   8.  Dysphagia - PEG placed      Karrie Harkins MD

## 2019-04-06 NOTE — PROGRESS NOTES
GI Progress Note    Jhoan Agudelo is a 66 y.o. male patient. 1. Hypothermia, initial encounter    2. Acute renal failure, unspecified acute renal failure type ([facility]    3. Sepsis, due [facility] unspecified [facility]    4. Gi bleed  5. S/p peg    Admit Date: 3/26/2019    Subjective:        Tolerated peg procedure   No significant abdominal pain  No signs of further gi bleed  h and h stable      ROS:  Cardiovascular ROS: no chest pain or dyspnea on exertio stools  Respiratory ROS: no cough, shortness of breath, or wheezing    Scheduled Meds:   insulin lispro  0-6 Units Subcutaneous TID WC    insulin lispro  0-3 Units Subcutaneous Nightly    carvedilol  6.25 mg Oral BID WC    tamsulosin  0.4 mg Oral Dinner    pantoprazole  40 mg Intravenous BID    mupirocin   Topical Daily    levothyroxine  37.5 mcg Intravenous Daily    And    sodium chloride (PF)  5 mL Intravenous Daily    sodium chloride flush  10 mL Intravenous 2 times per day       Continuous Infusions:   dextrose 5 % and 0.45 % NaCl 50 mL/hr at 04/05/19 1749    dextrose         PRN Meds:  acetaminophen, glucose, dextrose, glucagon (rDNA), dextrose      Objective:       Patient Vitals for the past 24 hrs:   BP Temp Temp src Pulse Resp SpO2 Weight   04/06/19 1032 -- 97.1 °F (36.2 °C) Tympanic -- -- -- --   04/06/19 1009 115/70 97.5 °F (36.4 °C) Oral 77 16 95 % --   04/06/19 0448 -- -- -- -- -- -- 113 lb 8.6 oz (51.5 kg)   04/06/19 0403 -- 94.7 °F (34.8 °C) Rectal -- -- -- --   04/06/19 0402 (!) 152/84 96.4 °F (35.8 °C) Temporal 70 16 100 % --   04/05/19 2341 (!) 158/86 95.7 °F (35.4 °C) Temporal 69 18 100 % --   04/05/19 2024 (!) 149/72 95 °F (35 °C) Temporal 64 16 100 % --   04/05/19 1730 (!) 149/95 94.7 °F (34.8 °C) Temporal 60 18 99 % --   04/05/19 1717 122/67 -- -- 59 20 100 % --   04/05/19 1705 125/68 -- -- 56 18 98 % --   04/05/19 1658 132/70 -- -- 56 20 100 % --   04/05/19 1646 (!) 126/58 -- -- 56 18 98 % --   04/05/19 1350 -- 96 °F (35.6 °C) Temporal -- -- -- --   19 1349 -- (!) 91.6 °F (33.1 °C) Rectal -- -- -- --   19 1325 -- 92.3 °F (33.5 °C) Rectal -- -- -- --   19 1220 -- 92.1 °F (33.4 °C) Rectal -- -- -- --       Exam:    VITALS:  /70   Pulse 77   Temp 97.1 °F (36.2 °C) (Tympanic)   Resp 16   Ht 5' 5\" (1.651 m)   Wt 113 lb 8.6 oz (51.5 kg)   SpO2 95%   BMI 18.89 kg/m²   TEMPERATURE:  Current - Temp: 97.1 °F (36.2 °C); Max - Temp  Av.8 °F (34.9 °C)  Min: 91.6 °F (33.1 °C)  Max: 97.5 °F (36.4 °C)    NAD  Head: Normocephalic, without obvious abnormality, atraumatic  Neck: supple, symmetrical, trachea midline and thyroid not enlarged, symmetric, no tenderness/mass/nodules  CVS:  RRR, Nl s1s2  Lungs CTA Bilaterally, normal effort  Abdomen: soft, non-tender; bowel sounds normal; no masses,  no organomegaly. PEG tube in good position  Extremities: No edema. Lab Data:  Recent Labs     19  0430 19  0517   WBC 5.2 4.3 6.1   HGB 7.6* 7.8* 7.4*   HCT 22.4* 22.7* 21.6*   MCV 89.4 89.9 89.4   PLT 85* 81* 88*     Recent Labs     19  0430 19  0516    139 138   K 4.7 4.8 4.6   * 111* 110   CO2 19* 18* 19*   PHOS 3.1 3.1 3.0   BUN 23* 21* 19   CREATININE 2.4* 2.5* 2.3*     No results for input(s): AST, ALT, ALB, BILIDIR, BILITOT, ALKPHOS in the last 72 hours. No results for input(s): LIPASE, AMYLASE in the last 72 hours. Recent Labs     19  0450 19  0430   INR 1.20* 1.11     No results for input(s): PTT in the last 72 hours. No results for input(s): OCCULTBLD in the last 72 hours.     Assessment:       Principal Problem:    Hypothermia  Active Problems:    Acute renal failure (HCC)    possible sepsis    SANDY (acute kidney injury) (HCC)    Metabolic acidosis    Acute metabolic encephalopathy    Altered mental status    Oropharyngeal dysphagia    Weakness    SOB (shortness of breath)    Goals of care, counseling/discussion    DNR (do not resuscitate)

## 2019-04-06 NOTE — PLAN OF CARE
Larry skin assessment completed. Pt's skin remains intact. Repositioned every 2 hours. Sacral heart and barrier cream applied. Heels elevated off mattress. Will continue to monitor for skin integrity impairment. Free from falls at this time. All precautions remain in place. Dome light illuminated for fall risk. Call light and assistive devices within reach. Free from pain at this time. Aware of availability of meds and knowledge of pain scale. Consult for dietician placed by MD. Assisted patient with placing meal orders to increase po intake. Will continue to document percentage of meals and strict I&Os. Will monitor.  Heather Ramos

## 2019-04-07 VITALS
SYSTOLIC BLOOD PRESSURE: 148 MMHG | OXYGEN SATURATION: 96 % | HEIGHT: 65 IN | HEART RATE: 75 BPM | DIASTOLIC BLOOD PRESSURE: 83 MMHG | BODY MASS INDEX: 19.94 KG/M2 | RESPIRATION RATE: 18 BRPM | TEMPERATURE: 97.2 F | WEIGHT: 119.71 LBS

## 2019-04-07 LAB
A/G RATIO: 0.6 (ref 1.1–2.2)
ALBUMIN SERPL-MCNC: 1.7 G/DL (ref 3.4–5)
ALP BLD-CCNC: 132 U/L (ref 40–129)
ALT SERPL-CCNC: 25 U/L (ref 10–40)
ANION GAP SERPL CALCULATED.3IONS-SCNC: 11 MMOL/L (ref 3–16)
AST SERPL-CCNC: 11 U/L (ref 15–37)
BASOPHILS ABSOLUTE: 0 K/UL (ref 0–0.2)
BASOPHILS RELATIVE PERCENT: 0.5 %
BILIRUB SERPL-MCNC: 0.4 MG/DL (ref 0–1)
BUN BLDV-MCNC: 20 MG/DL (ref 7–20)
CALCIUM SERPL-MCNC: 8.1 MG/DL (ref 8.3–10.6)
CHLORIDE BLD-SCNC: 108 MMOL/L (ref 99–110)
CO2: 19 MMOL/L (ref 21–32)
CREAT SERPL-MCNC: 2.6 MG/DL (ref 0.8–1.3)
EOSINOPHILS ABSOLUTE: 0.1 K/UL (ref 0–0.6)
EOSINOPHILS RELATIVE PERCENT: 1.3 %
GFR AFRICAN AMERICAN: 29
GFR NON-AFRICAN AMERICAN: 24
GLOBULIN: 2.9 G/DL
GLUCOSE BLD-MCNC: 120 MG/DL (ref 70–99)
GLUCOSE BLD-MCNC: 154 MG/DL (ref 70–99)
GLUCOSE BLD-MCNC: 156 MG/DL (ref 70–99)
GLUCOSE BLD-MCNC: 201 MG/DL (ref 70–99)
HCT VFR BLD CALC: 21.2 % (ref 40.5–52.5)
HEMOGLOBIN: 7.2 G/DL (ref 13.5–17.5)
LYMPHOCYTES ABSOLUTE: 0.5 K/UL (ref 1–5.1)
LYMPHOCYTES RELATIVE PERCENT: 7.9 %
MAGNESIUM: 1.8 MG/DL (ref 1.8–2.4)
MCH RBC QN AUTO: 30.6 PG (ref 26–34)
MCHC RBC AUTO-ENTMCNC: 33.9 G/DL (ref 31–36)
MCV RBC AUTO: 90.2 FL (ref 80–100)
MONOCYTES ABSOLUTE: 0.3 K/UL (ref 0–1.3)
MONOCYTES RELATIVE PERCENT: 4.4 %
NEUTROPHILS ABSOLUTE: 4.9 K/UL (ref 1.7–7.7)
NEUTROPHILS RELATIVE PERCENT: 85.9 %
PDW BLD-RTO: 16 % (ref 12.4–15.4)
PERFORMED ON: ABNORMAL
PHOSPHORUS: 3.2 MG/DL (ref 2.5–4.9)
PLATELET # BLD: 91 K/UL (ref 135–450)
PMV BLD AUTO: 8.2 FL (ref 5–10.5)
POTASSIUM SERPL-SCNC: 4.7 MMOL/L (ref 3.5–5.1)
RBC # BLD: 2.35 M/UL (ref 4.2–5.9)
SODIUM BLD-SCNC: 138 MMOL/L (ref 136–145)
TOTAL PROTEIN: 4.6 G/DL (ref 6.4–8.2)
WBC # BLD: 5.7 K/UL (ref 4–11)

## 2019-04-07 PROCEDURE — 6370000000 HC RX 637 (ALT 250 FOR IP): Performed by: STUDENT IN AN ORGANIZED HEALTH CARE EDUCATION/TRAINING PROGRAM

## 2019-04-07 PROCEDURE — C9113 INJ PANTOPRAZOLE SODIUM, VIA: HCPCS | Performed by: STUDENT IN AN ORGANIZED HEALTH CARE EDUCATION/TRAINING PROGRAM

## 2019-04-07 PROCEDURE — 84100 ASSAY OF PHOSPHORUS: CPT

## 2019-04-07 PROCEDURE — 51798 US URINE CAPACITY MEASURE: CPT

## 2019-04-07 PROCEDURE — 84478 ASSAY OF TRIGLYCERIDES: CPT

## 2019-04-07 PROCEDURE — 6360000002 HC RX W HCPCS: Performed by: STUDENT IN AN ORGANIZED HEALTH CARE EDUCATION/TRAINING PROGRAM

## 2019-04-07 PROCEDURE — 85025 COMPLETE CBC W/AUTO DIFF WBC: CPT

## 2019-04-07 PROCEDURE — 80053 COMPREHEN METABOLIC PANEL: CPT

## 2019-04-07 PROCEDURE — 83735 ASSAY OF MAGNESIUM: CPT

## 2019-04-07 PROCEDURE — 2500000003 HC RX 250 WO HCPCS: Performed by: STUDENT IN AN ORGANIZED HEALTH CARE EDUCATION/TRAINING PROGRAM

## 2019-04-07 PROCEDURE — 2580000003 HC RX 258: Performed by: STUDENT IN AN ORGANIZED HEALTH CARE EDUCATION/TRAINING PROGRAM

## 2019-04-07 RX ORDER — LEVOTHYROXINE SODIUM 0.07 MG/1
75 TABLET ORAL DAILY
Qty: 30 TABLET | Refills: 1 | Status: ON HOLD | OUTPATIENT
Start: 2019-04-07 | End: 2019-04-15 | Stop reason: SDUPTHER

## 2019-04-07 RX ORDER — PANTOPRAZOLE SODIUM 20 MG/1
40 TABLET, DELAYED RELEASE ORAL 2 TIMES DAILY
Qty: 30 TABLET | Refills: 0 | Status: SHIPPED | OUTPATIENT
Start: 2019-04-07 | End: 2019-04-11 | Stop reason: CLARIF

## 2019-04-07 RX ORDER — TAMSULOSIN HYDROCHLORIDE 0.4 MG/1
0.4 CAPSULE ORAL
Qty: 30 CAPSULE | Refills: 3 | Status: SHIPPED | OUTPATIENT
Start: 2019-04-07

## 2019-04-07 RX ADMIN — INSULIN LISPRO 1 UNITS: 100 INJECTION, SOLUTION INTRAVENOUS; SUBCUTANEOUS at 08:58

## 2019-04-07 RX ADMIN — PANTOPRAZOLE SODIUM 40 MG: 40 INJECTION, POWDER, FOR SOLUTION INTRAVENOUS at 08:57

## 2019-04-07 RX ADMIN — SODIUM CHLORIDE, PRESERVATIVE FREE 5 ML: 5 INJECTION INTRAVENOUS at 09:00

## 2019-04-07 RX ADMIN — CARVEDILOL 6.25 MG: 6.25 TABLET, FILM COATED ORAL at 08:57

## 2019-04-07 RX ADMIN — INSULIN LISPRO 2 UNITS: 100 INJECTION, SOLUTION INTRAVENOUS; SUBCUTANEOUS at 13:14

## 2019-04-07 RX ADMIN — LEVOTHYROXINE SODIUM ANHYDROUS 37.5 MCG: 100 INJECTION, POWDER, LYOPHILIZED, FOR SOLUTION INTRAVENOUS at 08:59

## 2019-04-07 ASSESSMENT — PAIN SCALES - GENERAL
PAINLEVEL_OUTOF10: 0
PAINLEVEL_OUTOF10: 0

## 2019-04-07 NOTE — PROGRESS NOTES
[address] [place] [address] or Facility: [facility] From: Claudetta Pollock RE: yoon brown RM: [id number] Went to start TF and after the initial flush and checking of residual, pulled back ivy, green \"bowel obstruction\" appearing fluid. Please advise. Need Callback: NEEDS CALLBACK Telemetry ROUTINE  Read 6:19 pm   6:20 pm  Hold off on TF.  Lets get KUB  6:21 pm  thanks    Claudetta Pollock

## 2019-04-07 NOTE — PROGRESS NOTES
11:26 am  [phone] Hospital or Facility: [facility] From: Mike Spears RE: Delbra Silence RM: [id number] For ; voided 350,  Need Callback: NO CALLBACK REQ Telemetry ROUTINE  Read 11:27 am

## 2019-04-08 LAB — TRIGL SERPL-MCNC: 54 MG/DL (ref 0–150)

## 2019-04-08 NOTE — PROGRESS NOTES
Pt dc'd by ambulance per MD order. All belongings sent with patient. CVC TL IJ dc'd per sterile procedure; pressure held for 5 minutes per MD order. IPOC complete.  Sana Hoskins

## 2019-04-11 ENCOUNTER — APPOINTMENT (OUTPATIENT)
Dept: GENERAL RADIOLOGY | Age: 74
DRG: 643 | End: 2019-04-11
Payer: MEDICARE

## 2019-04-11 ENCOUNTER — APPOINTMENT (OUTPATIENT)
Dept: CT IMAGING | Age: 74
DRG: 643 | End: 2019-04-11
Payer: MEDICARE

## 2019-04-11 ENCOUNTER — HOSPITAL ENCOUNTER (INPATIENT)
Age: 74
LOS: 4 days | Discharge: NURSING, REHAB OR LONG-TERM CARE | DRG: 643 | End: 2019-04-15
Attending: EMERGENCY MEDICINE | Admitting: INTERNAL MEDICINE
Payer: MEDICARE

## 2019-04-11 DIAGNOSIS — R41.82 ALTERED MENTAL STATUS, UNSPECIFIED ALTERED MENTAL STATUS TYPE: Primary | ICD-10-CM

## 2019-04-11 PROBLEM — N39.0 UTI (URINARY TRACT INFECTION): Status: ACTIVE | Noted: 2019-04-11

## 2019-04-11 PROBLEM — G93.41 ACUTE METABOLIC ENCEPHALOPATHY: Status: ACTIVE | Noted: 2019-04-11

## 2019-04-11 PROBLEM — T68.XXXA HYPOTHERMIA: Status: ACTIVE | Noted: 2019-04-11

## 2019-04-11 PROBLEM — N18.4 CKD (CHRONIC KIDNEY DISEASE), STAGE IV (HCC): Chronic | Status: ACTIVE | Noted: 2019-04-11

## 2019-04-11 LAB
A/G RATIO: 0.6 (ref 1.1–2.2)
ALBUMIN SERPL-MCNC: 2 G/DL (ref 3.4–5)
ALP BLD-CCNC: 140 U/L (ref 40–129)
ALT SERPL-CCNC: 23 U/L (ref 10–40)
AMMONIA: 13 UMOL/L (ref 16–60)
ANION GAP SERPL CALCULATED.3IONS-SCNC: 8 MMOL/L (ref 3–16)
AST SERPL-CCNC: 15 U/L (ref 15–37)
BACTERIA: ABNORMAL /HPF
BASE EXCESS VENOUS: -7 (ref -3–3)
BASOPHILS ABSOLUTE: 0 K/UL (ref 0–0.2)
BASOPHILS RELATIVE PERCENT: 0.4 %
BILIRUB SERPL-MCNC: <0.2 MG/DL (ref 0–1)
BILIRUBIN URINE: NEGATIVE
BLOOD, URINE: ABNORMAL
BUN BLDV-MCNC: 34 MG/DL (ref 7–20)
CALCIUM SERPL-MCNC: 8.2 MG/DL (ref 8.3–10.6)
CHLORIDE BLD-SCNC: 109 MMOL/L (ref 99–110)
CLARITY: CLEAR
CO2: 22 MMOL/L (ref 21–32)
COLOR: YELLOW
CREAT SERPL-MCNC: 2.2 MG/DL (ref 0.8–1.3)
EKG ATRIAL RATE: 82 BPM
EKG DIAGNOSIS: NORMAL
EKG P AXIS: 63 DEGREES
EKG P-R INTERVAL: 134 MS
EKG Q-T INTERVAL: 378 MS
EKG QRS DURATION: 102 MS
EKG QTC CALCULATION (BAZETT): 441 MS
EKG R AXIS: 29 DEGREES
EKG T AXIS: 240 DEGREES
EKG VENTRICULAR RATE: 82 BPM
EOSINOPHILS ABSOLUTE: 0.1 K/UL (ref 0–0.6)
EOSINOPHILS RELATIVE PERCENT: 0.8 %
EPITHELIAL CELLS, UA: ABNORMAL /HPF
GFR AFRICAN AMERICAN: 35
GFR NON-AFRICAN AMERICAN: 29
GLOBULIN: 3.4 G/DL
GLUCOSE BLD-MCNC: 115 MG/DL (ref 70–99)
GLUCOSE BLD-MCNC: 168 MG/DL (ref 70–99)
GLUCOSE BLD-MCNC: 243 MG/DL (ref 70–99)
GLUCOSE BLD-MCNC: 270 MG/DL (ref 70–99)
GLUCOSE URINE: NEGATIVE MG/DL
HCO3 VENOUS: 19.4 MMOL/L (ref 23–29)
HCT VFR BLD CALC: 21.9 % (ref 40.5–52.5)
HEMOGLOBIN: 7.3 G/DL (ref 13.5–17.5)
KETONES, URINE: NEGATIVE MG/DL
LACTATE: 1.98 MMOL/L (ref 0.4–2)
LEUKOCYTE ESTERASE, URINE: ABNORMAL
LYMPHOCYTES ABSOLUTE: 0.7 K/UL (ref 1–5.1)
LYMPHOCYTES RELATIVE PERCENT: 7.2 %
MCH RBC QN AUTO: 30.2 PG (ref 26–34)
MCHC RBC AUTO-ENTMCNC: 33.3 G/DL (ref 31–36)
MCV RBC AUTO: 90.8 FL (ref 80–100)
MICROSCOPIC EXAMINATION: YES
MONOCYTES ABSOLUTE: 0.5 K/UL (ref 0–1.3)
MONOCYTES RELATIVE PERCENT: 5.5 %
NEUTROPHILS ABSOLUTE: 7.9 K/UL (ref 1.7–7.7)
NEUTROPHILS RELATIVE PERCENT: 86.1 %
NITRITE, URINE: NEGATIVE
O2 SAT, VEN: 29 %
PCO2, VEN: 40.2 MM HG (ref 40–50)
PDW BLD-RTO: 16.9 % (ref 12.4–15.4)
PERFORMED ON: ABNORMAL
PH UA: 6 (ref 5–8)
PH VENOUS: 7.29 (ref 7.35–7.45)
PLATELET # BLD: 165 K/UL (ref 135–450)
PMV BLD AUTO: 8.7 FL (ref 5–10.5)
PO2, VEN: 21 MM HG
POC SAMPLE TYPE: ABNORMAL
POTASSIUM REFLEX MAGNESIUM: 5.3 MMOL/L (ref 3.5–5.1)
PROCALCITONIN: 0.44 NG/ML (ref 0–0.15)
PROTEIN UA: ABNORMAL MG/DL
RBC # BLD: 2.41 M/UL (ref 4.2–5.9)
RBC UA: ABNORMAL /HPF (ref 0–2)
SODIUM BLD-SCNC: 139 MMOL/L (ref 136–145)
SPECIFIC GRAVITY UA: 1.01 (ref 1–1.03)
TCO2 CALC VENOUS: 21 MMOL/L
TOTAL PROTEIN: 5.4 G/DL (ref 6.4–8.2)
TROPONIN: 0.05 NG/ML
URINE TYPE: ABNORMAL
UROBILINOGEN, URINE: 0.2 E.U./DL
WBC # BLD: 9.2 K/UL (ref 4–11)
WBC UA: ABNORMAL /HPF (ref 0–5)
YEAST: PRESENT /HPF

## 2019-04-11 PROCEDURE — 2060000000 HC ICU INTERMEDIATE R&B

## 2019-04-11 PROCEDURE — 6360000002 HC RX W HCPCS: Performed by: EMERGENCY MEDICINE

## 2019-04-11 PROCEDURE — 83605 ASSAY OF LACTIC ACID: CPT

## 2019-04-11 PROCEDURE — 93005 ELECTROCARDIOGRAM TRACING: CPT | Performed by: EMERGENCY MEDICINE

## 2019-04-11 PROCEDURE — 82803 BLOOD GASES ANY COMBINATION: CPT

## 2019-04-11 PROCEDURE — 82140 ASSAY OF AMMONIA: CPT

## 2019-04-11 PROCEDURE — 70450 CT HEAD/BRAIN W/O DYE: CPT

## 2019-04-11 PROCEDURE — 85025 COMPLETE CBC W/AUTO DIFF WBC: CPT

## 2019-04-11 PROCEDURE — 84439 ASSAY OF FREE THYROXINE: CPT

## 2019-04-11 PROCEDURE — 2580000003 HC RX 258: Performed by: EMERGENCY MEDICINE

## 2019-04-11 PROCEDURE — 6360000002 HC RX W HCPCS: Performed by: STUDENT IN AN ORGANIZED HEALTH CARE EDUCATION/TRAINING PROGRAM

## 2019-04-11 PROCEDURE — 6370000000 HC RX 637 (ALT 250 FOR IP): Performed by: STUDENT IN AN ORGANIZED HEALTH CARE EDUCATION/TRAINING PROGRAM

## 2019-04-11 PROCEDURE — 96365 THER/PROPH/DIAG IV INF INIT: CPT

## 2019-04-11 PROCEDURE — 84145 PROCALCITONIN (PCT): CPT

## 2019-04-11 PROCEDURE — 80053 COMPREHEN METABOLIC PANEL: CPT

## 2019-04-11 PROCEDURE — 87040 BLOOD CULTURE FOR BACTERIA: CPT

## 2019-04-11 PROCEDURE — 96361 HYDRATE IV INFUSION ADD-ON: CPT

## 2019-04-11 PROCEDURE — 36415 COLL VENOUS BLD VENIPUNCTURE: CPT

## 2019-04-11 PROCEDURE — 84484 ASSAY OF TROPONIN QUANT: CPT

## 2019-04-11 PROCEDURE — 96375 TX/PRO/DX INJ NEW DRUG ADDON: CPT

## 2019-04-11 PROCEDURE — 96367 TX/PROPH/DG ADDL SEQ IV INF: CPT

## 2019-04-11 PROCEDURE — 74176 CT ABD & PELVIS W/O CONTRAST: CPT

## 2019-04-11 PROCEDURE — 81001 URINALYSIS AUTO W/SCOPE: CPT

## 2019-04-11 PROCEDURE — 99285 EMERGENCY DEPT VISIT HI MDM: CPT

## 2019-04-11 PROCEDURE — 84443 ASSAY THYROID STIM HORMONE: CPT

## 2019-04-11 PROCEDURE — 87086 URINE CULTURE/COLONY COUNT: CPT

## 2019-04-11 PROCEDURE — 71045 X-RAY EXAM CHEST 1 VIEW: CPT

## 2019-04-11 RX ORDER — LEVOTHYROXINE SODIUM 0.07 MG/1
75 TABLET ORAL DAILY
Status: DISCONTINUED | OUTPATIENT
Start: 2019-04-11 | End: 2019-04-11

## 2019-04-11 RX ORDER — LISINOPRIL 5 MG/1
5 TABLET ORAL DAILY
Status: DISCONTINUED | OUTPATIENT
Start: 2019-04-11 | End: 2019-04-11

## 2019-04-11 RX ORDER — AMLODIPINE BESYLATE 5 MG/1
5 TABLET ORAL DAILY
Status: DISCONTINUED | OUTPATIENT
Start: 2019-04-11 | End: 2019-04-12

## 2019-04-11 RX ORDER — 0.9 % SODIUM CHLORIDE 0.9 %
1000 INTRAVENOUS SOLUTION INTRAVENOUS ONCE
Status: COMPLETED | OUTPATIENT
Start: 2019-04-11 | End: 2019-04-11

## 2019-04-11 RX ORDER — FLUCONAZOLE 40 MG/ML
200 POWDER, FOR SUSPENSION ORAL DAILY
Status: DISCONTINUED | OUTPATIENT
Start: 2019-04-12 | End: 2019-04-12

## 2019-04-11 RX ORDER — PANTOPRAZOLE SODIUM 40 MG/10ML
40 INJECTION, POWDER, LYOPHILIZED, FOR SOLUTION INTRAVENOUS 2 TIMES DAILY
Status: DISCONTINUED | OUTPATIENT
Start: 2019-04-11 | End: 2019-04-15 | Stop reason: HOSPADM

## 2019-04-11 RX ORDER — TAMSULOSIN HYDROCHLORIDE 0.4 MG/1
0.4 CAPSULE ORAL
Status: DISCONTINUED | OUTPATIENT
Start: 2019-04-11 | End: 2019-04-15 | Stop reason: HOSPADM

## 2019-04-11 RX ORDER — OMEPRAZOLE 20 MG/1
20 CAPSULE, DELAYED RELEASE ORAL 2 TIMES DAILY
COMMUNITY

## 2019-04-11 RX ORDER — ACETAMINOPHEN 325 MG/1
650 TABLET ORAL EVERY 4 HOURS PRN
Status: DISCONTINUED | OUTPATIENT
Start: 2019-04-11 | End: 2019-04-15 | Stop reason: HOSPADM

## 2019-04-11 RX ORDER — DEXTROSE MONOHYDRATE 50 MG/ML
100 INJECTION, SOLUTION INTRAVENOUS PRN
Status: DISCONTINUED | OUTPATIENT
Start: 2019-04-11 | End: 2019-04-15 | Stop reason: HOSPADM

## 2019-04-11 RX ORDER — LEVOTHYROXINE SODIUM ANHYDROUS 100 UG/5ML
37.5 INJECTION, POWDER, LYOPHILIZED, FOR SOLUTION INTRAVENOUS DAILY
Status: DISCONTINUED | OUTPATIENT
Start: 2019-04-17 | End: 2019-04-14

## 2019-04-11 RX ORDER — FLUCONAZOLE 100 MG/1
200 TABLET ORAL ONCE
Status: DISCONTINUED | OUTPATIENT
Start: 2019-04-11 | End: 2019-04-12

## 2019-04-11 RX ORDER — DEXTROSE MONOHYDRATE 25 G/50ML
12.5 INJECTION, SOLUTION INTRAVENOUS PRN
Status: DISCONTINUED | OUTPATIENT
Start: 2019-04-11 | End: 2019-04-15 | Stop reason: HOSPADM

## 2019-04-11 RX ORDER — MEGESTROL ACETATE 40 MG/ML
400 SUSPENSION ORAL DAILY
Status: DISCONTINUED | OUTPATIENT
Start: 2019-04-11 | End: 2019-04-12

## 2019-04-11 RX ORDER — SODIUM CHLORIDE 0.9 % (FLUSH) 0.9 %
10 SYRINGE (ML) INJECTION EVERY 12 HOURS SCHEDULED
Status: DISCONTINUED | OUTPATIENT
Start: 2019-04-11 | End: 2019-04-11

## 2019-04-11 RX ORDER — NICOTINE POLACRILEX 4 MG
15 LOZENGE BUCCAL PRN
Status: DISCONTINUED | OUTPATIENT
Start: 2019-04-11 | End: 2019-04-15 | Stop reason: HOSPADM

## 2019-04-11 RX ORDER — 0.9 % SODIUM CHLORIDE 0.9 %
5 VIAL (ML) INJECTION DAILY
Status: DISCONTINUED | OUTPATIENT
Start: 2019-04-11 | End: 2019-04-14

## 2019-04-11 RX ORDER — FAMOTIDINE 20 MG/1
20 TABLET, FILM COATED ORAL 2 TIMES DAILY
Status: CANCELLED | OUTPATIENT
Start: 2019-04-11

## 2019-04-11 RX ORDER — ASPIRIN 81 MG/1
81 TABLET, CHEWABLE ORAL DAILY
Status: DISCONTINUED | OUTPATIENT
Start: 2019-04-11 | End: 2019-04-12

## 2019-04-11 RX ORDER — SODIUM CHLORIDE 0.9 % (FLUSH) 0.9 %
10 SYRINGE (ML) INJECTION PRN
Status: DISCONTINUED | OUTPATIENT
Start: 2019-04-11 | End: 2019-04-11

## 2019-04-11 RX ORDER — FERROUS SULFATE 325(65) MG
325 TABLET ORAL
Status: DISCONTINUED | OUTPATIENT
Start: 2019-04-11 | End: 2019-04-12

## 2019-04-11 RX ORDER — HYDRALAZINE HYDROCHLORIDE 20 MG/ML
5 INJECTION INTRAMUSCULAR; INTRAVENOUS EVERY 6 HOURS PRN
Status: DISCONTINUED | OUTPATIENT
Start: 2019-04-11 | End: 2019-04-15 | Stop reason: HOSPADM

## 2019-04-11 RX ORDER — CARVEDILOL 6.25 MG/1
6.25 TABLET ORAL 2 TIMES DAILY WITH MEALS
Status: DISCONTINUED | OUTPATIENT
Start: 2019-04-11 | End: 2019-04-11

## 2019-04-11 RX ORDER — METOPROLOL TARTRATE 5 MG/5ML
5 INJECTION INTRAVENOUS EVERY 6 HOURS
Status: DISCONTINUED | OUTPATIENT
Start: 2019-04-12 | End: 2019-04-12

## 2019-04-11 RX ORDER — SODIUM CHLORIDE 0.9 % (FLUSH) 0.9 %
10 SYRINGE (ML) INJECTION PRN
Status: DISCONTINUED | OUTPATIENT
Start: 2019-04-11 | End: 2019-04-15 | Stop reason: HOSPADM

## 2019-04-11 RX ORDER — ONDANSETRON 2 MG/ML
4 INJECTION INTRAMUSCULAR; INTRAVENOUS EVERY 6 HOURS PRN
Status: DISCONTINUED | OUTPATIENT
Start: 2019-04-11 | End: 2019-04-11 | Stop reason: SDUPTHER

## 2019-04-11 RX ORDER — ONDANSETRON 2 MG/ML
4 INJECTION INTRAMUSCULAR; INTRAVENOUS EVERY 6 HOURS PRN
Status: DISCONTINUED | OUTPATIENT
Start: 2019-04-11 | End: 2019-04-15 | Stop reason: HOSPADM

## 2019-04-11 RX ORDER — SODIUM CHLORIDE 0.9 % (FLUSH) 0.9 %
10 SYRINGE (ML) INJECTION EVERY 12 HOURS SCHEDULED
Status: DISCONTINUED | OUTPATIENT
Start: 2019-04-11 | End: 2019-04-15 | Stop reason: HOSPADM

## 2019-04-11 RX ADMIN — HYDRALAZINE HYDROCHLORIDE 5 MG: 20 INJECTION INTRAMUSCULAR; INTRAVENOUS at 23:51

## 2019-04-11 RX ADMIN — SODIUM CHLORIDE 1000 ML: 9 INJECTION, SOLUTION INTRAVENOUS at 12:29

## 2019-04-11 RX ADMIN — INSULIN LISPRO 6 UNITS: 100 INJECTION, SOLUTION INTRAVENOUS; SUBCUTANEOUS at 18:58

## 2019-04-11 RX ADMIN — VANCOMYCIN HYDROCHLORIDE 750 MG: 10 INJECTION, POWDER, LYOPHILIZED, FOR SOLUTION INTRAVENOUS at 14:01

## 2019-04-11 RX ADMIN — CEFEPIME HYDROCHLORIDE 2 G: 2 INJECTION, POWDER, FOR SOLUTION INTRAVENOUS at 13:02

## 2019-04-11 RX ADMIN — Medication 10 ML: at 12:32

## 2019-04-11 RX ADMIN — ONDANSETRON 4 MG: 2 INJECTION INTRAMUSCULAR; INTRAVENOUS at 12:55

## 2019-04-11 RX ADMIN — ONDANSETRON 4 MG: 2 INJECTION INTRAMUSCULAR; INTRAVENOUS at 23:22

## 2019-04-11 ASSESSMENT — PAIN SCALES - GENERAL
PAINLEVEL_OUTOF10: 0
PAINLEVEL_OUTOF10: 0

## 2019-04-11 ASSESSMENT — ENCOUNTER SYMPTOMS
RESPIRATORY NEGATIVE: 1
ALLERGIC/IMMUNOLOGIC NEGATIVE: 1
VOMITING: 1
EYES NEGATIVE: 1

## 2019-04-11 NOTE — ED PROVIDER NOTES
1 [facility]  EMERGENCY DEPARTMENT ENCOUNTER          ATTENDING PHYSICIAN NOTE       Date of evaluation: 4/11/2019    Chief Complaint     Altered Mental Status      History of Present Illness     Cornelia Matson is a 66 y.o. male who presents with complaint of altered mental status. The patient has a history of prior stroke, and metabolic encephalopathy. His last stroke was in January of this year. He is on aspirin and I will of course and is again nursing home. Per nursing home, his last time using his baseline state of health was sometime yesterday evening before he went to bed. They found him this morning with diminished verbalization and less purposeful responsiveness of the left side of his body. His fingerstick blood glucose was unremarkable in route. The patient himself is not able to provide history. He is responsive to voice but is not making coherent sentences. Review of Systems     Review of Systems   Unable to perform ROS: Mental status change   All other systems reviewed and are negative.       Past Medical, Surgical, Family, and Social History     He has a past medical history of Acute renal failure ([address] Acute respiratory failure with hypoxia ([address] Angina pectoris ([address] 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[address] CAD (coronary artery disease), Cardiac arrest ([address] Cardiac arrest ([address] Chronic systolic heart failure ([address] Diabetes mellitus ([address] Diabetes mellitus ([address] Diabetes mellitus type II, uncontrolled ([address] DKA (diabetic ketoacidoses) ([address] Elevated LFTs, Femoral neck fracture ([address] HTN (hypertension), Hyperkalemia, Hyperlipidemia, Hypertension, PAF (paroxysmal atrial fibrillation) ([address] Paroxysmal A-fib ([address] PEA (Pulseless electrical activity) ([address] Pneumonia, Pneumonia due to organism, Pneumonia of right lower lobe due to Streptococcus pneumoniae ([address] Protein-calorie malnutrition, severe ([address] S/P hip hemiarthroplasty, Septic shock ([address] Systolic CHF ([address] Thrombocytopenia ([address] Thrombocytopenia ([facility] [address] and Type 2 diabetes mellitus with hyperglycemia ([facility] [address] He has a past surgical history that includes Gastrostomy tube placement; joint replacement; gastrostomy tube change (N/A, 12/14/2018); Colonoscopy (N/A, 4/1/2019); Upper gastrointestinal endoscopy (N/A, 4/1/2019); Anus surgery (N/A, 4/2/2019); and Gastrostomy tube placement (N/A, 4/5/2019). His family history is not on file. He reports that he quit smoking about 14 months ago. He has never used smokeless tobacco. He reports that he does not drink alcohol or use drugs.     Medications     Previous Medications    AMLODIPINE (NORVASC) 5 MG TABLET    Take 5 mg by mouth daily     APIXABAN (ELIQUIS) 2.5 MG TABS TABLET    Take 1 tablet by mouth 2 times daily    ASPIRIN 81 MG TABLET    Take 81 mg by mouth daily     CARVEDILOL (COREG) 6.25 MG TABLET    Take 6.25 mg by mouth 2 times daily (with meals)    FERROUS SULFATE 325 (65 FE) MG TABLET    Take 325 mg by mouth 3 times daily (with meals)     INSULIN ASPART (NOVOLOG) 100 UNIT/ML INJECTION VIAL    Inject 0-4 Units into the skin 3 times daily (before meals) Inject as per sliding scale    INSULIN GLARGINE (TOUJEO MAX SOLOSTAR) 300 UNIT/ML INJECTION PEN    Inject 5 Units into the skin nightly    LEVOTHYROXINE (SYNTHROID) 75 MCG TABLET    Take 1 tablet by mouth daily    LISINOPRIL (PRINIVIL;ZESTRIL) 5 MG TABLET    Take 5 mg by mouth daily    MEGESTROL (MEGACE) 40 MG/ML SUSPENSION    Take 400 mg by mouth daily    METFORMIN (GLUCOPHAGE) 500 MG TABLET    Take 500 mg by mouth 2 times daily (with meals)     MIRTAZAPINE (REMERON) 15 MG TABLET    Take 15 mg by mouth nightly    OMEPRAZOLE (PRILOSEC) 20 MG DELAYED RELEASE CAPSULE    Take 20 mg by mouth 2 times daily    TAMSULOSIN (FLOMAX) 0.4 MG CAPSULE    Take 1 capsule by mouth Daily with supper    VITAMIN D (ERGOCALCIFEROL) 42350 UNITS CAPS CAPSULE    Take 50,000 Units by mouth Twice a Week Give on Monday and Thursday       Allergies     He has No Known gross aneurysm   5. Advanced degenerative changes of the spine, unchanged   6. Air in the urinary bladder which could be from recent catheterization or infection, correlate clinically with no catheter in place at this time   7. Scatter artifact in the pelvis from right hip replacement. Moderate stool noted in the rectum and tracer minimal free fluid noted along the paracolic margins with no free air grossly identified. CT head without contrast   Final Result      1. Increase in small CSF isoattenuating right subdural fluid collection. Differential diagnosis is subdural hygroma if recent trauma, subdural effusion, or chronic hematoma less likely based on the recent change. 2.  No acute intracranial hemorrhage. 3.  Multifocal chronic ischemic changes unchanged. 4.  Persistent fluid in the right mastoid air cells. 5.  Chronic right maxillary sinusitis. XR CHEST PORTABLE   Final Result      Increased size of small-moderate loculated right inferolateral pleural effusion in comparison to 3/28/2019. There is associated right basilar consolidation-atelectasis versus pneumonia. Rightward volume loss is seen with shift of the mediastinum, compatible with a component of atelectasis. Left lung is clear. Top normal heart size.           LABS:   Results for orders placed or performed during the hospital encounter of 04/11/19   CBC Auto Differential   Result Value Ref Range    WBC 9.2 4.0 - 11.0 K/uL    RBC 2.41 (L) 4.20 - 5.90 M/uL    Hemoglobin 7.3 (L) 13.5 - 17.5 g/dL    Hematocrit 21.9 (L) 40.5 - 52.5 %    MCV 90.8 80.0 - 100.0 fL    MCH 30.2 26.0 - 34.0 pg    MCHC 33.3 31.0 - 36.0 g/dL    RDW 16.9 (H) 12.4 - 15.4 %    Platelets 614 597 - 915 K/uL    MPV 8.7 5.0 - 10.5 fL    Neutrophils % 86.1 %    Lymphocytes % 7.2 %    Monocytes % 5.5 %    Eosinophils % 0.8 %    Basophils % 0.4 %    Neutrophils # 7.9 (H) 1.7 - 7.7 K/uL    Lymphocytes # 0.7 (L) 1.0 - 5.1 K/uL    Monocytes # 0.5 0.0 - 1.3 K/uL Eosinophils # 0.1 0.0 - 0.6 K/uL    Basophils # 0.0 0.0 - 0.2 K/uL   Comprehensive Metabolic Panel w/ Reflex to MG   Result Value Ref Range    Sodium 139 136 - 145 mmol/L    Potassium reflex Magnesium 5.3 (H) 3.5 - 5.1 mmol/L    Chloride 109 99 - 110 mmol/L    CO2 22 21 - 32 mmol/L    Anion Gap 8 3 - 16    Glucose 168 (H) 70 - 99 mg/dL    BUN 34 (H) 7 - 20 mg/dL    CREATININE 2.2 (H) 0.8 - 1.3 mg/dL    GFR Non-African American 29 (A) >60    GFR  35 (A) >60    Calcium 8.2 (L) 8.3 - 10.6 mg/dL    Total Protein 5.4 (L) 6.4 - 8.2 g/dL    Alb 2.0 (L) 3.4 - 5.0 g/dL    Albumin/Globulin Ratio 0.6 (L) 1.1 - 2.2    Total Bilirubin <0.2 0.0 - 1.0 mg/dL    Alkaline Phosphatase 140 (H) 40 - 129 U/L    ALT 23 10 - 40 U/L    AST 15 15 - 37 U/L    Globulin 3.4 g/dL   Troponin   Result Value Ref Range    Troponin 0.05 (H) <0.01 ng/mL   Urinalysis, reflex to microscopic   Result Value Ref Range    Color, UA Yellow Straw/Yellow    Clarity, UA Clear Clear    Glucose, Ur Negative Negative mg/dL    Bilirubin Urine Negative Negative    Ketones, Urine Negative Negative mg/dL    Specific Gravity, UA 1.010 1.005 - 1.030    Blood, Urine TRACE-LYSED (A) Negative    pH, UA 6.0 5.0 - 8.0    Protein, UA TRACE (A) Negative mg/dL    Urobilinogen, Urine 0.2 <2.0 E.U./dL    Nitrite, Urine Negative Negative    Leukocyte Esterase, Urine LARGE (A) Negative    Microscopic Examination YES     Urine Type Not Specified    Ammonia   Result Value Ref Range    Ammonia 13 (L) 16 - 60 umol/L   Microscopic Urinalysis   Result Value Ref Range    WBC, UA 20-50 (A) 0 - 5 /HPF    RBC, UA 0-2 0 - 2 /HPF    Epi Cells 0-2 /HPF    Bacteria, UA Rare (A) /HPF    Yeast, UA Present (A) /HPF   POCT Venous   Result Value Ref Range    pH, Salvatore 7.291 (L) 7.350 - 7.450    pCO2, Salvatore 40.2 40.0 - 50.0 mm Hg    pO2, Salvatore 21 Not Established mm Hg    HCO3, Venous 19.4 (L) 23.0 - 29.0 mmol/L    Base Excess, Salvatore -7 (L) -3 - 3    O2 Sat, Salvatore 29 Not Established %    TC02 (Calc), Aliya 21 Not Established mmol/L    Lactate 1.98 0.40 - 2.00 mmol/L    Sample Type ALIYA     Performed on SEE BELOW    EKG 12 Lead   Result Value Ref Range    Ventricular Rate 82 BPM    Atrial Rate 82 BPM    P-R Interval 134 ms    QRS Duration 102 ms    Q-T Interval 378 ms    QTc Calculation (Bazett) 441 ms    P Axis 63 degrees    R Axis 29 degrees    T Axis 240 degrees    Diagnosis       EKG performed in ER and to be interpreted by ER physician. Confirmed by MD, ER ([id number]),  Ayana Hughes (TRIPP)CELESTE (Shon) on 4/11/2019 12:44:54 PM       RECENT VITALS:  BP: (!) 164/94, , Pulse: 83, Resp: 22, SpO2: 100 %     Procedures         ED Course     Nursing Notes, Past Medical Hx, Past Surgical Hx, Social Hx,Allergies, and Family Hx were reviewed. The patient was given the following medications:  Orders Placed This Encounter   Medications    0.9 % sodium chloride bolus    sodium chloride flush 0.9 % injection 10 mL    sodium chloride flush 0.9 % injection 10 mL    cefepime (MAXIPIME) 2 g IVPB minibag    vancomycin (VANCOCIN) 750 mg in dextrose 5 % 250 mL IVPB    ondansetron (ZOFRAN) injection 4 mg       CONSULTS:  PHARMACY TO DOSE VANCOMYCIN  IP CONSULT TO NEUROSURGERY  IP CONSULT TO HOSPITALIST    MEDICAL DECISIONMAKING / ASSESSMENT / Liv Jacinda is a 66 y.o. male resented with complaint of altered mental status. The patient has a history of prior stroke. He is on Elequis and presents out of the time window for thrombolytics. He is moving all extremities well. He is somewhat more difficult to get verbal communication out of. However, I have a low suspicion for large vessel occlusion and the patient would not be a candidate for mechanical thrombectomy secondary to his baseline functional status and recent stroke. Given his chronic renal failure, no CTA was performed as will not .       The remainder of his workup demonstrates 20 WBCs and bacteria, slight worsening of a right-sided pleural effusion, and stable anemia likely of chronic disease. He was started on vancomycin and cefepime and given 1 L of IV fluids for resuscitation. His lactate was unremarkable and he was not hypotensive and had no other signs of sepsis. CT showed a slight increase in right-sided frontal fluid collection. Neurosurgery was consult but the recommendations are pending at this time. CT abdomen was unremarkable. He had several episodes of vomiting of tube feeds here. These were controlled with Zofran. I discussed his case with the patient's daughter and his nephew who are both listed as emergency contact. They related to me that if they thought that nothing further could be done, they would not want him to indoor more suffering. His daughter clarified this statement stating that if his heart stopped or he stopped breathing on his own that would not want aggressive resuscitation. As such, I did sign DNR CCA paperwork. The patient will be admitted to the hospitalist who requested AOD coverage for the patient. Clinical Impression     1. Altered mental status, unspecified altered mental status type        Disposition     PATIENT REFERRED TO:  No follow-up provider specified.     DISCHARGE MEDICATIONS:  New Prescriptions    No medications on file       DISPOSITION Decision To Admit 04/11/2019 02:33:49 PM          Melinda Tobin MD  04/11/19 3244

## 2019-04-11 NOTE — PROGRESS NOTES
Piperacillin/tazobactam 3.375 g IV q8h ordered for patient. This medication is renally eliminated. Will change to piperacillin/tazobactam 3.375 g IV q12h per renal dose adjustment policy. Calculated CrCL 19.8 mL/min (based on SCr 2.2 mg/dL)    Pharmacy will continue to monitor renal function and adjust dose as necessary. Please call with any questions. Thanks!   Linda May, PharmD, BCCCP  4/11/2019 6:06 PM

## 2019-04-11 NOTE — CONSULTS
NEUROSURGERY CONSULT NOTE    Ian Reyna  [id number]   1945   4/11/2019    Requesting physician: No admitting provider for patient encounter. Reason for consultation: Hygroma    History of present illness: Patient is a 66 y.o. male w/ PMH of afib (Eliquis) HTN, HLD, CAD, DM, PNA who presented on 4/11/2019 to [facility] ED c/o AMS. The patient has a history of prior stroke, and metabolic encephalopathy. His last stroke was in January of this year. Per nursing home, his last baseline state of health was sometime yesterday evening before he went to bed. They found him this morning with diminished verbalization and less purposeful responsiveness of the left side of his body. His fingerstick blood glucose was unremarkable in route. The patient himself is not able to provide history. He is responsive to voice but is not making coherent sentences. CT Head showed small right temporal parietal hygroma.     ROS:   YUDELKA 2/2 Encephalopathy    No Known Allergies    Past Medical History:   Diagnosis Date    Acute renal failure (HCC)     Acute respiratory failure with hypoxia (HCC)     Angina pectoris (HCC)     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HCC)     CAD (coronary artery disease)     Cardiac arrest ([address]     Cardiac arrest (HCC)     Chronic systolic heart failure (HCC)     Diabetes mellitus ([address]     Diabetes mellitus ([address] 10/9/2012    Diabetes mellitus type II, uncontrolled ([address]     4/1017 A1c = 13    DKA (diabetic ketoacidoses) ([address] 04/2017    Elevated LFTs     Femoral neck fracture ([address] 10/17/2017    HTN (hypertension) 10/17/2017    Hyperkalemia     Hyperlipidemia     Hypertension     PAF (paroxysmal atrial fibrillation) (HCC)     Paroxysmal A-fib (HCC)     PEA (Pulseless electrical activity) ([address] 04/13/2017    Pneumonia 04/2017    LIKELY PNEUMOCOCCAL    Pneumonia due to organism     Pneumonia of right lower lobe due to Streptococcus pneumoniae ([address]     Protein-calorie malnutrition, severe ([address] 04/2017    BMI = 19    S/P hip hemiarthroplasty 2017    Septic shock (HCC)     Systolic CHF (HCC)     EF 30%    Thrombocytopenia (HCC)     Thrombocytopenia (HCC)     Type 2 diabetes mellitus with hyperglycemia ([facility] 2016        Past Surgical History:   Procedure Laterality Date    ANUS SURGERY N/A 2019    EXAM UNDER ANESTHESIA, RECTAL BIOPSY X 2 performed by Carolina Ospina MD at [facility] 2019    COLONOSCOPY performed by Magnus Martínez MD at [address] N/A 2018    GASTRIC TUBE REMOVAL performed by Reid Fuchs MD at [facility]      G-tube placement   [facility] N/A 2019    EGD PEG TUBE PLACEMENT successful tube placement performed by Magnus Martínez MD at [facility]      hip    UPPER GASTROINTESTINAL ENDOSCOPY N/A 2019    EGD BIOPSY performed by Magnus Martínez MD at [facility] Not on file   Tobacco Use    Smoking status: Former Smoker     Last attempt to quit: 2018     Years since quittin.2    Smokeless tobacco: Never Used   Substance and Sexual Activity    Alcohol use: No    Drug use: No    Sexual activity: Not on file        History reviewed. No pertinent family history.      Outpatient Medications Marked as Taking for the 19 encounter [facility] Encounter)   Medication Sig Dispense Refill    omeprazole (PRILOSEC) 20 MG delayed release capsule Take 20 mg by mouth 2 times daily      tamsulosin (FLOMAX) 0.4 MG capsule Take 1 capsule by mouth Daily with supper 30 capsule 3    levothyroxine (SYNTHROID) 75 MCG tablet Take 1 tablet by mouth daily 30 tablet 1    carvedilol (COREG) 6.25 MG tablet Take 6.25 mg by mouth 2 times daily (with meals)      vitamin D (ERGOCALCIFEROL) 62527 units CAPS capsule Take 50,000 Units by mouth Twice a Week Give on Monday and Thursday      lisinopril (PRINIVIL;ZESTRIL) 5 MG tablet Take 5 mg by mouth daily      megestrol (MEGACE) 40 MG/ML suspension Take 400 mg by mouth daily      mirtazapine (REMERON) 15 MG tablet Take 15 mg by mouth nightly      apixaban (ELIQUIS) 2.5 MG TABS tablet Take 1 tablet by mouth 2 times daily 60 tablet 2    insulin glargine (TOUJEO MAX SOLOSTAR) 300 UNIT/ML injection pen Inject 5 Units into the skin nightly 3 pen 3    metFORMIN (GLUCOPHAGE) 500 MG tablet Take 500 mg by mouth 2 times daily (with meals)       insulin aspart (NOVOLOG) 100 UNIT/ML injection vial Inject 0-4 Units into the skin 3 times daily (before meals) Inject as per sliding scale      ferrous sulfate 325 (65 Fe) MG tablet Take 325 mg by mouth 3 times daily (with meals)       amLODIPine (NORVASC) 5 MG tablet Take 5 mg by mouth daily       aspirin 81 MG tablet Take 81 mg by mouth daily           Current Facility-Administered Medications   Medication Dose Route Frequency Provider Last Rate Last Dose    sodium chloride flush 0.9 % injection 10 mL  10 mL Intravenous 2 times per day Rocky Bell MD        sodium chloride flush 0.9 % injection 10 mL  10 mL Intravenous PRN Rocky Bell MD   10 mL at 04/11/19 1232    ondansetron (ZOFRAN) injection 4 mg  4 mg Intravenous Q6H PRN Rocky Bell MD   4 mg at 04/11/19 1255     Current Outpatient Medications   Medication Sig Dispense Refill    omeprazole (PRILOSEC) 20 MG delayed release capsule Take 20 mg by mouth 2 times daily      tamsulosin (FLOMAX) 0.4 MG capsule Take 1 capsule by mouth Daily with supper 30 capsule 3    levothyroxine (SYNTHROID) 75 MCG tablet Take 1 tablet by mouth daily 30 tablet 1    carvedilol (COREG) 6.25 MG tablet Take 6.25 mg by mouth 2 times daily (with meals)      vitamin D (ERGOCALCIFEROL) 12905 units CAPS capsule Take 50,000 Units by mouth Twice a Week Give on Monday and Thursday      lisinopril (PRINIVIL;ZESTRIL) 5 MG tablet Take 5 mg by mouth daily  megestrol (MEGACE) 40 MG/ML suspension Take 400 mg by mouth daily      mirtazapine (REMERON) 15 MG tablet Take 15 mg by mouth nightly      apixaban (ELIQUIS) 2.5 MG TABS tablet Take 1 tablet by mouth 2 times daily 60 tablet 2    insulin glargine (TOUJEO MAX SOLOSTAR) 300 UNIT/ML injection pen Inject 5 Units into the skin nightly 3 pen 3    metFORMIN (GLUCOPHAGE) 500 MG tablet Take 500 mg by mouth 2 times daily (with meals)       insulin aspart (NOVOLOG) 100 UNIT/ML injection vial Inject 0-4 Units into the skin 3 times daily (before meals) Inject as per sliding scale      ferrous sulfate 325 (65 Fe) MG tablet Take 325 mg by mouth 3 times daily (with meals)       amLODIPine (NORVASC) 5 MG tablet Take 5 mg by mouth daily       aspirin 81 MG tablet Take 81 mg by mouth daily           Objective:  BP (!) 164/94   Pulse 83   Resp 22   Ht 5' 5\" (1.651 m)   Wt 108 lb 1.6 oz (49 kg)   SpO2 100%   BMI 17.99 kg/m²     Physical Exam:   Patient seen and examined  GCS:  4 - Opens eyes on own  5 - Alert and oriented  6 - Follows simple motor commands  General: Chronically ill. Alert and cooperative in no acute distress. HENT: atraumatic, neck supple  Eyes: Optic discs: Not tested  Pulmonary: unlabored respiratory effort  Cardiovascular:  Warm well perfused.  No peripheral edema  Gastrointestinal: abdomen soft, NT, ND    Neurological:  Mental Status: Awake, alert, oriented to self, year and place, speech clear and appropriate  Attention: Intact  Language: Mild Dysarthria  Sensation: Intact to all extremities to light touch  Coordination: Intact    Cranial Nerves:  Cranial Nerves:  II: Visual acuity not tested, denies new visual changes / diplopia  III, IV, VI: PERRL, 3 mm bilaterally, EOMI, no nystagmus noted  V: Facial sensation intact bilaterally to touch  VII: Face symmetric  VIII: Hearing intact bilaterally to spoken voice  IX: Palate movement equal bilaterally  XI: Shoulder shrug equal bilaterally  XII: Tongue midline     Musculoskeletal:   Gait: Not tested   Assist devices: None   Tone: Flaccid  Motor strength:    Right  Left    Right  Left    Deltoid  4 3  Hip Flex  4 3   Biceps  4 3  Knee Extensors  4 3   Triceps  4 3  Knee Flexors  4 3   Wrist Ext  4 3  Ankle Dorsiflex. 4 3   Wrist Flex  4 3  Ankle Plantarflex. 4 3   Handgrip  4 3  Ext Sohail Longus  4 3   Thumb Ext  4 3         Radiological Findings:  Ct Head Without Contrast  Result Date: 4/11/2019  1. Increase in small CSF isoattenuating right subdural fluid collection. Differential diagnosis is subdural hygroma if recent trauma, subdural effusion, or chronic hematoma less likely based on the recent change. 2.  No acute intracranial hemorrhage. 3.  Multifocal chronic ischemic changes unchanged. 4.  Persistent fluid in the right mastoid air cells. 5.  Chronic right maxillary sinusitis. Labs:  Recent Labs     04/11/19  1140   WBC 9.2   HGB 7.3*   HCT 21.9*          Recent Labs     04/11/19  1139      K 5.3*      CO2 22   BUN 34*   CREATININE 2.2*   GLUCOSE 168*   CALCIUM 8.2*       No results for input(s): PROTIME, INR, APTT in the last 72 hours.     Patient Active Problem List    Diagnosis Date Noted    Rectal mass     Rectal bleeding     Severe malnutrition ([address] 03/27/2019    Acquired hypothyroidism 03/27/2019    Altered mental status     Oropharyngeal dysphagia     Weakness     SOB (shortness of breath)     Goals of care, counseling/discussion     DNR (do not resuscitate) discussion     Encounter for palliative care     possible sepsis 03/26/2019    Hypothermia 03/26/2019    SANDY (acute kidney injury) ([address] 88/83/4106    Metabolic acidosis 20/50/5445    Acute metabolic encephalopathy 15/05/6568    Hypoglycemia, exogenous insulin, in setting of SANDY 03/05/2019    Vertebral artery stenosis, right 03/01/2019    SANDY (acute kidney injury) ([address] 02/28/2019    Normocytic anemia 02/28/2019    Severe protein-calorie

## 2019-04-11 NOTE — H&P
Internal Medicine PGY-3 Resident History & Physical      PCP: Isi Ruffin    Date of Admission: 4/11/2019    Date ofService: Pt seen/examined on 4/11/2019 and Admitted to Inpatient with expected LOS greater than two midnights dueto medical therapy. Placed in Observation. Chief Complaint:  Altered mental status      History Of Present Illness:     Philippe Joe, 66 y.o. male with PMHx of MCA stroke (1/2019), Afib (Eliquis), chronic diastolic HF (EF 11%), CAD, HTN, HLD, DMII, and gastric ulcers presented to ED with altered mental status after nursing staff after noticing diminished verbalization and responsiveness on left side this morning. He was at is baseline last night before bed. Patient is A&Ox3 and responsive to questions and commands. He is not able to give an accurate history and does not know why he is in the hospital. Patient vomited 4x in ED. Denies headache, chest pain, dyspnea, abdominal pain, nausea, or musculoskeletal pain. Patient was recently discharged on 4/7/19 for SANDY, PNA, and gastric ulcers causing chronic anemia    In ED, was started on vancomycin and cefepime. Patient is hypothermic (94) and hypertensive. BMP showed hyperkalemia (5.3), Cr 2.2 (BL 2.2-2.4). Troponin elevated 0.05. UA showed large LE and budding yeast. VBG 7.291/40.2/21. Lactate WNL.     Past Medical History:          Diagnosis Date    Acute renal failure (HCC)     Acute respiratory failure with hypoxia (HCC)     Angina pectoris (HCC)     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HCC)     CAD (coronary artery disease)     Cardiac arrest ([facility]     Cardiac arrest (HCC)     Chronic systolic heart failure (HCC)     Diabetes mellitus ([facility]     Diabetes mellitus ([facility] 10/9/2012    Diabetes mellitus type II, uncontrolled ([address]     4/1017 A1c = 13    DKA (diabetic ketoacidoses) ([facility] 04/2017    Elevated LFTs     Femoral neck fracture ([facility] 10/17/2017    HTN (hypertension) 10/17/2017    Hyperkalemia     Hyperlipidemia     Hypertension     PAF (paroxysmal atrial fibrillation) (HCC)     Paroxysmal A-fib (HCC)     PEA (Pulseless electrical activity) ([facility] 04/13/2017    Pneumonia 04/2017    LIKELY PNEUMOCOCCAL    Pneumonia due to organism     Pneumonia of right lower lobe due to Streptococcus pneumoniae ([facility])     Protein-calorie malnutrition, severe ([facility] 04/2017    BMI = 19    S/P hip hemiarthroplasty 12/29/2017    Septic shock (HCC)     Systolic CHF ([facility]     EF 30%    Thrombocytopenia (HCC)     Thrombocytopenia ([facility])     Type 2 diabetes mellitus with hyperglycemia ([facility] 11/28/2016       Past Surgical History:          Procedure Laterality Date    ANUS SURGERY N/A 4/2/2019    EXAM UNDER ANESTHESIA, RECTAL BIOPSY X 2 performed by Rosmery Rome MD at [facility] N/A 4/1/2019    COLONOSCOPY performed by Gary Bunch MD at [address] [place] [address] N/A 12/14/2018    GASTRIC TUBE REMOVAL performed by Lindsay Delgadillo MD at [facility]      G-tube placement   [address] [place] [address] N/A 4/5/2019    EGD PEG TUBE PLACEMENT successful tube placement performed by Gary Bunch MD at [address] [place] [address]      hip    UPPER GASTROINTESTINAL ENDOSCOPY N/A 4/1/2019    EGD BIOPSY performed by Gary Bunch MD at [place] [address]       Medications Prior to Admission:      Prior to Admission medications    Medication Sig Start Date End Date Taking?  Authorizing Provider   omeprazole (PRILOSEC) 20 MG delayed release capsule Take 20 mg by mouth 2 times daily   Yes Historical Provider, MD   tamsulosin (FLOMAX) 0.4 MG capsule Take 1 capsule by mouth Daily with supper 4/7/19  Yes Anu Sheikh MD   levothyroxine (SYNTHROID) 75 MCG tablet Take 1 tablet by mouth daily 4/7/19  Yes Anu Sheikh MD   carvedilol (COREG) 6.25 MG tablet Take 6.25 mg by mouth 2 times daily (with meals)   Yes Historical Provider, MD   vitamin D (ERGOCALCIFEROL) 10380 units CAPS capsule Take 50,000 Units by Genitourinary: Negative. Musculoskeletal: Negative. Skin: Negative. Allergic/Immunologic: Negative. Neurological: Negative. Hematological: Negative. Psychiatric/Behavioral: Positive for agitation and confusion. PHYSICAL EXAM PERFORMED:    BP (!) 164/94   Pulse 83   Resp 22   Ht 5' 5\" (1.651 m)   Wt 108 lb 1.6 oz (49 kg)   SpO2 100%   BMI 17.99 kg/m²     General appearance:  Noapparent distress, appears stated age and cooperative. Vomit on shirt and mouth. HEENT:  Normal cephalic, atraumatic without obviousdeformity. Pupils equal, round, and reactive to light. Extra ocular muscles intact. Conjunctivae/corneas clear. Neck: Supple, with full range of motion. No jugular venous distention. Trachea midline. Respiratory: Normal respiratory effort. Wheezing present bilaterally. Cardiovascular:Regular rate and rhythm with normal S1/S2 without murmurs, rubs or gallops. Abdomen: Soft, non-tender,non-distended with normal bowel sounds. Musculoskeletal:  No clubbing, cyanosis or edema bilaterally. Full range of motion without deformity. Skin: Skin color, texture, turgor normal.  No rashes or lesions. Neurologic:  Neurovascularly intact without any focal sensory/motor deficits. Cranial nerves: II-XII intact, grosslynon-focal.  Psychiatric:  Alert and oriented, thought content appropriate, normal insight. Short and uncooperative answers. Capillary Refill: Brisk,< 3 seconds   Peripheral Pulses: +2 palpable, equal bilaterally       Labs:     Recent Labs     04/11/19  1140   WBC 9.2   HGB 7.3*   HCT 21.9*        Recent Labs     04/11/19  1139      K 5.3*      CO2 22   BUN 34*   CREATININE 2.2*   CALCIUM 8.2*     Recent Labs     04/11/19  1139   AST 15   ALT 23   BILITOT <0.2   ALKPHOS 140*     No results for input(s): INR in the last 72 hours.   Recent Labs     04/11/19  1139   TROPONINI 0.05*       Urinalysis:      Lab Results   Component Value Date    NITRU Negative XR CHEST PORTABLE   Final Result      Increased size of small-moderate loculated right inferolateral pleural effusion in comparison to 3/28/2019. There is associated right basilar consolidation-atelectasis versus pneumonia. Rightward volume loss is seen with shift of the mediastinum, compatible with a component of atelectasis. Left lung is clear. Top normal heart size. ASSESSMENT & PLAN:  Jyl Deal 65 yo male with PMHx of MCA stroke (1/19), Afib (Eliquis), chronic diastolic HF (EF 79%), CAD, HTN, HLD, DMII, and gastric ulcers who presented to the ED from nursing home with altered mental status after being found less responsive on left side this morning. He is hypothermic and hypertensive. He was recently discharged on 4/7/2019 for SANDY and FOBT+ w/ EGD showing gastric ulcers causing chronic anemia. Active Hospital Problems    Diagnosis Date Noted    Altered mental status [R41.82]         Acute metabolic encephalopathy likely 2/2 to UTI  Pt presented from nursing home with altered mental status per nursing staff. Was A&Ox3 when examined. UA showed increased LE and budding yeast present. Ammonia 13 and lactate 1.98.  - urine and blood cultures ordered  - Fluconazole 200mg PO  - BMP     Resolving PNA  CT indicates right basilar consolidation & pleural effusion which was less pronounced than previous 3/28/19.   - vancomycin 750mg and cefepime 2g given in ED    Chronic Anemia    Hb 7.3 (BL 7.2). Previous admission EGD showed gastric ulcers.   - protonix 40mg BID   - continue home ferrous sulfate 325mg    Paroxysmal Afib   -Continue home Eliquis 2.5mg    HTN   -Continue home amlodipine 5mg, lisinopril 5mg and Coreg 5.85CD    Diastolic HF HFpEF (75-33%)  -Continue home Coreg 6.25mg    DMII  - med dose sliding scale  - hypoglycemic protocol     Hypothyroidism  -continue synthroid 75mcg      DVT Prophylaxis: home eliquis 2.5mg  Diet: No diet orders on file  Code Status: DNR-CCA    Dispo - GMF       Sharmon Cowden

## 2019-04-11 NOTE — ED TRIAGE NOTES
Pt nonverbal today.  Pt is usually verbal. Pt is also normally able to transfer himself, but is unable this AM

## 2019-04-11 NOTE — ED NOTES
Home Med List is complete.   Source of medications in list is [facility] medication list.     Patient's RN Loyd Boogie states that he did not have any meds yet today.   Vikram CejaD., BCPS   4/11/2019 12:22 PM  Wireless: [phone]

## 2019-04-11 NOTE — ED NOTES
Bed: A09-09  Expected date:   Expected time:   Means of arrival:   Comments:  [address] Elke Bran RN  04/11/19 [id number]

## 2019-04-11 NOTE — H&P
 Diabetes mellitus type II, uncontrolled ([facility]     4/1017 A1c = 13    DKA (diabetic ketoacidoses) ([facility] 04/2017    Elevated LFTs     Femoral neck fracture ([facility] 10/17/2017    HTN (hypertension) 10/17/2017    Hyperkalemia     Hyperlipidemia     Hypertension     [facility]ox[facility]mal [facility] fibrillation) [facility]     Paroxysmal A-fib ([facility])     PEA (Pulseless electrical activity) ([facility] 04/13/2017    Pneumonia 04/2017    LIKELY PNEUMOCOCCAL    Pneumonia due to organism     Pneumonia of right lower lobe due to Streptococcus pneumoniae ([facility])     Protein-calorie malnutrition, severe ([address] 04/2017    BMI = 19    S/P hip hemiarthroplasty 12/29/2017    Septic shock ([facility])     Systolic CHF ([facility]     EF 30%    Thrombocytopenia ([facility])     Thrombocytopenia ([facility])     Type 2 diabetes mellitus with hyperglycemia ([facility] 11/28/2016       Past Surgical History:          Procedure Laterality Date    ANUS SURGERY N/A 4/2/2019    EXAM UNDER ANESTHESIA, RECTAL BIOPSY X 2 performed by Parthenia Schilder, MD at [facility] N/A 4/1/2019    COLONOSCOPY performed by Kuldeep Montes MD at [address] [place] [address] N/A 12/14/2018    GASTRIC TUBE REMOVAL performed by Hollie Gosselin, MD at [facility]      G-tube placement   [address] [place] [address] N/A 4/5/2019    EGD PEG TUBE PLACEMENT successful tube placement performed by Kuldeep Montes MD at [address] [place] [address]      hip    UPPER GASTROINTESTINAL ENDOSCOPY N/A 4/1/2019    EGD BIOPSY performed by Kuldeep Montes MD at [place] [address]       Medications Prior to Admission:      Prior to Admission medications    Medication Sig Start Date End Date Taking?  Authorizing Provider   omeprazole (PRILOSEC) 20 MG delayed release capsule Take 20 mg by mouth 2 times daily   Yes Historical Provider, MD   tamsulosin (FLOMAX) 0.4 MG capsule Take 1 capsule by mouth Daily with supper 4/7/19  Yes Mara Leone MD   levothyroxine (SYNTHROID) 75 MCG tablet Take 1 tablet by mouth daily 4/7/19  Yes Liliana Acevedo MD   carvedilol (COREG) 6.25 MG tablet Take 6.25 mg by mouth 2 times daily (with meals)   Yes Historical Provider, MD   vitamin D (ERGOCALCIFEROL) 91700 units CAPS capsule Take 50,000 Units by mouth Twice a Week Give on Monday and Thursday   Yes Historical Provider, MD   lisinopril (PRINIVIL;ZESTRIL) 5 MG tablet Take 5 mg by mouth daily   Yes Historical Provider, MD   megestrol (MEGACE) 40 MG/ML suspension Take 400 mg by mouth daily   Yes Historical Provider, MD   mirtazapine (REMERON) 15 MG tablet Take 15 mg by mouth nightly   Yes Historical Provider, MD   apixaban (ELIQUIS) 2.5 MG TABS tablet Take 1 tablet by mouth 2 times daily 3/7/19  Yes Sera Benton MD   insulin glargine (TOUJEO MAX SOLOSTAR) 300 UNIT/ML injection pen Inject 5 Units into the skin nightly 1/21/19  Yes Raquel Tam MD   metFORMIN (GLUCOPHAGE) 500 MG tablet Take 500 mg by mouth 2 times daily (with meals)    Yes Historical Provider, MD   insulin aspart (NOVOLOG) 100 UNIT/ML injection vial Inject 0-4 Units into the skin 3 times daily (before meals) Inject as per sliding scale   Yes Historical Provider, MD   ferrous sulfate 325 (65 Fe) MG tablet Take 325 mg by mouth 3 times daily (with meals)    Yes Historical Provider, MD   amLODIPine (NORVASC) 5 MG tablet Take 5 mg by mouth daily    Yes Historical Provider, MD   aspirin 81 MG tablet Take 81 mg by mouth daily    Yes Historical Provider, MD       Allergies: Patient has no known allergies. Social History:      The patient currently lives at nursing facility. TOBACCO:   reports that he quit smoking about 14 months ago. He has never used smokeless tobacco.  ETOH:   reports that he does not drink alcohol. Family History:      Reviewed in detail and negative for DM, CAD, Cancer, CVA. Positive as follows:    REVIEW OF SYSTEMS:   Pertinent positives as noted in theHPI.  All other systems reviewed and negative. ROS: Review of Systems   HENT: Negative. Eyes: Negative. Respiratory: Negative. Cardiovascular: Negative. Gastrointestinal: Positive for vomiting. Endocrine: Negative. Genitourinary: Negative. Musculoskeletal: Negative. Skin: Negative. Allergic/Immunologic: Negative. Neurological: Negative. Hematological: Negative. Psychiatric/Behavioral: Positive for agitation and confusion. PHYSICAL EXAM PERFORMED:    BP (!) 164/94   Pulse 83   Temp 94 °F (34.4 °C) (Rectal)   Resp 22   Ht 5' 5\" (1.651 m)   Wt 108 lb 1.6 oz (49 kg)   SpO2 100%   BMI 17.99 kg/m²     General appearance:  Noapparent distress, appears stated age and cooperative. Vomit on shirt and mouth. HEENT:  Normal cephalic, atraumatic without obviousdeformity. Pupils equal, round, and reactive to light. Extra ocular muscles intact. Conjunctivae/corneas clear. Neck: Supple, with full range of motion. No jugular venous distention. Trachea midline. Respiratory: Normal respiratory effort. Wheezing present bilaterally. Cardiovascular:Regular rate and rhythm with normal S1/S2 without murmurs, rubs or gallops. Abdomen: Soft, non-tender,non-distended with normal bowel sounds. Musculoskeletal:  No clubbing, cyanosis or edema bilaterally. Full range of motion without deformity. Skin: Skin color, texture, turgor normal.  No rashes or lesions. Neurologic:  Neurovascularly intact without any focal sensory/motor deficits. Cranial nerves: II-XII intact, grosslynon-focal.  Psychiatric:  Alert and oriented, thought content appropriate, normal insight. Short and uncooperative answers.    Capillary Refill: Brisk,< 3 seconds   Peripheral Pulses: +2 palpable, equal bilaterally       Labs:     Recent Labs     04/11/19  1140   WBC 9.2   HGB 7.3*   HCT 21.9*        Recent Labs     04/11/19  1139      K 5.3*      CO2 22   BUN 34*   CREATININE 2.2*   CALCIUM 8.2*     Recent Labs     04/11/19  1139   AST 15   ALT 23   BILITOT <0.2   ALKPHOS 140*     No results for input(s): INR in the last 72 hours. Recent Labs     04/11/19  1139   TROPONINI 0.05*       Urinalysis:      Lab Results   Component Value Date    NITRU Negative 04/11/2019    WBCUA 20-50 04/11/2019    BACTERIA Rare 04/11/2019    RBCUA 0-2 04/11/2019    BLOODU TRACE-LYSED 04/11/2019    SPECGRAV 1.010 04/11/2019    GLUCOSEU Negative 04/11/2019       Radiology:   CT ABDOMEN PELVIS WO CONTRAST Additional Contrast? None   Final Result      1. Limited study without IV or oral contrast with motion artifact limiting the assessment. 2. Right basilar consolidation and pleural effusion which may be pneumonia, this is less pronounced than the previous consolidation in the right lower lobe seen on prior study from April 19, 2017. Minimal left-sided pleural effusion and atelectasis    noted. Trace pericardial effusion also noted   3. Chronic pancreatitis, scattered calcifications but no acute inflammation noted around the pancreas. 4. Grossly normal-appearing renal outlines without hydronephrosis but again limited without IV contrast. Advanced aortic and iliac calcifications without gross aneurysm   5. Advanced degenerative changes of the spine, unchanged   6. Air in the urinary bladder which could be from recent catheterization or infection, correlate clinically with no catheter in place at this time   7. Scatter artifact in the pelvis from right hip replacement. Moderate stool noted in the rectum and tracer minimal free fluid noted along the paracolic margins with no free air grossly identified. CT head without contrast   Final Result      1. Increase in small CSF isoattenuating right subdural fluid collection. Differential diagnosis is subdural hygroma if recent trauma, subdural effusion, or chronic hematoma less likely based on the recent change. 2.  No acute intracranial hemorrhage. 3.  Multifocal chronic ischemic changes unchanged.    4. Persistent fluid in the right mastoid air cells. 5.  Chronic right maxillary sinusitis. XR CHEST PORTABLE   Final Result      Increased size of small-moderate loculated right inferolateral pleural effusion in comparison to 3/28/2019. There is associated right basilar consolidation-atelectasis versus pneumonia. Rightward volume loss is seen with shift of the mediastinum, compatible with a component of atelectasis. Left lung is clear. Top normal heart size. ASSESSMENT & PLAN:  Juan Fulton 67 yo male with PMHx of MCA stroke (1/19), Afib (Eliquis), chronic diastolic HF (EF 13%), CAD, HTN, HLD, DMII, and gastric ulcers who presented to the ED from nursing home with altered mental status after being found less responsive on left side this morning. He is hypothermic and hypertensive. He was recently discharged on 4/7/2019 for SANDY and FOBT+ w/ EGD showing gastric ulcers causing chronic anemia. Active Hospital Problems    Diagnosis Date Noted    Altered mental status [R41.82]         Acute metabolic encephalopathy likely 2/2 to UTI  Pt presented from nursing home with altered mental status per nursing staff. Was A&Ox3 when examined. UA showed increased LE and budding yeast present. Ammonia 13 and lactate 1.98.  - urine and blood cultures ordered  - Fluconazole 200mg PO  - zosyn  - BMP     Resolving PNA  CT indicates right basilar consolidation & pleural effusion which was less pronounced than previous 3/28/19.   - vancomycin 750mg and cefepime 2g given in ED  - procalcitonin    Chronic Anemia    Hb 7.3 (BL 7.2). Previous admission EGD showed gastric ulcers.   - protonix 40mg BID   - continue home ferrous sulfate 325mg    Paroxysmal Afib   -Continue home Eliquis 2.5mg    HTN   -Continue home amlodipine 5mg, lisinopril 5mg and Coreg 8.05YL    Diastolic HF HFpEF (80-26%)  -Continue home Coreg 6.25mg    DMII  - med dose sliding scale  - hypoglycemic protocol     Hypothyroidism  -continue synthroid 75mcg      DVT Prophylaxis: home eliquis 2.5mg  Diet: Full liquid diet;honey thick   Code Status: DNR-CCA  Dispo - GMF    Scribed by, Chet Pitt MD    Will staff and discuss with Dr. Trinidad Sox

## 2019-04-11 NOTE — ED NOTES
Pt emesis x4 and cleaned up multiple times.  Salmon bags to g-tub per MD.      Jarad Calabrese, RN  04/11/19 2221

## 2019-04-12 ENCOUNTER — ANESTHESIA EVENT (OUTPATIENT)
Dept: ENDOSCOPY | Age: 74
DRG: 643 | End: 2019-04-12
Payer: MEDICARE

## 2019-04-12 ENCOUNTER — ANESTHESIA (OUTPATIENT)
Dept: ENDOSCOPY | Age: 74
DRG: 643 | End: 2019-04-12
Payer: MEDICARE

## 2019-04-12 VITALS
OXYGEN SATURATION: 99 % | RESPIRATION RATE: 13 BRPM | SYSTOLIC BLOOD PRESSURE: 137 MMHG | DIASTOLIC BLOOD PRESSURE: 78 MMHG

## 2019-04-12 LAB
ANION GAP SERPL CALCULATED.3IONS-SCNC: 10 MMOL/L (ref 3–16)
BASOPHILS ABSOLUTE: 0 K/UL (ref 0–0.2)
BASOPHILS RELATIVE PERCENT: 0.2 %
BUN BLDV-MCNC: 34 MG/DL (ref 7–20)
CALCIUM SERPL-MCNC: 8.3 MG/DL (ref 8.3–10.6)
CHLORIDE BLD-SCNC: 107 MMOL/L (ref 99–110)
CO2: 21 MMOL/L (ref 21–32)
CREAT SERPL-MCNC: 2.2 MG/DL (ref 0.8–1.3)
EOSINOPHILS ABSOLUTE: 0 K/UL (ref 0–0.6)
EOSINOPHILS RELATIVE PERCENT: 0.1 %
GFR AFRICAN AMERICAN: 35
GFR NON-AFRICAN AMERICAN: 29
GLUCOSE BLD-MCNC: 100 MG/DL (ref 70–99)
GLUCOSE BLD-MCNC: 125 MG/DL (ref 70–99)
GLUCOSE BLD-MCNC: 128 MG/DL (ref 70–99)
GLUCOSE BLD-MCNC: 130 MG/DL (ref 70–99)
GLUCOSE BLD-MCNC: 134 MG/DL (ref 70–99)
HCT VFR BLD CALC: 22.5 % (ref 40.5–52.5)
HEMOGLOBIN: 7.7 G/DL (ref 13.5–17.5)
LYMPHOCYTES ABSOLUTE: 0.6 K/UL (ref 1–5.1)
LYMPHOCYTES RELATIVE PERCENT: 5.6 %
MCH RBC QN AUTO: 30.5 PG (ref 26–34)
MCHC RBC AUTO-ENTMCNC: 34.1 G/DL (ref 31–36)
MCV RBC AUTO: 89.5 FL (ref 80–100)
MONOCYTES ABSOLUTE: 0.5 K/UL (ref 0–1.3)
MONOCYTES RELATIVE PERCENT: 4.5 %
NEUTROPHILS ABSOLUTE: 9.8 K/UL (ref 1.7–7.7)
NEUTROPHILS RELATIVE PERCENT: 89.6 %
PDW BLD-RTO: 16.4 % (ref 12.4–15.4)
PERFORMED ON: ABNORMAL
PLATELET # BLD: 172 K/UL (ref 135–450)
PMV BLD AUTO: 8 FL (ref 5–10.5)
POTASSIUM REFLEX MAGNESIUM: 4.7 MMOL/L (ref 3.5–5.1)
RBC # BLD: 2.52 M/UL (ref 4.2–5.9)
SODIUM BLD-SCNC: 138 MMOL/L (ref 136–145)
T4 FREE: 1.2 NG/DL (ref 0.9–1.8)
TSH SERPL DL<=0.05 MIU/L-ACNC: 6.62 UIU/ML (ref 0.27–4.2)
URINE CULTURE, ROUTINE: NORMAL
WBC # BLD: 10.9 K/UL (ref 4–11)

## 2019-04-12 PROCEDURE — 3700000001 HC ADD 15 MINUTES (ANESTHESIA): Performed by: INTERNAL MEDICINE

## 2019-04-12 PROCEDURE — 97530 THERAPEUTIC ACTIVITIES: CPT

## 2019-04-12 PROCEDURE — 2500000003 HC RX 250 WO HCPCS: Performed by: STUDENT IN AN ORGANIZED HEALTH CARE EDUCATION/TRAINING PROGRAM

## 2019-04-12 PROCEDURE — 7100000011 HC PHASE II RECOVERY - ADDTL 15 MIN: Performed by: INTERNAL MEDICINE

## 2019-04-12 PROCEDURE — 80048 BASIC METABOLIC PNL TOTAL CA: CPT

## 2019-04-12 PROCEDURE — 6360000002 HC RX W HCPCS: Performed by: NURSE ANESTHETIST, CERTIFIED REGISTERED

## 2019-04-12 PROCEDURE — 2500000003 HC RX 250 WO HCPCS: Performed by: NURSE ANESTHETIST, CERTIFIED REGISTERED

## 2019-04-12 PROCEDURE — 85025 COMPLETE CBC W/AUTO DIFF WBC: CPT

## 2019-04-12 PROCEDURE — 7100000010 HC PHASE II RECOVERY - FIRST 15 MIN: Performed by: INTERNAL MEDICINE

## 2019-04-12 PROCEDURE — C9113 INJ PANTOPRAZOLE SODIUM, VIA: HCPCS | Performed by: STUDENT IN AN ORGANIZED HEALTH CARE EDUCATION/TRAINING PROGRAM

## 2019-04-12 PROCEDURE — 0DJ08ZZ INSPECTION OF UPPER INTESTINAL TRACT, VIA NATURAL OR ARTIFICIAL OPENING ENDOSCOPIC: ICD-10-PCS | Performed by: INTERNAL MEDICINE

## 2019-04-12 PROCEDURE — 3609012800 HC EGD DIAGNOSTIC ONLY: Performed by: INTERNAL MEDICINE

## 2019-04-12 PROCEDURE — 2580000003 HC RX 258: Performed by: NURSE ANESTHETIST, CERTIFIED REGISTERED

## 2019-04-12 PROCEDURE — 3700000000 HC ANESTHESIA ATTENDED CARE: Performed by: INTERNAL MEDICINE

## 2019-04-12 PROCEDURE — 97535 SELF CARE MNGMENT TRAINING: CPT

## 2019-04-12 PROCEDURE — 2580000003 HC RX 258: Performed by: STUDENT IN AN ORGANIZED HEALTH CARE EDUCATION/TRAINING PROGRAM

## 2019-04-12 PROCEDURE — 97116 GAIT TRAINING THERAPY: CPT

## 2019-04-12 PROCEDURE — 6360000002 HC RX W HCPCS: Performed by: STUDENT IN AN ORGANIZED HEALTH CARE EDUCATION/TRAINING PROGRAM

## 2019-04-12 PROCEDURE — 2060000000 HC ICU INTERMEDIATE R&B

## 2019-04-12 PROCEDURE — 97166 OT EVAL MOD COMPLEX 45 MIN: CPT

## 2019-04-12 PROCEDURE — 36415 COLL VENOUS BLD VENIPUNCTURE: CPT

## 2019-04-12 PROCEDURE — 97162 PT EVAL MOD COMPLEX 30 MIN: CPT

## 2019-04-12 RX ORDER — CARVEDILOL 6.25 MG/1
6.25 TABLET ORAL 2 TIMES DAILY WITH MEALS
Status: DISCONTINUED | OUTPATIENT
Start: 2019-04-12 | End: 2019-04-12

## 2019-04-12 RX ORDER — AMLODIPINE BESYLATE 5 MG/1
5 TABLET ORAL DAILY
Status: DISCONTINUED | OUTPATIENT
Start: 2019-04-12 | End: 2019-04-15 | Stop reason: HOSPADM

## 2019-04-12 RX ORDER — MEGESTROL ACETATE 40 MG/ML
400 SUSPENSION ORAL DAILY
Status: DISCONTINUED | OUTPATIENT
Start: 2019-04-12 | End: 2019-04-15 | Stop reason: HOSPADM

## 2019-04-12 RX ORDER — METOCLOPRAMIDE HYDROCHLORIDE 5 MG/ML
10 INJECTION INTRAMUSCULAR; INTRAVENOUS
Status: DISCONTINUED | OUTPATIENT
Start: 2019-04-12 | End: 2019-04-12 | Stop reason: HOSPADM

## 2019-04-12 RX ORDER — PROMETHAZINE HYDROCHLORIDE 25 MG/ML
6.25 INJECTION, SOLUTION INTRAMUSCULAR; INTRAVENOUS
Status: DISCONTINUED | OUTPATIENT
Start: 2019-04-12 | End: 2019-04-12 | Stop reason: HOSPADM

## 2019-04-12 RX ORDER — FERROUS SULFATE 300 MG/5ML
300 LIQUID (ML) ORAL
Status: DISCONTINUED | OUTPATIENT
Start: 2019-04-12 | End: 2019-04-15 | Stop reason: HOSPADM

## 2019-04-12 RX ORDER — MEPERIDINE HYDROCHLORIDE 25 MG/ML
12.5 INJECTION INTRAMUSCULAR; INTRAVENOUS; SUBCUTANEOUS EVERY 5 MIN PRN
Status: DISCONTINUED | OUTPATIENT
Start: 2019-04-12 | End: 2019-04-12 | Stop reason: HOSPADM

## 2019-04-12 RX ORDER — FLUCONAZOLE 40 MG/ML
200 POWDER, FOR SUSPENSION ORAL DAILY
Status: DISCONTINUED | OUTPATIENT
Start: 2019-04-12 | End: 2019-04-13

## 2019-04-12 RX ORDER — MORPHINE SULFATE 4 MG/ML
1 INJECTION, SOLUTION INTRAMUSCULAR; INTRAVENOUS EVERY 5 MIN PRN
Status: DISCONTINUED | OUTPATIENT
Start: 2019-04-12 | End: 2019-04-12 | Stop reason: HOSPADM

## 2019-04-12 RX ORDER — SODIUM CHLORIDE 9 MG/ML
INJECTION, SOLUTION INTRAVENOUS CONTINUOUS PRN
Status: DISCONTINUED | OUTPATIENT
Start: 2019-04-12 | End: 2019-04-12 | Stop reason: SDUPTHER

## 2019-04-12 RX ORDER — LABETALOL HYDROCHLORIDE 5 MG/ML
5 INJECTION, SOLUTION INTRAVENOUS EVERY 10 MIN PRN
Status: DISCONTINUED | OUTPATIENT
Start: 2019-04-12 | End: 2019-04-12 | Stop reason: HOSPADM

## 2019-04-12 RX ORDER — LISINOPRIL 5 MG/1
5 TABLET ORAL DAILY
Status: DISCONTINUED | OUTPATIENT
Start: 2019-04-12 | End: 2019-04-15 | Stop reason: HOSPADM

## 2019-04-12 RX ORDER — FERROUS SULFATE 325(65) MG
325 TABLET ORAL
Status: DISCONTINUED | OUTPATIENT
Start: 2019-04-12 | End: 2019-04-12

## 2019-04-12 RX ORDER — LIDOCAINE HYDROCHLORIDE 20 MG/ML
INJECTION, SOLUTION INFILTRATION; PERINEURAL PRN
Status: DISCONTINUED | OUTPATIENT
Start: 2019-04-12 | End: 2019-04-12 | Stop reason: SDUPTHER

## 2019-04-12 RX ORDER — ASPIRIN 81 MG/1
81 TABLET, CHEWABLE ORAL DAILY
Status: DISCONTINUED | OUTPATIENT
Start: 2019-04-12 | End: 2019-04-12

## 2019-04-12 RX ORDER — PROPOFOL 10 MG/ML
INJECTION, EMULSION INTRAVENOUS PRN
Status: DISCONTINUED | OUTPATIENT
Start: 2019-04-12 | End: 2019-04-12 | Stop reason: SDUPTHER

## 2019-04-12 RX ORDER — HYDRALAZINE HYDROCHLORIDE 20 MG/ML
5 INJECTION INTRAMUSCULAR; INTRAVENOUS EVERY 10 MIN PRN
Status: DISCONTINUED | OUTPATIENT
Start: 2019-04-12 | End: 2019-04-12 | Stop reason: HOSPADM

## 2019-04-12 RX ORDER — DIPHENHYDRAMINE HYDROCHLORIDE 50 MG/ML
12.5 INJECTION INTRAMUSCULAR; INTRAVENOUS
Status: DISCONTINUED | OUTPATIENT
Start: 2019-04-12 | End: 2019-04-12 | Stop reason: HOSPADM

## 2019-04-12 RX ORDER — CARVEDILOL 6.25 MG/1
6.25 TABLET ORAL 2 TIMES DAILY WITH MEALS
Status: DISCONTINUED | OUTPATIENT
Start: 2019-04-12 | End: 2019-04-15 | Stop reason: HOSPADM

## 2019-04-12 RX ORDER — LISINOPRIL 5 MG/1
5 TABLET ORAL DAILY
Status: DISCONTINUED | OUTPATIENT
Start: 2019-04-12 | End: 2019-04-12

## 2019-04-12 RX ORDER — OXYCODONE HYDROCHLORIDE 5 MG/1
10 TABLET ORAL PRN
Status: DISCONTINUED | OUTPATIENT
Start: 2019-04-12 | End: 2019-04-12 | Stop reason: HOSPADM

## 2019-04-12 RX ORDER — OXYCODONE HYDROCHLORIDE 5 MG/1
5 TABLET ORAL PRN
Status: DISCONTINUED | OUTPATIENT
Start: 2019-04-12 | End: 2019-04-12 | Stop reason: HOSPADM

## 2019-04-12 RX ADMIN — PANTOPRAZOLE SODIUM 40 MG: 40 INJECTION, POWDER, FOR SOLUTION INTRAVENOUS at 09:52

## 2019-04-12 RX ADMIN — SODIUM CHLORIDE: 900 INJECTION, SOLUTION INTRAVENOUS at 16:21

## 2019-04-12 RX ADMIN — Medication 10 ML: at 21:46

## 2019-04-12 RX ADMIN — PHENYLEPHRINE HYDROCHLORIDE 200 MCG: 10 INJECTION, SOLUTION INTRAMUSCULAR; INTRAVENOUS; SUBCUTANEOUS at 16:38

## 2019-04-12 RX ADMIN — PROPOFOL 25 MG: 10 INJECTION, EMULSION INTRAVENOUS at 16:26

## 2019-04-12 RX ADMIN — PIPERACILLIN AND TAZOBACTAM 3.38 G: 3; .375 INJECTION, POWDER, FOR SOLUTION INTRAVENOUS at 06:50

## 2019-04-12 RX ADMIN — LIDOCAINE HYDROCHLORIDE 100 MG: 20 INJECTION, SOLUTION INFILTRATION; PERINEURAL at 16:26

## 2019-04-12 RX ADMIN — PIPERACILLIN AND TAZOBACTAM 3.38 G: 3; .375 INJECTION, POWDER, FOR SOLUTION INTRAVENOUS at 18:26

## 2019-04-12 RX ADMIN — PROPOFOL 25 MG: 10 INJECTION, EMULSION INTRAVENOUS at 16:31

## 2019-04-12 RX ADMIN — PROPOFOL 25 MG: 10 INJECTION, EMULSION INTRAVENOUS at 16:36

## 2019-04-12 RX ADMIN — METOPROLOL TARTRATE 5 MG: 5 INJECTION INTRAVENOUS at 07:02

## 2019-04-12 RX ADMIN — Medication 10 ML: at 09:53

## 2019-04-12 RX ADMIN — PHENYLEPHRINE HYDROCHLORIDE 200 MCG: 10 INJECTION, SOLUTION INTRAMUSCULAR; INTRAVENOUS; SUBCUTANEOUS at 16:37

## 2019-04-12 RX ADMIN — PANTOPRAZOLE SODIUM 40 MG: 40 INJECTION, POWDER, FOR SOLUTION INTRAVENOUS at 21:46

## 2019-04-12 ASSESSMENT — PULMONARY FUNCTION TESTS
PIF_VALUE: 0
PIF_VALUE: 1
PIF_VALUE: 0

## 2019-04-12 ASSESSMENT — PAIN SCALES - GENERAL
PAINLEVEL_OUTOF10: 0

## 2019-04-12 ASSESSMENT — ENCOUNTER SYMPTOMS: SHORTNESS OF BREATH: 1

## 2019-04-12 NOTE — PROGRESS NOTES
Patient admitted to unit from Emergency Department. Patient alert and oriented to self, place and time but not situation. Vital signs taken, blood pressure high, patient is complaining of nausea and is placing his fingers in his mouth to induce vomitting. Unable to get an oral or axillary temperature and unable to get an O2 level. Patient feels cool to the touch. Placed warm blanket on patient to try to warm him. Notified on call hospitalist of patient's blood pressures. Will continue to monitor.

## 2019-04-12 NOTE — PROGRESS NOTES
Pt's Bp is 184/90. His hypertensive agents are all PO's. Pt is currently NPO. Reached out to Residents via perfect serve regarding this. Will monitor.

## 2019-04-12 NOTE — DISCHARGE INSTR - COC
Continuity of Care Form    Patient Name: Abhay Garcias   :  1945  MRN:  [id number]    Admit date:  2019  Discharge date:  4/15/2019      Code Status Order: DNR-CCA   Advance Directives:     Admitting Physician:  Stacy Trujillo MD  PCP: Nohemi Louie    Discharging Nurse: [facility] Unit/Room#: 8722/4084-69  Discharging Unit Phone Number: ***    Emergency Contact:   Extended Emergency Contact Information  Primary Emergency Contact: [address] Phone: [phone]  Relation: Niece/Nephew  Secondary Emergency Contact: [address] Phone: [phone]  Relation: Other    Past Surgical History:  Past Surgical History:   Procedure Laterality Date    ANUS SURGERY N/A 2019    EXAM UNDER ANESTHESIA, RECTAL BIOPSY X 2 performed by Sarah Pressley MD at [facility] 2019    COLONOSCOPY performed by Abbie Burnette MD at [address]  N/A 2018    GASTRIC TUBE REMOVAL performed by Veronica Marie MD at [facility]      G-tube placement   [facility] N/A 2019    EGD PEG TUBE PLACEMENT successful tube placement performed by Abbie Burnette MD at [address] N/A 2019    EGD BIOPSY performed by Abbie Burnette MD at [place] [address]       Immunization History:   Immunization History   Administered Date(s) Administered    Influenza Virus Vaccine 10/15/2012, 10/18/2013, 2016, 10/01/2018    Pneumococcal 13-valent Conjugate ([id number]) 2015    Pneumococcal Polysaccharide ([id number]) 2006, 10/15/2012       Active Problems:  Patient Active Problem List   Diagnosis Code    Vitamin D deficiency E55.9    Vitamin B deficiency E53.9    Type 2 diabetes mellitus ([facility] E11.9    Angiopathy, peripheral ([facility] I73.9    Noncompliance with medication regimen Z91.14    Gonadotropin deficiency ([facility] E23.0    Essential hypertension I10    Long term current use of insulin ([facility]) Z79.4    Dyslipidemia E78.5    Carotid artery narrowing I65.29    Atherosclerosis of coronary artery I25.10    Hyperkalemia E87.5    Acute renal failure (HCC) N17.9    Paroxysmal A-fib ([facility]) I48.0    Coronary artery disease involving native coronary artery of native heart without angina pectoris I25.10    Cardiomyopathy ([facility] I42.9    Uncontrolled type 2 diabetes mellitus with hyperglycemia, with long-term current use of insulin ([facility]) E11.65, Z79.4    S/P hip hemiarthroplasty Z96.649    Acute ischemic right MCA stroke ([facility] I63.511    SANDY (acute kidney injury) ([facility] N17.9    Normocytic anemia D64.9    Severe protein-calorie malnutrition ([facility]) E43    S/P percutaneous endoscopic gastrostomy (PEG) tube placement ([facility]) Z93.1    Right to left cardiac shunt ([facility]) I28.0    H/O ischemic multifocal multiple vascular territories stroke Z86.73    Anticoagulated Z79.01    Vertebral artery stenosis, right I65.01    Hypoglycemia, exogenous insulin, in setting of SANDY E15    possible sepsis A41.9    Hypothermia T68. XXXA    SANDY (acute kidney injury) ([facility] I27.2    Metabolic acidosis D97.8    Acute metabolic encephalopathy V02.09    Altered mental status R41.82    Oropharyngeal dysphagia R13.12    Weakness R53.1    SOB (shortness of breath) R06.02    Goals of care, counseling/discussion Z71.89    DNR (do not resuscitate) discussion Z71.89    Encounter for palliative care Z51.5    Severe malnutrition ([facility] E43    Acquired hypothyroidism E03.9    Rectal mass K62.9    Rectal bleeding K62.5    Acute metabolic encephalopathy S65.89    UTI (urinary tract infection) N39.0    Hypothermia T68. Everlena Lo    CKD (chronic kidney disease), stage IV ([facility]) N18.4       Isolation/Infection:   Isolation          No Isolation            Nurse Assessment:  Last Vital Signs: BP (!) 122/99   Pulse 95   Temp 97.4 °F (36.3 °C) (Oral)   Resp 16   Ht 5' 5\" (1.651 m)   Wt 108 lb 1.6 oz (49 kg)   SpO2 100%   BMI 17.99 kg/m²     Last documented pain score (0-10 scale): Pain Level: 0  Last Weight:   Wt Readings from Last 1 Encounters:   04/11/19 108 lb 1.6 oz (49 kg)     Mental Status:  oriented and alert    IV Access:  - None    Nursing Mobility/ADLs:  Walking   Assisted  Transfer  Assisted  Bathing  Dependent  Dressing  Dependent  Toileting  Dependent  Feeding  Dependent  Med Admin  Dependent  Med Delivery   crushed    Wound Care Documentation and Therapy:        Elimination:  Continence:   · Bowel: No  · Bladder: No  Urinary Catheter: None   Colostomy/Ileostomy/Ileal Conduit: No     Date of Last BM 4/15/19*  Intake/Output Summary (Last 24 hours) at 4/12/2019 0836  Last data filed at 4/12/2019 0353  Gross per 24 hour   Intake 0 ml   Output 700 ml   Net -700 ml     I/O last 3 completed shifts: In: 0   Out: 700 [Urine:300; Emesis/NG output:400]    Safety Concerns: At Risk for Falls and Aspiration Risk    Impairments/Disabilities:      Speech    Nutrition Therapy:  Current Nutrition Therapy:   - Tube Feedings:  Diabetic    Routes of Feeding: Gastrostomy Tube  Liquids: No Liquids  Daily Fluid Restriction: no  Last Modified Barium Swallow with Video (Video Swallowing Test): not done    Treatments at the Time of Hospital Discharge:   Respiratory Treatments: N/A  Oxygen Therapy:  is not on home oxygen therapy.   Ventilator:    - No ventilator support    Rehab Therapies: Physical Therapy and Occupational Therapy  Weight Bearing Status/Restrictions: No weight bearing restirctions  Other Medical Equipment (for information only, NOT a DME order):  walker  Other Treatments:     Patient's personal belongings (please select all that are sent with patient):  None    RN SIGNATURE:  Electronically signed by Dmitry Mcguire RN on 4/15/19 at 2:27 PM    CASE MANAGEMENT/SOCIAL WORK SECTION    Inpatient Status Date: 04/11/2019    Readmission Risk Assessment Score:  Readmission Risk Risk of Unplanned Readmission:        34           Discharging to Facility/ Agency   · Name:   · Address: [address] [place] [address]  · Phone: [phone]  · Fax: [phone]    / signature: Electronically signed by WIL Zambrano LSW on 4/15/2019 at 1:41 PM      PHYSICIAN SECTION    Prognosis: Fair    Condition at Discharge: Stable    Rehab Potential (if transferring to Rehab): Good    Recommended Labs or Other Treatments After Discharge:   -Please give synthyroid on empty stomach. -outpatient palliative care to follow      Physician Certification: I certify the above information and transfer of Kalee Florian  is necessary for the continuing treatment of the diagnosis listed and that he requires intermediate nursing facility.      Update Admission H&P: No change in H&P    PHYSICIAN SIGNATURE:  Electronically signed by Michael Melton MD on 4/15/19 at 12:47 PM

## 2019-04-12 NOTE — PROGRESS NOTES
Report called to Diann Simpson on PCU. Patient still drowsy but vital signs stable on room air. Family updated by Dr. Kaylah Levy. Gauze dressing reapplied to PEG tube.

## 2019-04-12 NOTE — PLAN OF CARE
Problem: Falls - Risk of:  Goal: Will remain free from falls  Description  Will remain free from falls  Outcome: Ongoing     Pt remains without falls during this shift. Pt does not attempt to get OOB alone. Pt able to call out appropriately, call light within reach. Safety precautions in place include fall armband, fall towel, chair & bed alarms, non slip foot wear. Signs posted on door, and door remains open for observation. The bed is in lowest position, wheels are locked, and rails are up 2/4. Continue with current care.

## 2019-04-12 NOTE — PROGRESS NOTES
Speech Language Pathology  Dysphagia - hold    Order received, chart reviewed, d/w medical resident and reviewed prior dysphagia recommendations. At dc, pt on nectar thick liquids for pleasure and receiving tube feeds. Pt with G tube draining green brownish secretions, per MD note. Swallow evaluation on hold due to GI. Will follow up as appropriate      Alyssa Paul M.S./[facility]-SLP #[id number]  Pg.  # [phone]

## 2019-04-12 NOTE — CONSULTS
NEUROSURGERY CONSULT NOTE    Paul Maza  [id number]   1945   4/11/2019    Requesting physician: Yanna Cummings MD    Reason for consultation: Hygroma    History of present illness: Patient is a 66 y.o. male w/ PMH of afib (Eliquis) HTN, HLD, CAD, DM, PNA who presented on 4/11/2019 to [facility] ED c/o AMS. The patient has a history of prior stroke, and metabolic encephalopathy. His last stroke was in January of this year. Per nursing home, his last baseline state of health was sometime yesterday evening before he went to bed. They found him this morning with diminished verbalization and less purposeful responsiveness of the left side of his body. His fingerstick blood glucose was unremarkable in route. The patient himself is not able to provide history. He is responsive to voice but is not making coherent sentences. CT Head showed small right temporal parietal hygroma.     ROS:   YUDELKA 2/2 Encephalopathy    No Known Allergies    Past Medical History:   Diagnosis Date    Acute renal failure (HCC)     Acute respiratory failure with hypoxia (HCC)     Angina pectoris (HCC)     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HCC)     CAD (coronary artery disease)     Cardiac arrest ([address]     Cardiac arrest (HCC)     Chronic systolic heart failure (HCC)     Diabetes mellitus ([address]     Diabetes mellitus ([address] 10/9/2012    Diabetes mellitus type II, uncontrolled ([address]     4/1017 A1c = 13    DKA (diabetic ketoacidoses) ([address] 04/2017    Elevated LFTs     Femoral neck fracture ([address] 10/17/2017    HTN (hypertension) 10/17/2017    Hyperkalemia     Hyperlipidemia     Hypertension     PAF (paroxysmal atrial fibrillation) (HCC)     Paroxysmal A-fib (HCC)     PEA (Pulseless electrical activity) ([address] 04/13/2017    Pneumonia 04/2017    LIKELY PNEUMOCOCCAL    Pneumonia due to organism     Pneumonia of right lower lobe due to Streptococcus pneumoniae ([address]     Protein-calorie malnutrition, severe ([address] 04/2017    BMI = 19    S/P hip hemiarthroplasty 2017    Septic shock (HCC)     Systolic CHF (HCC)     EF 30%    Thrombocytopenia (HCC)     Thrombocytopenia (HCC)     Type 2 diabetes mellitus with hyperglycemia ([facility] 2016        Past Surgical History:   Procedure Laterality Date    ANUS SURGERY N/A 2019    EXAM UNDER ANESTHESIA, RECTAL BIOPSY X 2 performed by Chrissy Leigh MD at [facility] 2019    COLONOSCOPY performed by Jesus Mcarthur MD at [address] N/A 2018    GASTRIC TUBE REMOVAL performed by Diana Mariscal MD at [facility]      G-tube placement   [facility] N/A 2019    EGD PEG TUBE PLACEMENT successful tube placement performed by Jesus Mcarthur MD at [facility] [facility]      [facility]    UPPER GASTROINTESTINAL ENDOSCOPY N/A 2019    EGD BIOPSY performed by Jesus Mcarthur MD at [facility] Not on file   Tobacco Use    Smoking status: Former Smoker     Last attempt to quit: 2018     Years since quittin.2    Smokeless tobacco: Never Used   Substance and Sexual Activity    Alcohol use: No    Drug use: No    Sexual activity: Not on file        History reviewed. No pertinent family history.      Outpatient Medications Marked as Taking for the 19 encounter [facility] Encounter)   Medication Sig Dispense Refill    omeprazole (PRILOSEC) 20 MG delayed release capsule Take 20 mg by mouth 2 times daily      tamsulosin (FLOMAX) 0.4 MG capsule Take 1 capsule by mouth Daily with supper 30 capsule 3    levothyroxine (SYNTHROID) 75 MCG tablet Take 1 tablet by mouth daily 30 tablet 1    carvedilol (COREG) 6.25 MG tablet Take 6.25 mg by mouth 2 times daily (with meals)      vitamin D (ERGOCALCIFEROL) 36502 units CAPS capsule Take 50,000 Units by mouth Twice a Week Give on Monday and Thursday      lisinopril (PRINIVIL;ZESTRIL) 5 MG tablet Oral Q4H PRN Golden Weiss MD        insulin lispro (HUMALOG) injection pen 0-12 Units  0-12 Units Subcutaneous TID WC Golden Weiss MD   6 Units at 04/11/19 1858    insulin lispro (HUMALOG) injection pen 0-6 Units  0-6 Units Subcutaneous Nightly Golden Weiss MD        glucose (GLUTOSE) 40 % oral gel 15 g  15 g Oral PRN Golden Weiss MD        dextrose 50 % solution 12.5 g  12.5 g Intravenous PRN Golden Weiss MD        glucagon (rDNA) injection 1 mg  1 mg Intramuscular PRN Golden Weiss MD        dextrose 5 % solution  100 mL/hr Intravenous PRN Golden Weiss MD        pantoprazole (PROTONIX) injection 40 mg  40 mg Intravenous BID Golden Weiss MD        fluconazole (DIFLUCAN) tablet 200 mg  200 mg Oral Once Golden Weiss MD        [START ON 4/17/2019] levothyroxine (SYNTHROID) injection 37.5 mcg  37.5 mcg Intravenous Daily Golden Weiss MD        And    sodium chloride (PF) 0.9 % injection 5 mL  5 mL Intravenous Daily Golden Weiss MD        piperacillin-tazobactam (ZOSYN) 3.375 g in dextrose 5 % 100 mL IVPB extended infusion (mini-bag)  3.375 g Intravenous Q12H Golden Weiss MD        [START ON 4/12/2019] fluconazole (DIFLUCAN) 40 MG/ML suspension 200 mg  200 mg Oral Daily Golden Weiss MD            Objective:  BP (!) 184/90   Pulse 72   Temp 94.5 °F (34.7 °C) (Rectal)   Resp 18   Ht 5' 5\" (1.651 m)   Wt 108 lb 1.6 oz (49 kg)   SpO2 100%   BMI 17.99 kg/m²     Physical Exam:   Patient seen and examined  GCS:  4 - Opens eyes on own  5 - Alert and oriented  6 - Follows simple motor commands  General: Chronically ill. Alert and cooperative in no acute distress. HENT: atraumatic, neck supple  Eyes: Optic discs: Not tested  Pulmonary: unlabored respiratory effort  Cardiovascular:  Warm well perfused.  No peripheral edema  Gastrointestinal: abdomen soft, NT, ND    Neurological:  Mental Status: Awake, alert, oriented to self, year and place, speech clear and appropriate  Attention: breath)     Goals of care, counseling/discussion     DNR (do not resuscitate) discussion     Encounter for palliative care     possible sepsis 03/26/2019    Hypothermia 03/26/2019    SANDY (acute kidney injury) ([facility] 93/39/4544    Metabolic acidosis 97/31/6724    Acute metabolic encephalopathy 57/82/5627    Hypoglycemia, exogenous insulin, in setting of SANDY 03/05/2019    Vertebral artery stenosis, right 03/01/2019    SANDY (acute kidney injury) ([address] 02/28/2019    Normocytic anemia 02/28/2019    Severe protein-calorie malnutrition ([address] 02/28/2019    S/P percutaneous endoscopic gastrostomy (PEG) tube placement ([address] 02/28/2019    Right to left cardiac shunt ([address] 02/28/2019    H/O ischemic multifocal multiple vascular territories stroke 02/28/2019    Anticoagulated 02/28/2019    Acute ischemic right MCA stroke ([address] 01/17/2019    S/P hip hemiarthroplasty 12/29/2017    Uncontrolled type 2 diabetes mellitus with hyperglycemia, with long-term current use of insulin ([address] 10/17/2017    Paroxysmal A-fib ([address]     Coronary artery disease involving native coronary artery of native heart without angina pectoris     Cardiomyopathy ([address]     Acute renal failure ([address]     Hyperkalemia     Noncompliance with medication regimen 10/17/2016    Type 2 diabetes mellitus ([address] 08/11/2016    Essential hypertension 08/11/2016    Vitamin B deficiency 11/26/2012    Atherosclerosis of coronary artery 04/04/2011    Long term current use of insulin ([address] 01/25/2011    Vitamin D deficiency 09/17/2010    Angiopathy, peripheral ([address] 09/17/2010    Gonadotropin deficiency ([address] 09/17/2010    Dyslipidemia 02/12/2010    Carotid artery narrowing 02/12/2010       Assessment:  Patient is a 66 y.o. male w/left side weakness, dysarthria, and small CSF isoattenuating right subdural fluid collection seen on CT Head scan. Plan:  1. No emergent neurosurgical intervention indicated  2.  Neurologic exams frequency:  - Floor: Q4H  3. Hygroma:  - Not cause of symptoms  - No further imaging necessary unless there is a change in neurologic exam  4. No neurosurgical follow-up indicated. Thank you for the consultation.     Savanna Portillo MD, PhD  [address], [address], [address] [phone] (c), [phone] (o)

## 2019-04-12 NOTE — PROGRESS NOTES
Patient returned to room and was assisted back to bed. G-tube remains to link for drainage. Dark green secretions noted. Dressing intact to G-tube site. Fall precautions are in place.

## 2019-04-12 NOTE — ANESTHESIA PRE PROCEDURE
Department of Anesthesiology  Preprocedure Note       Name:  Diamond Lopes   Age:  66 y.o.  :  1945                                          MRN:  [id number]         Date:  2019      Surgeon: Jaron Arana):  Martha Victor MD    Procedure: EGD DIAGNOSTIC ONLY (N/A )    Medications prior to admission:   Prior to Admission medications    Medication Sig Start Date End Date Taking?  Authorizing Provider   omeprazole (PRILOSEC) 20 MG delayed release capsule Take 20 mg by mouth 2 times daily   Yes Historical Provider, MD   tamsulosin (FLOMAX) 0.4 MG capsule Take 1 capsule by mouth Daily with supper 19  Yes Kandy Wright MD   levothyroxine (SYNTHROID) 75 MCG tablet Take 1 tablet by mouth daily 19  Yes Kandy Wright MD   carvedilol (COREG) 6.25 MG tablet Take 6.25 mg by mouth 2 times daily (with meals)   Yes Historical Provider, MD   vitamin D (ERGOCALCIFEROL) 16236 units CAPS capsule Take 50,000 Units by mouth Twice a Week Give on Monday and Thursday   Yes Historical Provider, MD   lisinopril (PRINIVIL;ZESTRIL) 5 MG tablet Take 5 mg by mouth daily   Yes Historical Provider, MD   megestrol (MEGACE) 40 MG/ML suspension Take 400 mg by mouth daily   Yes Historical Provider, MD   mirtazapine (REMERON) 15 MG tablet Take 15 mg by mouth nightly   Yes Historical Provider, MD   apixaban (ELIQUIS) 2.5 MG TABS tablet Take 1 tablet by mouth 2 times daily 3/7/19  Yes Bobbi Palacios MD   insulin glargine (TOUJEO MAX SOLOSTAR) 300 UNIT/ML injection pen Inject 5 Units into the skin nightly 19  Yes Raquel Epperson MD   metFORMIN (GLUCOPHAGE) 500 MG tablet Take 500 mg by mouth 2 times daily (with meals)    Yes Historical Provider, MD   insulin aspart (NOVOLOG) 100 UNIT/ML injection vial Inject 0-4 Units into the skin 3 times daily (before meals) Inject as per sliding scale   Yes Historical Provider, MD   ferrous sulfate 325 (65 Fe) MG tablet Take 325 mg by mouth 3 times daily (with meals)    Yes Historical Provider, MD (ZOSYN) 3.375 g in dextrose 5 % 100 mL IVPB extended infusion (mini-bag)  3.375 g Intravenous Q12H Kun Reddy MD   Stopped at 04/12/19 1050    hydrALAZINE (APRESOLINE) injection 5 mg  5 mg Intravenous Q6H PRN Raquel Lacey MD   5 mg at 04/11/19 2351       Allergies:  No Known Allergies    Problem List:    Patient Active Problem List   Diagnosis Code    Vitamin D deficiency E55.9    Vitamin B deficiency E53.9    Type 2 diabetes mellitus ([facility] E11.9    Angiopathy, peripheral [facility] I73.9    Noncompliance with medication regimen Z91.14    Gonadotropin deficiency ([facility] E23.0    Essential hypertension I10    Long term current use of insulin ([facility]) Z79.4    Dyslipidemia E78.5    Carotid artery narrowing I65.29    Atherosclerosis of coronary artery I25.10    Hyperkalemia E87.5    Acute renal failure (HCC) N17.9    Paroxysmal A-fib ([facility]) I48.0    Coronary artery disease involving native coronary artery of native heart without angina pectoris I25.10    Cardiomyopathy ([facility] I42.9    Uncontrolled type 2 diabetes mellitus with hyperglycemia, with long-term current use of insulin ([facility]) E11.65, Z79.4    S/P hip hemiarthroplasty Z96.649    Acute ischemic right MCA stroke ([facility]) I63.511    SANDY (acute kidney injury) ([facility] N17.9    Normocytic anemia D64.9    Severe protein-calorie malnutrition ([facility]) E43    S/P percutaneous endoscopic gastrostomy (PEG) tube placement ([facility]) Z93.1    Right to left cardiac shunt ([facility]) I28.0    H/O ischemic multifocal multiple vascular territories stroke Z86.73    Anticoagulated Z79.01    Vertebral artery stenosis, right I65.01    Hypoglycemia, exogenous insulin, in setting of SANDY E15    possible sepsis A41.9    Hypothermia T68. XXXA    SANDY (acute kidney injury) ([facility] T69.6    Metabolic acidosis O73.4    Acute metabolic encephalopathy X30.47    Altered mental status R41.82    Oropharyngeal dysphagia R13.12    Weakness R53.1    SOB (shortness of breath) R06.02    REMOVAL performed by Heath Brown MD at [facility]      G-tube placement   [address] [facility] N/A 2019    EGD PEG TUBE PLACEMENT successful tube placement performed by Anu Burton MD at [facility]      hip    UPPER GASTROINTESTINAL ENDOSCOPY N/A 2019    EGD BIOPSY performed by Anu Burton MD at [address] [facility] History:    Social History     Tobacco Use    Smoking status: Former Smoker     Last attempt to quit: 2018     Years since quittin.2    Smokeless tobacco: Never Used   Substance Use Topics    Alcohol use: No                                Counseling given: Not Answered      Vital Signs (Current):   Vitals:    19 0658 19 0919 19 1145 19 1519   BP: (!) 122/99 131/70 (!) 146/76 (!) 143/74   Pulse:  90 91 73   Resp:     Temp:  98.3 °F (36.8 °C) 98.2 °F (36.8 °C) 97.7 °F (36.5 °C)   TempSrc:  Oral Oral Oral   SpO2:  97% 99% 100%   Weight:       Height:                                                  BP Readings from Last 3 Encounters:   19 (!) 143/74   19 (!) 148/83   19 117/70       NPO Status:                                                                                 BMI:   Wt Readings from Last 3 Encounters:   19 108 lb 1.6 oz (49 kg)   19 119 lb 11.4 oz (54.3 kg)   19 102 lb 9.6 oz (46.5 kg)     Body mass index is 17.99 kg/m².     CBC:   Lab Results   Component Value Date    WBC 10.9 2019    RBC 2.52 2019    HGB 7.7 2019    HCT 22.5 2019    MCV 89.5 2019    RDW 16.4 2019     2019       CMP:   Lab Results   Component Value Date     2019    K 4.7 2019     2019    CO2 21 2019    BUN 34 2019    CREATININE 2.2 2019    GFRAA 35 2019    AGRATIO 0.6 2019    LABGLOM 29 2019    GLUCOSE 100 2019    PROT 5.4 2019

## 2019-04-12 NOTE — PROGRESS NOTES
Progress Note  PGY-1    Hospital Day:                                                          Code:DNR-CCA  Admit Date: 4/11/2019                                             PCP: Julissa SOTO                                SUBJECTIVE:     Interval Hx: Patient seen at bedside. Patient in no acute distress. Patient sleeping and very lethargic. Patient said he was feeling better but refused any other questions. MEDICATIONS:   Scheduled Meds:   fluconazole  200 mg Per G Tube Daily    amLODIPine  5 mg Per G Tube Daily    apixaban  2.5 mg Per G Tube BID    aspirin  81 mg Per G Tube Daily    megestrol  400 mg Per G Tube Daily    carvedilol  6.25 mg Per G Tube BID WC    lisinopril  5 mg Per G Tube Daily    ferrous sulfate  300 mg Per G Tube TID WC    tamsulosin  0.4 mg Oral Dinner    sodium chloride flush  10 mL Intravenous 2 times per day    insulin lispro  0-12 Units Subcutaneous TID WC    insulin lispro  0-6 Units Subcutaneous Nightly    pantoprazole  40 mg Intravenous BID    [START ON 4/17/2019] levothyroxine  37.5 mcg Intravenous Daily    And    sodium chloride (PF)  5 mL Intravenous Daily    piperacillin-tazobactam  3.375 g Intravenous Q12H      Continuous Infusions:   dextrose       PRN Meds:ondansetron, sodium chloride flush, magnesium hydroxide, acetaminophen, glucose, dextrose, glucagon (rDNA), dextrose, hydrALAZINE    Allergies: No Known Allergies    PHYSICAL EXAM:       Vitals: BP (!) 122/99   Pulse 95   Temp 97.4 °F (36.3 °C) (Oral)   Resp 16   Ht 5' 5\" (1.651 m)   Wt 108 lb 1.6 oz (49 kg)   SpO2 100%   BMI 17.99 kg/m²     I/O:      Intake/Output Summary (Last 24 hours) at 4/12/2019 0914  Last data filed at 4/12/2019 0353  Gross per 24 hour   Intake 0 ml   Output 700 ml   Net -700 ml     No intake/output data recorded. I/O last 3 completed shifts:   In: 0   Out: 700 [Urine:300; Emesis/NG output:400]      General: Alert and Oriented, No acute distress, cooperative, appears Limited study without IV or oral contrast with motion artifact limiting the assessment. 2. Right basilar consolidation and pleural effusion which may be pneumonia, this is less pronounced than the previous consolidation in the right lower lobe seen on prior study from April 19, 2017. Minimal left-sided pleural effusion and atelectasis    noted. Trace pericardial effusion also noted   3. Chronic pancreatitis, scattered calcifications but no acute inflammation noted around the pancreas. 4. Grossly normal-appearing renal outlines without hydronephrosis but again limited without IV contrast. Advanced aortic and iliac calcifications without gross aneurysm   5. Advanced degenerative changes of the spine, unchanged   6. Air in the urinary bladder which could be from recent catheterization or infection, correlate clinically with no catheter in place at this time   7. Scatter artifact in the pelvis from right hip replacement. Moderate stool noted in the rectum and tracer minimal free fluid noted along the paracolic margins with no free air grossly identified. CT head without contrast   Final Result      1. Increase in small CSF isoattenuating right subdural fluid collection. Differential diagnosis is subdural hygroma if recent trauma, subdural effusion, or chronic hematoma less likely based on the recent change. 2.  No acute intracranial hemorrhage. 3.  Multifocal chronic ischemic changes unchanged. 4.  Persistent fluid in the right mastoid air cells. 5.  Chronic right maxillary sinusitis. XR CHEST PORTABLE   Final Result      Increased size of small-moderate loculated right inferolateral pleural effusion in comparison to 3/28/2019. There is associated right basilar consolidation-atelectasis versus pneumonia. Rightward volume loss is seen with shift of the mediastinum, compatible with a component of atelectasis. Left lung is clear. Top normal heart size.             ASSESSMENT AND PLAN: Abhay Garcias 65 yo male with PMHx of MCA stroke (1/19), Afib (Eliquis), chronic diastolic HF (EF 89%), CAD, HTN, HLD, DMII, and gastric ulcers who presented to the ED from nursing home with altered mental status after being found less responsive on left side this morning. He is hypothermic and hypertensive. He was recently discharged on 4/7/2019 for SANDY and FOBT+ w/ EGD showing gastric ulcers causing chronic anemia. Acute metabolic encephalopathy likely 2/2 to UTI  Pt presented from nursing home with altered mental status per nursing staff. Was A&Ox3 when examined. UA showed increased LE and budding yeast present. Ammonia 13 and lactate 1.98. Procalcitonin elevated to 0.44.  - urine and blood cultures ordered  - Fluconazole 200mg PO  - zosyn  - BMP     Resolving PNA  CT indicates right basilar consolidation & pleural effusion which was less pronounced than previous 3/28/19. Procalcitonin elevated to 0.44.   - vancomycin 750mg and cefepime 2g given in ED     Chronic Anemia    Hb 7.7 (BL 7.2). Previous admission EGD showed gastric ulcers.   - protonix 40mg BID   - continue home ferrous sulfate 325mg     Paroxysmal Afib   -Continue home Eliquis 2.5mg     HTN   -Continue home amlodipine 5mg, lisinopril 5mg and Coreg 6.62ZK     Diastolic HF HFpEF (58-47%)  -Continue home Coreg 6.25mg     DMII  - med dose sliding scale  - hypoglycemic protocol      Hypothyroidism  -continue synthroid 75mcg        DVT Prophylaxis: home eliquis 2.5mg  Diet: Full liquid diet;honey thick   Code Status: DNR-CCA  Dispo - GMF      Kacey Dyer Ohm   4/12/2019,  9:14 AM

## 2019-04-12 NOTE — PROGRESS NOTES
Progress Note  PGY-1                                                            Code:DNR-CCA  Admit Date: 4/11/2019   CC: Altered mental status                                                                    SUBJECTIVE:     Interval Hx: Patient seen and examined at bedside. No acute events overnight. Patient does not appear in any acute distress. Patient resting comfortably in bed, appears somnolent but is responsive to questions, oriented x2. No focal neurological deficits, VSS. No longer hypothermic. Patient denies any headache, chest pain, SOB, abdominal pain, hematuria. G-tube draining green-brownish secretions, will consult GI.        MEDICATIONS:   Scheduled Meds:   fluconazole  200 mg Per G Tube Daily    amLODIPine  5 mg Per G Tube Daily    apixaban  2.5 mg Per G Tube BID    aspirin  81 mg Per G Tube Daily    megestrol  400 mg Per G Tube Daily    carvedilol  6.25 mg Per G Tube BID WC    lisinopril  5 mg Per G Tube Daily    ferrous sulfate  300 mg Per G Tube TID WC    tamsulosin  0.4 mg Oral Dinner    sodium chloride flush  10 mL Intravenous 2 times per day    insulin lispro  0-12 Units Subcutaneous TID WC    insulin lispro  0-6 Units Subcutaneous Nightly    pantoprazole  40 mg Intravenous BID    [START ON 4/17/2019] levothyroxine  37.5 mcg Intravenous Daily    And    sodium chloride (PF)  5 mL Intravenous Daily    piperacillin-tazobactam  3.375 g Intravenous Q12H      Continuous Infusions:   dextrose       PRN Meds:ondansetron, sodium chloride flush, magnesium hydroxide, acetaminophen, glucose, dextrose, glucagon (rDNA), dextrose, hydrALAZINE    Allergies: No Known Allergies    PHYSICAL EXAM:       Vitals: /70   Pulse 90   Temp 98.3 °F (36.8 °C) (Oral)   Resp 16   Ht 5' 5\" (1.651 m)   Wt 108 lb 1.6 oz (49 kg)   SpO2 97%   BMI 17.99 kg/m²     I/O:      Intake/Output Summary (Last 24 hours) at 4/12/2019 1026  Last data filed at 4/12/2019 0353  Gross per 24 hour   Intake 0 ml Output 700 ml   Net -700 ml     No intake/output data recorded. I/O last 3 completed shifts: In: 0   Out: 700 [Urine:300; Emesis/NG output:400]      General: Alert and Oriented, No acute distress, cooperative, appears stated age  Eye: PERRLA, EOMI, Normal Conjunctiva  HENT: Normocephalic, Normal Hearing, Moist oral mucosa, no pharyngeal edema  Neck: Supple, Non-tender, no carotid bruit, no JVD, no lymphadenopathy, no thyromegaly  Respiratory: Lungs clear to auscultation bilaterally, Non-labored respirations, BS equal, symmetrical expansion, no chest wall tenderness  Cardiovascular: Normal rate, regular rhythm, no murmurs, no gallops, good pulses in all extremities, normal peripheral perfusion, no edema  Gastrointestinal: Soft, Non-tender, non-distended, normal bowel sounds, no organomegaly  Musculoskeletal: Normal ROM, Normal strength, No tenderness, No swelling, No deformity   Feet: Normal by visual exam, Normal pulses, Sensation intact  Neurologic: Alert, Oriented, Normal sensory, Normal motor, No focal defects, CN II-XII intact, Normal DTRs  Psychiatric: Cooperative, appropriate mood and affect, normal judgment, non-suicidal        DATA:       Labs:  CBC:   Recent Labs     04/11/19  1140 04/12/19  0523   WBC 9.2 10.9   HGB 7.3* 7.7*   HCT 21.9* 22.5*    172       BMP:   Recent Labs     04/11/19  1139 04/12/19  0523    138   K 5.3* 4.7    107   CO2 22 21   BUN 34* 34*   CREATININE 2.2* 2.2*   GLUCOSE 168* 100*     LFT's:   Recent Labs     04/11/19  1139   AST 15   ALT 23   BILITOT <0.2   ALKPHOS 140*     Troponin:   Recent Labs     04/11/19  1139   TROPONINI 0.05*     BNP: No results for input(s): BNP in the last 72 hours. ABGs: No results for input(s): PHART, IDA4IRH, PO2ART in the last 72 hours. INR: No results for input(s): INR in the last 72 hours.     U/A:  Recent Labs     04/11/19  1140   COLORU Yellow   PHUR 6.0   WBCUA 20-50*   RBCUA 0-2   YEAST Present*   BACTERIA Rare*   CLARITYU Nhan Loya   4/12/2019,  10:26 AM

## 2019-04-12 NOTE — ANESTHESIA POSTPROCEDURE EVALUATION
Department of Anesthesiology  Postprocedure Note    Patient: Luis Dukes  MRN: [id number]  YOB: 1945  Date of evaluation: 4/12/2019  Time:  5:07 PM     Procedure Summary     Date:  04/12/19 Room / Location:  [facility] [address] / [address] END[address]    Anesthesia Start:  3394 Anesthesia Stop:  2673    Procedure:  EGD DIAGNOSTIC ONLY (N/A ) Diagnosis:  (GI BLEED)    Surgeon:  Quoc Pickens MD Responsible Provider:  Puneet Waggoner MD    Anesthesia Type:  general ASA Status:  3          Anesthesia Type: general    Bruna Phase I:      Bruna Phase II: Bruna Score: 8    Last vitals: Reviewed and per EMR flowsheets.        Anesthesia Post Evaluation    Patient location during evaluation: PACU  Patient participation: complete - patient participated  Level of consciousness: awake and alert  Pain score: 0  Airway patency: patent  Nausea & Vomiting: no nausea and no vomiting  Complications: no  Cardiovascular status: hemodynamically stable  Respiratory status: acceptable  Hydration status: euvolemic

## 2019-04-12 NOTE — PROGRESS NOTES
4 Eyes Admission Assessment     I agree as the admission nurse that 2 RN's have performed a thorough Head to Toe Skin Assessment on the patient. ALL assessment sites listed below have been assessed on admission. Areas assessed by both nurses:   []   Head, Face, and Ears   []   Shoulders, Back, and Chest  []   Arms, Elbows, and Hands   []   Coccyx, Sacrum, and Ischum  []   Legs, Feet, and Heels        Does the Patient have Skin Breakdown?   No         Larry Prevention initiated:  No   Wound Care Orders initiated:  No      [facility] nurse consulted for Pressure Injury (Stage 3,4, Unstageable, DTI, NWPT, and Complex wounds):  No      Nurse 1 eSignature: Pancho Sahni      **SHARE this note so that the co-signing nurse is able to place an eSignature**    Nurse 2 eSignature: {Esignature:[id number]}

## 2019-04-12 NOTE — PROGRESS NOTES
Patient Diagnosis(es): The encounter diagnosis was Altered mental status, unspecified altered mental status type. has a past medical history of Acute renal failure ([address] Acute respiratory failure with hypoxia ([address] Angina pectoris ([address] 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[address] CAD (coronary artery disease), Cardiac arrest [facility]), Cardiac arrest ([address] Chronic systolic heart failure ([address] Diabetes mellitus ([address] Diabetes mellitus ([address] Diabetes mellitus type II, uncontrolled ([address] DKA (diabetic ketoacidoses) ([address] Elevated LFTs, Femoral neck fracture ([address] HTN (hypertension), Hyperkalemia, Hyperlipidemia, Hypertension, PAF (paroxysmal atrial fibrillation) ([address] Paroxysmal A-fib ([address] PEA (Pulseless electrical activity) ([address] Pneumonia, Pneumonia due to organism, Pneumonia of right lower lobe due to Streptococcus pneumoniae ([address] Protein-calorie malnutrition, severe ([address] S/P hip hemiarthroplasty, Septic shock ([address] Systolic CHF ([address] Thrombocytopenia ([address] Thrombocytopenia ([address] and Type 2 diabetes mellitus with hyperglycemia ([address] has a past surgical history that includes Gastrostomy tube placement; joint replacement; gastrostomy tube change (N/A, 12/14/2018); Colonoscopy (N/A, 4/1/2019); Upper gastrointestinal endoscopy (N/A, 4/1/2019); Anus surgery (N/A, 4/2/2019); and Gastrostomy tube placement (N/A, 4/5/2019). Restrictions  Position Activity Restriction  Other position/activity restrictions: up with assistance  Vision/Hearing  Vision: Within Functional Limits(appears WFL with mobility)  Hearing: (unable to accurately assess)     Subjective  General  Chart Reviewed: Yes  Patient assessed for rehabilitation services?: Yes  Additional Pertinent Hx: Patient is a 65 y/o male admitted 4/11 from nursing facility with altered mental status. chest x-ray (+) for Increased size of small-moderate loculated right inferolateral pleural effusion in comparison to 3/28/2019.  CT head (+) for Increase in small CSF isoattenuating right subdural fluid collection, (-) for hemorrhage. CT abdomen (-) for acute findings. PMH significant for ARF, acute respiratory failure with hypoxia, angina pectoris, CAD, cardiac arrest, diabetes, DKA, femoral neck fracture (2017), HTN, HLD, a-fib, PNA, septic shock, CHF. Response To Previous Treatment: Not applicable  Family / Caregiver Present: No(two family members arrived at end of session)  Referring Practitioner: Brandon Gomes MD  Referral Date : 04/11/19  Diagnosis: altered mental status  Follows Commands: Impaired  Other (Comment): patient with <25% command following, does respond some to tactile cues  General Comment  Comments: Patient supine in bed upon arrival.  Subjective  Subjective: Patient agreeable to PT evaluation. Patient with minimal verbalization throughout treatment, is able to state name but unable to provide further history. Pain Screening  Patient Currently in Pain: Denies  Vital Signs  Patient Currently in Pain: Denies       Orientation  Orientation  Overall Orientation Status: Impaired(patient oriented to first name when asked, does not verbalize response to other orientation questions)  Orientation Level: Oriented to person  Social/Functional History  Social/Functional History  Type of Home: Facility[facility]  Additional Comments: Patient unable to provide history; per family that arrived at end of session, patient was ambulating with FWW at facility prior to admission. Per previous hospital admission, patient requiring assistance with all ADLs.    Cognition   Cognition  Overall Cognitive Status: Exceptions  Arousal/Alertness: Delayed responses to stimuli  Following Commands: Inconsistently follows commands  Initiation: Requires cues for all  Sequencing: Requires cues for all  Cognition Comment: pt only stated  name, otherwise non verbal, with assist to initiate mobility, pt participated in all mobility tasks    Objective          PROM RLE (degrees)  RLE PROM: WFL  PROM LLE (degrees)  LLE PROM: [facility]  Strength RLE  Strength RLE: Exception  Comment: unable to accurately assess due to cognitive deficits, appears grossly weakened with functional mobility  Strength LLE  Strength LLE: Exception  Comment: unable to accurately assess due to cognitive deficits, appears grossly weakened with functional mobility        Bed mobility  Supine to Sit: Moderate assistance(assist with LEs and assist with bringing trunk to upright sitting position)  Transfers  Sit to Stand: Minimal Assistance;2 Person Assistance(min assist x 2 from edge of bed (2 trials) and from bedside chair, verbal and tactile cues)  Stand to sit: Maximum Assistance(verbal cues for safety (impulsive with sitting))  Bed to Chair: Minimal assistance; Moderate assistance;2 Person Assistance(min x 1, mod x 1 to take steps with FWW)  Ambulation  Ambulation?: Yes  Ambulation 1  Surface: level tile  Device: Rolling Walker  Assistance: Minimal assistance; Moderate assistance;2 Person assistance(min x 1, mod x 1)  Quality of Gait: decreased yajaira, posterior weight shift, decreased step length/height bilaterally with shuffling steps, continuous verbal cues for safety and sequencing  Distance: 3-4 steps from edge of bed to bedside chair, 5' in room (turning and sitting impulsively on EOB), 4-5 steps from edge of bed to bedside chair  Comments: tactile cues for safe navigation of walker  Stairs/Curb  Stairs?: No     Balance  Sitting - Static: Poor(min-mod assist x 1 for static EOB sitting, posterior and right sided trunk lean)  Standing - Static: Fair;-(minimal assistance with UE support)  Standing - Dynamic: Poor(mod x 1 and min x 1 to ambulate with FWW)        Plan   Plan  Times per week: 2-5  Current Treatment Recommendations: Strengthening, Gait Training, Patient/Caregiver Education & Training, Balance Training, Functional Mobility Training, Endurance Training, Transfer Training, Safety Education & Training  Safety

## 2019-04-12 NOTE — PROGRESS NOTES
Removed coban dressing from pt's arm per request of pt's family. Some edema is present however edema seems to be improving.

## 2019-04-12 NOTE — PROGRESS NOTES
Occupational Therapy   Occupational Therapy Initial Assessment/tx  Date: 2019   Patient Name: Luis Dukes  MRN: [id number]     : 1945    Date of Service: 2019    Discharge Recommendations:  Luis Dukes scored a /24 on the AM-PAC ADL Inpatient form. Current research shows that an AM-PAC score of 17 or less is typically not associated with a discharge to the patient's home setting. Based on the patients AM-PAC score and their current ADL deficits, it is recommended that the patient have 3-5 sessions per week of Occupational Therapy at d/c to increase the patients independence. OT Equipment Recommendations  Equipment Needed: No    Assessment   Performance deficits / Impairments: Decreased functional mobility ; Decreased ADL status; Decreased ROM; Decreased cognition  Assessment: Pt is basically non-verbal, needs A of 1-2 for bed mobility and transfers, is dep with ADLs. He is not actively using either UE while seated, needs A to place hands on walker. Per family, pt was using rolling walker with A and needed A with ADLs at [facility]. Recommend ongoing OT at discharge to maximize functional abilites. Treatment Diagnosis: impaired ADLs, cogntion, transfers secondary to UTI  Prognosis: Good  Decision Making: Medium Complexity  Patient Education: role of OT-did not verbalize understanding, re-teach  REQUIRES OT FOLLOW UP: Yes  Activity Tolerance  Activity Tolerance: Patient limited by fatigue  Safety Devices  Safety Devices in place: Yes  Type of devices: Left in chair;Nurse notified; Chair alarm in place;Call light within reach           Patient Diagnosis(es): The encounter diagnosis was Altered mental status, unspecified altered mental status type.      has a past medical history of Acute renal failure ([address] Acute respiratory failure with hypoxia ([address] Angina pectoris ([address] 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[address] CAD (coronary artery disease), Cardiac arrest [facility]), [facility] arrest [address] Chronic systolic heart failure ([address] Diabetes mellitus ([address] Diabetes mellitus ([address] Diabetes mellitus type II, uncontrolled ([address] DKA (diabetic ketoacidoses) ([address] Elevated LFTs, Femoral neck fracture ([address] HTN (hypertension), Hyperkalemia, Hyperlipidemia, Hypertension, PAF (paroxysmal atrial fibrillation) ([facility]), Paroxysmal A-fib ([address] PEA (Pulseless electrical activity) ([address] Pneumonia, Pneumonia due to organism, Pneumonia of right lower lobe due to Streptococcus pneumoniae ([address] Protein-calorie malnutrition, severe ([address] S/P hip hemiarthroplasty, Septic shock ([address] Systolic CHF ([address] Thrombocytopenia ([address] Thrombocytopenia ([address] and Type 2 diabetes mellitus with hyperglycemia ([facility] has a past surgical history that includes Gastrostomy tube placement; joint replacement; gastrostomy tube change (N/A, 12/14/2018); Colonoscopy (N/A, 4/1/2019); Upper gastrointestinal endoscopy (N/A, 4/1/2019); Anus surgery (N/A, 4/2/2019); and Gastrostomy tube placement (N/A, 4/5/2019). Treatment Diagnosis: impaired ADLs, cogntion, transfers secondary to UTI      Restrictions  Position Activity Restriction  Other position/activity restrictions: up with assistance    Subjective   General  Patient assessed for rehabilitation services?: Yes  Additional Pertinent Hx: PMH: ARF, acute respiratory failure with hypoxia, CAD, angina, cardiac arrest, DM, DKA, HTN,, hyperkalemia, A fib. CVA  Referring Practitioner: Dr. Chante Wu  Diagnosis: Pt admitted with AMS, dx acute metabolic encephalopathy secondary to UTI, head CT=neg acute hemorrhage, increase in small CSF R subdural fluid collection, CXR=neg  Subjective  Subjective: Pt in bed, only stated first name. Social/Functional History  Social/Functional History  Type of Home: Facility([facility])  Additional Comments: Patient unable to provide history; per family that arrived at end of session, patient was ambulating with FWW at facility prior to admission.   Per previous hospital admission, patient requiring assistance with all ADLs. Objective        Orientation  Overall Orientation Status: (pt reported first name)     Balance  Sitting Balance:  Moderate assistance(pt not actively using UEs to stabilize self)  Standing Balance: Dependent/Total(Kristel of 2)  Standing Balance  Sit to stand: 2 Person assistance(3 trials, Kristel of 2 each time)  Stand to sit: 2 Person assistance(3 trials, Kristel of 2)  Toilet Transfers  Toilet - Technique: Ambulating(a few steps with rolling walker), A placing hands on walker  Equipment Used: (simulated 3 in1 commode)  Toilet Transfer: 2 Person assistance(Kristel of 1, mod A of 1)  ADL  Grooming: Dependent/Total(washing face)        Bed mobility  Supine to Sit: Moderate assistance(increased time/effort)  Transfers  Sit to stand: 2 Person assistance(3 trials, Kristel of 2 each time)  Stand to sit: 2 Person assistance(3 trials, Kristel of 2)  Transfer Comments: pt needed max A to turn and sit safely on bed     Cognition  Overall Cognitive Status: Exceptions  Arousal/Alertness: Delayed responses to stimuli  Following Commands: Inconsistently follows commands  Initiation: Requires cues for all  Sequencing: Requires cues for all  Cognition Comment: pt only stated  name, otherwise non verbal, with assist to initiate mobility, pt participated in all mobility tasks  Perception  Overall Perceptual Status: (pt kept head turned to R, cues to come to midline)          Patient participated in OT eval and tx including ADLs and transfer                Plan   Plan  Times per week: 2-5  Times per day: Daily  Current Treatment Recommendations: Balance Training, Functional Mobility Training, Endurance Training    G-Code     OutComes Score                                                  AM-PAC Score        AM-[facility] Inpatient Daily Activity Raw Score: 6  AM-PAC Inpatient ADL T-Scale Score : 17.07  ADL Inpatient CMS 0-100% Score: 100  ADL Inpatient CMS G-Code Modifier :

## 2019-04-13 LAB
ABO/RH: NORMAL
ANION GAP SERPL CALCULATED.3IONS-SCNC: 10 MMOL/L (ref 3–16)
ANTIBODY SCREEN: NORMAL
BASOPHILS ABSOLUTE: 0 K/UL (ref 0–0.2)
BASOPHILS RELATIVE PERCENT: 0.5 %
BLOOD BANK DISPENSE STATUS: NORMAL
BLOOD BANK PRODUCT CODE: NORMAL
BPU ID: NORMAL
BUN BLDV-MCNC: 34 MG/DL (ref 7–20)
CALCIUM SERPL-MCNC: 8.1 MG/DL (ref 8.3–10.6)
CHLORIDE BLD-SCNC: 109 MMOL/L (ref 99–110)
CO2: 22 MMOL/L (ref 21–32)
CREAT SERPL-MCNC: 2.4 MG/DL (ref 0.8–1.3)
DESCRIPTION BLOOD BANK: NORMAL
EOSINOPHILS ABSOLUTE: 0 K/UL (ref 0–0.6)
EOSINOPHILS RELATIVE PERCENT: 0.6 %
GFR AFRICAN AMERICAN: 32
GFR NON-AFRICAN AMERICAN: 26
GLUCOSE BLD-MCNC: 122 MG/DL (ref 70–99)
GLUCOSE BLD-MCNC: 129 MG/DL (ref 70–99)
GLUCOSE BLD-MCNC: 83 MG/DL (ref 70–99)
GLUCOSE BLD-MCNC: 91 MG/DL (ref 70–99)
GLUCOSE BLD-MCNC: 93 MG/DL (ref 70–99)
HCT VFR BLD CALC: 19 % (ref 40.5–52.5)
HEMOGLOBIN: 6.4 G/DL (ref 13.5–17.5)
LYMPHOCYTES ABSOLUTE: 0.4 K/UL (ref 1–5.1)
LYMPHOCYTES RELATIVE PERCENT: 6 %
MCH RBC QN AUTO: 30.3 PG (ref 26–34)
MCHC RBC AUTO-ENTMCNC: 33.9 G/DL (ref 31–36)
MCV RBC AUTO: 89.5 FL (ref 80–100)
MONOCYTES ABSOLUTE: 0.4 K/UL (ref 0–1.3)
MONOCYTES RELATIVE PERCENT: 6.8 %
NEUTROPHILS ABSOLUTE: 5.3 K/UL (ref 1.7–7.7)
NEUTROPHILS RELATIVE PERCENT: 86.1 %
PDW BLD-RTO: 15.8 % (ref 12.4–15.4)
PERFORMED ON: ABNORMAL
PERFORMED ON: ABNORMAL
PERFORMED ON: NORMAL
PERFORMED ON: NORMAL
PLATELET # BLD: 150 K/UL (ref 135–450)
PMV BLD AUTO: 7.8 FL (ref 5–10.5)
POTASSIUM REFLEX MAGNESIUM: 4.3 MMOL/L (ref 3.5–5.1)
RBC # BLD: 2.12 M/UL (ref 4.2–5.9)
SODIUM BLD-SCNC: 141 MMOL/L (ref 136–145)
WBC # BLD: 6.2 K/UL (ref 4–11)

## 2019-04-13 PROCEDURE — 6360000002 HC RX W HCPCS: Performed by: STUDENT IN AN ORGANIZED HEALTH CARE EDUCATION/TRAINING PROGRAM

## 2019-04-13 PROCEDURE — 86923 COMPATIBILITY TEST ELECTRIC: CPT

## 2019-04-13 PROCEDURE — 86850 RBC ANTIBODY SCREEN: CPT

## 2019-04-13 PROCEDURE — 85025 COMPLETE CBC W/AUTO DIFF WBC: CPT

## 2019-04-13 PROCEDURE — 36430 TRANSFUSION BLD/BLD COMPNT: CPT

## 2019-04-13 PROCEDURE — P9016 RBC LEUKOCYTES REDUCED: HCPCS

## 2019-04-13 PROCEDURE — 2060000000 HC ICU INTERMEDIATE R&B

## 2019-04-13 PROCEDURE — 80048 BASIC METABOLIC PNL TOTAL CA: CPT

## 2019-04-13 PROCEDURE — 2580000003 HC RX 258: Performed by: STUDENT IN AN ORGANIZED HEALTH CARE EDUCATION/TRAINING PROGRAM

## 2019-04-13 PROCEDURE — 86901 BLOOD TYPING SEROLOGIC RH(D): CPT

## 2019-04-13 PROCEDURE — 86900 BLOOD TYPING SEROLOGIC ABO: CPT

## 2019-04-13 PROCEDURE — C9113 INJ PANTOPRAZOLE SODIUM, VIA: HCPCS | Performed by: STUDENT IN AN ORGANIZED HEALTH CARE EDUCATION/TRAINING PROGRAM

## 2019-04-13 PROCEDURE — 6370000000 HC RX 637 (ALT 250 FOR IP): Performed by: STUDENT IN AN ORGANIZED HEALTH CARE EDUCATION/TRAINING PROGRAM

## 2019-04-13 PROCEDURE — 36415 COLL VENOUS BLD VENIPUNCTURE: CPT

## 2019-04-13 RX ORDER — 0.9 % SODIUM CHLORIDE 0.9 %
250 INTRAVENOUS SOLUTION INTRAVENOUS ONCE
Status: COMPLETED | OUTPATIENT
Start: 2019-04-13 | End: 2019-04-13

## 2019-04-13 RX ORDER — ASPIRIN 81 MG/1
81 TABLET, CHEWABLE ORAL DAILY
Status: DISCONTINUED | OUTPATIENT
Start: 2019-04-13 | End: 2019-04-15 | Stop reason: HOSPADM

## 2019-04-13 RX ADMIN — MINERAL SUPPLEMENT IRON 300 MG / 5 ML STRENGTH LIQUID 100 PER BOX UNFLAVORED 300 MG: at 11:31

## 2019-04-13 RX ADMIN — PANTOPRAZOLE SODIUM 40 MG: 40 INJECTION, POWDER, FOR SOLUTION INTRAVENOUS at 20:47

## 2019-04-13 RX ADMIN — LISINOPRIL 5 MG: 5 TABLET ORAL at 09:04

## 2019-04-13 RX ADMIN — Medication 10 ML: at 20:48

## 2019-04-13 RX ADMIN — APIXABAN 2.5 MG: 2.5 TABLET, FILM COATED ORAL at 11:31

## 2019-04-13 RX ADMIN — HYDRALAZINE HYDROCHLORIDE 5 MG: 20 INJECTION INTRAMUSCULAR; INTRAVENOUS at 00:09

## 2019-04-13 RX ADMIN — MINERAL SUPPLEMENT IRON 300 MG / 5 ML STRENGTH LIQUID 100 PER BOX UNFLAVORED 300 MG: at 18:19

## 2019-04-13 RX ADMIN — AMLODIPINE BESYLATE 5 MG: 5 TABLET ORAL at 09:05

## 2019-04-13 RX ADMIN — ASPIRIN 81 MG 81 MG: 81 TABLET ORAL at 11:33

## 2019-04-13 RX ADMIN — MEGESTROL ACETATE 400 MG: 40 SUSPENSION ORAL at 09:04

## 2019-04-13 RX ADMIN — CARVEDILOL 6.25 MG: 6.25 TABLET, FILM COATED ORAL at 09:04

## 2019-04-13 RX ADMIN — MINERAL SUPPLEMENT IRON 300 MG / 5 ML STRENGTH LIQUID 100 PER BOX UNFLAVORED 300 MG: at 11:32

## 2019-04-13 RX ADMIN — APIXABAN 2.5 MG: 2.5 TABLET, FILM COATED ORAL at 20:47

## 2019-04-13 RX ADMIN — CARVEDILOL 6.25 MG: 6.25 TABLET, FILM COATED ORAL at 18:18

## 2019-04-13 RX ADMIN — TAMSULOSIN HYDROCHLORIDE 0.4 MG: 0.4 CAPSULE ORAL at 18:18

## 2019-04-13 RX ADMIN — PIPERACILLIN AND TAZOBACTAM 3.38 G: 3; .375 INJECTION, POWDER, FOR SOLUTION INTRAVENOUS at 06:25

## 2019-04-13 RX ADMIN — PANTOPRAZOLE SODIUM 40 MG: 40 INJECTION, POWDER, FOR SOLUTION INTRAVENOUS at 09:05

## 2019-04-13 RX ADMIN — SODIUM CHLORIDE 250 ML: 9 INJECTION, SOLUTION INTRAVENOUS at 09:05

## 2019-04-13 ASSESSMENT — PAIN SCALES - GENERAL
PAINLEVEL_OUTOF10: 0
PAINLEVEL_OUTOF10: 0

## 2019-04-13 NOTE — OP NOTE
[address] [address], [address]                                OPERATIVE REPORT    PATIENT NAME: Nadine Aguilera                       :        1945  MED REC NO:   [id number]                          ROOM:       2978  ACCOUNT NO:   [de-identified]                           ADMIT DATE: 2019  PROVIDER:     Iliana Hodge MD    DATE OF PROCEDURE:  2019    INDICATIONS:  A 66-year-old man who has G-tube on his last admission  placed endoscopically. He was admitted through the emergency room with  brown material coming through the G-tube, concerning for GI bleed. The  patient had also several gastric and duodenal ulcers . there were felt to be stress ulcers  and he has been treated with PPI. Today, he is having endoscopic  reevaluation. OPERATIVE PROCEDURE:  With the patient in the left lateral position and  after IV Diprivan, the Olympus video endoscope was introduced into the  esophagus and advanced towards the GE junction. Esophagus was normal.   Stomach was entered. The G-tube is in good position. Most of the  ulcers are healing very nicely and very little residual noted, two of  them in the duodenum. The gastritis has also healed. There was no sign  of upper GI bleed. No biopsies were obtained as the patient has been on  Eliquis recently. Scope was then removed without complications. IMPRESSION:  1. Status post PEG. 2.  Near complete healing of gastric and duodenal ulcers. 3.  No upper GI bleeds. PLAN:  Tube feeding can be resumed tomorrow. We will continue PPI  therapy and we will continue Protonix 40 mg twice a day and monitor his  progress.         Lit Payne MD    D: 2019 17:06:07       T: 2019 21:40:56     [email or web address]  Job#: [id number]     Doc#: [id number]    CC:  Melodye Alar, MD Verner Canner

## 2019-04-13 NOTE — PROGRESS NOTES
Progress Note  PGY-3    Hospital Day:                                                          Code:DNR-CCA  Admit Date: 4/11/2019                                             PCP: Helen SOTO                                SUBJECTIVE:     Interval Hx: Patient seen at bedside and was resting comfortably. Patient was much more alert this morning but did not answer any orientation questions. He says he is feeling well this morning and complaining of no pain. Patient denies headache, chest pain, dyspnea, abdominal pain, nausea or vomiting. EGD done yesterday showed near complete healing gastric and duodenal ulcers and no upper GI bleed. G-tube draining greenish-brown secretions. Overnight, patient's hemoglobin dropped to 6.4 (BL 7.7). He was screened and typed. Patient's temperature also dropped to 94. 6.        MEDICATIONS:   Scheduled Meds:   sodium chloride  250 mL Intravenous Once    fluconazole  200 mg Per G Tube Daily    amLODIPine  5 mg Per G Tube Daily    megestrol  400 mg Per G Tube Daily    carvedilol  6.25 mg Per G Tube BID WC    lisinopril  5 mg Per G Tube Daily    ferrous sulfate  300 mg Per G Tube TID WC    tamsulosin  0.4 mg Oral Dinner    sodium chloride flush  10 mL Intravenous 2 times per day    insulin lispro  0-12 Units Subcutaneous TID WC    insulin lispro  0-6 Units Subcutaneous Nightly    pantoprazole  40 mg Intravenous BID    [START ON 4/17/2019] levothyroxine  37.5 mcg Intravenous Daily    And    sodium chloride (PF)  5 mL Intravenous Daily    piperacillin-tazobactam  3.375 g Intravenous Q12H      Continuous Infusions:   dextrose       PRN Meds:ondansetron, sodium chloride flush, magnesium hydroxide, acetaminophen, glucose, dextrose, glucagon (rDNA), dextrose, hydrALAZINE    Allergies: No Known Allergies    PHYSICAL EXAM:       Vitals: /70   Pulse 79   Temp 98.1 °F (36.7 °C) (Oral)   Resp 16   Ht 5' 5\" (1.651 m)   Wt 102 lb 4.7 oz (46.4 kg)   SpO2 100%   BMI 17.02 kg/m²     I/O:      Intake/Output Summary (Last 24 hours) at 4/13/2019 0806  Last data filed at 4/13/2019 8511  Gross per 24 hour   Intake 150 ml   Output 375 ml   Net -225 ml     No intake/output data recorded. I/O last 3 completed shifts: In: 150 [I.V.:150]  Out: 375 [Emesis/NG output:375]      General: Alert and Oriented, No acute distress, cooperative, appears stated age  Eye: PERRLA, EOMI, Normal Conjunctiva  HENT: Normocephalic, Normal Hearing, Moist oral mucosa, no pharyngeal edema  Neck: Supple, Non-tender, no carotid bruit, no JVD, no lymphadenopathy, no thyromegaly  Respiratory: Lungs clear to auscultation bilaterally, Non-labored respirations, BS equal, symmetrical expansion, no chest wall tenderness  Cardiovascular: Normal rate, regular rhythm, no murmurs, no gallops, good pulses in all extremities, normal peripheral perfusion, no edema  Gastrointestinal: Soft, Non-tender, non-distended, normal bowel sounds, no organomegaly  Musculoskeletal: Normal ROM, Normal strength, No tenderness, No swelling, No deformity   Feet: Normal by visual exam, Normal pulses, Sensation intact  Neurologic: Alert, Oriented, Normal sensory, Normal motor, No focal defects, CN II-XII intact, Normal DTRs  Psychiatric: Cooperative, appropriate mood and affect, normal judgment, non-suicidal        DATA:       Labs:  CBC:   Recent Labs     04/11/19  1140 04/12/19  0523 04/13/19  0506   WBC 9.2 10.9 6.2   HGB 7.3* 7.7* 6.4*   HCT 21.9* 22.5* 19.0*    172 150       BMP:   Recent Labs     04/11/19  1139 04/12/19  0523 04/13/19  0506    138 141   K 5.3* 4.7 4.3    107 109   CO2 22 21 22   BUN 34* 34* 34*   CREATININE 2.2* 2.2* 2.4*   GLUCOSE 168* 100* 93     LFT's:   Recent Labs     04/11/19  1139   AST 15   ALT 23   BILITOT <0.2   ALKPHOS 140*     Troponin:   Recent Labs     04/11/19  1139   TROPONINI 0.05*     BNP: No results for input(s): BNP in the last 72 hours.   ABGs: No results for input(s): PHART, FKG1VRY, PO2ART in the last 72 hours. INR: No results for input(s): INR in the last 72 hours. U/A:  Recent Labs     04/11/19  1140   COLORU Yellow   PHUR 6.0   WBCUA 20-50*   RBCUA 0-2   YEAST Present*   BACTERIA Rare*   CLARITYU Clear   SPECGRAV 1.010   LEUKOCYTESUR LARGE*   UROBILINOGEN 0.2   BILIRUBINUR Negative   BLOODU TRACE-LYSED*   GLUCOSEU Negative       CT ABDOMEN PELVIS WO CONTRAST Additional Contrast? None   Final Result      1. Limited study without IV or oral contrast with motion artifact limiting the assessment. 2. Right basilar consolidation and pleural effusion which may be pneumonia, this is less pronounced than the previous consolidation in the right lower lobe seen on prior study from April 19, 2017. Minimal left-sided pleural effusion and atelectasis    noted. Trace pericardial effusion also noted   3. Chronic pancreatitis, scattered calcifications but no acute inflammation noted around the pancreas. 4. Grossly normal-appearing renal outlines without hydronephrosis but again limited without IV contrast. Advanced aortic and iliac calcifications without gross aneurysm   5. Advanced degenerative changes of the spine, unchanged   6. Air in the urinary bladder which could be from recent catheterization or infection, correlate clinically with no catheter in place at this time   7. Scatter artifact in the pelvis from right hip replacement. Moderate stool noted in the rectum and tracer minimal free fluid noted along the paracolic margins with no free air grossly identified. CT head without contrast   Final Result      1. Increase in small CSF isoattenuating right subdural fluid collection. Differential diagnosis is subdural hygroma if recent trauma, subdural effusion, or chronic hematoma less likely based on the recent change. 2.  No acute intracranial hemorrhage. 3.  Multifocal chronic ischemic changes unchanged. 4.  Persistent fluid in the right mastoid air cells.    5.  Chronic

## 2019-04-13 NOTE — PROGRESS NOTES
99%   BMI 17.02 kg/m²     I/O:      Intake/Output Summary (Last 24 hours) at 4/13/2019 0912  Last data filed at 4/13/2019 5558  Gross per 24 hour   Intake 150 ml   Output 375 ml   Net -225 ml     No intake/output data recorded. I/O last 3 completed shifts: In: 150 [I.V.:150]  Out: 375 [Emesis/NG output:375]      General: Alert and Oriented, No acute distress, cooperative, appears stated age  Eye: PERRLA, EOMI, Normal Conjunctiva  HENT: Normocephalic, Normal Hearing, Moist oral mucosa, no pharyngeal edema  Neck: Supple, Non-tender, no carotid bruit, no JVD, no lymphadenopathy, no thyromegaly  Respiratory: Lungs clear to auscultation bilaterally, Non-labored respirations, BS equal, symmetrical expansion, no chest wall tenderness  Cardiovascular: Normal rate, regular rhythm, no murmurs, no gallops, good pulses in all extremities, normal peripheral perfusion, no edema  Gastrointestinal: Soft, Non-tender, non-distended, normal bowel sounds, no organomegaly  Musculoskeletal: Normal ROM, Normal strength, No tenderness, No swelling, No deformity   Feet: Normal by visual exam, Normal pulses, Sensation intact  Neurologic: Alert, Oriented, Normal sensory, Normal motor, No focal defects, CN II-XII intact, Normal DTRs  Psychiatric: Cooperative, appropriate mood and affect, normal judgment, non-suicidal        DATA:       Labs:  CBC:   Recent Labs     04/11/19  1140 04/12/19  0523 04/13/19  0506   WBC 9.2 10.9 6.2   HGB 7.3* 7.7* 6.4*   HCT 21.9* 22.5* 19.0*    172 150       BMP:   Recent Labs     04/11/19  1139 04/12/19  0523 04/13/19  0506    138 141   K 5.3* 4.7 4.3    107 109   CO2 22 21 22   BUN 34* 34* 34*   CREATININE 2.2* 2.2* 2.4*   GLUCOSE 168* 100* 93     LFT's:   Recent Labs     04/11/19  1139   AST 15   ALT 23   BILITOT <0.2   ALKPHOS 140*     Troponin:   Recent Labs     04/11/19  1139   TROPONINI 0.05*     BNP: No results for input(s): BNP in the last 72 hours.   ABGs: No results for input(s): PHART, LDB0YCP, PO2ART in the last 72 hours. INR: No results for input(s): INR in the last 72 hours. U/A:  Recent Labs     04/11/19  1140   COLORU Yellow   PHUR 6.0   WBCUA 20-50*   RBCUA 0-2   YEAST Present*   BACTERIA Rare*   CLARITYU Clear   SPECGRAV 1.010   LEUKOCYTESUR LARGE*   UROBILINOGEN 0.2   BILIRUBINUR Negative   BLOODU TRACE-LYSED*   GLUCOSEU Negative       CT ABDOMEN PELVIS WO CONTRAST Additional Contrast? None   Final Result      1. Limited study without IV or oral contrast with motion artifact limiting the assessment. 2. Right basilar consolidation and pleural effusion which may be pneumonia, this is less pronounced than the previous consolidation in the right lower lobe seen on prior study from April 19, 2017. Minimal left-sided pleural effusion and atelectasis    noted. Trace pericardial effusion also noted   3. Chronic pancreatitis, scattered calcifications but no acute inflammation noted around the pancreas. 4. Grossly normal-appearing renal outlines without hydronephrosis but again limited without IV contrast. Advanced aortic and iliac calcifications without gross aneurysm   5. Advanced degenerative changes of the spine, unchanged   6. Air in the urinary bladder which could be from recent catheterization or infection, correlate clinically with no catheter in place at this time   7. Scatter artifact in the pelvis from right hip replacement. Moderate stool noted in the rectum and tracer minimal free fluid noted along the paracolic margins with no free air grossly identified. CT head without contrast   Final Result      1. Increase in small CSF isoattenuating right subdural fluid collection. Differential diagnosis is subdural hygroma if recent trauma, subdural effusion, or chronic hematoma less likely based on the recent change. 2.  No acute intracranial hemorrhage. 3.  Multifocal chronic ischemic changes unchanged. 4.  Persistent fluid in the right mastoid air cells.    5.

## 2019-04-13 NOTE — PROGRESS NOTES
Speech Language Pathology  SLP spoke with RN re: pt's medical status and continued need for ST eval. RN, Bobby Wright, will speak with Resident Physician and page SLP with confirmation to continue/discontinue order. Dulce Maria Mcarthur M.A. CF-SLP [phone]  Speech-Language Pathologist   Pager # [phone]    If patient is discharged prior to next session, this note will serve as discharge summary.

## 2019-04-13 NOTE — PLAN OF CARE
Problem: Falls - Risk of:  Goal: Will remain free from falls  Outcome: Ongoing   Patient has remained free from falls during shift. Patient uses call light appropriately. Abebe Fall Risk; High (45& higher). Patient has non-skid socks on, fall precautions are in place, dome light illuminated. Bed is in lowest position, with alarm on, locked wheels, and call light is with in reach. Will continue to monitor. Problem: Skin Integrity:  Goal: Will show no infection signs and symptoms  Outcome: Ongoing   Patient has shown no new signs of skin breakdown this shift. Larry score completed this shift. Special mattress ordered and patient changed as needed. Patient turned Q2. Will continue to monitor.

## 2019-04-13 NOTE — PROGRESS NOTES
GI Progress Note    Patrick Ford is a 66 y.o. male patient. 1. Altered mental status, unspecified altered mental status type        Admit Date: 4/11/2019    Subjective:       Feels better today  No abdominal pain  Greenish material through GT.  Seems to be bile  Peg tube in good position  Mental status reversed to base line      ROS:  Cardiovascular ROS: no chest pain or dyspnea on exertion  Gastrointestinal ROS: no abdominal pain, change in bowel habits, or black or bloody stools  Respiratory ROS: no cough, shortness of breath, or wheezing    Scheduled Meds:   sodium chloride  250 mL Intravenous Once    apixaban  2.5 mg Oral BID    aspirin  81 mg Oral Daily    amLODIPine  5 mg Per G Tube Daily    megestrol  400 mg Per G Tube Daily    carvedilol  6.25 mg Per G Tube BID WC    lisinopril  5 mg Per G Tube Daily    ferrous sulfate  300 mg Per G Tube TID WC    tamsulosin  0.4 mg Oral Dinner    sodium chloride flush  10 mL Intravenous 2 times per day    insulin lispro  0-12 Units Subcutaneous TID WC    insulin lispro  0-6 Units Subcutaneous Nightly    pantoprazole  40 mg Intravenous BID    [START ON 4/17/2019] levothyroxine  37.5 mcg Intravenous Daily    And    sodium chloride (PF)  5 mL Intravenous Daily       Continuous Infusions:   dextrose         PRN Meds:  ondansetron, sodium chloride flush, magnesium hydroxide, acetaminophen, glucose, dextrose, glucagon (rDNA), dextrose, hydrALAZINE      Objective:       Patient Vitals for the past 24 hrs:   BP Temp Temp src Pulse Resp SpO2 Height Weight   04/13/19 1452 (!) 145/72 97 °F (36.1 °C) Oral 69 16 98 % -- --   04/13/19 1436 (!) 151/81 96.9 °F (36.1 °C) Oral 72 16 -- -- --   04/13/19 1158 (!) 141/68 98.6 °F (37 °C) Oral 69 16 98 % 5' 5\" (1.651 m) --   04/13/19 0906 133/67 98.1 °F (36.7 °C) Oral 88 16 99 % -- --   04/13/19 0632 -- 98.1 °F (36.7 °C) Oral -- -- -- -- --   04/13/19 0306 129/70 97.5 °F (36.4 °C) Oral 79 16 100 % -- 102 lb 4.7 oz (46.4 kg) 19 0045 -- 94.6 °F (34.8 °C) Rectal -- -- -- -- --   19 0006 (!) 166/79 -- -- 75 18 98 % -- --   19 2148 (!) 152/77 -- -- -- -- -- -- --   19 1937 (!) 161/91 97.4 °F (36.3 °C) Oral 75 18 100 % -- --   19 1736 (!) 174/81 -- -- 69 16 99 % -- --   19 1724 (!) 159/84 -- -- 73 20 98 % -- --   19 1702 (!) 150/77 -- -- 75 18 100 % -- --   19 1653 128/74 -- -- 71 16 100 % -- --       Exam:    VITALS:  BP (!) 145/72   Pulse 69   Temp 97 °F (36.1 °C) (Oral)   Resp 16   Ht 5' 5\" (1.651 m)   Wt 102 lb 4.7 oz (46.4 kg)   SpO2 98%   BMI 17.02 kg/m²   TEMPERATURE:  Current - Temp: 97 °F (36.1 °C); Max - Temp  Av.3 °F (36.3 °C)  Min: 94.6 °F (34.8 °C)  Max: 98.6 °F (37 °C)    NAD  General appearance: alert, appears stated age, cooperative and no distress  Head: Normocephalic, without obvious abnormality, atraumatic  Neck: supple, symmetrical, trachea midline and thyroid not enlarged, symmetric, no tenderness/mass/nodules  CVS:  RRR, Nl s1s2  Lungs CTA Bilaterally, normal effort  Abdomen: soft, non-tender; bowel sounds normal; no masses,  no organomegaly. Peg tube in good position  Extremities: No edema. Lab Data:  Recent Labs     19  1140 19  0523 19  0506   WBC 9.2 10.9 6.2   HGB 7.3* 7.7* 6.4*   HCT 21.9* 22.5* 19.0*   MCV 90.8 89.5 89.5    172 150     Recent Labs     19  1139 19  0523 19  0506    138 141   K 5.3* 4.7 4.3    107 109   CO2 22 21 22   BUN 34* 34* 34*   CREATININE 2.2* 2.2* 2.4*     Recent Labs     19  1139   AST 15   ALT 23   BILITOT <0.2   ALKPHOS 140*     No results for input(s): LIPASE, AMYLASE in the last 72 hours. Recent Labs     19  1139   PROT 5.4*     No results for input(s): PTT in the last 72 hours. No results for input(s): OCCULTBLD in the last 72 hours.     Radiology review: as in records    Assessment:       Principal Problem:    Acute metabolic encephalopathy  Active Problems:    Hyperkalemia    Severe protein-calorie malnutrition (HCC)    Hypothyroidism    UTI (urinary tract infection)    Hypothermia    CK[facility] (chronic [facility]), [facility] ([facility]  Resolved Problems:    * No resolved hospital problems. *  anemia, multifactorial. Hg dropped today to 6.4.     Recommendations:       Blood transfusion per primary care team  Will monitor his h and h  Continue protonix    Gary Bunch MD  4/13/2019  4:09 PM

## 2019-04-13 NOTE — CONSULTS
Nutrition Assessment (Enteral Nutrition)    Type and Reason for Visit: Initial, Consult    Nutrition Recommendations:   1. Recommend order \"Diet: Tube feed continuous/ NPO\". Initiate Jevity 1.2 (Standard with Fiber formula) at 20 mL/hr and as tolerated, increase by 20 mL/hr q 4 hours until goal of 65 mL/hr is met via PEG   2. Recommend 100 mL H20 q 4 hours. Recommend reevaluate IV fluids at this time. Increase flush if Na increases greater than 145 mEq/L.   3. Monitor for tolerance (abdominal distention, bowel habits, N/V, cramping). 4. Continue NPO, monitor ability to advance diet as tolerated per Speech Therapy recs  5. Monitor weight, labs (glucose) and clinical progress  6. Continue Megace via PEG      Nutrition Assessment: Consult received for TF ordering and management. Pt is severely malnourished on admit AEB severe loss of subcutaneous fat and muscle mass with low BMI and significant weight loss x 3 months. Pt is at risk for further compromise d/t NPO status, ongoing requirement for nutrition support s/p PEG placement on 4/5/19. Will provide TF recs, monitor ability to advance diet per Speech Therapy, monitor tolerance of TF at goal and nutrition status. Malnutrition Assessment:  · Malnutrition Status: Meets the criteria for severe malnutrition  · Context: Chronic illness  · Findings of the 6 clinical characteristics of malnutrition (Minimum of 2 out of 6 clinical characteristics is required to make the diagnosis of moderate or severe Protein Calorie Malnutrition based on AND/ASPEN Guidelines):  1. Energy Intake-Less than or equal to 75% of estimated energy requirement, Greater than or equal to 1 month    2. Weight Loss-7.5% loss or greater, in 3 months  3. Fat Loss-Severe subcutaneous fat loss, Orbital, Triceps  4. Muscle Loss-Severe muscle mass loss, Temples (temporalis muscle), Clavicles (pectoralis and deltoids), Thigh (quadriceps), Calf (gastrocnemius)  5.  Fluid Accumulation-No Monitoring    Nutrition Evaluation:   · Evaluation: Goals set   · Goals: Pt will tolerate TF to meet 100% of nutrition needs and tolerate diet advancement per ST   · Monitoring: Nutrition Progression, TF Intake, TF Tolerance, Weight, Pertinent Labs, Chewing/Swallowing, Mental Status/Confusion, Monitor Bowel Function      Electronically signed by Luis Valdez RD, BRITTNEY on 4/13/19 at 12:24 PM    Contact Number: [phone]

## 2019-04-14 LAB
ANION GAP SERPL CALCULATED.3IONS-SCNC: 10 MMOL/L (ref 3–16)
BASOPHILS ABSOLUTE: 0.1 K/UL (ref 0–0.2)
BASOPHILS RELATIVE PERCENT: 1.2 %
BUN BLDV-MCNC: 36 MG/DL (ref 7–20)
CALCIUM SERPL-MCNC: 8.1 MG/DL (ref 8.3–10.6)
CHLORIDE BLD-SCNC: 108 MMOL/L (ref 99–110)
CO2: 21 MMOL/L (ref 21–32)
CREAT SERPL-MCNC: 2.6 MG/DL (ref 0.8–1.3)
EOSINOPHILS ABSOLUTE: 0.1 K/UL (ref 0–0.6)
EOSINOPHILS RELATIVE PERCENT: 2 %
GFR AFRICAN AMERICAN: 29
GFR NON-AFRICAN AMERICAN: 24
GLUCOSE BLD-MCNC: 162 MG/DL (ref 70–99)
GLUCOSE BLD-MCNC: 169 MG/DL (ref 70–99)
GLUCOSE BLD-MCNC: 213 MG/DL (ref 70–99)
HCT VFR BLD CALC: 23.8 % (ref 40.5–52.5)
HEMOGLOBIN: 8.1 G/DL (ref 13.5–17.5)
LYMPHOCYTES ABSOLUTE: 0.5 K/UL (ref 1–5.1)
LYMPHOCYTES RELATIVE PERCENT: 8.9 %
MCH RBC QN AUTO: 30.2 PG (ref 26–34)
MCHC RBC AUTO-ENTMCNC: 34 G/DL (ref 31–36)
MCV RBC AUTO: 88.8 FL (ref 80–100)
MONOCYTES ABSOLUTE: 0.5 K/UL (ref 0–1.3)
MONOCYTES RELATIVE PERCENT: 8.5 %
NEUTROPHILS ABSOLUTE: 4.8 K/UL (ref 1.7–7.7)
NEUTROPHILS RELATIVE PERCENT: 79.4 %
PDW BLD-RTO: 15.6 % (ref 12.4–15.4)
PERFORMED ON: ABNORMAL
PERFORMED ON: ABNORMAL
PLATELET # BLD: 171 K/UL (ref 135–450)
PMV BLD AUTO: 7.9 FL (ref 5–10.5)
POTASSIUM REFLEX MAGNESIUM: 4.4 MMOL/L (ref 3.5–5.1)
RBC # BLD: 2.68 M/UL (ref 4.2–5.9)
SODIUM BLD-SCNC: 139 MMOL/L (ref 136–145)
WBC # BLD: 6.1 K/UL (ref 4–11)

## 2019-04-14 PROCEDURE — 6360000002 HC RX W HCPCS: Performed by: STUDENT IN AN ORGANIZED HEALTH CARE EDUCATION/TRAINING PROGRAM

## 2019-04-14 PROCEDURE — 85025 COMPLETE CBC W/AUTO DIFF WBC: CPT

## 2019-04-14 PROCEDURE — 2060000000 HC ICU INTERMEDIATE R&B

## 2019-04-14 PROCEDURE — 36415 COLL VENOUS BLD VENIPUNCTURE: CPT

## 2019-04-14 PROCEDURE — C9113 INJ PANTOPRAZOLE SODIUM, VIA: HCPCS | Performed by: STUDENT IN AN ORGANIZED HEALTH CARE EDUCATION/TRAINING PROGRAM

## 2019-04-14 PROCEDURE — 2580000003 HC RX 258: Performed by: STUDENT IN AN ORGANIZED HEALTH CARE EDUCATION/TRAINING PROGRAM

## 2019-04-14 PROCEDURE — 2500000003 HC RX 250 WO HCPCS: Performed by: INTERNAL MEDICINE

## 2019-04-14 PROCEDURE — 6370000000 HC RX 637 (ALT 250 FOR IP): Performed by: STUDENT IN AN ORGANIZED HEALTH CARE EDUCATION/TRAINING PROGRAM

## 2019-04-14 PROCEDURE — 80048 BASIC METABOLIC PNL TOTAL CA: CPT

## 2019-04-14 RX ORDER — LEVOTHYROXINE SODIUM ANHYDROUS 100 UG/5ML
37.5 INJECTION, POWDER, LYOPHILIZED, FOR SOLUTION INTRAVENOUS DAILY
Status: DISCONTINUED | OUTPATIENT
Start: 2019-04-14 | End: 2019-04-15 | Stop reason: HOSPADM

## 2019-04-14 RX ORDER — 0.9 % SODIUM CHLORIDE 0.9 %
5 VIAL (ML) INJECTION DAILY
Status: DISCONTINUED | OUTPATIENT
Start: 2019-04-14 | End: 2019-04-15 | Stop reason: HOSPADM

## 2019-04-14 RX ADMIN — LEVOTHYROXINE SODIUM ANHYDROUS 37.5 MCG: 100 INJECTION, POWDER, LYOPHILIZED, FOR SOLUTION INTRAVENOUS at 13:18

## 2019-04-14 RX ADMIN — PANTOPRAZOLE SODIUM 40 MG: 40 INJECTION, POWDER, FOR SOLUTION INTRAVENOUS at 07:43

## 2019-04-14 RX ADMIN — AMLODIPINE BESYLATE 5 MG: 5 TABLET ORAL at 07:44

## 2019-04-14 RX ADMIN — MINERAL SUPPLEMENT IRON 300 MG / 5 ML STRENGTH LIQUID 100 PER BOX UNFLAVORED 300 MG: at 07:45

## 2019-04-14 RX ADMIN — CARVEDILOL 6.25 MG: 6.25 TABLET, FILM COATED ORAL at 07:44

## 2019-04-14 RX ADMIN — Medication 10 ML: at 07:44

## 2019-04-14 RX ADMIN — Medication 10 ML: at 13:19

## 2019-04-14 RX ADMIN — Medication 10 ML: at 20:37

## 2019-04-14 RX ADMIN — MEGESTROL ACETATE 400 MG: 40 SUSPENSION ORAL at 07:43

## 2019-04-14 RX ADMIN — TAMSULOSIN HYDROCHLORIDE 0.4 MG: 0.4 CAPSULE ORAL at 17:59

## 2019-04-14 RX ADMIN — ASPIRIN 81 MG 81 MG: 81 TABLET ORAL at 07:44

## 2019-04-14 RX ADMIN — APIXABAN 2.5 MG: 2.5 TABLET, FILM COATED ORAL at 20:36

## 2019-04-14 RX ADMIN — MINERAL SUPPLEMENT IRON 300 MG / 5 ML STRENGTH LIQUID 100 PER BOX UNFLAVORED 300 MG: at 17:59

## 2019-04-14 RX ADMIN — LISINOPRIL 5 MG: 5 TABLET ORAL at 07:43

## 2019-04-14 RX ADMIN — INSULIN LISPRO 2 UNITS: 100 INJECTION, SOLUTION INTRAVENOUS; SUBCUTANEOUS at 20:53

## 2019-04-14 RX ADMIN — PANTOPRAZOLE SODIUM 40 MG: 40 INJECTION, POWDER, FOR SOLUTION INTRAVENOUS at 20:36

## 2019-04-14 RX ADMIN — MINERAL SUPPLEMENT IRON 300 MG / 5 ML STRENGTH LIQUID 100 PER BOX UNFLAVORED 300 MG: at 13:18

## 2019-04-14 RX ADMIN — APIXABAN 2.5 MG: 2.5 TABLET, FILM COATED ORAL at 07:44

## 2019-04-14 RX ADMIN — CARVEDILOL 6.25 MG: 6.25 TABLET, FILM COATED ORAL at 17:59

## 2019-04-14 ASSESSMENT — PAIN SCALES - GENERAL
PAINLEVEL_OUTOF10: 0

## 2019-04-14 NOTE — PROGRESS NOTES
Speech Language Pathology - Contact Note - No Treatment  Third attempt to see pt for SLP eval previously ordered. Spoke with RN [facility], who indicates that pt not appropriate for SLP eval at this time and stated will d/c order. Please re-refer when pt appropriate. Pt was previously discharged from hospital on 4/7/19 on nectar thick liquids for pleasure and receiving tube feeds. Pt was readmitted on 4/11/19. On 4/12/19, SLP order was received, chart was reviewed, d/w medical resident and reviewed prior dysphagia recommendations, swallow evaluation was on hold due to GI. On 4/13/19, SLP spoke with RN re: pt's medical status and continued need for ST eval.  Swallow evaluation again on hold.     Electronically Signed by:  Partha Roberts., [id number] [facility], [place]  Speech-Language Pathologist  [phone]  Pager #[phone]

## 2019-04-14 NOTE — PROGRESS NOTES
Progress Note  PGY-3                                                               Code:DNR-CCA  Admit Date: 4/11/2019     CC: Altered mental status                                                              SUBJECTIVE:     Interval Hx: Patient seen and examined at bedside. Pt was resting comfortably not in any acute distress. Patient alert and oriented. He says he is feeling well this morning and complaining of no pain. Patient denies headache, chest pain, dyspnea, abdominal pain, nausea or vomiting. MEDICATIONS:   Scheduled Meds:   apixaban  2.5 mg Oral BID    aspirin  81 mg Oral Daily    amLODIPine  5 mg Per G Tube Daily    megestrol  400 mg Per G Tube Daily    carvedilol  6.25 mg Per G Tube BID WC    lisinopril  5 mg Per G Tube Daily    ferrous sulfate  300 mg Per G Tube TID WC    tamsulosin  0.4 mg Oral Dinner    sodium chloride flush  10 mL Intravenous 2 times per day    insulin lispro  0-12 Units Subcutaneous TID WC    insulin lispro  0-6 Units Subcutaneous Nightly    pantoprazole  40 mg Intravenous BID    [START ON 4/17/2019] levothyroxine  37.5 mcg Intravenous Daily    And    sodium chloride (PF)  5 mL Intravenous Daily      Continuous Infusions:   dextrose       PRN Meds:ondansetron, sodium chloride flush, magnesium hydroxide, acetaminophen, glucose, dextrose, glucagon (rDNA), dextrose, hydrALAZINE    Allergies: No Known Allergies    PHYSICAL EXAM:       Vitals: BP (!) 140/80   Pulse 88   Temp 98 °F (36.7 °C) (Oral)   Resp 16   Ht 5' 5\" (1.651 m)   Wt 104 lb 8 oz (47.4 kg)   SpO2 99%   BMI 17.39 kg/m²     I/O:      Intake/Output Summary (Last 24 hours) at 4/14/2019 1107  Last data filed at 4/14/2019 1015  Gross per 24 hour   Intake 2388 ml   Output 650 ml   Net 1738 ml     I/O this shift:  In: -   Out: 200 [Urine:200]  I/O last 3 completed shifts:   In: 2388 [P.O.:30; I.V.:650; Blood:175; NG/GT:1533]  Out: 450 [Urine:450]      General: Alert and Oriented, No acute distress, Negative       CT ABDOMEN PELVIS WO CONTRAST Additional Contrast? None   Final Result      1. Limited study without IV or oral contrast with motion artifact limiting the assessment. 2. Right basilar consolidation and pleural effusion which may be pneumonia, this is less pronounced than the previous consolidation in the right lower lobe seen on prior study from April 19, 2017. Minimal left-sided pleural effusion and atelectasis    noted. Trace pericardial effusion also noted   3. Chronic pancreatitis, scattered calcifications but no acute inflammation noted around the pancreas. 4. Grossly normal-appearing renal outlines without hydronephrosis but again limited without IV contrast. Advanced aortic and iliac calcifications without gross aneurysm   5. Advanced degenerative changes of the spine, unchanged   6. Air in the urinary bladder which could be from recent catheterization or infection, correlate clinically with no catheter in place at this time   7. Scatter artifact in the pelvis from right hip replacement. Moderate stool noted in the rectum and tracer minimal free fluid noted along the paracolic margins with no free air grossly identified. CT head without contrast   Final Result      1. Increase in small CSF isoattenuating right subdural fluid collection. Differential diagnosis is subdural hygroma if recent trauma, subdural effusion, or chronic hematoma less likely based on the recent change. 2.  No acute intracranial hemorrhage. 3.  Multifocal chronic ischemic changes unchanged. 4.  Persistent fluid in the right mastoid air cells. 5.  Chronic right maxillary sinusitis. XR CHEST PORTABLE   Final Result      Increased size of small-moderate loculated right inferolateral pleural effusion in comparison to 3/28/2019. There is associated right basilar consolidation-atelectasis versus pneumonia.       Rightward volume loss is seen with shift of the mediastinum, compatible with a component of atelectasis. Left lung is clear. Top normal heart size. ASSESSMENT AND PLAN:   Mert Huerta 67 yo male with PMHx of MCA stroke (1/19), Afib (Eliquis), chronic diastolic HF (EF 60%), CAD, HTN, HLD, DMII, and gastric ulcers who presented to the ED from nursing home with altered mental status after being found less responsive on left side. He was hypothermic and hypertensive. He was recently discharged on 4/7/2019 for SANDY and FOBT+ w/ EGD showing gastric ulcers causing chronic anemia.     Acute metabolic encephalopathy likely 2/2 to hypothyroidism, less likely infection  Pt presented from nursing home with altered mental status per nursing staff. Was A&Ox3 when examined. Urine and blood cultures negative. Antibiotics and antifungals stopped. - IV synthroid 37.5mcg daily  - PO need to be on empty stomach, but he is on continuous TF     Emesis: Pt had atleast 4 episodes of emesis in the ED described to be greenish in color. Pt has hx of gastric and duodenal ulcers. G-tube draining green-brownish secretions  - GI consulted - Emergent EGD showed near complete healing gastric and duodenal ulcers and no upper GI bleed  - Continue protonix 40mg BID     Chronic Anemia    Hb 7.7 (BL 7.2). Previous admission EGD showed gastric ulcers. Patient's Hb dropped to 6.4. Typed and screened. - protonix 40mg BID   - continue home ferrous sulfate 325mg  - GI consulted - EGD showed healing stress ulcers and no upper GI bleed     Paroxysmal Afib   -Continue home Eliquis 2.5mg     HTN   -Continue home amlodipine 5mg, lisinopril 5mg and Coreg 4.56CU     Diastolic HF HFpEF (83-27%)  -Continue home Coreg 6.25mg     DMII  - med dose sliding scale  - hypoglycemic protocol      Hypothyroidism  TSH level 6.62.  Free T4 1.2.  - IV synthroid as above     DVT Prophylaxis: home eliquis 2.5mg  Diet: NPO  Code Status: DNR-CCA  [address] [place], PGY3  4/14/2019,  11:07 AM

## 2019-04-14 NOTE — PLAN OF CARE
Patient remained on warming blanket overnight as ordered, oral temp 97.1-98.2 oral overnight. Will continue to monitor. Lolita Garza 4/14/2019     Problem: Falls - Risk of:  Goal: Will remain free from falls  Description  Will remain free from falls  Outcome: Ongoing  Note:   Patient remained free from falls overnight. Made no attempts to get out of bed overnight. Bed alarm remains active, bed in low/locked position, non-skid footwear in place, and call light in reach. Will continue fall risk protocol. Lolita Garza 4/14/2019      Problem: HEMODYNAMIC STATUS  Goal: Patient has stable vital signs and fluid balance  Outcome: Ongoing  Note:   Patient VSS and remained SR on telemetry overnight. Will continue to monitor.     Lolita Garza 4/14/2019

## 2019-04-15 VITALS
TEMPERATURE: 97.4 F | HEIGHT: 65 IN | SYSTOLIC BLOOD PRESSURE: 119 MMHG | HEART RATE: 64 BPM | DIASTOLIC BLOOD PRESSURE: 72 MMHG | RESPIRATION RATE: 16 BRPM | BODY MASS INDEX: 17.45 KG/M2 | OXYGEN SATURATION: 100 % | WEIGHT: 104.72 LBS

## 2019-04-15 LAB
ANION GAP SERPL CALCULATED.3IONS-SCNC: 9 MMOL/L (ref 3–16)
BASOPHILS ABSOLUTE: 0.2 K/UL (ref 0–0.2)
BASOPHILS RELATIVE PERCENT: 2.9 %
BUN BLDV-MCNC: 39 MG/DL (ref 7–20)
CALCIUM SERPL-MCNC: 7.8 MG/DL (ref 8.3–10.6)
CHLORIDE BLD-SCNC: 109 MMOL/L (ref 99–110)
CO2: 20 MMOL/L (ref 21–32)
CREAT SERPL-MCNC: 2.6 MG/DL (ref 0.8–1.3)
EOSINOPHILS ABSOLUTE: 0.1 K/UL (ref 0–0.6)
EOSINOPHILS RELATIVE PERCENT: 2.3 %
GFR AFRICAN AMERICAN: 29
GFR NON-AFRICAN AMERICAN: 24
GLUCOSE BLD-MCNC: 136 MG/DL (ref 70–99)
GLUCOSE BLD-MCNC: 179 MG/DL (ref 70–99)
GLUCOSE BLD-MCNC: 251 MG/DL (ref 70–99)
HCT VFR BLD CALC: 24.3 % (ref 40.5–52.5)
HEMOGLOBIN: 8.1 G/DL (ref 13.5–17.5)
LYMPHOCYTES ABSOLUTE: 0.5 K/UL (ref 1–5.1)
LYMPHOCYTES RELATIVE PERCENT: 9.7 %
MCH RBC QN AUTO: 30.3 PG (ref 26–34)
MCHC RBC AUTO-ENTMCNC: 33.1 G/DL (ref 31–36)
MCV RBC AUTO: 91.3 FL (ref 80–100)
MONOCYTES ABSOLUTE: 0.5 K/UL (ref 0–1.3)
MONOCYTES RELATIVE PERCENT: 9.6 %
NEUTROPHILS ABSOLUTE: 4.1 K/UL (ref 1.7–7.7)
NEUTROPHILS RELATIVE PERCENT: 75.5 %
PDW BLD-RTO: 15.7 % (ref 12.4–15.4)
PERFORMED ON: ABNORMAL
PERFORMED ON: ABNORMAL
PLATELET # BLD: 168 K/UL (ref 135–450)
PMV BLD AUTO: 7.9 FL (ref 5–10.5)
POTASSIUM REFLEX MAGNESIUM: 4.7 MMOL/L (ref 3.5–5.1)
RBC # BLD: 2.66 M/UL (ref 4.2–5.9)
SODIUM BLD-SCNC: 138 MMOL/L (ref 136–145)
WBC # BLD: 5.5 K/UL (ref 4–11)

## 2019-04-15 PROCEDURE — 2580000003 HC RX 258: Performed by: STUDENT IN AN ORGANIZED HEALTH CARE EDUCATION/TRAINING PROGRAM

## 2019-04-15 PROCEDURE — 80048 BASIC METABOLIC PNL TOTAL CA: CPT

## 2019-04-15 PROCEDURE — 85025 COMPLETE CBC W/AUTO DIFF WBC: CPT

## 2019-04-15 PROCEDURE — 6360000002 HC RX W HCPCS: Performed by: STUDENT IN AN ORGANIZED HEALTH CARE EDUCATION/TRAINING PROGRAM

## 2019-04-15 PROCEDURE — C9113 INJ PANTOPRAZOLE SODIUM, VIA: HCPCS | Performed by: STUDENT IN AN ORGANIZED HEALTH CARE EDUCATION/TRAINING PROGRAM

## 2019-04-15 PROCEDURE — 2580000003 HC RX 258: Performed by: INTERNAL MEDICINE

## 2019-04-15 PROCEDURE — 6370000000 HC RX 637 (ALT 250 FOR IP): Performed by: STUDENT IN AN ORGANIZED HEALTH CARE EDUCATION/TRAINING PROGRAM

## 2019-04-15 PROCEDURE — 36415 COLL VENOUS BLD VENIPUNCTURE: CPT

## 2019-04-15 PROCEDURE — 2500000003 HC RX 250 WO HCPCS: Performed by: INTERNAL MEDICINE

## 2019-04-15 RX ORDER — LEVOTHYROXINE SODIUM 0.1 MG/1
100 TABLET ORAL DAILY
Qty: 30 TABLET | Refills: 0 | Status: ON HOLD | OUTPATIENT
Start: 2019-04-15 | End: 2019-05-24 | Stop reason: HOSPADM

## 2019-04-15 RX ORDER — LEVOTHYROXINE SODIUM 0.1 MG/1
100 TABLET ORAL DAILY
Qty: 30 TABLET | Refills: 0 | Status: SHIPPED | OUTPATIENT
Start: 2019-04-15 | End: 2019-04-15 | Stop reason: SDUPTHER

## 2019-04-15 RX ADMIN — Medication 10 ML: at 08:34

## 2019-04-15 RX ADMIN — CARVEDILOL 6.25 MG: 6.25 TABLET, FILM COATED ORAL at 08:31

## 2019-04-15 RX ADMIN — INSULIN LISPRO 2 UNITS: 100 INJECTION, SOLUTION INTRAVENOUS; SUBCUTANEOUS at 08:33

## 2019-04-15 RX ADMIN — ASPIRIN 81 MG 81 MG: 81 TABLET ORAL at 08:31

## 2019-04-15 RX ADMIN — SODIUM CHLORIDE 10 ML: 9 INJECTION INTRAMUSCULAR; INTRAVENOUS; SUBCUTANEOUS at 08:37

## 2019-04-15 RX ADMIN — MINERAL SUPPLEMENT IRON 300 MG / 5 ML STRENGTH LIQUID 100 PER BOX UNFLAVORED 300 MG: at 08:31

## 2019-04-15 RX ADMIN — PANTOPRAZOLE SODIUM 40 MG: 40 INJECTION, POWDER, FOR SOLUTION INTRAVENOUS at 08:32

## 2019-04-15 RX ADMIN — LISINOPRIL 5 MG: 5 TABLET ORAL at 08:31

## 2019-04-15 RX ADMIN — APIXABAN 2.5 MG: 2.5 TABLET, FILM COATED ORAL at 08:31

## 2019-04-15 RX ADMIN — AMLODIPINE BESYLATE 5 MG: 5 TABLET ORAL at 08:31

## 2019-04-15 RX ADMIN — MEGESTROL ACETATE 400 MG: 40 SUSPENSION ORAL at 08:37

## 2019-04-15 RX ADMIN — INSULIN LISPRO 6 UNITS: 100 INJECTION, SOLUTION INTRAVENOUS; SUBCUTANEOUS at 13:02

## 2019-04-15 RX ADMIN — LEVOTHYROXINE SODIUM ANHYDROUS 37.5 MCG: 100 INJECTION, POWDER, LYOPHILIZED, FOR SOLUTION INTRAVENOUS at 08:33

## 2019-04-15 RX ADMIN — MINERAL SUPPLEMENT IRON 300 MG / 5 ML STRENGTH LIQUID 100 PER BOX UNFLAVORED 300 MG: at 13:03

## 2019-04-15 ASSESSMENT — PAIN SCALES - GENERAL
PAINLEVEL_OUTOF10: 0

## 2019-04-15 NOTE — PROGRESS NOTES
GI Progress Note    Philippe Joe is a 66 y.o. male patient.   1. Altered mental status, unspecified altered mental status type        Admit Date: 4/11/2019    Subjective:       Stabilizing  No abdominal pain  No sign of active gi bleed  Hg 8.1 after blood transfusion        ROS:  Cardiovascular ROS: no chest pain or dyspnea on exertion  Respiratory ROS: no cough, shortness of breath, or wheezing    Scheduled Meds:   levothyroxine  37.5 mcg Intravenous Daily    And    sodium chloride (PF)  5 mL Intravenous Daily    apixaban  2.5 mg Oral BID    aspirin  81 mg Oral Daily    amLODIPine  5 mg Per G Tube Daily    megestrol  400 mg Per G Tube Daily    carvedilol  6.25 mg Per G Tube BID WC    lisinopril  5 mg Per G Tube Daily    ferrous sulfate  300 mg Per G Tube TID WC    tamsulosin  0.4 mg Oral Dinner    sodium chloride flush  10 mL Intravenous 2 times per day    insulin lispro  0-12 Units Subcutaneous TID WC    insulin lispro  0-6 Units Subcutaneous Nightly    pantoprazole  40 mg Intravenous BID       Continuous Infusions:   dextrose         PRN Meds:  ondansetron, sodium chloride flush, magnesium hydroxide, acetaminophen, glucose, dextrose, glucagon (rDNA), dextrose, hydrALAZINE      Objective:       Patient Vitals for the past 24 hrs:   BP Temp Temp src Pulse Resp SpO2 Weight   04/14/19 2033 (!) 151/83 97.1 °F (36.2 °C) Oral 82 18 98 % --   04/14/19 1724 -- -- -- 66 -- -- --   04/14/19 1625 139/80 97.9 °F (36.6 °C) Oral 82 16 96 % --   04/14/19 1440 -- -- -- 82 -- -- --   04/14/19 1156 (!) 144/78 97.9 °F (36.6 °C) Oral 94 16 98 % --   04/14/19 0742 (!) 140/80 98 °F (36.7 °C) Oral 88 16 99 % --   04/14/19 0311 (!) 154/83 98.2 °F (36.8 °C) Oral 82 16 100 % --   04/14/19 0101 -- -- -- -- -- -- 104 lb 8 oz (47.4 kg)   04/13/19 2340 135/74 97.1 °F (36.2 °C) Oral 82 16 97 % --       Exam:    VITALS:  BP (!) 151/83   Pulse 82   Temp 97.1 °F (36.2 °C) (Oral)   Resp 18   Ht 5' 5\" (1.651 m)   Wt 104 lb 8 oz (47.4 kg)   SpO2 98%   BMI 17.39 kg/m²   TEMPERATURE:  Current - Temp: 97.1 °F (36.2 °C); Max - Temp  Av.7 °F (36.5 °C)  Min: 97.1 °F (36.2 °C)  Max: 98.2 °F (36.8 °C)    NAD  Head: Normocephalic, without obvious abnormality, atraumatic  Neck: supple, symmetrical, trachea midline and thyroid not enlarged, symmetric, no tenderness/mass/nodules  CVS:  RRR, Nl s1s2  Lungs CTA Bilaterally, normal effort  Abdomen: soft, non-tender; bowel sounds normal; no masses,  no organomegaly. Peg tube in good position  Extremities: No edema. Lab Data:  Recent Labs     19  0447   WBC 10.9 6.2 6.1   HGB 7.7* 6.4* 8.1*   HCT 22.5* 19.0* 23.8*   MCV 89.5 89.5 88.8    150 171     Recent Labs     196 19  0447    141 139   K 4.7 4.3 4.4    109 108   CO2 21 22 21   BUN 34* 34* 36*   CREATININE 2.2* 2.4* 2.6*     No results for input(s): AST, ALT, ALB, BILIDIR, BILITOT, ALKPHOS in the last 72 hours. No results for input(s): LIPASE, AMYLASE in the last 72 hours. No results for input(s): PROT, INR in the last 72 hours. No results for input(s): PTT in the last 72 hours. No results for input(s): OCCULTBLD in the last 72 hours. Assessment:       Principal Problem:    Acute metabolic encephalopathy  Active Problems:    Hyperkalemia    Severe protein-calorie malnutrition (HCC)    Hypothyroidism    UTI (urinary tract infection)    Hypothermia    CKD (chronic kidney disease), stage IV (HCC)  Resolved Problems:    * No resolved hospital problems. *    Anemia.  Hg up to 8.1     Recommendations:       Continue protonix  Monitor his h and h    Farhana Sabillon MD  2019

## 2019-04-15 NOTE — CONSULTS
Consult received. Palliative care is familiar with pt from his last admission. Discharge order noted. Pt is resting comfortable, has no needs or complaints. Called nephew Elvira Dunham but did not reach him.  Recommend outpatient palliative care referral. D/w Kristen Sow who will make outpatient palliative care referral.    Kaila Garcia NP  Palliative Care  [phone]

## 2019-04-15 NOTE — PROGRESS NOTES
Progress Note    Admit Date: 4/11/2019  Diet: DIET TUBE FEED CONTINUOUS/CYCLIC NPO; STANDARD WITH FIBER (Jevity 1.2); Gastrostomy; Continuous; 20; 65; 24    CC: AMS    Interval history: Patient was seen and examined at bedside. Patient resting comfortably in bed, not in any acute distress. Patient alert and oriented x 3 this am. Patient denies fevers, chills, nausea, headache, chest pain, SOB, abdominal pain, leg swelling.      Medications:     Scheduled Meds:   levothyroxine  37.5 mcg Intravenous Daily    And    sodium chloride (PF)  5 mL Intravenous Daily    apixaban  2.5 mg Oral BID    aspirin  81 mg Oral Daily    amLODIPine  5 mg Per G Tube Daily    megestrol  400 mg Per G Tube Daily    carvedilol  6.25 mg Per G Tube BID WC    lisinopril  5 mg Per G Tube Daily    ferrous sulfate  300 mg Per G Tube TID WC    tamsulosin  0.4 mg Oral Dinner    sodium chloride flush  10 mL Intravenous 2 times per day    insulin lispro  0-12 Units Subcutaneous TID WC    insulin lispro  0-6 Units Subcutaneous Nightly    pantoprazole  40 mg Intravenous BID     Continuous Infusions:   dextrose       PRN Meds:ondansetron, sodium chloride flush, magnesium hydroxide, acetaminophen, glucose, dextrose, glucagon (rDNA), dextrose, hydrALAZINE    Objective:   Vitals:   T-max:  Patient Vitals for the past 8 hrs:   BP Temp Temp src Pulse Resp SpO2 Weight   04/15/19 0800 (!) 169/87 97 °F (36.1 °C) Oral 75 18 100 % --   04/15/19 0405 134/76 97 °F (36.1 °C) Oral 74 18 99 % 104 lb 11.5 oz (47.5 kg)       Intake/Output Summary (Last 24 hours) at 4/15/2019 1004  Last data filed at 4/15/2019 0405  Gross per 24 hour   Intake 2906 ml   Output 200 ml   Net 2706 ml       Physical Exam     General: Alert and Oriented, No acute distress, cooperative, appears stated age  Eye: PERRLA, EOMI, Normal Conjunctiva  HENT: Normocephalic, Normal Hearing, Moist oral mucosa, no pharyngeal edema  Neck: Supple, Non-tender, no carotid bruit, no JVD, no lymphadenopathy, no thyromegaly  Respiratory: Lungs clear to auscultation bilaterally, Non-labored respirations, BS equal, symmetrical expansion, no chest wall tenderness  Cardiovascular: Normal rate, regular rhythm, no murmurs, no gallops, good pulses in all extremities, normal peripheral perfusion, no edema  Gastrointestinal: Soft, Non-tender, non-distended, normal bowel sounds, no organomegaly  Musculoskeletal: Normal ROM, Normal strength, No tenderness, No swelling, No deformity   Feet: Normal by visual exam, Normal pulses, Sensation intact  Neurologic: Alert, Oriented, Normal sensory, Normal motor, No focal defects, CN II-XII intact, Normal DTRs  Psychiatric: Cooperative, appropriate mood and affect, normal judgment, non-suicidal      LABS:    CBC:   Recent Labs     04/13/19  0506 04/14/19  0447 04/15/19  0455   WBC 6.2 6.1 5.5   HGB 6.4* 8.1* 8.1*   HCT 19.0* 23.8* 24.3*    171 168   MCV 89.5 88.8 91.3     Renal:    Recent Labs     04/13/19  0506 04/14/19  0447 04/15/19  0455    139 138   K 4.3 4.4 4.7    108 109   CO2 22 21 20*   BUN 34* 36* 39*   CREATININE 2.4* 2.6* 2.6*   GLUCOSE 93 169* 136*   CALCIUM 8.1* 8.1* 7.8*   ANIONGAP 10 10 9     Hepatic: No results for input(s): AST, ALT, BILITOT, BILIDIR, PROT, LABALBU, ALKPHOS in the last 72 hours. Troponin: No results for input(s): TROPONINI in the last 72 hours. BNP: No results for input(s): BNP in the last 72 hours. Lipids: No results for input(s): CHOL, HDL in the last 72 hours. Invalid input(s): LDLCALCU, TRIGLYCERIDE  ABGs:  No results for input(s): PHART, MHY3TFM, PO2ART, THS8TGP, BEART, THGBART, O3FMPJQN, LMO9PYD in the last 72 hours. INR: No results for input(s): INR in the last 72 hours. Lactate: No results for input(s): LACTATE in the last 72 hours. Cultures:  -----------------------------------------------------------------  RAD:   CT ABDOMEN PELVIS WO CONTRAST Additional Contrast? None   Final Result      1. Anthony De La Vega 67 yo male with PMHx of MCA stroke (1/19), Afib (Eliquis), chronic diastolic HF (EF 74%), CAD, HTN, HLD, DMII, and gastric ulcers who presented to the ED from nursing home with altered mental status after being found less responsive on left side. He was hypothermic and hypertensive. He was recently discharged on 4/7/2019 for SANDY and FOBT+ w/ EGD showing gastric ulcers causing chronic anemia.     Acute metabolic encephalopathy likely 2/2 to hypothyroidism, less likely infection  Pt presented from nursing home with altered mental status per nursing staff. Was A&Ox3 when examined. Urine and blood cultures negative. Antibiotics and antifungals stopped. - IV synthroid 37.5mcg daily  - PO need to be on empty stomach, but he is on continuous TF     Emesis: Pt had atleast 4 episodes of emesis in the ED described to be greenish in color. Pt has hx of gastric and duodenal ulcers. G-tube draining green-brownish secretions  - GI consulted - Emergent EGD showed near complete healing gastric and duodenal ulcers and no upper GI bleed  - Continue protonix 40mg BID     Chronic Anemia    Today, Hb 8.1 S/p 1uPRBC on  4/13 after Hb dropped to 6.4 (BL 7.2). Previous admission EGD showed gastric ulcers. - protonix 40mg BID   - continue home ferrous sulfate 325mg  - GI consulted - EGD showed healing stress ulcers and no upper GI bleed     Paroxysmal Afib   -Continue home Eliquis 2.5mg     HTN   -Continue home amlodipine 5mg, lisinopril 5mg and Coreg 4.35JC     Diastolic HF HFpEF (90-78%)  -Continue home Coreg 6.25mg     DMII  - med dose sliding scale  - hypoglycemic protocol      Hypothyroidism  TSH level 6.62. Free T4 1.2.  - IV synthroid as above     DVT Prophylaxis: home eliquis 2.5mg  Diet: DIET TUBE FEED CONTINUOUS/CYCLIC NPO; STANDARD WITH FIBER (Jevity 1.2);  Gastrostomy; Continuous; 20; 65; 24  Code Status: DNR-JADA Serna 33, PGY-1  04/15/19  10:04 AM    This patient will be staffed and discussed with Jorge Card MD.

## 2019-04-15 NOTE — PLAN OF CARE
Problem: Skin Integrity:  Goal: Absence of new skin breakdown  Description  Absence of new skin breakdown  Outcome: Ongoing   Larry skin assessment completed overnight, pt scored a 15. No new skin breakdown noted. Pt has remained incontinent of bowel and bladder overnight. Every 2 hours and as needed pt turned and repositioned and perineal care provided. Pt educated on the importance of frequent repositioning in order to reduce the risk for impaired skin integrity. Pt voiced understanding of education. Will continue to monitor. Problem: OXYGENATION/RESPIRATORY FUNCTION  Goal: Patient will achieve/maintain normal respiratory rate/effort  Description  Respiratory rate and effort will be within normal limits for the patient  Outcome: Ongoing   Pt has maintained SpO2 > 92% on room air, respiratory rate 18-20, with no complaints of shortness of breath or dysnpea. Will continue to monitor.

## 2019-04-15 NOTE — CARE COORDINATION
Case Management Discharge Assessment    4/15/2019  [facility] Case Management Department    Patient: Ashley Krishnan  MRN: [id number] / : 1945  ACCT: [de-identified]          Admission Documentation  Attending Provider: William Vargas MD  Admit date/time: 2019 11:03 AM  Status: Inpatient [101]  Diagnosis: Altered mental status     Readmission within last 30 days:  yes     Discharge order noted. MOISÉS confirmed with [facility] that patient can return today to his LTC bed. Orders/DNR-CCA/discharge summary faxed and confirmed. RN-Tamiko to call report. MOISÉS spoke with patient nephew-Juan Pena ([phone]) who will see patient back at facility. Outpatient palliative care discussed, he was agreeable. Referral made to [place] outpatient palliative care to see patient at facility. MOISÉS intervention complete. ADDENDUM 1515:  MOISÉS updated patient daughter-nAthony ([phone]) on discharge plan. Pharmacy: facility   Potential Assistance Purchasing Medications:     Does patient want to participate in local refill/meds to beds program?:      Notification completed in HENS/PAS? No     IMM  Yes     Discharge disposition: [address] [facility],[address] Phone: [phone] Fax: [phone]     LOC-LTC    Mode of Transport-ambulance  Name [facility]  [facility]  Time of Transport-3:30pm    Marco Antonio Rivera and his family were provided with choice of provider; he and his family are in agreement with the discharge plan.       Care Transitions patient: No    WIL Gómez, [facility]  Case Management Department  Ph: [phone]

## 2019-04-15 NOTE — PROGRESS NOTES
Report called to Nohemi Washington at [facility]. Discharged to [facility] per ambulance transport.

## 2019-04-15 NOTE — PLAN OF CARE
Skin dry and scaly. Pin  point sized open area to tail bone. Zinc oxide cream to area. Will continue to keep skin clean and dry and monitor.

## 2019-04-15 NOTE — PLAN OF CARE
Alert and oriented to person and time. Otherwise confused. Able to follow commands. Generalized weakness. Left hand grasp and leg push and pull slightly weaker. In bed with alarm activated and call light in reach. Will continue to toilet and monitor for safety.

## 2019-04-15 NOTE — PROGRESS NOTES
GI Progress Note    Cornelia Matson is a 66 y.o. male patient. 1. Altered mental status, unspecified altered mental status type    2.      anemia    Admit Date: 2019    Subjective:       Feels ok  No abdominal pain  No hematochezia or melena  Tolerating tube feeding  No nausea or vomiting    ROS:  Cardiovascular ROS: no chest pain or dyspnea on exertion  Respiratory ROS: no cough, shortness of breath, or wheezing            Objective:       Patient Vitals for the past 24 hrs:   BP Temp Temp src Pulse Resp SpO2 Weight   04/15/19 1310 119/72 97.4 °F (36.3 °C) Oral 64 16 100 % --   04/15/19 1115 115/65 97 °F (36.1 °C) Oral 70 16 100 % --   04/15/19 0800 (!) 169/87 97 °F (36.1 °C) Oral 75 18 100 % --   04/15/19 0405 134/76 97 °F (36.1 °C) Oral 74 18 99 % 104 lb 11.5 oz (47.5 kg)   19 2334 (!) 143/85 98.5 °F (36.9 °C) Oral 83 18 100 % --   19 2033 (!) 151/83 97.1 °F (36.2 °C) Oral 82 18 98 % --       Exam:    VITALS:  /72   Pulse 64   Temp 97.4 °F (36.3 °C) (Oral)   Resp 16   Ht 5' 5\" (1.651 m)   Wt 104 lb 11.5 oz (47.5 kg)   SpO2 100%   BMI 17.43 kg/m²   TEMPERATURE:  Current - Temp: 97.4 °F (36.3 °C); Max - Temp  Av.3 °F (36.3 °C)  Min: 97 °F (36.1 °C)  Max: 98.5 °F (36.9 °C)    NAD  General appearance: alert, appears stated age, cooperative and no distress  Head: Normocephalic, without obvious abnormality, atraumatic  Neck: supple, symmetrical, trachea midline and thyroid not enlarged, symmetric, no tenderness/mass/nodules  CVS:  RRR, Nl s1s2  Lungs CTA Bilaterally, normal effort  Abdomen: soft, non-tender; bowel sounds normal; no masses,  no organomegaly. Peg in good posistion  Extremities: No edema.     Lab Data:  Recent Labs     19  0506 197 04/15/19  0455   WBC 6.2 6.1 5.5   HGB 6.4* 8.1* 8.1*   HCT 19.0* 23.8* 24.3*   MCV 89.5 88.8 91.3    171 168     Recent Labs     19  0506 197 04/15/19  0455    139 138   K 4.3 4.4 4.7    108

## 2019-04-15 NOTE — DISCHARGE SUMMARY
INTERNAL MEDICINE DEPARTMENT AT [address] [place] [address]  Discharge Summary At the [facility]    Patient ID: Anabel Cisneros                                             Discharge Date: 4/15/2019   Patient's PCP: [facility]                                          Discharge Physician: Kun Reddy MD  Admit Date: 4/11/2019   Admitting Physician: Lakesha Heller MD    PROBLEMS DURING HOSPITALIZATION:  Present on Admission:   Acute metabolic encephalopathy   UTI (urinary tract infection)   Hypothermia   Hyperkalemia   CKD (chronic kidney disease), stage IV (HCC)   Hypothyroidism   Severe protein-calorie malnutrition ([address]      DISCHARGE DIAGNOSES:    Hospital Course:    Anabel Cisneros, 66 y.o. male with PMHx of MCA stroke (1/2019), Afib (Eliquis), chronic diastolic HF echo: 7/77 (EF 55%), CAD, HTN, HLD, DMII, and gastric ulcers presented to ED with altered mental status after nursing staff after noticing diminished verbalization and responsiveness on left side morning of admission. In ED, 94.4, HR: 83, Bp: 164/94. BMP showed hyperkalemia (5.3), Cr 2.2 (BL 2.2-2.4). Troponin elevated 0.05. UA showed large LE and budding yeast. VBG 7.291/40.2/21. Lactate WNL. CT abdomen and pelvis: unremarkable. CT head: Increase in small CSF isoattenuating right subdural fluid collection. Differential diagnosis is subdural hygroma if recent trauma, subdural effusion, or chronic hematoma less likely based on the recent change. Neurosurgery was consulted, no acute intervention necessary. The following issues were addressed during hospitalization:    Acute metabolic encephalopathy likely 2/2 to hypothyroidism, less likely infection: Pt presented from nursing home with altered mental status per nursing staff. Was A&Ox3 when examined. Urine and blood cultures negative. Antibiotics and antifungals stopped.  Treated with IV synthroid 37.5mcg daily.  PO needs to be on empty stomach at discharge.      Emesis: Pt had atleast 4 episodes of emesis in the ED described to be greenish in color. Pt has hx of gastric and duodenal ulcers. G-tube draining green-brownish secretions. GI was consulted, Emergent EGD showed near complete healing gastric and duodenal ulcers and no upper GI bleed. Continued protonix 40mg BID     Chronic Anemia    Today, Hb 8.1 S/p 1uPRBC on  4/13 after Hb dropped to 6.4 (BL 7.2). Previous admission EGD showed gastric ulcers. - protonix 40mg BID   - continue home ferrous sulfate 325mg  - GI consulted - EGD showed healing stress ulcers and no upper GI bleed     Paroxysmal Afib: Continued home Eliquis 2.5mg     HTN: Continued home amlodipine 5mg, lisinopril 5mg and Coreg 0.25VL     Diastolic HF HFpEF (22-12%): Continued home Coreg 6.25mg     DMII:med dose sliding scale and hypoglycemic protocol      Hypothyroidism  TSH level 6.62. Free T4 1.2.  - IV synthroid as above    Physical Exam:  /65   Pulse 70   Temp 97 °F (36.1 °C) (Oral)   Resp 16   Ht 5' 5\" (1.651 m)   Wt 104 lb 11.5 oz (47.5 kg)   SpO2 100%   BMI 17.43 kg/m²   General appearance: alert, appears stated age and cooperative  Head: Normocephalic, without obvious abnormality, atraumatic  Eyes: conjunctivae/corneas clear. PERRL, EOM's intact. Fundi benign. Ears: normal TM's and external ear canals both ears  Nose: Nares normal. Septum midline. Mucosa normal. No drainage or sinus tenderness.   Throat: lips, mucosa, and tongue normal; teeth and gums normal  Neck: no adenopathy, no carotid bruit, no JVD, supple, symmetrical, trachea midline and thyroid not enlarged, symmetric, no tenderness/mass/nodules  Lungs: clear to auscultation bilaterally  Heart: regular rate and rhythm, S1, S2 normal, no murmur, click, rub or gallop  Abdomen: soft, non-tender; bowel sounds normal; no masses,  no organomegaly  Extremities: extremities normal, atraumatic, no cyanosis or edema  Neurologic: Grossly normal    Consults: none  Disposition: skilled nursing facility  Discharged Condition: Stable  Follow Up: Primary Care Physician in one week    DISCHARGE MEDICATION:     Medication List      CHANGE how you take these medications    levothyroxine 100 MCG tablet  Commonly known as:  SYNTHROID  Take 1 tablet by mouth daily  What changed:    · medication strength  · how much to take        CONTINUE taking these medications    amLODIPine 5 MG tablet  Commonly known as:  NORVASC     apixaban 2.5 MG Tabs tablet  Commonly known as:  ELIQUIS  Take 1 tablet by mouth 2 times daily     aspirin 81 MG tablet     carvedilol 6.25 MG tablet  Commonly known as:  COREG     ferrous sulfate 325 (65 Fe) MG tablet     insulin glargine 300 UNIT/ML injection pen  Commonly known as:  TOUJEO MAX SOLOSTAR  Inject 5 Units into the skin nightly     lisinopril 5 MG tablet  Commonly known as:  PRINIVIL;ZESTRIL     megestrol 40 MG/ML suspension  Commonly known as:  MEGACE     metFORMIN 500 MG tablet  Commonly known as:  GLUCOPHAGE     mirtazapine 15 MG tablet  Commonly known as:  REMERON     NOVOLOG 100 UNIT/ML injection vial  Generic drug:  insulin aspart     omeprazole 20 MG delayed release capsule  Commonly known as:  PRILOSEC     tamsulosin 0.4 MG capsule  Commonly known as:  FLOMAX  Take 1 capsule by mouth Daily with supper     vitamin D 92826 units Caps capsule  Commonly known as:  ERGOCALCIFEROL           Where to Get Your Medications      You can get these medications from any pharmacy    Bring a paper prescription for each of these medications  · levothyroxine 100 MCG tablet         Time Spent on discharge is more than 30 mins    Signed:  Magnus Vaughn MD, PGY-1   4/15/2019  Addendum to the Resident Dr. Alina Kelley  Discharge notes. Pt seen,examined and evaluated at bed side with the  resident. I have reviewed the current history, physical findings, labs and assessment and plan and agree with note as documented .     Admitted for Acute metabolic encephalopathy  Hypothermia  Severe

## 2019-04-16 LAB
BLOOD CULTURE, ROUTINE: NORMAL
BLOOD CULTURE, ROUTINE: NORMAL
CULTURE, BLOOD 2: NORMAL

## 2019-04-17 ENCOUNTER — ANESTHESIA (OUTPATIENT)
Dept: ENDOSCOPY | Age: 74
DRG: 377 | End: 2019-04-17
Payer: MEDICARE

## 2019-04-17 ENCOUNTER — APPOINTMENT (OUTPATIENT)
Dept: GENERAL RADIOLOGY | Age: 74
DRG: 377 | End: 2019-04-17
Payer: MEDICARE

## 2019-04-17 ENCOUNTER — HOSPITAL ENCOUNTER (INPATIENT)
Age: 74
LOS: 5 days | Discharge: NURSING, REHAB OR LONG-TERM CARE | DRG: 377 | End: 2019-04-22
Attending: EMERGENCY MEDICINE | Admitting: STUDENT IN AN ORGANIZED HEALTH CARE EDUCATION/TRAINING PROGRAM
Payer: MEDICARE

## 2019-04-17 ENCOUNTER — ANESTHESIA EVENT (OUTPATIENT)
Dept: ENDOSCOPY | Age: 74
DRG: 377 | End: 2019-04-17
Payer: MEDICARE

## 2019-04-17 VITALS — DIASTOLIC BLOOD PRESSURE: 85 MMHG | SYSTOLIC BLOOD PRESSURE: 167 MMHG | OXYGEN SATURATION: 100 %

## 2019-04-17 DIAGNOSIS — K62.5 RECTAL BLEEDING: Primary | ICD-10-CM

## 2019-04-17 DIAGNOSIS — K92.2 GASTROINTESTINAL HEMORRHAGE, UNSPECIFIED GASTROINTESTINAL HEMORRHAGE TYPE: ICD-10-CM

## 2019-04-17 LAB
ABO/RH: NORMAL
ALBUMIN SERPL-MCNC: 1.5 G/DL (ref 3.4–5)
ALBUMIN SERPL-MCNC: 1.9 G/DL (ref 3.4–5)
ALP BLD-CCNC: 104 U/L (ref 40–129)
ALP BLD-CCNC: 79 U/L (ref 40–129)
ALT SERPL-CCNC: 11 U/L (ref 10–40)
ALT SERPL-CCNC: 14 U/L (ref 10–40)
ANION GAP SERPL CALCULATED.3IONS-SCNC: 12 MMOL/L (ref 3–16)
ANISOCYTOSIS: ABNORMAL
ANTIBODY SCREEN: NORMAL
APTT: 33.3 SEC (ref 26–36)
AST SERPL-CCNC: 10 U/L (ref 15–37)
AST SERPL-CCNC: 13 U/L (ref 15–37)
BACTERIA: ABNORMAL /HPF
BANDED NEUTROPHILS RELATIVE PERCENT: 1 % (ref 0–7)
BASOPHILS ABSOLUTE: 0 K/UL (ref 0–0.2)
BASOPHILS RELATIVE PERCENT: 0 %
BILIRUB SERPL-MCNC: <0.2 MG/DL (ref 0–1)
BILIRUB SERPL-MCNC: <0.2 MG/DL (ref 0–1)
BILIRUBIN DIRECT: <0.2 MG/DL (ref 0–0.3)
BILIRUBIN DIRECT: <0.2 MG/DL (ref 0–0.3)
BILIRUBIN URINE: NEGATIVE
BILIRUBIN, INDIRECT: ABNORMAL MG/DL (ref 0–1)
BILIRUBIN, INDIRECT: ABNORMAL MG/DL (ref 0–1)
BLOOD BANK DISPENSE STATUS: NORMAL
BLOOD BANK DISPENSE STATUS: NORMAL
BLOOD BANK PRODUCT CODE: NORMAL
BLOOD BANK PRODUCT CODE: NORMAL
BLOOD, URINE: ABNORMAL
BPU ID: NORMAL
BPU ID: NORMAL
BUN BLDV-MCNC: 45 MG/DL (ref 7–20)
CALCIUM SERPL-MCNC: 7.9 MG/DL (ref 8.3–10.6)
CHLORIDE BLD-SCNC: 105 MMOL/L (ref 99–110)
CLARITY: ABNORMAL
CO2: 22 MMOL/L (ref 21–32)
COLOR: YELLOW
CORTISOL TOTAL: 12.7 UG/DL
CREAT SERPL-MCNC: 2.1 MG/DL (ref 0.8–1.3)
CRENATED RBC'S: ABNORMAL
DESCRIPTION BLOOD BANK: NORMAL
DESCRIPTION BLOOD BANK: NORMAL
EKG ATRIAL RATE: 60 BPM
EKG DIAGNOSIS: NORMAL
EKG P AXIS: 56 DEGREES
EKG P-R INTERVAL: 116 MS
EKG Q-T INTERVAL: 492 MS
EKG QRS DURATION: 104 MS
EKG QTC CALCULATION (BAZETT): 492 MS
EKG R AXIS: 10 DEGREES
EKG T AXIS: -89 DEGREES
EKG VENTRICULAR RATE: 60 BPM
EOSINOPHILS ABSOLUTE: 0.2 K/UL (ref 0–0.6)
EOSINOPHILS RELATIVE PERCENT: 3 %
EPITHELIAL CELLS, UA: ABNORMAL /HPF
FIBRINOGEN: 337 MG/DL (ref 200–397)
GFR AFRICAN AMERICAN: 37
GFR NON-AFRICAN AMERICAN: 31
GLUCOSE BLD-MCNC: 116 MG/DL (ref 70–99)
GLUCOSE BLD-MCNC: 53 MG/DL (ref 70–99)
GLUCOSE BLD-MCNC: 97 MG/DL (ref 70–99)
GLUCOSE URINE: NEGATIVE MG/DL
HCT VFR BLD CALC: 22.4 % (ref 40.5–52.5)
HCT VFR BLD CALC: 31.9 % (ref 40.5–52.5)
HEMOGLOBIN: 10.6 G/DL (ref 13.5–17.5)
HEMOGLOBIN: 7.5 G/DL (ref 13.5–17.5)
INR BLD: 1.09 (ref 0.86–1.14)
INR BLD: 1.28 (ref 0.86–1.14)
KETONES, URINE: NEGATIVE MG/DL
LACTIC ACID: 1.5 MMOL/L (ref 0.4–2)
LEUKOCYTE ESTERASE, URINE: ABNORMAL
LIPASE: 4 U/L (ref 13–60)
LYMPHOCYTES ABSOLUTE: 0.8 K/UL (ref 1–5.1)
LYMPHOCYTES RELATIVE PERCENT: 13 %
MAGNESIUM: 1.3 MG/DL (ref 1.8–2.4)
MCH RBC QN AUTO: 30.4 PG (ref 26–34)
MCHC RBC AUTO-ENTMCNC: 33.4 G/DL (ref 31–36)
MCV RBC AUTO: 91 FL (ref 80–100)
METAMYELOCYTES RELATIVE PERCENT: 1 %
MICROSCOPIC EXAMINATION: YES
MONOCYTES ABSOLUTE: 0.5 K/UL (ref 0–1.3)
MONOCYTES RELATIVE PERCENT: 9 %
NEUTROPHILS ABSOLUTE: 4.4 K/UL (ref 1.7–7.7)
NEUTROPHILS RELATIVE PERCENT: 73 %
NITRITE, URINE: NEGATIVE
PDW BLD-RTO: 15.8 % (ref 12.4–15.4)
PERFORMED ON: ABNORMAL
PERFORMED ON: NORMAL
PH UA: 6 (ref 5–8)
PLATELET # BLD: 262 K/UL (ref 135–450)
PMV BLD AUTO: 7.9 FL (ref 5–10.5)
POTASSIUM REFLEX MAGNESIUM: 4.6 MMOL/L (ref 3.5–5.1)
PROTEIN UA: 30 MG/DL
PROTHROMBIN TIME: 12.4 SEC (ref 9.8–13)
PROTHROMBIN TIME: 14.6 SEC (ref 9.8–13)
RBC # BLD: 2.46 M/UL (ref 4.2–5.9)
RBC UA: ABNORMAL /HPF (ref 0–2)
SODIUM BLD-SCNC: 139 MMOL/L (ref 136–145)
SPECIFIC GRAVITY UA: 1.01 (ref 1–1.03)
T4 TOTAL: 7.2 UG/DL (ref 4.5–10.9)
TOTAL PROTEIN: 4 G/DL (ref 6.4–8.2)
TOTAL PROTEIN: 5.1 G/DL (ref 6.4–8.2)
TROPONIN: 0.04 NG/ML
TSH SERPL DL<=0.05 MIU/L-ACNC: 8.79 UIU/ML (ref 0.27–4.2)
URINE TYPE: ABNORMAL
UROBILINOGEN, URINE: 0.2 E.U./DL
WBC # BLD: 5.9 K/UL (ref 4–11)
WBC UA: ABNORMAL /HPF (ref 0–5)
YEAST: PRESENT /HPF

## 2019-04-17 PROCEDURE — 96374 THER/PROPH/DIAG INJ IV PUSH: CPT

## 2019-04-17 PROCEDURE — 84436 ASSAY OF TOTAL THYROXINE: CPT

## 2019-04-17 PROCEDURE — 36569 INSJ PICC 5 YR+ W/O IMAGING: CPT

## 2019-04-17 PROCEDURE — 82533 TOTAL CORTISOL: CPT

## 2019-04-17 PROCEDURE — 86850 RBC ANTIBODY SCREEN: CPT

## 2019-04-17 PROCEDURE — 86923 COMPATIBILITY TEST ELECTRIC: CPT

## 2019-04-17 PROCEDURE — 3700000001 HC ADD 15 MINUTES (ANESTHESIA): Performed by: INTERNAL MEDICINE

## 2019-04-17 PROCEDURE — 3609009900 HC COLONOSCOPY W/CONTROL BLEEDING ANY METHOD: Performed by: INTERNAL MEDICINE

## 2019-04-17 PROCEDURE — 85014 HEMATOCRIT: CPT

## 2019-04-17 PROCEDURE — 80076 HEPATIC FUNCTION PANEL: CPT

## 2019-04-17 PROCEDURE — 2580000003 HC RX 258: Performed by: STUDENT IN AN ORGANIZED HEALTH CARE EDUCATION/TRAINING PROGRAM

## 2019-04-17 PROCEDURE — 2580000003 HC RX 258: Performed by: EMERGENCY MEDICINE

## 2019-04-17 PROCEDURE — 94761 N-INVAS EAR/PLS OXIMETRY MLT: CPT

## 2019-04-17 PROCEDURE — 85384 FIBRINOGEN ACTIVITY: CPT

## 2019-04-17 PROCEDURE — 2580000003 HC RX 258: Performed by: ANESTHESIOLOGY

## 2019-04-17 PROCEDURE — C9113 INJ PANTOPRAZOLE SODIUM, VIA: HCPCS | Performed by: STUDENT IN AN ORGANIZED HEALTH CARE EDUCATION/TRAINING PROGRAM

## 2019-04-17 PROCEDURE — 93005 ELECTROCARDIOGRAM TRACING: CPT | Performed by: EMERGENCY MEDICINE

## 2019-04-17 PROCEDURE — 2500000003 HC RX 250 WO HCPCS: Performed by: STUDENT IN AN ORGANIZED HEALTH CARE EDUCATION/TRAINING PROGRAM

## 2019-04-17 PROCEDURE — 86901 BLOOD TYPING SEROLOGIC RH(D): CPT

## 2019-04-17 PROCEDURE — 6360000002 HC RX W HCPCS: Performed by: STUDENT IN AN ORGANIZED HEALTH CARE EDUCATION/TRAINING PROGRAM

## 2019-04-17 PROCEDURE — 71045 X-RAY EXAM CHEST 1 VIEW: CPT

## 2019-04-17 PROCEDURE — 0DJD8ZZ INSPECTION OF LOWER INTESTINAL TRACT, VIA NATURAL OR ARTIFICIAL OPENING ENDOSCOPIC: ICD-10-PCS | Performed by: INTERNAL MEDICINE

## 2019-04-17 PROCEDURE — C9132 KCENTRA, PER I.U.: HCPCS | Performed by: STUDENT IN AN ORGANIZED HEALTH CARE EDUCATION/TRAINING PROGRAM

## 2019-04-17 PROCEDURE — 36430 TRANSFUSION BLD/BLD COMPNT: CPT

## 2019-04-17 PROCEDURE — 83605 ASSAY OF LACTIC ACID: CPT

## 2019-04-17 PROCEDURE — 85025 COMPLETE CBC W/AUTO DIFF WBC: CPT

## 2019-04-17 PROCEDURE — 83690 ASSAY OF LIPASE: CPT

## 2019-04-17 PROCEDURE — 84443 ASSAY THYROID STIM HORMONE: CPT

## 2019-04-17 PROCEDURE — 36415 COLL VENOUS BLD VENIPUNCTURE: CPT

## 2019-04-17 PROCEDURE — 81001 URINALYSIS AUTO W/SCOPE: CPT

## 2019-04-17 PROCEDURE — 96361 HYDRATE IV INFUSION ADD-ON: CPT

## 2019-04-17 PROCEDURE — 2000000000 HC ICU R&B

## 2019-04-17 PROCEDURE — 85610 PROTHROMBIN TIME: CPT

## 2019-04-17 PROCEDURE — 94760 N-INVAS EAR/PLS OXIMETRY 1: CPT

## 2019-04-17 PROCEDURE — 99291 CRITICAL CARE FIRST HOUR: CPT

## 2019-04-17 PROCEDURE — P9016 RBC LEUKOCYTES REDUCED: HCPCS

## 2019-04-17 PROCEDURE — 6360000002 HC RX W HCPCS: Performed by: ANESTHESIOLOGY

## 2019-04-17 PROCEDURE — 83735 ASSAY OF MAGNESIUM: CPT

## 2019-04-17 PROCEDURE — 80048 BASIC METABOLIC PNL TOTAL CA: CPT

## 2019-04-17 PROCEDURE — 2720000010 HC SURG SUPPLY STERILE: Performed by: INTERNAL MEDICINE

## 2019-04-17 PROCEDURE — 3700000000 HC ANESTHESIA ATTENDED CARE: Performed by: INTERNAL MEDICINE

## 2019-04-17 PROCEDURE — 86900 BLOOD TYPING SEROLOGIC ABO: CPT

## 2019-04-17 PROCEDURE — 85730 THROMBOPLASTIN TIME PARTIAL: CPT

## 2019-04-17 PROCEDURE — 84484 ASSAY OF TROPONIN QUANT: CPT

## 2019-04-17 PROCEDURE — 85018 HEMOGLOBIN: CPT

## 2019-04-17 PROCEDURE — 2500000003 HC RX 250 WO HCPCS: Performed by: ANESTHESIOLOGY

## 2019-04-17 PROCEDURE — 02HV33Z INSERTION OF INFUSION DEVICE INTO SUPERIOR VENA CAVA, PERCUTANEOUS APPROACH: ICD-10-PCS | Performed by: STUDENT IN AN ORGANIZED HEALTH CARE EDUCATION/TRAINING PROGRAM

## 2019-04-17 RX ORDER — DEXTROSE MONOHYDRATE 25 G/50ML
INJECTION, SOLUTION INTRAVENOUS
Status: DISPENSED
Start: 2019-04-17 | End: 2019-04-18

## 2019-04-17 RX ORDER — 0.9 % SODIUM CHLORIDE 0.9 %
250 INTRAVENOUS SOLUTION INTRAVENOUS ONCE
Status: DISCONTINUED | OUTPATIENT
Start: 2019-04-17 | End: 2019-04-18

## 2019-04-17 RX ORDER — PROPOFOL 10 MG/ML
INJECTION, EMULSION INTRAVENOUS PRN
Status: DISCONTINUED | OUTPATIENT
Start: 2019-04-17 | End: 2019-04-17 | Stop reason: SDUPTHER

## 2019-04-17 RX ORDER — DEXTROSE MONOHYDRATE 50 MG/ML
100 INJECTION, SOLUTION INTRAVENOUS PRN
Status: DISCONTINUED | OUTPATIENT
Start: 2019-04-17 | End: 2019-04-22 | Stop reason: HOSPADM

## 2019-04-17 RX ORDER — SODIUM CHLORIDE, SODIUM LACTATE, POTASSIUM CHLORIDE, CALCIUM CHLORIDE 600; 310; 30; 20 MG/100ML; MG/100ML; MG/100ML; MG/100ML
INJECTION, SOLUTION INTRAVENOUS CONTINUOUS PRN
Status: DISCONTINUED | OUTPATIENT
Start: 2019-04-17 | End: 2019-04-17 | Stop reason: SDUPTHER

## 2019-04-17 RX ORDER — PANTOPRAZOLE SODIUM 40 MG/10ML
40 INJECTION, POWDER, LYOPHILIZED, FOR SOLUTION INTRAVENOUS 2 TIMES DAILY
Status: DISCONTINUED | OUTPATIENT
Start: 2019-04-17 | End: 2019-04-17

## 2019-04-17 RX ORDER — LIDOCAINE HYDROCHLORIDE 20 MG/ML
INJECTION, SOLUTION INFILTRATION; PERINEURAL PRN
Status: DISCONTINUED | OUTPATIENT
Start: 2019-04-17 | End: 2019-04-17 | Stop reason: SDUPTHER

## 2019-04-17 RX ORDER — 0.9 % SODIUM CHLORIDE 0.9 %
1000 INTRAVENOUS SOLUTION INTRAVENOUS ONCE
Status: COMPLETED | OUTPATIENT
Start: 2019-04-17 | End: 2019-04-17

## 2019-04-17 RX ORDER — DEXTROSE MONOHYDRATE 25 G/50ML
12.5 INJECTION, SOLUTION INTRAVENOUS PRN
Status: DISCONTINUED | OUTPATIENT
Start: 2019-04-17 | End: 2019-04-22 | Stop reason: HOSPADM

## 2019-04-17 RX ORDER — SODIUM CHLORIDE, SODIUM LACTATE, POTASSIUM CHLORIDE, CALCIUM CHLORIDE 600; 310; 30; 20 MG/100ML; MG/100ML; MG/100ML; MG/100ML
INJECTION, SOLUTION INTRAVENOUS CONTINUOUS
Status: DISCONTINUED | OUTPATIENT
Start: 2019-04-17 | End: 2019-04-17

## 2019-04-17 RX ORDER — SODIUM CHLORIDE 0.9 % (FLUSH) 0.9 %
10 SYRINGE (ML) INJECTION EVERY 12 HOURS SCHEDULED
Status: DISCONTINUED | OUTPATIENT
Start: 2019-04-17 | End: 2019-04-22 | Stop reason: HOSPADM

## 2019-04-17 RX ORDER — DEXTROSE MONOHYDRATE 25 G/50ML
25 INJECTION, SOLUTION INTRAVENOUS ONCE
Status: COMPLETED | OUTPATIENT
Start: 2019-04-17 | End: 2019-04-17

## 2019-04-17 RX ORDER — 0.9 % SODIUM CHLORIDE 0.9 %
500 INTRAVENOUS SOLUTION INTRAVENOUS ONCE
Status: COMPLETED | OUTPATIENT
Start: 2019-04-17 | End: 2019-04-17

## 2019-04-17 RX ORDER — SODIUM CHLORIDE, SODIUM LACTATE, POTASSIUM CHLORIDE, CALCIUM CHLORIDE 600; 310; 30; 20 MG/100ML; MG/100ML; MG/100ML; MG/100ML
INJECTION, SOLUTION INTRAVENOUS CONTINUOUS
Status: DISCONTINUED | OUTPATIENT
Start: 2019-04-17 | End: 2019-04-19

## 2019-04-17 RX ORDER — GLYCOPYRROLATE 1 MG/5 ML
SYRINGE (ML) INTRAVENOUS PRN
Status: DISCONTINUED | OUTPATIENT
Start: 2019-04-17 | End: 2019-04-17 | Stop reason: SDUPTHER

## 2019-04-17 RX ORDER — SODIUM CHLORIDE 0.9 % (FLUSH) 0.9 %
10 SYRINGE (ML) INJECTION PRN
Status: DISCONTINUED | OUTPATIENT
Start: 2019-04-17 | End: 2019-04-22 | Stop reason: HOSPADM

## 2019-04-17 RX ORDER — ONDANSETRON 2 MG/ML
4 INJECTION INTRAMUSCULAR; INTRAVENOUS EVERY 6 HOURS PRN
Status: DISCONTINUED | OUTPATIENT
Start: 2019-04-17 | End: 2019-04-22 | Stop reason: HOSPADM

## 2019-04-17 RX ORDER — NICOTINE POLACRILEX 4 MG
15 LOZENGE BUCCAL PRN
Status: DISCONTINUED | OUTPATIENT
Start: 2019-04-17 | End: 2019-04-22 | Stop reason: HOSPADM

## 2019-04-17 RX ADMIN — LIDOCAINE HYDROCHLORIDE 50 ML: 20 INJECTION, SOLUTION INFILTRATION; PERINEURAL at 21:46

## 2019-04-17 RX ADMIN — DEXTROSE 50 % IN WATER (D50W) INTRAVENOUS SYRINGE 25 G: at 15:27

## 2019-04-17 RX ADMIN — SODIUM CHLORIDE, SODIUM LACTATE, POTASSIUM CHLORIDE, AND CALCIUM CHLORIDE: 600; 310; 30; 20 INJECTION, SOLUTION INTRAVENOUS at 21:45

## 2019-04-17 RX ADMIN — PROPOFOL 20 MG: 10 INJECTION, EMULSION INTRAVENOUS at 21:46

## 2019-04-17 RX ADMIN — PROPOFOL 20 MG: 10 INJECTION, EMULSION INTRAVENOUS at 21:52

## 2019-04-17 RX ADMIN — SODIUM CHLORIDE 500 ML: 9 INJECTION, SOLUTION INTRAVENOUS at 14:00

## 2019-04-17 RX ADMIN — PHENYLEPHRINE HYDROCHLORIDE 100 MCG: 10 INJECTION, SOLUTION INTRAMUSCULAR; INTRAVENOUS; SUBCUTANEOUS at 21:54

## 2019-04-17 RX ADMIN — PROPOFOL 20 MG: 10 INJECTION, EMULSION INTRAVENOUS at 22:07

## 2019-04-17 RX ADMIN — Medication 0.1 MG: at 21:49

## 2019-04-17 RX ADMIN — PHENYLEPHRINE HYDROCHLORIDE 100 MCG: 10 INJECTION, SOLUTION INTRAMUSCULAR; INTRAVENOUS; SUBCUTANEOUS at 22:11

## 2019-04-17 RX ADMIN — PHENYLEPHRINE HYDROCHLORIDE 100 MCG: 10 INJECTION, SOLUTION INTRAMUSCULAR; INTRAVENOUS; SUBCUTANEOUS at 22:14

## 2019-04-17 RX ADMIN — NOREPINEPHRINE BITARTRATE 5 MCG/MIN: 1 INJECTION INTRAVENOUS at 21:27

## 2019-04-17 RX ADMIN — PHENYLEPHRINE HYDROCHLORIDE 100 MCG: 10 INJECTION, SOLUTION INTRAMUSCULAR; INTRAVENOUS; SUBCUTANEOUS at 21:58

## 2019-04-17 RX ADMIN — LIDOCAINE HYDROCHLORIDE 50 ML: 20 INJECTION, SOLUTION INFILTRATION; PERINEURAL at 21:53

## 2019-04-17 RX ADMIN — PROTHROMBIN, COAGULATION FACTOR VII HUMAN, COAGULATION FACTOR IX HUMAN, COAGULATION FACTOR X HUMAN, PROTEIN C, PROTEIN S HUMAN, AND WATER 2224 UNITS: KIT at 19:10

## 2019-04-17 RX ADMIN — SODIUM CHLORIDE, POTASSIUM CHLORIDE, SODIUM LACTATE AND CALCIUM CHLORIDE: 600; 310; 30; 20 INJECTION, SOLUTION INTRAVENOUS at 17:04

## 2019-04-17 RX ADMIN — SODIUM CHLORIDE 1000 ML: 9 INJECTION, SOLUTION INTRAVENOUS at 18:45

## 2019-04-17 RX ADMIN — PANTOPRAZOLE SODIUM 8 MG/HR: 40 INJECTION, POWDER, FOR SOLUTION INTRAVENOUS at 20:39

## 2019-04-17 RX ADMIN — SODIUM CHLORIDE, POTASSIUM CHLORIDE, SODIUM LACTATE AND CALCIUM CHLORIDE: 600; 310; 30; 20 INJECTION, SOLUTION INTRAVENOUS at 21:48

## 2019-04-17 RX ADMIN — PANTOPRAZOLE SODIUM 80 MG: 40 INJECTION, POWDER, FOR SOLUTION INTRAVENOUS at 20:04

## 2019-04-17 RX ADMIN — SODIUM CHLORIDE, PRESERVATIVE FREE 10 ML: 5 INJECTION INTRAVENOUS at 20:10

## 2019-04-17 RX ADMIN — PROPOFOL 20 MG: 10 INJECTION, EMULSION INTRAVENOUS at 22:00

## 2019-04-17 ASSESSMENT — PAIN SCALES - GENERAL
PAINLEVEL_OUTOF10: 0

## 2019-04-17 ASSESSMENT — PAIN - FUNCTIONAL ASSESSMENT
PAIN_FUNCTIONAL_ASSESSMENT: 0-10
PAIN_FUNCTIONAL_ASSESSMENT: ACTIVITIES ARE NOT PREVENTED
PAIN_FUNCTIONAL_ASSESSMENT: ACTIVITIES ARE NOT PREVENTED

## 2019-04-17 ASSESSMENT — ENCOUNTER SYMPTOMS
BLOOD IN STOOL: 1
RECTAL PAIN: 0
CONSTIPATION: 0
EYE DISCHARGE: 0
DIARRHEA: 0
EYE PAIN: 0
EYE ITCHING: 0
COUGH: 0
PHOTOPHOBIA: 0
APNEA: 0
SHORTNESS OF BREATH: 0
SHORTNESS OF BREATH: 1
EYE REDNESS: 0
CHOKING: 0
FACIAL SWELLING: 0
VOMITING: 0
ABDOMINAL DISTENTION: 0
ANAL BLEEDING: 0
STRIDOR: 0
ABDOMINAL PAIN: 0
CHEST TIGHTNESS: 0
BACK PAIN: 0
NAUSEA: 0

## 2019-04-17 ASSESSMENT — PAIN DESCRIPTION - PROGRESSION
CLINICAL_PROGRESSION: NOT CHANGED
CLINICAL_PROGRESSION: NOT CHANGED

## 2019-04-17 NOTE — CARE COORDINATION
Received report from nurse, Doug Cordero, at [facility]. She stated pt was found to have rectal bleeding this morning that \"filled his brief. \" BP soft 80's/50's and pulses in the 50's. He was discharged on 4/15 for the same issue. He also had a new PEG placed last admission. Pt is a LTC resident at [facility] Phone: [phone] Fax: [phone]. He was also referred to [facility] Palliative care. Code status last admission [facility]. Emergency Contacts:  Adeline Person (daughter) [phone]  Al Hensley (nephew) [phone]    Discharge Planning:  Return to [facility] [address] He will require stretcher transport.     Kalyani Pastor RN  Case Management  [phone]

## 2019-04-17 NOTE — ED TRIAGE NOTES
Pt arrived to ED via squad from [facility] with c/o rectal bleeding.  Pt was just discharged a few days ago for the same thing

## 2019-04-17 NOTE — PROGRESS NOTES
Rt responded to rapid response. Pt with low B/P. Ra SpO2 =99, HR 68-70, BS bilat and clear w good chest excursion, trachea midline. RR -14. Per Team no further RT needs @ this time. Will call me if assessment changes.

## 2019-04-17 NOTE — PROCEDURES
Earnestine Villegas is a 66 y.o. male patient. 1. Rectal bleeding      Past Medical History:   Diagnosis Date    Acute renal failure (HCC)     Acute respiratory failure with hypoxia (HCC)     Angina pectoris (HCC)     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HCC)     CAD (coronary artery disease)     Cardiac arrest ([address]     Cardiac arrest (HCC)     Chronic systolic heart failure (HCC)     Diabetes mellitus ([facility] [address]     Diabetes mellitus ([facility] [address] [facility] 10/9/2012    Diabetes mellitus type II, uncontrolled ([facility]     4/1017 A1c = 13    DKA (diabetic ketoacidoses) ([address] 04/2017    Elevated LFTs     Femoral neck fracture ([facility] [address] [facility] 10/17/2017    HTN (hypertension) 10/17/2017    Hyperkalemia     Hyperlipidemia     Hypertension     PAF ([facility]oxysmal atrial fibrillation) ([facility])     Paroxysmal A-fib ([facility])     PEA (Pulseless electrical activity) ([facility] 04/13/2017    Pneumonia 04/2017    LIKELY PNEUMOCOCCAL    Pneumonia due to organism     Pneumonia of right lower lobe due to Streptococcus pneumoniae ([facility])     Protein-calorie malnutrition, severe ([facility] 04/2017    BMI = 19    S/P hip hemiarthroplasty 12/29/2017    Septic shock (HCC)     Systolic CHF (HCC)     EF 30%    Thrombocytopenia (HCC)     Thrombocytopenia (HCC)     Type 2 diabetes mellitus with hyperglycemia ([facility] 11/28/2016     Blood pressure (!) 71/54, pulse 62, temperature 93.7 °F (34.3 °C), temperature source Core, resp. rate 13, height 5' 5\" (1.651 m), weight 105 lb 6.1 oz (47.8 kg), SpO2 100 %. Central Line  Date/Time: 4/17/2019 7:18 PM  Performed by: Carlin Alegria MD  Authorized by: Carlin Alegria MD   Consent: The procedure was performed in an emergent situation. Verbal consent obtained.   Risks and benefits: risks, benefits and alternatives were discussed  Consent given by: patient  Patient understanding: patient states understanding of the procedure being performed  Patient consent: the patient's understanding of the procedure matches consent given  Procedure consent: procedure consent matches procedure scheduled  Relevant documents: relevant documents present and verified  Test results: test results available and properly labeled  Site marked: the operative site was marked  Required items: required blood products, implants, devices, and special equipment available  Patient identity confirmed: verbally with patient, arm band and hospital-assigned identification number  Time out: Immediately prior to procedure a \"time out\" was called to verify the correct patient, procedure, equipment, support staff and site/side marked as required.   Indications: vascular access  Anesthesia: local infiltration    Anesthesia:  Local Anesthetic: lidocaine 1% without epinephrine  Anesthetic total: 3 mL    Sedation:  Patient sedated: no    Preparation: skin prepped with 2% chlorhexidine  Skin prep agent dried: skin prep agent completely dried prior to procedure  Sterile barriers: all five maximum sterile barriers used - cap, mask, sterile gown, sterile gloves, and large sterile sheet  Hand hygiene: hand hygiene performed prior to central venous catheter insertion  Location details: left femoral  Patient position: flat  Catheter type: triple lumen  Catheter size: 7 Fr  Pre-procedure: landmarks identified  Ultrasound guidance: yes  Sterile ultrasound techniques: sterile gel and sterile probe covers were used  Number of attempts: 1  Successful placement: yes  Post-procedure: line sutured and dressing applied  Assessment: blood return through all ports and free fluid flow  Patient tolerance: Patient tolerated the procedure well with no immediate complications          Carlin Alegria MD  4/17/2019

## 2019-04-17 NOTE — PROGRESS NOTES
Patient arrived from [address] [place] [address] to room 4501. He is hypotensive and hypothermic with fresh bright red blood from his rectum. Central line placed, link placed. 1 L fluid bolus infused 2 units of PRBC transfused with no reactions. Plan for EGD and colonoscopy. Consent obtained from daughter Nasreen Zhang over the phone. Resident and RN to verify consent.

## 2019-04-17 NOTE — ED PROVIDER NOTES
Date of evaluation: 4/17/2019    Chief Complaint   Rectal Bleeding (pt dc from here a few days ago for same thing)      Nursing Notes, Past Medical Hx, Past Surgical Hx, Social Hx, Allergies, and Family Hx were reviewed. History of Present Illness     Jasmin Waters is a 66 y.o. male who presents rectal bleeding. He was recently admitted for UTI hypotension hypothyroidism who was recently discharged 2 days ago. Presents with bright red blood per rectum. He states he feels weak but no pain anywhere no abdominal pain. No hematemesis he has G-tube in. He is on Eloquis. Review of Systems     Review of Systems   Constitutional: Negative for activity change and appetite change. HENT: Negative. Respiratory: Negative for shortness of breath. Cardiovascular: Negative for chest pain. Gastrointestinal: Positive for blood in stool. Negative for abdominal pain. All other systems reviewed and are negative. Past Medical, Surgical, Family, and Social History     He has a past medical history of Acute renal failure ([address] Acute respiratory failure with hypoxia ([address] Angina pectoris ([address] 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[address] CAD (coronary artery disease), Cardiac arrest ([address] Cardiac arrest ([address] Chronic systolic heart failure ([address] Diabetes mellitus ([address] Diabetes mellitus ([address] Diabetes mellitus type II, uncontrolled ([address] DKA (diabetic ketoacidoses) ([address] Elevated LFTs, Femoral neck fracture ([address] HTN (hypertension), Hyperkalemia, Hyperlipidemia, Hypertension, PAF (paroxysmal atrial fibrillation) ([address] Paroxysmal A-fib ([address] PEA (Pulseless electrical activity) ([address] Pneumonia, Pneumonia due to organism, Pneumonia of right lower lobe due to Streptococcus pneumoniae ([address] Protein-calorie malnutrition, severe ([address] S/P hip hemiarthroplasty, Septic shock ([address] Systolic CHF ([address] Thrombocytopenia ([address] Thrombocytopenia ([address] and Type 2 diabetes mellitus with hyperglycemia ([facility]   He has a past surgical GFR Non- 31 (*)     GFR  37 (*)     Calcium 7.9 (*)     All other components within normal limits    Narrative:     Performed at: [facility] [employer] - [facility]  [address] [address] [address]   Phone [phone]   HEPATIC FUNCTION PANEL - Abnormal; Notable for the following components: Total Protein 5.1 (*)     Alb 1.9 (*)     AST 13 (*)     All other components within normal limits    Narrative:     Performed at: [facility] [employer] - [facility]  [address] [address]   Phone [phone]   LIPASE - Abnormal; Notable for the following components:    Lipase 4.0 (*)     All other components within normal limits    Narrative:     Performed at: [facility] [employer] - [place] [address] [address]   Phone [phone]   PROTIME-INR - Abnormal; Notable for the following components:    Protime 14.6 (*)     INR 1.28 (*)     All other components within normal limits    Narrative:     Performed at: [facility] [employer] - [place] [address] [address]   Phone [phone]   TSH WITHOUT REFLEX   T4   URINALYSIS   TROPONIN   POCT LACTIC ACID (LACTATE)   TYPE AND SCREEN    Narrative:     Performed at:   [facility] [employer] - [place] [address] [address]   Phone [phone]       ED BEDSIDE ULTRASOUND:    The lack of access a peripheral intravenous catheter was placed by myself in the left brachial no complication    RECENT VITALS:  BP: 113/67, Temp: 98 °F (36.7 °C), Pulse: 60, Resp: 18     Procedures       ED Course     The patient was given the followingmedications:  Orders Placed This Encounter   Medications    0.9 % sodium chloride bolus    dextrose 50 % solution 25 g    dextrose 50 % solution     WEILER, JENNIFER: cabinet override            CONSULTS:  IP CONSULT TO GI  IP

## 2019-04-17 NOTE — H&P
Internal Medicine PGY-1Resident History & Physical      PCP: Maged Bang    Date of Admission: 4/17/2019    Date of Service: Pt seen/examined on 04/17/2019  and Admitted to Inpatient with expected LOS greaterthan two midnights due to medical therapy. Chief Complaint:  Bleeding per rectum      History Of Present Illness: PMHx of MCA stroke (1/2019), Afib (Eliquis), chronic diastolic HF echo: 2/68 (EF 55%), CAD, HTN, HLD, DMII, and gastric ulcers, malnutrition presented for [facility] from nursing home facility for bleeding per rectum, per nursing hoe pt was doing good yesterday, this morning aroungd 10 am nurse saw blood underneath him, she moved him blood around from his anal area, he denies any abdominal pain, fever, vomiting, urinary symptoms, she mentioned he is poor oral intake and did not eat too much yesterday, denies  headache, chest pain, dyspnea, abdominal pain, nausea, or musculoskeletal pain. Pt is poor historian (base line stroke with expressive aphasia) pt just DC from the hospital 04/15/2019  For altered mental status after nursing staff after noticing diminished verbalization and responsiveness on left side morning of admission and treated for Hypothyroidism,   Of note Rectal biopsy: 04/02/2019    Posterior rectal biopsy:       - Small foci with features of tubular adenoma arising within a         hyperplastic polyp/ mixed hyperplastic polyp and tubular adenoma.       - No evidence of high-grade dysplasia or malignancy.      - Polypoid lesion appears histologically excised.       B. Anterior rectal biopsy:       - Hyperplastic polyp with focal nonspecific inflammatory changes.       - No evidence of malignancy.     , EGD/Colonscopy showed Gastritis with Duodenitis     In ED Bp was 75/59 Temp was normal 36.7, HR 67, RR 18  Cr 2.1 base line 2.4, alb 1.9, INR 1.28, Hb 7.5 last Hb 8.1, glucose 56           Past Medical History:          Diagnosis Date    Acute renal failure ([address]     Acute respiratory failure with hypoxia (HCC)     Angina pectoris (HCC)     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[address]     CAD (coronary artery disease)     Cardiac arrest ([address]     Cardiac arrest (HCC)     Chronic systolic heart failure (HCC)     Diabetes mellitus ([address]     Diabetes mellitus ([address] 10/9/2012    Diabetes mellitus type II, uncontrolled ([address]     4/1017 A1c = 13    DKA (diabetic ketoacidoses) ([address] 04/2017    Elevated LFTs     Femoral neck fracture ([address] 10/17/2017    HTN (hypertension) 10/17/2017    Hyperkalemia     Hyperlipidemia     Hypertension     PAF (paroxysmal atrial fibrillation) (HCC)     Paroxysmal A-fib (HCC)     PEA (Pulseless electrical activity) ([address] 04/13/2017    Pneumonia 04/2017    LIKELY PNEUMOCOCCAL    Pneumonia due to organism     Pneumonia of right lower lobe due to Streptococcus pneumoniae (HCC)     Protein-calorie malnutrition, severe ([address] 04/2017    BMI = 19    S/P hip hemiarthroplasty 12/29/2017    Septic shock (HCC)     Systolic CHF ([address]     EF 30%    Thrombocytopenia (HCC)     Thrombocytopenia (HCC)     Type 2 diabetes mellitus with hyperglycemia ([address] 11/28/2016       Past Surgical History:          Procedure Laterality Date    ANUS SURGERY N/A 4/2/2019    EXAM UNDER ANESTHESIA, RECTAL BIOPSY X 2 performed by Ebstanley Kraus MD at [facility] 1 N/A 4/1/2019    COLONOSCOPY performed by Anu Burton MD at [address] N/A 12/14/2018    GASTRIC TUBE REMOVAL performed by Heath Brown MD at [facility]      G-tube placement    GASTROSTOMY TUBE PLACEMENT N/A 4/5/2019    EGD PEG TUBE PLACEMENT successful tube placement performed by Anu Burton MD at [address] [place] [address]      hip   Leonardo Clore N/A 4/1/2019    EGD BIOPSY performed by Anu Burton MD at [facility] 27 N/A 4/12/2019    EGD DIAGNOSTIC ONLY performed by Kathryn Amin Maynard Closs, MD at [facility] 28       Medications Prior to Admission:      Prior to Admission medications    Medication Sig Start Date End Date Taking? Authorizing Provider   levothyroxine (SYNTHROID) 100 MCG tablet Take 1 tablet by mouth daily 4/15/19   Emily Stanton MD   metFORMIN (GLUCOPHAGE) 500 MG tablet Take 1 tablet by mouth 2 times daily (with meals) 4/15/19   Reji Escobar MD   omeprazole (PRILOSEC) 20 MG delayed release capsule Take 20 mg by mouth 2 times daily    Historical Provider, MD   tamsulosin (FLOMAX) 0.4 MG capsule Take 1 capsule by mouth Daily with supper 4/7/19   Zac Restrepo MD   carvedilol (COREG) 6.25 MG tablet Take 6.25 mg by mouth 2 times daily (with meals)    Historical Provider, MD   vitamin D (ERGOCALCIFEROL) 40252 units CAPS capsule Take 50,000 Units by mouth Twice a Week Give on Monday and Thursday    Historical Provider, MD   lisinopril (PRINIVIL;ZESTRIL) 5 MG tablet Take 5 mg by mouth daily    Historical Provider, MD   megestrol (MEGACE) 40 MG/ML suspension Take 400 mg by mouth daily    Historical Provider, MD   mirtazapine (REMERON) 15 MG tablet Take 15 mg by mouth nightly    Historical Provider, MD   apixaban (ELIQUIS) 2.5 MG TABS tablet Take 1 tablet by mouth 2 times daily 3/7/19   Emily Stanton MD   ferrous sulfate 325 (65 Fe) MG tablet Take 325 mg by mouth 3 times daily (with meals)     Historical Provider, MD   amLODIPine (NORVASC) 5 MG tablet Take 5 mg by mouth daily     Historical Provider, MD   aspirin 81 MG tablet Take 81 mg by mouth daily     Historical Provider, MD       Allergies:  Patient has no known allergies. Social History:      The patient currently lives nursing home    TOBACCO:   reports that he quit smoking about 14 months ago. He has never used smokeless tobacco.  ETOH:  reports that he does not drink alcohol. Family History:      Reviewed in detail and negative for DM, CAD, Cancer, CVA. Positive as follows:    History reviewed.  No pertinent family history. REVIEW OF SYSTEMS: Pertinent positives as noted in the HPI. All other systems reviewed and negative. ROS: Review of Systems   Constitutional: Positive for fatigue and unexpected weight change. Negative for activity change, appetite change, chills, diaphoresis and fever. HENT: Negative for congestion, dental problem, drooling, ear discharge, ear pain, facial swelling, hearing loss and mouth sores. Eyes: Negative for photophobia, pain, discharge, redness, itching and visual disturbance. Respiratory: Negative for apnea, cough, choking, chest tightness, shortness of breath and stridor. Cardiovascular: Negative for chest pain, palpitations and leg swelling. Gastrointestinal: Positive for blood in stool. Negative for abdominal distention, abdominal pain, anal bleeding, constipation, diarrhea, nausea, rectal pain and vomiting. Endocrine: Negative for cold intolerance, heat intolerance, polydipsia, polyphagia and polyuria. Genitourinary: Negative for difficulty urinating, dysuria, enuresis, flank pain, frequency, genital sores and hematuria. Musculoskeletal: Negative for arthralgias, back pain, gait problem, joint swelling, myalgias and neck pain. Neurological: Positive for weakness. Negative for dizziness, tremors, seizures, syncope, facial asymmetry, speech difficulty, light-headedness, numbness and headaches. PHYSICAL EXAM PERFORMED:    /73   Pulse 74   Temp (!) 91.9 °F (33.3 °C) (Oral) Comment: MD at bedside  Resp 18   Ht 5' 5\" (1.651 m)   Wt 105 lb 6.1 oz (47.8 kg)   SpO2 100%   BMI 17.54 kg/m²     General appearance:  Malnourished, looked tired, lying in bed, pallor   HEENT:  Normal cephalic,atraumatic without obvious deformity. Pupils equal, round, and reactive to light. Extra ocular muscles intact. Conjunctivae/corneas pale   Neck: Supple, with full range of motion. No jugular venous distention. Trachea midline. Respiratory:  Normal respiratory effort.  Clear to auscultation, bilaterally without Rales/Wheezes/Rhonchi  Cardiovascular:  Regular rate and rhythm with normal S1/S2 without murmurs, rubs or gallops. Abdomen: Soft, non-tender, non-distended with normal bowel sounds, Rectal exam full with blood and clot s of blood . Musculoskeletal:  No clubbing, cyanosis oredema bilaterally. Full range of motion without deformity, Legs with blackish discoloration   Skin: Skin color, texture, turgor normal.  Norashes or lesions. Neurologic:  Neurovascularly intact without any focal sensory/motor deficits. Cranialnerves: II-XII intact, grossly non-focal.  Psychiatric:  Alert and oriented, thought content appropriate,normal insight  Capillary Refill: Brisk,< 3 seconds   Peripheral Pulses: +2 palpable, equal bilaterally       Labs:     Recent Labs     04/15/19  0455 04/17/19  1414   WBC 5.5 5.9   HGB 8.1* 7.5*   HCT 24.3* 22.4*    262     Recent Labs     04/15/19  0455 04/17/19  1414    139   K 4.7 4.6    105   CO2 20* 22   BUN 39* 45*   CREATININE 2.6* 2.1*   CALCIUM 7.8* 7.9*     Recent Labs     04/17/19  1414   AST 13*   ALT 14   BILIDIR <0.2   BILITOT <0.2   ALKPHOS 104     Recent Labs     04/17/19  1414   INR 1.28*     No results for input(s): Leeann Nicole in the last 72 hours. Urinalysis:      Lab Results   Component Value Date    NITRU Negative 04/11/2019    WBCUA 20-50 04/11/2019    BACTERIA Rare 04/11/2019    RBCUA 0-2 04/11/2019    BLOODU TRACE-LYSED 04/11/2019    SPECGRAV 1.010 04/11/2019    GLUCOSEU Negative 04/11/2019       Radiology:     CXR: I have reviewed the CXR with the following interpretation:   EKG:  I have reviewed the EKG with the following interpretation:     XR CHEST PORTABLE   Final Result   Impression: Streaky atelectasis of the right lung base. Layering density of the right lower lung field may represent a layering effusion.           ASSESSMENT & PLAN:  Anabel Cisneros is a 66 y.o. male w/ PMHx of MCA stroke (1/19), Afib

## 2019-04-17 NOTE — FLOWSHEET NOTE
04/17/19 1416   Encounter Summary   Services provided to: Patient   Referral/Consult From: Rounding   Continue Visiting   (Seen 4/17/19, ADRY. )   Complexity of Encounter Moderate   Length of Encounter 15 minutes   Routine   Type Initial   Assessment Approachable   Intervention Nurtured hope   Outcome Expressed gratitude

## 2019-04-17 NOTE — ED NOTES
Bed: B23-23  Expected date:   Expected time:   Means of arrival:   Comments:  [facility] [address] [facility], RN  04/17/19 1917

## 2019-04-18 PROBLEM — Z86.73 HISTORY OF STROKE: Status: ACTIVE | Noted: 2019-01-17

## 2019-04-18 LAB
ANION GAP SERPL CALCULATED.3IONS-SCNC: 7 MMOL/L (ref 3–16)
BANDED NEUTROPHILS RELATIVE PERCENT: 1 % (ref 0–7)
BASOPHILS ABSOLUTE: 0.1 K/UL (ref 0–0.2)
BASOPHILS RELATIVE PERCENT: 1 %
BUN BLDV-MCNC: 40 MG/DL (ref 7–20)
CALCIUM SERPL-MCNC: 7.2 MG/DL (ref 8.3–10.6)
CHLORIDE BLD-SCNC: 107 MMOL/L (ref 99–110)
CO2: 21 MMOL/L (ref 21–32)
CREAT SERPL-MCNC: 1.9 MG/DL (ref 0.8–1.3)
EOSINOPHILS ABSOLUTE: 0 K/UL (ref 0–0.6)
EOSINOPHILS RELATIVE PERCENT: 0 %
GFR AFRICAN AMERICAN: 42
GFR NON-AFRICAN AMERICAN: 35
GLUCOSE BLD-MCNC: 73 MG/DL (ref 70–99)
GLUCOSE BLD-MCNC: 82 MG/DL (ref 70–99)
GLUCOSE BLD-MCNC: 88 MG/DL (ref 70–99)
GLUCOSE BLD-MCNC: 96 MG/DL (ref 70–99)
HCT VFR BLD CALC: 28.4 % (ref 40.5–52.5)
HCT VFR BLD CALC: 28.8 % (ref 40.5–52.5)
HCT VFR BLD CALC: 29 % (ref 40.5–52.5)
HCT VFR BLD CALC: 29 % (ref 40.5–52.5)
HEMOGLOBIN: 9.5 G/DL (ref 13.5–17.5)
HEMOGLOBIN: 9.7 G/DL (ref 13.5–17.5)
HEMOGLOBIN: 9.8 G/DL (ref 13.5–17.5)
HEMOGLOBIN: 9.9 G/DL (ref 13.5–17.5)
LYMPHOCYTES ABSOLUTE: 0.4 K/UL (ref 1–5.1)
LYMPHOCYTES RELATIVE PERCENT: 7 %
MAGNESIUM: 2.2 MG/DL (ref 1.8–2.4)
MCH RBC QN AUTO: 29.8 PG (ref 26–34)
MCHC RBC AUTO-ENTMCNC: 33.8 G/DL (ref 31–36)
MCV RBC AUTO: 88.1 FL (ref 80–100)
MONOCYTES ABSOLUTE: 0.2 K/UL (ref 0–1.3)
MONOCYTES RELATIVE PERCENT: 4 %
NEUTROPHILS ABSOLUTE: 5.3 K/UL (ref 1.7–7.7)
NEUTROPHILS RELATIVE PERCENT: 87 %
PDW BLD-RTO: 14.3 % (ref 12.4–15.4)
PERFORMED ON: NORMAL
PLATELET # BLD: 200 K/UL (ref 135–450)
PMV BLD AUTO: 7.9 FL (ref 5–10.5)
POTASSIUM REFLEX MAGNESIUM: 4.5 MMOL/L (ref 3.5–5.1)
RBC # BLD: 3.29 M/UL (ref 4.2–5.9)
RBC # BLD: NORMAL 10*6/UL
SODIUM BLD-SCNC: 135 MMOL/L (ref 136–145)
TROPONIN: 0.05 NG/ML
WBC # BLD: 6 K/UL (ref 4–11)

## 2019-04-18 PROCEDURE — 99221 1ST HOSP IP/OBS SF/LOW 40: CPT | Performed by: NURSE PRACTITIONER

## 2019-04-18 PROCEDURE — 2580000003 HC RX 258: Performed by: STUDENT IN AN ORGANIZED HEALTH CARE EDUCATION/TRAINING PROGRAM

## 2019-04-18 PROCEDURE — 80048 BASIC METABOLIC PNL TOTAL CA: CPT

## 2019-04-18 PROCEDURE — 85014 HEMATOCRIT: CPT

## 2019-04-18 PROCEDURE — 99024 POSTOP FOLLOW-UP VISIT: CPT | Performed by: SURGERY

## 2019-04-18 PROCEDURE — 6360000002 HC RX W HCPCS: Performed by: STUDENT IN AN ORGANIZED HEALTH CARE EDUCATION/TRAINING PROGRAM

## 2019-04-18 PROCEDURE — 83735 ASSAY OF MAGNESIUM: CPT

## 2019-04-18 PROCEDURE — 94760 N-INVAS EAR/PLS OXIMETRY 1: CPT

## 2019-04-18 PROCEDURE — 6370000000 HC RX 637 (ALT 250 FOR IP): Performed by: STUDENT IN AN ORGANIZED HEALTH CARE EDUCATION/TRAINING PROGRAM

## 2019-04-18 PROCEDURE — 84484 ASSAY OF TROPONIN QUANT: CPT

## 2019-04-18 PROCEDURE — 36592 COLLECT BLOOD FROM PICC: CPT

## 2019-04-18 PROCEDURE — 1200000000 HC SEMI PRIVATE

## 2019-04-18 PROCEDURE — 36415 COLL VENOUS BLD VENIPUNCTURE: CPT

## 2019-04-18 PROCEDURE — C9113 INJ PANTOPRAZOLE SODIUM, VIA: HCPCS | Performed by: STUDENT IN AN ORGANIZED HEALTH CARE EDUCATION/TRAINING PROGRAM

## 2019-04-18 PROCEDURE — 85025 COMPLETE CBC W/AUTO DIFF WBC: CPT

## 2019-04-18 PROCEDURE — 99223 1ST HOSP IP/OBS HIGH 75: CPT | Performed by: INTERNAL MEDICINE

## 2019-04-18 PROCEDURE — 85018 HEMOGLOBIN: CPT

## 2019-04-18 RX ORDER — 0.9 % SODIUM CHLORIDE 0.9 %
5 VIAL (ML) INJECTION DAILY
Status: DISCONTINUED | OUTPATIENT
Start: 2019-04-18 | End: 2019-04-18

## 2019-04-18 RX ORDER — LEVOTHYROXINE SODIUM ANHYDROUS 100 UG/5ML
50 INJECTION, POWDER, LYOPHILIZED, FOR SOLUTION INTRAVENOUS ONCE
Status: DISCONTINUED | OUTPATIENT
Start: 2019-04-24 | End: 2019-04-21

## 2019-04-18 RX ORDER — LEVOTHYROXINE SODIUM ANHYDROUS 100 UG/5ML
50 INJECTION, POWDER, LYOPHILIZED, FOR SOLUTION INTRAVENOUS ONCE
Status: DISCONTINUED | OUTPATIENT
Start: 2019-04-24 | End: 2019-04-18

## 2019-04-18 RX ORDER — MAGNESIUM SULFATE IN WATER 40 MG/ML
4 INJECTION, SOLUTION INTRAVENOUS ONCE
Status: COMPLETED | OUTPATIENT
Start: 2019-04-18 | End: 2019-04-18

## 2019-04-18 RX ORDER — PANTOPRAZOLE SODIUM 40 MG/10ML
40 INJECTION, POWDER, LYOPHILIZED, FOR SOLUTION INTRAVENOUS DAILY
Status: DISCONTINUED | OUTPATIENT
Start: 2019-04-18 | End: 2019-04-22 | Stop reason: HOSPADM

## 2019-04-18 RX ORDER — LANOLIN ALCOHOL/MO/W.PET/CERES
CREAM (GRAM) TOPICAL 2 TIMES DAILY
Status: DISCONTINUED | OUTPATIENT
Start: 2019-04-18 | End: 2019-04-22 | Stop reason: HOSPADM

## 2019-04-18 RX ORDER — 0.9 % SODIUM CHLORIDE 0.9 %
5 VIAL (ML) INJECTION DAILY
Status: DISCONTINUED | OUTPATIENT
Start: 2019-04-24 | End: 2019-04-21

## 2019-04-18 RX ADMIN — PANTOPRAZOLE SODIUM 40 MG: 40 INJECTION, POWDER, FOR SOLUTION INTRAVENOUS at 12:06

## 2019-04-18 RX ADMIN — MAGNESIUM SULFATE HEPTAHYDRATE 4 G: 40 INJECTION, SOLUTION INTRAVENOUS at 02:16

## 2019-04-18 RX ADMIN — Medication: at 20:54

## 2019-04-18 RX ADMIN — SODIUM CHLORIDE, POTASSIUM CHLORIDE, SODIUM LACTATE AND CALCIUM CHLORIDE: 600; 310; 30; 20 INJECTION, SOLUTION INTRAVENOUS at 04:17

## 2019-04-18 RX ADMIN — SODIUM CHLORIDE, POTASSIUM CHLORIDE, SODIUM LACTATE AND CALCIUM CHLORIDE: 600; 310; 30; 20 INJECTION, SOLUTION INTRAVENOUS at 15:26

## 2019-04-18 RX ADMIN — PANTOPRAZOLE SODIUM 8 MG/HR: 40 INJECTION, POWDER, FOR SOLUTION INTRAVENOUS at 04:18

## 2019-04-18 RX ADMIN — SODIUM CHLORIDE, PRESERVATIVE FREE 10 ML: 5 INJECTION INTRAVENOUS at 20:54

## 2019-04-18 RX ADMIN — SODIUM CHLORIDE, PRESERVATIVE FREE 10 ML: 5 INJECTION INTRAVENOUS at 12:06

## 2019-04-18 RX ADMIN — Medication: at 15:31

## 2019-04-18 ASSESSMENT — PAIN DESCRIPTION - PROGRESSION: CLINICAL_PROGRESSION: NOT CHANGED

## 2019-04-18 ASSESSMENT — PAIN SCALES - GENERAL
PAINLEVEL_OUTOF10: 0

## 2019-04-18 ASSESSMENT — ENCOUNTER SYMPTOMS
CONSTIPATION: 0
DIARRHEA: 0
BLOOD IN STOOL: 1
NAUSEA: 0
SHORTNESS OF BREATH: 0
VOMITING: 0

## 2019-04-18 ASSESSMENT — PAIN - FUNCTIONAL ASSESSMENT: PAIN_FUNCTIONAL_ASSESSMENT: ACTIVITIES ARE NOT PREVENTED

## 2019-04-18 NOTE — ANESTHESIA PRE PROCEDURE
Department of Anesthesiology  Preprocedure Note       Name:  Luis Dukes   Age:  66 y.o.  :  1945                                          MRN:  [id number]         Date:  2019      Surgeon: Isaiah Wilson):  Quoc Pickens MD    Procedure: COLONOSCOPY CONTROL HEMORRHAGE (N/A )    Medications prior to admission:   Prior to Admission medications    Medication Sig Start Date End Date Taking? Authorizing Provider   levothyroxine (SYNTHROID) 100 MCG tablet Take 1 tablet by mouth daily 4/15/19   Marion Junior MD   metFORMIN (GLUCOPHAGE) 500 MG tablet Take 1 tablet by mouth 2 times daily (with meals) 4/15/19   Reji Torres MD   omeprazole (PRILOSEC) 20 MG delayed release capsule Take 20 mg by mouth 2 times daily    Historical Provider, MD   tamsulosin (FLOMAX) 0.4 MG capsule Take 1 capsule by mouth Daily with supper 19   Kristen Velásquez MD   carvedilol (COREG) 6.25 MG tablet Take 6.25 mg by mouth 2 times daily (with meals)    Historical Provider, MD   vitamin D (ERGOCALCIFEROL) 38251 units CAPS capsule Take 50,000 Units by mouth Twice a Week Give on Monday and Thursday    Historical Provider, MD   lisinopril (PRINIVIL;ZESTRIL) 5 MG tablet Take 5 mg by mouth daily    Historical Provider, MD   megestrol (MEGACE) 40 MG/ML suspension Take 400 mg by mouth daily    Historical Provider, MD   mirtazapine (REMERON) 15 MG tablet Take 15 mg by mouth nightly    Historical Provider, MD   apixaban (ELIQUIS) 2.5 MG TABS tablet Take 1 tablet by mouth 2 times daily 3/7/19   Marion Junior MD   ferrous sulfate 325 (65 Fe) MG tablet Take 325 mg by mouth 3 times daily (with meals)     Historical Provider, MD   amLODIPine (NORVASC) 5 MG tablet Take 5 mg by mouth daily     Historical Provider, MD   aspirin 81 MG tablet Take 81 mg by mouth daily     Historical Provider, MD       Current medications:    No current facility-administered medications for this visit. No current outpatient medications on file. Facility-Administered Medications Ordered in Other Visits   Medication Dose Route Frequency Provider Last Rate Last Dose    sodium chloride flush 0.9 % injection 10 mL  10 mL Intravenous 2 times per day Sachin Malone MD   10 mL at 04/17/19 2010    sodium chloride flush 0.9 % injection 10 mL  10 mL Intravenous PRN Sachin Malone MD        magnesium hydroxide (MILK OF MAGNESIA) 400 MG/5ML suspension 30 mL  30 mL Oral Daily PRN Sachin Malone MD        ondansetron [facility]) injection 4 mg  4 mg Intravenous Q6H PRN Sachin Malone MD        0.9 % sodium chloride bolus  250 mL Intravenous Once Sachin Malone MD        glucose (GLUTOSE) 40 % oral gel 15 g  15 g Oral PRN Raquel Montenegro MD        dextrose 50 % solution 12.5 g  12.5 g Intravenous PRN Raquel Montenegro MD        glucagon (rDNA) injection 1 mg  1 mg Intramuscular PRN Raquel Montenegro MD        dextrose 5 % solution  100 mL/hr Intravenous PRN Raquel Montenegro MD        pantoprazole (PROTONIX) 80 mg in sodium chloride 0.9 % 100 mL infusion  8 mg/hr Intravenous Continuous Davin Lawler MD 10 mL/hr at 04/17/19 2039 8 mg/hr at 04/17/19 2039    norepinephrine (LEVOPHED) 16 mg in dextrose 5 % 250 mL infusion  2 mcg/min Intravenous Continuous Davin Lawler MD 4.7 mL/hr at 04/17/19 2127 5 mcg/min at 04/17/19 2127    lactated ringers infusion   Intravenous Continuous Misael Black MD           Allergies:  No Known Allergies    Problem List:    Patient Active Problem List   Diagnosis Code    Vitamin D deficiency E55.9    Vitamin B deficiency E53.9    Type 2 diabetes mellitus ([address] E11.9    Angiopathy, peripheral ([address] I73.9    Noncompliance with medication regimen Z91.14    Gonadotropin deficiency ([facility] E23.0    Essential hypertension I10    Long term current use of insulin (HCC) Z79.4    Dyslipidemia E78.5    Carotid artery narrowing I65.29    Atherosclerosis of coronary artery I25.10    Hyperkalemia E87.5    Acute renal failure ([facility] N17.9    Paroxysmal A-fib (HCC) I48.0    Coronary artery disease involving native coronary artery of native heart without angina pectoris I25.10    Cardiomyopathy ([facility] I42.9    Uncontrolled type 2 diabetes mellitus with hyperglycemia, with long-term current use of insulin ([facility]) E11.65, Z79.4    S/P hip hemiarthroplasty Z96.649    Acute ischemic right MCA stroke ([facility] I63.511    SANDY (acute kidney injury) ([facility] N17.9    Normocytic anemia D64.9    Severe protein-calorie malnutrition ([facility] E43    S/P percutaneous endoscopic gastrostomy (PEG) tube placement ([facility]) Z93.1    Right to left cardiac shunt ([facility]) I28.0    H/O ischemic multifocal multiple vascular territories stroke Z86.73    Anticoagulated Z79.01    Vertebral artery stenosis, right I65.01    Hypoglycemia, exogenous insulin, in setting of SANDY E15    possible sepsis A41.9    Hypothermia T68. XXXA    SANDY (acute kidney injury) ([facility] B56.7    Metabolic acidosis Y54.8    Acute metabolic encephalopathy U35.50    Altered mental status R41.82    Oropharyngeal dysphagia R13.12    Weakness R53.1    SOB (shortness of breath) R06.02    Goals of care, counseling/discussion Z71.89    DNR (do not resuscitate) discussion Z71.89    Encounter for palliative care Z51.5    Severe malnutrition ([facility] E43    Hypothyroidism E03.9    Rectal mass K62.9    Rectal bleeding K62.5    Acute metabolic encephalopathy Z53.63    UTI (urinary tract infection) N39.0    Hypothermia T68. Chastity Challenger    CKD (chronic kidney disease), stage IV (HCC) N18.4    GI bleeding K92.2       Past Medical History:        Diagnosis Date    Acute renal failure (HCC)     Acute respiratory failure with hypoxia (HCC)     Angina pectoris (HCC)     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HCC)     CAD (coronary artery disease)     Cardiac arrest ([facility]     [facility]     Chronic systolic heart failure (HCC)     Diabetes mellitus ([facility]     Diabetes mellitus ([facility] 10/9/2012    Diabetes mellitus Answered      Vital Signs (Current): There were no vitals filed for this visit.                                            BP Readings from Last 3 Encounters:   04/17/19 100/66   04/17/19 (!) 167/85   04/15/19 119/72       NPO Status:                                                                                 BMI:   Wt Readings from Last 3 Encounters:   04/17/19 105 lb 6.1 oz (47.8 kg)   04/15/19 104 lb 11.5 oz (47.5 kg)   04/07/19 119 lb 11.4 oz (54.3 kg)     There is no height or weight on file to calculate BMI.    CBC:   Lab Results   Component Value Date    WBC 5.9 04/17/2019    RBC 2.46 04/17/2019    HGB 10.6 04/17/2019    HCT 31.9 04/17/2019    MCV 91.0 04/17/2019    RDW 15.8 04/17/2019     04/17/2019       CMP:   Lab Results   Component Value Date     04/17/2019    K 4.6 04/17/2019     04/17/2019    CO2 22 04/17/2019    BUN 45 04/17/2019    CREATININE 2.1 04/17/2019    GFRAA 37 04/17/2019    AGRATIO 0.6 04/11/2019    LABGLOM 31 04/17/2019    GLUCOSE 53 04/17/2019    PROT 4.0 04/17/2019    CALCIUM 7.9 04/17/2019    BILITOT <0.2 04/17/2019    ALKPHOS 79 04/17/2019    AST 10 04/17/2019    ALT 11 04/17/2019       POC Tests:   Recent Labs     04/17/19  1949   POCGLU 97       Coags:   Lab Results   Component Value Date    PROTIME 12.4 04/17/2019    INR 1.09 04/17/2019    APTT 33.3 04/17/2019       HCG (If Applicable): No results found for: PREGTESTUR, PREGSERUM, HCG, HCGQUANT     ABGs:   Lab Results   Component Value Date    PHART 7.308 03/26/2019    PO2ART 42.4 03/26/2019    VKB2BDA 34.0 03/26/2019    DVD2ONY 17.0 03/26/2019    BEART -9 03/26/2019    J2XBFVNF 74 03/26/2019        Type & Screen (If Applicable):  No results found for: [facility]    Anesthesia Evaluation  Patient summary reviewed and Nursing notes reviewed no history of anesthetic complications:   Airway: Mallampati: II  TM distance: >3 FB   Neck ROM: full  Mouth opening: > = 3 FB Dental:    (+) edentulous      Pulmonary:

## 2019-04-18 NOTE — OP NOTE
[address]                                OPERATIVE REPORT    PATIENT NAME: Abner Osei                       :        1945  MED REC NO:   [id number]                          ROOM:       65  ACCOUNT NO:   [de-identified]                           ADMIT DATE: 2019  PROVIDER:     Asael Encinas MD    DATE OF PROCEDURE:  2019    EMERGENCY COLONOSCOPY REPORT    SURGEON:  Asael Encinas M.D. INDICATION FOR PROCEDURE:  Massive lower GI bleed. DESCRIPTION OF PROCEDURE:  With the patient in the left lateral position  and after IV Diprivan, the Olympus video colonoscope was inserted into  the rectum and advanced to the descending colon. There was significant  stool retention. We washed the colon rather vigorously. Further  advancement of the scope was not possible due to the stool. There was  fresh bleeding noted in the lower rectal area. There was a clot also  sitting in the rectum above it. Hemostasis was performed using  Hemospray. The scope was then removed, and the procedure was terminated  without complication. Bleeding site was identified in the rectum. The Hemospray was used for  hemostasis. Stool retention as described. The patient will be observed closely in ICU. He has recently had biopsy  from the distal rectum obtained by  _____. We will ask him to see  the patient.         Raiza Larios MD    D: 2019 22:41:30       T: 2019 0:48:48     [email or web address]  Job#: [id number]     Doc#: [id number]    CC:

## 2019-04-18 NOTE — PROGRESS NOTES
HR jessie to low 40s. EKG obtained. Sinus Bradycardia with incomplete Right BBB. Residents aware. Temp dropped to 34.9 CORE, bearhugger replaced.

## 2019-04-18 NOTE — PROGRESS NOTES
Clinical Pharmacy Progress Note        Admit date: 4/17/2019     ALLERGIES:  Patient has no known allergies. IV Synthroid ordered is on restricted formulary. Per formulary restrictions, IV Synthroid will be held for 6 days. Patient has PEG tube. Consider using for administering PO medications when assured LGIB unlikely to recur.      Marcheta Aase., PharmD, [facility]  Phone: [phone]  4/18/2019 1:53 PM

## 2019-04-18 NOTE — ANESTHESIA POSTPROCEDURE EVALUATION
Department of Anesthesiology  Postprocedure Note    Patient: Zaina Elaine  MRN: [id number]  YOB: 1945  Date of evaluation: 4/17/2019  Time:  10:38 PM     Procedure Summary     Date:  04/17/19 Room / Location:  [facility] ENDO 03 / [facility] ENDOSCOPY    Anesthesia Start:  2145 Anesthesia Stop:  2224    Procedure:  COLONOSCOPY CONTROL HEMORRHAGE (N/A ) Diagnosis:  (GI Bleeding)    Surgeon:  Gerda Higgins MD Responsible Provider:  Trevor Solares MD    Anesthesia Type:  MAC ASA Status:  3 - Emergent          Anesthesia Type: MAC    Bruna Phase I:      Bruna Phase II:      Last vitals: Reviewed and per EMR flowsheets. Anesthesia Post Evaluation    Patient location during evaluation: PACU  Patient participation: complete - patient participated  Level of consciousness: awake and alert  Pain scale: please refer to nursing notes. Airway patency: patent  Nausea & Vomiting: no nausea and no vomiting  Complications: no  Cardiovascular status: hemodynamically stable  Respiratory status: spontaneous ventilation  Hydration status: stable  Comments: No phone calls received from PACU RN regarding patient.

## 2019-04-18 NOTE — PROGRESS NOTES
Levo started at 2127 during colonoscopy procedure for pressure support. Pt tolerated procedure well but remains on levo gtt for MAP >65. Core temp rising since starting on fluid warmer; now 35.8.

## 2019-04-18 NOTE — PROGRESS NOTES
4 Eyes Admission Assessment     I agree as the admission nurse that 2 RN's have performed a thorough Head to Toe Skin Assessment on the patient. ALL assessment sites listed below have been assessed on admission. Areas assessed by both nurses:   [x]   Head, Face, and Ears   [x]   Shoulders, Back, and Chest  [x]   Arms, Elbows, and Hands   [x]   Coccyx, Sacrum, and Ischum  [x]   Legs, Feet, and Heels        Does the Patient have Skin Breakdown?   Yes a wound was noted on the Admission Assessment and an LDA was Initiated documentation include the Belle-wound, Wound Assessment, Measurements, Dressing Treatment, Drainage, and Color\",          - Possible pressure injury on coccyx  - Bilateral dry, scaly skin on lower extremities     Larry Prevention initiated:  Yes   Wound Care Orders initiated:  Yes      [facility] nurse consulted for Pressure Injury (Stage 3,4, Unstageable, DTI, NWPT, and Complex wounds):  Yes      Nurse 1 eSignature: Electronically signed by Renetta Robbins RN on 4/18/19 at 12:29 AM    **SHARE this note so that the co-signing nurse is able to place an eSignature**    Nurse 2 eSignature: Electronically signed by J Carlos Ace RN on 6/28/73 at 7:53 AM

## 2019-04-18 NOTE — CARE COORDINATION
Case Management Admission Assessment     2019  [facility] Case Management Department    Patient: Kev Farrell  MRN: [id number] / : 1945  ACCT: [de-identified]        Admission Documentation  Attending Provider: Christine Rosales MD  Admit date/time: 2019  1:16 PM  Status: Inpatient [101]  Diagnosis: GI bleeding     Readmission within last 30 days:  yes     Admitted to ICU after re-occurrence of GI bleed. Met with patient at bedside - he states things have been going well at [facility] and he is fine to go back at discharge. Spoke with Eunice Younger at [facility] ([phone]), and they are able to accept patient back at any point. He does not require a precert. PTA Living Situation  Discharge Planning  [facility] Home: <More Facilities>  Living Arrangements: Alone  Support Systems: Family Members  Potential Assistance Needed: Extended Care Facility  Type of Home Care Services: [facility], Nursing Services  Patient expects to be discharged to[de-identified] [facility]  Expected Discharge Date: 19    Service Assessment       Values / Beliefs  Do you have any ethnic, cultural, sacramental, or spiritual Protestant needs you would like us to be aware of while you are in the hospital?: No    Advance Directives (For Healthcare)  Pre-existing DNR Comfort Care/DNR Arrest/DNI Order: No  Healthcare Directive: Yes, patient has an advance directive for healthcare treatment  Copy in Chart: Yes, copy in chart  Information on Healthcare Directives Requested: No  Patient Requests Assistance: No  Advance Directives: Pt. not interested at this time              [facility]    per SNF    Home Health/Skilled Nursing   Home care at home?  No     LOC:  Skilled Nursing  Name of Penny Saleem  Phone of [address]  Fax of Facility [phone]    Therapy Consults  PT evaluation needed?: Yes (Comment)  OT Evalulation Needed?: Yes (Comment)  SLP evaluation needed?: Yes (Comment)    Pharmacy:per SNF  Potential Assistance Purchasing Medications:  No  Does patient want to participate in local refill/meds to beds program?: No    Discharge Plan   Patient expects to be discharged to[de-identified] [facility]       Barriers to discharge: monitoring after GI bleed    Role of Case Management discussed with patient/family/designee.      Steffen Burr RN , BSN  [facility]  Case Management Department  Ph: [phone]

## 2019-04-18 NOTE — PROGRESS NOTES
Endoscopy RN's at bedside preparing pt for emergent colonoscopy. Pt is currently being transfused 3rd unit of blood this admission and continues to have bright, bloody stool. Blood pressure has stabilized since completion of fluid bolus. 2,224 units of K-Centra administered. Protonix bolus running. Pt is alert to person, place and time but is forgetful and  Inattentive. Bear hugger remains in place for hypothermia; pt temperature currently 34. 3.

## 2019-04-18 NOTE — PROGRESS NOTES
Transfer Acceptance note     The patient was seen and examined. Briefly, Mr. Dank Montez is a 65 y/o M with a PMHx of stroke, afib on anticoagulation, CKD stage IV and recent PEG tube insertion who presented with bright red blood per rectum. Patient was admitted to the ICU and received 3 units of pRBCs. Blood pressure was maintained with IV fluids. GI performed a colonoscopy today and did identify a bleed in the rectum and achieved hemostasis with hemospray. Patient's repeat hemoglobin has been stable. He is currently off anticoagulation, cardiology has been consulted for recommendations. He remains NPO. Patient will be transferred in stable condition. Vitals:    04/18/19 1300   BP: 123/69   Pulse: 65   Resp: 11   Temp:    SpO2:      Scheduled Meds:   pantoprazole  40 mg Intravenous Daily    HYDROCERIN   Topical BID    [START ON 4/24/2019] sodium chloride (PF)  5 mL Intravenous Daily    And    [START ON 4/24/2019] levothyroxine  50 mcg Intravenous Once    sodium chloride flush  10 mL Intravenous 2 times per day     Continuous Infusions:   dextrose      lactated ringers 75 mL/hr at 04/18/19 0808     PRN Meds:sodium chloride flush, magnesium hydroxide, ondansetron, glucose, dextrose, glucagon (rDNA), dextrose    Gen: supine in bed; no apparent distress  HEENT: MMM  Cardiovascular: +S1, +S2; RRR; no murmurs, rubs or gallops  Lungs:CTAB  Abdomen: soft, non-tender, non-distended, PEG tube in place  Extremities: warm, well-perfused; no cyanosis, no edema, no erythema  Neuro: AAO x3; no evidence of aphasia or neglect    The objective and subjective findings as well as the ICU course of treatment have been reviewed with the ICU team. The treatment plan has been reviewed with the ICU team. The patient is being transferred to [place] [address] in stable condition.     Mihaela Noel DO, PGY1  Reach via [facility]  04/18/19  2:04 PM

## 2019-04-18 NOTE — PLAN OF CARE
Problem: Falls - Risk of:  Goal: Will remain free from falls  Description  Will remain free from falls  Outcome: Met This Shift  Note:   Bed alarm utilized this shift for pt safety. Pt reoriented to surroundings and bed kept in lowest position with wheel locked and side rails up 3/4. Call light, belongings and bedside table kept within reach. Room door left open. Problem: Risk for Impaired Skin Integrity  Goal: Tissue integrity - skin and mucous membranes  Description  Structural intactness and normal physiological function of skin and  mucous membranes. Outcome: Met This Shift  Note:   Pt is of increased risk of skin breakdown. Preventative measures in place. Head to toe skin assessment completed qshift. Wound care consulted for small, possible pressure ulcer on coccyx. Medical equipment kept off of skin as much as possible and moisture controlled. Pt turned q2hr and PRN. Low air loss and alternating pressure relief mattress in use. Sacral heart not placed due to frequent stooling. Problem: Bowel Function - Altered:  Goal: Bowel elimination is within specified parameters  Description  Bowel elimination is within specified parameters  Outcome: Ongoing  Note:   Pt admitted with constant, bright red bleeding from rectum. Bleeding has slowed since colonoscopy procedure at 2130. Problem: Fluid Volume - Imbalance:  Goal: Will show no signs and symptoms of excessive bleeding  Description  Will show no signs and symptoms of excessive bleeding  Outcome: Ongoing  Note:   Monitoring vital signs q15min and monitoring hemoglobin/hematocrit q8hr.

## 2019-04-18 NOTE — PROGRESS NOTES
RN called [facility] to inquire about last time pt ate. Caregiver unable to confirm when tube feeds were last administered. Caregiver stated that EMS was called at 12:48pm, pt was picked up shortly after, and tube feeds were not continued en route to ED. Endoscopy nurses informed.

## 2019-04-18 NOTE — PROGRESS NOTES
Clinical Pharmacy Progress Note    Admit date: 4/17/2019     Subjective/Objective:  Patient is a 70yr old male admitted from [facility]. Assessment/Plan:    1. Home Medication List in Epic:   · Updated today using the paper sent from [facility]. See paper chart. · Additions: Added Novolog Sliding scale TID before meals, and Toujeo 5units QHS. Thank you, please call with questions.    Digna Sanchez PharmD., BCPS   4/18/2019 10:53 AM  Wireless: [phone]

## 2019-04-18 NOTE — OP NOTE
Colonoscopy    Bleeding area was noted in distal rectum. hemospray was used for hemostasis. Significant amount of stool. Will watch him closely. Dr Anette Powell did recent Bx of adenoma in rectum. Will ask him to see.      Discussed with Dr Burgess Christy m.d.  4-32-28

## 2019-04-18 NOTE — CONSULTS
[facility]  Palliative Medicine Consultation Note      Date Of Admission:4/17/2019  Date of consult: 04/17/2019  Seen by [facility] in the past:  Yes    Recommendations:        PC familiar with patient during a recent hospital stay. Today he seems weak and is difficult to understand at times. PC did call patient's dtr Morena and left a message on her VM. Morena die return PC call, we reviewed patient's current medical issues and his issue with anticoagulation and rectal bleeding. We talked about the burdens vs benefits of some of patient's treatments. She seemed to understand and asked PC to call her cousin Lina Gaytan and provide him with an update. PC agreed and reported that once cardiology provided their recs, we could discuss options further. 1. Goals of Care/Advanced Care planning/Code status: full code   2. Pain: patient denies pain, he appears comfortable. 3. SOB: patient currently on RA, no signs of respiratory distress. 4. Rectal bleeding: patient was previously on anticoagulation due to hx of Afib. This may be an issues going forward, this is patient's second GI bleed in a short period of time. Cardiology consulted, will awaits recs. 5. Disposition: from a NH long term. 6. Malnutrition: patient has a PEG tube due to dysphagia and poor nutritional intake, did get a PEG recently. Tube feeding currently on hold.      Reason for Consult:         __x___ Goals of Care  __x___ Code Status Discussion/Advanced Care Planning   __x___ Psychosocial/Family Support  __x___ Symptom Management  _____ Other (Specify)    Requesting Physician: Dr. Rodas Boards:  Rectal Bleeding     History Obtained From:  patient, electronic medical record    History of Present Illness:         Patient is a 66year old male who has a significant PMHx for MCA stroke (1/2019), Afib (Eliquis), chronic diastolic HF echo: 5/13 (VB 55%), CAD, HTN, HLD, DMII, gastric ulcers, and malnutrition who presented to [facility] Past Surgical History:        Procedure Laterality Date    ANUS SURGERY N/A 4/2/2019    EXAM UNDER ANESTHESIA, RECTAL BIOPSY X 2 [facility] COLONOSCOPY N/A 4/1/2019    COLONOSCOPY performed by Bob Landers MD at [facility] COLONOSCOPY N/A 4/17/2019    COLONOSCOPY CONTROL HEMORRHAGE performed by Bob Landers MD at [address] N/A 12/14/2018    GASTRIC TUBE REMOVAL performed by Mg Haley MD at [address] [facility]      G-tube placement   [facility] N/A 4/5/2019    EGD PEG TUBE PLACEMENT successful tube placement performed by Bob Landers MD at [address] [place] [address]      hip    UPPER GASTROINTESTINAL ENDOSCOPY N/A 4/1/2019    EGD BIOPSY performed by Bob Landers MD at [address] [facility] N/A 4/12/2019    EGD DIAGNOSTIC ONLY performed by Bob Landers MD at [facility] ENDOSCOPY       Current Medications:    Current Facility-Administered Medications: sodium chloride flush 0.9 % injection 10 mL, 10 mL, Intravenous, 2 times per day  sodium chloride flush 0.9 % injection 10 mL, 10 mL, Intravenous, PRN  magnesium hydroxide (MILK OF MAGNESIA) 400 MG/5ML suspension 30 mL, 30 mL, Oral, Daily PRN  ondansetron (ZOFRAN) injection 4 mg, 4 mg, Intravenous, Q6H PRN  0.9 % sodium chloride bolus, 250 mL, Intravenous, Once  glucose (GLUTOSE) 40 % oral gel 15 g, 15 g, Oral, PRN  dextrose 50 % solution 12.5 g, 12.5 g, Intravenous, PRN  glucagon (rDNA) injection 1 mg, 1 mg, Intramuscular, PRN  dextrose 5 % solution, 100 mL/hr, Intravenous, PRN  [COMPLETED] pantoprazole (PROTONIX) 80 mg in sodium chloride 0.9 % 50 mL bolus, 80 mg, Intravenous, Once **FOLLOWED BY** pantoprazole (PROTONIX) 80 mg in sodium chloride 0.9 % 100 mL infusion, 8 mg/hr, Intravenous, Continuous  norepinephrine (LEVOPHED) 16 mg in dextrose 5 % 250 mL infusion, 2 mcg/min, Intravenous, Continuous  lactated ringers infusion, , Intravenous, Continuous    Allergies:  Patient has no known allergies. Social History:    Patient currently lives in a nursing home    Review of Systems -   General ROS: positive for  - fatigue and weight loss  Psychological ROS: negative  ENT ROS: negative  Hematological and Lymphatic ROS: negative  Respiratory ROS: negative  Cardiovascular ROS: irregular   Gastrointestinal ROS: positive for - abdominal pain, appetite loss, melena and PEG tube  Genito-Urinary ROS: negative  Musculoskeletal ROS: positive for - muscular weakness  Neurological ROS: negative    Objective:        PHYSICAL EXAM:    General appearance: alert, appears stated age and cooperative  Head: Normocephalic, without obvious abnormality, atraumatic  Eyes: negative findings: lids and lashes normal and conjunctivae and sclerae normal  Lungs: clear to auscultation bilaterally  Heart: irregularly irregular rhythm  Abdomen: soft, non-tender; bowel sounds normal; no masses,  no organomegaly and PEG tube  Extremities: extremities normal, atraumatic, no cyanosis or edema  Neurologic: sleepy    Palliative Performance Scale:  60% ? Ambulation reduced; Significant disease; Can't do hobbies/housework; intake normal   or reduced; occasional assist; LOC full/confusion  50% ? Mainly sit/lie; Extensive disease; Can't do any work; Considerable assist; intake normal  Or reduced; LOC full/confusion  40% ? Mainly in bed; Extensive disease; Mainly assist; intake normal or reduced; occasional assist; LOC full/confusion  30% ? Bed Bound; Extensive disease; Total care; intake reduced; LOC full/confusion  20% ? Bed Bound; Extensive disease; Total care; intake minimal; Drowsy/coma  10% ? Bed Bound; Extensive disease; Total care; Mouth care only; Drowsy/coma  0 ?  Death    PPS: 30%    Vitals:    /63   Pulse 72   Temp 95.9 °F (35.5 °C) (Core)   Resp 13   Ht 5' 5\" (1.651 m)   Wt 109 lb 12.6 oz (49.8 kg)   SpO2 99%   BMI 18.27 kg/m²     DATA:    CBC with Differential:    Lab Results   Component Value Date    WBC 6.0 04/18/2019    RBC 3.29 04/18/2019    HGB 9.5 04/18/2019    HCT 28.4 04/18/2019     04/18/2019    MCV 88.1 04/18/2019    MCH 29.8 04/18/2019    MCHC 33.8 04/18/2019    RDW 14.3 04/18/2019    NRBC 5 03/26/2019    BANDSPCT 1 04/18/2019    METASPCT 1 04/17/2019    LYMPHOPCT 7.0 04/18/2019    MONOPCT 4.0 04/18/2019    MYELOPCT 2 04/17/2017    BASOPCT 1.0 04/18/2019    MONOSABS 0.2 04/18/2019    LYMPHSABS 0.4 04/18/2019    EOSABS 0.0 04/18/2019    BASOSABS 0.1 04/18/2019     CMP:    Lab Results   Component Value Date     04/18/2019    K 4.5 04/18/2019     04/18/2019    CO2 21 04/18/2019    BUN 40 04/18/2019    CREATININE 1.9 04/18/2019    GFRAA 42 04/18/2019    AGRATIO 0.6 04/11/2019    LABGLOM 35 04/18/2019    GLUCOSE 96 04/18/2019    PROT 4.0 04/17/2019    LABALBU 1.5 04/17/2019    CALCIUM 7.2 04/18/2019    BILITOT <0.2 04/17/2019    ALKPHOS 79 04/17/2019    AST 10 04/17/2019    ALT 11 04/17/2019     Hepatic Function Panel:    Lab Results   Component Value Date    ALKPHOS 79 04/17/2019    ALT 11 04/17/2019    AST 10 04/17/2019    PROT 4.0 04/17/2019    BILITOT <0.2 04/17/2019    BILIDIR <0.2 04/17/2019    IBILI see below 04/17/2019    LABALBU 1.5 04/17/2019     Albumin:    Lab Results   Component Value Date    LABALBU 1.5 04/17/2019         Conclusion/Time spent:         Recommendations see above    Time spent with patient and/or family: 10  Time reviewing records: 10  Time communicating with staff: 10    A total of 30 minutes spent with the patient and family on unit greater than 50% in counseling regarding palliative care and in goals of care for the patient. Thank you to Dr. Priti Peterson for this consultation. We will continue to follow Mr. Suarez's care as needed.         Trisha Haskins, APRN/CNP  Palliative Care  [phone]

## 2019-04-18 NOTE — PLAN OF CARE
Nutrition Problem: Severe malnutrition  Intervention: Food and/or Nutrient Delivery: Continue NPO, Start Tube Feeding  Nutritional Goals: Patient will tolerate tube feed to provide 100% of nutrition needs

## 2019-04-18 NOTE — CONSULTS
04/17/2019    ALKPHOS 79 04/17/2019     Lab Results   Component Value Date    CHOL 91 01/18/2019    HDL 41 01/18/2019    TRIG 54 04/07/2019     UA: Lab Results   Component Value Date    COLORU Yellow 04/17/2019    PHUR 6.0 04/17/2019    LABCAST 1-3 Hyaline 02/27/2019    WBCUA  04/17/2019    RBCUA 0-2 04/17/2019    YEAST Present 04/17/2019    BACTERIA 1+ 04/17/2019    CLARITYU CLOUDY 04/17/2019    SPECGRAV 1.015 04/17/2019    LEUKOCYTESUR LARGE 04/17/2019    UROBILINOGEN 0.2 04/17/2019    BILIRUBINUR Negative 04/17/2019    BLOODU SMALL 04/17/2019    GLUCOSEU Negative 04/17/2019    AMORPHOUS 1+ 03/26/2019       Imaging:   XR CHEST PORTABLE   Final Result   Impression: Streaky atelectasis of the right lung base. Layering density of the right lower lung field may represent a layering effusion. Assessment/Plan: This is a 66 y.o. male with Hx of CVA, afib on eliquis, recent PEG tube placement, and recent rectal biopsy who presented with GI bleed. Hemostasis has been achieved after application of hemospray. No urgent surgical intervention indicated.      - consider benefits and risks of anticoagulation given episodes of bleeding  - defer to medicine/GI for diet advancement  - okay to decrease IVF  - Will discuss with staff    Tong Ellison MD PGY-1  General Surgery Resident  04/18/19  7:12 AM  [phone]

## 2019-04-18 NOTE — FLOWSHEET NOTE
04/18/19 1410   Encounter Summary   Services provided to: Patient   Referral/Consult From: [facility]   Continue Visiting   (Seen 4/18/19, ADRY. )   Complexity of Encounter Moderate   Length of Encounter 15 minutes   Routine   Type Follow up   Spiritual/Caodaism   Type Spiritual support   Assessment Approachable   Intervention Nurtured hope;Prayer   Outcome Expressed gratitude

## 2019-04-18 NOTE — CONSULTS
Pulmonary & Critical Care Medicine ICU Consultation    [address] [facility] / HPI:                The patient is a 66 y.o. male with significant PMHx of MCA stroke (1/2019), Afib (Eliquis), chronic diastolic HF (EF 55%), CAD, HTN, HLD, DMII, and gastric ulcers, malnutrition presented for bleeding per rectum. Per nursing home, pt was doing well yesterday, however, this morning around 10am nurse saw blood underneath him, she moved him blood around from his anal area. He denies any abdominal pain, fever, vomiting, urinary symptoms. She mentioned he is poor oral intake and did not eat too much yesterday. Denies headache, chest pain, dyspnea, abdominal pain, nausea, or musculoskeletal pain.     Overnight, pt became hemodynamically unstable and was transferred to the ICU. Emergent L femoral CVC placed and pt responded well to fluids and blood. GI called, urgently taken for colonoscopy, found bleed in rectum that was stopped with hemospray. Exam was limited by stool burden.      This AM, pt reports feeling ok. He denies any chest pain, shortness of breath, abdominal pain, n/v/c/d, no more blood per rectum that he is aware of. RN also denied any blood this AM. He has diffuse abdominal tenderness, that is expected 2/2 to recent PEG tube placement. Did require up to 6L NC overnight, but this AM, he was weaned down to 2L and had his NC out of his nose, satting well on RA. UA with concerns for possible UTI, however, has had prior UAs that appear similar that had no growth on culture, and yeast present. Will hold on treating for now. Denying any symptoms.        Past Medical History:      Diagnosis Date    Acute renal failure (HCC)     Acute respiratory failure with hypoxia (HCC)     Angina pectoris (HCC)     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HCC)     CAD (coronary artery disease)     Cardiac arrest ([facility]     Cardiac arrest ([facility]     Chronic systolic heart failure ([facility]     Diabetes mellitus ([facility]     Diabetes mellitus ([facility] 10/9/2012  Diabetes mellitus type II, uncontrolled ([facility]     4/1017 A1c = 13    DKA (diabetic ketoacidoses) ([address] 04/2017    Elevated LFTs     Femoral neck fracture ([address] 10/17/2017    HTN (hypertension) 10/17/2017    Hyperkalemia     Hyperlipidemia     Hypertension     PAF [facility]oxysmal atrial fibrillation) ([facility]     Paroxysmal A-fib ([facility])     PEA (Pulseless electrical activity) ([facility] 04/13/2017    Pneumonia 04/2017    LIKELY PNEUMOCOCCAL    Pneumonia due to organism     Pneumonia of right lower lobe due to Streptococcus pneumoniae ([facility])     Protein-calorie malnutrition, severe ([address] 04/2017    BMI = 19    S/P hip hemiarthroplasty 12/29/2017    Septic shock ([facility])     Systolic CHF ([address]     EF 30%    Thrombocytopenia (HCC)     Thrombocytopenia (HCC)     Type 2 diabetes mellitus with hyperglycemia ([address] 11/28/2016      Past Surgical History:        Procedure Laterality Date    ANUS SURGERY N/A 4/2/2019    EXAM UNDER ANESTHESIA, RECTAL BIOPSY X 2 performed by Tashia Mendoza MD at [facility] 4/1/2019    COLONOSCOPY performed by James Bell MD at [facility] N/A 4/17/2019    COLONOSCOPY CONTROL HEMORRHAGE performed by James Bell MD at [address]  N/A 12/14/2018    GASTRIC TUBE REMOVAL performed by Alycia Johnson MD at [facility]      G-tube placement   [facility] N/A 4/5/2019    EGD PEG TUBE PLACEMENT successful tube placement performed by James Bell MD at [address] [place] [address]    UPPER GASTROINTESTINAL ENDOSCOPY N/A 4/1/2019    EGD BIOPSY performed by James Bell MD at [facility] N/A 4/12/2019    EGD DIAGNOSTIC ONLY performed by James Bell MD at [facility] History:    TOBACCO:   reports that he quit smoking about 14 months ago.  He has never used smokeless tobacco.  ETOH:   reports that he does not bilaterally       Current Medications:     sodium chloride flush  10 mL Intravenous 2 times per day    sodium chloride  250 mL Intravenous Once     Allergies: No Known Allergies    IV:   dextrose      pantoprozole (PROTONIX) infusion 8 mg/hr (04/18/19 0418)    norepinephrine Stopped (04/18/19 0645)    lactated ringers 150 mL/hr at 04/18/19 0417       DATA:    Old records have been reviewed  CBC with Differential:    Lab Results   Component Value Date    WBC 6.0 04/18/2019    RBC 3.29 04/18/2019    HGB 9.9 04/18/2019    HGB 9.8 04/18/2019    HCT 29.0 04/18/2019    HCT 29.0 04/18/2019     04/18/2019    MCV 88.1 04/18/2019    MCH 29.8 04/18/2019    MCHC 33.8 04/18/2019    RDW 14.3 04/18/2019    NRBC 5 03/26/2019    BANDSPCT 1 04/18/2019    METASPCT 1 04/17/2019    LYMPHOPCT 7.0 04/18/2019    MONOPCT 4.0 04/18/2019    MYELOPCT 2 04/17/2017    BASOPCT 1.0 04/18/2019    MONOSABS 0.2 04/18/2019    LYMPHSABS 0.4 04/18/2019    EOSABS 0.0 04/18/2019    BASOSABS 0.1 04/18/2019     BMP:    Lab Results   Component Value Date     04/18/2019    K 4.5 04/18/2019     04/18/2019    CO2 21 04/18/2019    BUN 40 04/18/2019    CREATININE 1.9 04/18/2019    CALCIUM 7.2 04/18/2019    GFRAA 42 04/18/2019    LABGLOM 35 04/18/2019    GLUCOSE 96 04/18/2019     Hepatic Function Panel:    Lab Results   Component Value Date    ALKPHOS 79 04/17/2019    ALT 11 04/17/2019    AST 10 04/17/2019    PROT 4.0 04/17/2019    BILITOT <0.2 04/17/2019    BILIDIR <0.2 04/17/2019    IBILI see below 04/17/2019     ABG:    Lab Results   Component Value Date    UIR8LNP 17.0 03/26/2019    BEART -9 03/26/2019    B0LXUHOL 74 03/26/2019    PHART 7.308 03/26/2019    TDR5VOC 34.0 03/26/2019    PO2ART 42.4 03/26/2019    FSA8GFR 18 03/26/2019       Cultures: none      Radiology Review:  All pertinent images / reports were reviewed as a part of this visit. imaging reveals the following:   XR CHEST PORTABLE   Final Result   Impression: Streaky atelectasis of the right lung base. Layering density of the right lower lung field may represent a layering effusion. Assessment/Plan   Earnestine Villegas is a 66 y.o. male w/ PMH p A fib (eliquis), HFpEF (EF 55-60%), prior GI bleeds and h/o gastritis, duodenitis, and recent rectal biopsy 4/2/19 presenting w/ [facility]. Bleeding per rectum  Pt on eliquis, presented with bleeding per rectum, no abdominal pain. Recent rectal biopsy 04/2019 with no evidence of malignancy. H/o duodenitis and gastritis. Urgent colonoscopy last night with hemospray to rectal bleed. This exam was limited by large stool burden. Hgb stable (9.9) since, denies any more blood per rectum. - s/p Kcentra  - GI consulted, appreciate recs  - H&H q8H  - protonix gtt    Anemia   Hgb 9.9 down from 10.6. Likely 2/2 GI bleed. S/p 3pRBC, now stable. - H&H q8H      Paroxysmal Afib   NSR, was on eliquis that was recently started on 1/2019.  - holding eliquis  - holding coreg 2/2 to hypotension      HTN  Initially hypotensive 2/2 to acute bleeding.   - holding home amlodipine, lisinopril and coreg. Diastolic heart failure HFpEF  - holding coreg      CAD s/p cardiac arrest  - holding coreg, lisinopril     CKD 4  Baseline 2.3-2.4.  - stable Cr 2. 1.      IDDMII (good glucose control)  Hypoglycemia on presentation.  - holding home glargine 5u nightly, metformin 500  - Hypoglycemia protocol  - NPO     Hypothroidism  Elevated TSH 8.79 on 4/17/19  - NPO  - IV synthroid 100 until can tolerate PO     Severe malnutrition in setting of poor PO intake 2/2 dysphagia  - PEG placed  - holding TFs for 24 hr to make sure stable        Code Status: DNR-CCA  F/E/N:   Diet NPO Effective Now   GI / DVT Prophylaxis: holding eliquis  Disposition:  ICU      Carlin Alegria MD  Internal Medicine, PGY 1      I will discuss the patient with the senior resident and attending, Vickey Mcmanus MD.   Patient examined, findings as disucssed with Dr Jorge Shukla.  Agree with assessment and plan. Bleeding appears to be controlled at this point. Advise not re-starting anticoagulation therapy because of recurrent bleeding, high risk for repeat occurrence.

## 2019-04-18 NOTE — CONSULTS
Nutrition Assessment (Enteral Nutrition)    Type and Reason for Visit: Initial, Consult, Positive Nutrition Screen    Nutrition Recommendations:     **NPO- recommend initiating EN via PEG with MD clearance. Recommend SLP evaluation prior to starting po diet as patient only tolerating nectar fluids for pleasure at previous admission. EN recommendations provided below to meet 100% of nutrition needs. ENTERAL NUTRITION  1. Recommend order \"Diet: Tube feed continuous/ NPO\". Initiate Jevity 1.2 formula at 20 mL/hr and as tolerated, increase by 20 mL/hr q 4 hours until goal of 60 mL/hr is met per PEG. 2. Minimum of 30 mL q 4 hours H2O flush recommended to maintain feeding tube. 3. Monitor for tolerance (bowel habits, N/V, cramping, abdominal distention). Nutrition Assessment: Patient admitted from SNF with GI bleed. RD familiar with patient from recent admissions. Patient nutritionally compromised as he meets ASPEN criteria for severe PCM with significant wt loss, muscle and fat loss, and PEG tube for nutrition. Patient has missing teeth and dysphagia dx last admit. Currently NPO and awaiting surgery recs for nutrition advancement. Per EMR review patient has been refusing care this am. Will provide EN recommendations and monitor nutritional status. Malnutrition Assessment:  · Malnutrition Status: Meets the criteria for severe malnutrition  · Context: Chronic illness  · Findings of the 6 clinical characteristics of malnutrition (Minimum of 2 out of 6 clinical characteristics is required to make the diagnosis of moderate or severe Protein Calorie Malnutrition based on AND/ASPEN Guidelines):  1. Energy Intake-Unable to assess, Unable to assess    2. Weight Loss-7.5% loss or greater, in 1 month  3. Fat Loss-Severe subcutaneous fat loss, Orbital, Triceps, Fat overlying the ribs  4. Muscle Loss-Severe muscle mass loss, Temples (temporalis muscle)  5.  Fluid Accumulation-No significant fluid accumulation, 6.  Strength-Not measured    Nutrition Risk Level: High    Nutrition Needs:  · Estimated Daily Total Kcal: 8543-8480(83-91)  · Estimated Daily Protein (g): 70-75(1.3-1.5)  · Estimated Daily Fluid (ml/day): 0064-1293    Nutrition Diagnosis:   · Problem: Severe malnutrition  · Etiology: related to Insufficient energy/nutrient consumption     Signs and symptoms:  as evidenced by Nutrition support - EN, BMI, Weight loss greater than or equal to 5% in 1 month, Severe loss of subcutaneous fat, Severe muscle loss    Objective Information:  · Nutrition-Focused Physical Findings: +BM 4/17, bloody  · Wound Type: (Open spot to coccyx, would not allow wound nurse to complete assmt)  · Current Nutrition Therapies:  · Oral Diet Orders: NPO   · Oral Diet intake: NPO  · Oral Nutrition Supplement (ONS) Orders: None  · ONS intake: NPO  · Tube Feeding (TF) Orders:   · Feeding Route: Gastrostomy  · Goal TF & Flush Orders Provides: RD Goal: Jevity 1.2 at goal rate of 60 ml/hr will provide 1440 ml TV, 1728 kcal, 80 gm protein and 1162 ml free water.  30 ml water flush Q4 hrs  · Anthropometric Measures:  · Ht: 5' 5\" (165.1 cm)   · Current Body Wt: 109 lb (49.4 kg)  · Weight Change: 9% loss ~1 month   · Ideal Body Wt: 136 lb (61.7 kg),   · BMI Classification: BMI <18.5 Underweight    Nutrition Interventions:   Continue NPO, Start Tube Feeding  Continued Inpatient Monitoring    Nutrition Evaluation:   · Evaluation: Goals set   · Goals: Patient will tolerate tube feed to provide 100% of nutrition needs   · Monitoring: Nutrition Progression, Skin Integrity, Pertinent Labs      Electronically signed by Betito Aguirre RD, LD on 4/18/19 at 11:20 AM    Contact Number: [phone]

## 2019-04-18 NOTE — CONSULTS
GI:    Mr brown was seen as an emergency consult in ICU with massive gi bleed and hypotension. He has been taking eliqius for atrial fibrillation. given three units of blood and kcentra . S/p peg. Recent egd showed complete healing of gastric ulcer that was diagnose din his last admission. Abdomen: soft .  Peg tube in good position    Plan:  Emergent colonoscopy after stabilization    Juliann Reeves m.d.  icu # [id number]  3-[birth year]-31

## 2019-04-19 LAB
ANION GAP SERPL CALCULATED.3IONS-SCNC: 10 MMOL/L (ref 3–16)
BANDED NEUTROPHILS RELATIVE PERCENT: 4 % (ref 0–7)
BASOPHILS ABSOLUTE: 0 K/UL (ref 0–0.2)
BASOPHILS RELATIVE PERCENT: 0 %
BUN BLDV-MCNC: 35 MG/DL (ref 7–20)
CALCIUM SERPL-MCNC: 7.8 MG/DL (ref 8.3–10.6)
CHLORIDE BLD-SCNC: 108 MMOL/L (ref 99–110)
CO2: 19 MMOL/L (ref 21–32)
CREAT SERPL-MCNC: 2 MG/DL (ref 0.8–1.3)
EOSINOPHILS ABSOLUTE: 0.3 K/UL (ref 0–0.6)
EOSINOPHILS RELATIVE PERCENT: 5 %
GFR AFRICAN AMERICAN: 39
GFR NON-AFRICAN AMERICAN: 33
GLUCOSE BLD-MCNC: 114 MG/DL (ref 70–99)
GLUCOSE BLD-MCNC: 119 MG/DL (ref 70–99)
GLUCOSE BLD-MCNC: 60 MG/DL (ref 70–99)
GLUCOSE BLD-MCNC: 64 MG/DL (ref 70–99)
GLUCOSE BLD-MCNC: 68 MG/DL (ref 70–99)
HCT VFR BLD CALC: 29.1 % (ref 40.5–52.5)
HEMOGLOBIN: 9.9 G/DL (ref 13.5–17.5)
LYMPHOCYTES ABSOLUTE: 0.8 K/UL (ref 1–5.1)
LYMPHOCYTES RELATIVE PERCENT: 15 %
MCH RBC QN AUTO: 30 PG (ref 26–34)
MCHC RBC AUTO-ENTMCNC: 33.9 G/DL (ref 31–36)
MCV RBC AUTO: 88.4 FL (ref 80–100)
METAMYELOCYTES RELATIVE PERCENT: 1 %
MONOCYTES ABSOLUTE: 0.3 K/UL (ref 0–1.3)
MONOCYTES RELATIVE PERCENT: 6 %
NEUTROPHILS ABSOLUTE: 4.1 K/UL (ref 1.7–7.7)
NEUTROPHILS RELATIVE PERCENT: 69 %
PDW BLD-RTO: 14.2 % (ref 12.4–15.4)
PERFORMED ON: ABNORMAL
PLATELET # BLD: 234 K/UL (ref 135–450)
PMV BLD AUTO: 7.7 FL (ref 5–10.5)
POTASSIUM REFLEX MAGNESIUM: 4.5 MMOL/L (ref 3.5–5.1)
RBC # BLD: 3.29 M/UL (ref 4.2–5.9)
SODIUM BLD-SCNC: 137 MMOL/L (ref 136–145)
WBC # BLD: 5.6 K/UL (ref 4–11)

## 2019-04-19 PROCEDURE — 2580000003 HC RX 258: Performed by: STUDENT IN AN ORGANIZED HEALTH CARE EDUCATION/TRAINING PROGRAM

## 2019-04-19 PROCEDURE — 94760 N-INVAS EAR/PLS OXIMETRY 1: CPT

## 2019-04-19 PROCEDURE — 80048 BASIC METABOLIC PNL TOTAL CA: CPT

## 2019-04-19 PROCEDURE — 6360000002 HC RX W HCPCS: Performed by: STUDENT IN AN ORGANIZED HEALTH CARE EDUCATION/TRAINING PROGRAM

## 2019-04-19 PROCEDURE — 36592 COLLECT BLOOD FROM PICC: CPT

## 2019-04-19 PROCEDURE — 6370000000 HC RX 637 (ALT 250 FOR IP): Performed by: INTERNAL MEDICINE

## 2019-04-19 PROCEDURE — 1200000000 HC SEMI PRIVATE

## 2019-04-19 PROCEDURE — 85025 COMPLETE CBC W/AUTO DIFF WBC: CPT

## 2019-04-19 PROCEDURE — C9113 INJ PANTOPRAZOLE SODIUM, VIA: HCPCS | Performed by: STUDENT IN AN ORGANIZED HEALTH CARE EDUCATION/TRAINING PROGRAM

## 2019-04-19 PROCEDURE — 99223 1ST HOSP IP/OBS HIGH 75: CPT | Performed by: INTERNAL MEDICINE

## 2019-04-19 PROCEDURE — 99231 SBSQ HOSP IP/OBS SF/LOW 25: CPT | Performed by: SURGERY

## 2019-04-19 RX ORDER — LISINOPRIL 5 MG/1
5 TABLET ORAL DAILY
Status: DISCONTINUED | OUTPATIENT
Start: 2019-04-19 | End: 2019-04-21

## 2019-04-19 RX ADMIN — PANTOPRAZOLE SODIUM 40 MG: 40 INJECTION, POWDER, FOR SOLUTION INTRAVENOUS at 09:34

## 2019-04-19 RX ADMIN — LISINOPRIL 5 MG: 5 TABLET ORAL at 15:19

## 2019-04-19 RX ADMIN — SODIUM CHLORIDE, POTASSIUM CHLORIDE, SODIUM LACTATE AND CALCIUM CHLORIDE: 600; 310; 30; 20 INJECTION, SOLUTION INTRAVENOUS at 04:33

## 2019-04-19 RX ADMIN — SODIUM CHLORIDE, PRESERVATIVE FREE 10 ML: 5 INJECTION INTRAVENOUS at 09:35

## 2019-04-19 RX ADMIN — Medication: at 09:35

## 2019-04-19 RX ADMIN — SODIUM CHLORIDE, PRESERVATIVE FREE 10 ML: 5 INJECTION INTRAVENOUS at 19:44

## 2019-04-19 RX ADMIN — Medication: at 19:43

## 2019-04-19 RX ADMIN — DEXTROSE MONOHYDRATE 12.5 G: 25 INJECTION, SOLUTION INTRAVENOUS at 11:29

## 2019-04-19 ASSESSMENT — PAIN SCALES - GENERAL
PAINLEVEL_OUTOF10: 0

## 2019-04-19 ASSESSMENT — PAIN DESCRIPTION - PROGRESSION
CLINICAL_PROGRESSION: NOT CHANGED

## 2019-04-19 NOTE — PROGRESS NOTES
GI:    Feels much better  No abdominal pain  Had non bloody BM  Hg 9.9  hct 29.1    Started back on TF low rate with jevity    Abdomen: soft . peg in good position    He is at high risk for massive re bleed if eliqius started so soon. Will need to monitor his gi status for few more days and then  perform FFS and re evaluate the rectal region prior. It is a very difficult and challenging situation  As the risk of bleed is a reality on eliqius , and risk of cva off eliqius is also another reality. I discussed Watchman procedure with Dr Meek Sanchez who feels it is helpful. Since he does not do this procedure he referred  me to Dr Myles Zhang at [facility] . I will contact Dr Frances Euceda to further discuss watchman device . Will address what seems to be right- to- left shunt on echo with him.     Would recommend non fiber containing feeding formula    Discussed with ALEN ny m.d.  35 min of critical care  ICU [id number]

## 2019-04-19 NOTE — PROGRESS NOTES
Hospitalist Progress Note    PCP: Deisy Joaquin    Date of Admission: 4/17/2019  Hospital Day: 1      Chief Complaint:   Chief Complaint   Patient presents with    Rectal Bleeding     pt dc from here a few days ago for same thing           Hospital Course:   Admitted for GI bleeding while on blood thinners due to history of atrial fibrillation, stroke      Subjective:   Patient seen and examined  More hemodynamically stable now, blood counts have stabilized. Denies any new complaints. Ancillary notes reviewed    Medications:  Reviewed    Infusion Medications    dextrose      lactated ringers 75 mL/hr at 04/18/19 1526     Scheduled Medications    pantoprazole  40 mg Intravenous Daily    HYDROCERIN   Topical BID    [START ON 4/24/2019] sodium chloride (PF)  5 mL Intravenous Daily    And    [START ON 4/24/2019] levothyroxine  50 mcg Intravenous Once    sodium chloride flush  10 mL Intravenous 2 times per day     PRN Meds: sodium chloride flush, magnesium hydroxide, ondansetron, glucose, dextrose, glucagon (rDNA), dextrose      Intake/Output Summary (Last 24 hours) at 4/18/2019 2019  Last data filed at 4/18/2019 1526  Gross per 24 hour   Intake 2865.56 ml   Output 1110 ml   Net 1755.56 ml       Exam:    BP (!) 122/54   Pulse (!) 41   Temp 95 °F (35 °C) (Core)   Resp 11   Ht 5' 5\" (1.651 m)   Wt 109 lb 12.6 oz (49.8 kg)   SpO2 94%   BMI 18.27 kg/m²       General appearance: No apparent distress, appears stated age and cooperative. HEENT: Pupils equal, round, and reactive to light. Conjunctivae/corneas clear. Neck: Supple, with full range of motion. No jugular venous distention. Trachea midline. Respiratory:  Normal respiratory effort. Clear to auscultation, bilaterally without Rales/Wheezes/Rhonchi. Cardiovascular: Regular rate and rhythm with normal S1/S2 without murmurs, rubs or gallops. Abdomen: Soft, non-tender, non-distended with normal bowel sounds.   Musculoskeletal: No clubbing, cyanosis or edema bilaterally. Full range of motion without deformity. Skin: Skin color, texture, turgor normal.  No rashes or lesions. Neurologic: At baseline with his history of prior strokes  Psychiatric: Alert and oriented, thought content appropriate, normal insight  Capillary Refill: Brisk,< 3 seconds   Peripheral Pulses: +2 palpable, equal bilaterally       Labs:   Recent Labs     04/17/19  1414 04/17/19 2054 04/18/19  0501 04/18/19  1212   WBC 5.9  --  6.0  --    HGB 7.5* 10.6* 9.8*  9.9* 9.5*   HCT 22.4* 31.9* 29.0*  29.0* 28.4*     --  200  --      Recent Labs     04/17/19 1414 04/18/19  0501    135*   K 4.6 4.5    107   CO2 22 21   BUN 45* 40*   CREATININE 2.1* 1.9*   CALCIUM 7.9* 7.2*     Recent Labs     04/17/19 1414 04/17/19 1948   AST 13* 10*   ALT 14 11   BILIDIR <0.2 <0.2   BILITOT <0.2 <0.2   ALKPHOS 104 79     Recent Labs     04/17/19 1414 04/17/19 1949   INR 1.28* 1.09     Recent Labs     04/17/19 1948 04/18/19  0118   TROPONINI 0.04* 0.05*       Urinalysis:      Lab Results   Component Value Date    NITRU Negative 04/17/2019    WBCUA  04/17/2019    BACTERIA 1+ 04/17/2019    RBCUA 0-2 04/17/2019    BLOODU SMALL 04/17/2019    SPECGRAV 1.015 04/17/2019    GLUCOSEU Negative 04/17/2019       Radiology:  XR CHEST PORTABLE   Final Result   Impression: Streaky atelectasis of the right lung base. Layering density of the right lower lung field may represent a layering effusion. Assessment/Plan:    Active Hospital Problems    Diagnosis    Generalized abdominal pain [R10.84]    GI bleeding [K92.2]    Altered mental status [R41.82]    Hypothermia [T68. XXXA]    History of stroke [Z86.73]    Paroxysmal A-fib ([facility] [I48.0]    Type 2 diabetes mellitus ([facility] [E11.9]       Presented with acute GI bleed on oral anticoagulation for A. Fib. Although stabilized now, this does presents challenges with his high stroke risk.   He does have a right-to-left shunt on echo as well. Unsure if watchman device would be enough to prevent future strokes in the presence of a shunt. requiring Olya hugger for hypothermia. This was present on prior admission as well, this is likely related to autonomic dysfunction from stroke. Probability of sepsis is low. Okay to transfer to floor. Continue home medications for diabetes, A. fib, stroke except for oral and evaluation.   We will consult palliative  Consult interventional cardiology for possibility/candidacy of watchman device  Resume tube feeds once hemodynamic status and hemoglobin have been stable for 24 hours    DVT Prophylaxis: SCD  Diet: Diet NPO Effective Now  Code Status: DNR-CCA    PT/OT Eval Status: Patient at [address] [place] [address] MD Ashleigh  8:19 PM 4/18/2019

## 2019-04-19 NOTE — CONSULTS
66 y.o. with recent MCA stroke who has a fib, GI bleed asmission on 4/1 with PUD, recent rectal biopsy, CKD, DM2, HTN, HL, and CAD who has GI bleed (rectal) on eliquis. Cardiology consulted re: anticoagulation mgt. No angina. No sob. No n/v/LH. No syncope. Gi bleeding has slowed down.     Past Medical History:   Diagnosis Date    Acute renal failure ([address]     Acute respiratory failure with hypoxia (HCC)     Angina pectoris (HCC)     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HCC)     CAD (coronary artery disease)     Cardiac arrest ([address]     Cardiac arrest (HCC)     Chronic systolic heart failure (HCC)     Diabetes mellitus ([address]     Diabetes mellitus ([address] 10/9/2012    Diabetes mellitus type II, uncontrolled ([address]     4/1017 A1c = 13    DKA (diabetic ketoacidoses) ([address] 04/2017    Elevated LFTs     Femoral neck fracture ([address] 10/17/2017    HTN (hypertension) 10/17/2017    Hyperkalemia     Hyperlipidemia     Hypertension     PA[facility]ox[facility]mal [facility] fibrillation) [facility]     Paroxysmal A-fib (HCC)     PEA (Pulseless electrical activity) ([address] 04/13/2017    Pneumonia 04/2017    LIKELY PNEUMOCOCCAL    Pneumonia due to organism     Pneumonia of right lower lobe due to Streptococcus pneumoniae ([facility])     Protein-calorie malnutrition, severe ([address] 04/2017    BMI = 19    S/P hip hemiarthroplasty 12/29/2017    Septic shock ([facility])     Systolic CHF ([address]     EF 30%    Thrombocytopenia (HCC)     Thrombocytopenia (HCC)     Type 2 diabetes mellitus with hyperglycemia ([address] 11/28/2016     Past Surgical History:   Procedure Laterality Date    ANUS SURGERY N/A 4/2/2019    EXAM UNDER ANESTHESIA, RECTAL BIOPSY X 2 performed by Anne Ngo MD at [facility] 4/1/2019    COLONOSCOPY performed by Quoc Pickens MD at [facility] N/A 4/17/2019    COLONOSCOPY CONTROL HEMORRHAGE performed by Quoc Pickens MD at [address] N/A 12/14/2018    GASTRIC TUBE REMOVAL performed by Diana Mariscal MD at [facility]      G-tube placement   [facility] N/A 2019    EGD PEG TUBE PLACEMENT successful tube placement performed by Jesus Mcarthur MD at [address] [place] [address]      hip    UPPER GASTROINTESTINAL ENDOSCOPY N/A 2019    EGD BIOPSY [facility]A [facility]    EGD DIAGNOSTIC ONLY performed by Jesus Mcarthur MD at [address] [facility] [address] History     Tobacco Use    Smoking status: Former Smoker     Last attempt to quit: 2018     Years since quittin.2    Smokeless tobacco: Never Used   Substance Use Topics    Alcohol use: No    Drug use: No     No Known Allergies    History reviewed. No pertinent family history. Review of Systems   General: No fevers, chills, fatigue, or night sweats. HEENT: No blurry or decreased vision. No changes in hearing, nasal discharge or sore throat. Cardiovascular: See HPI. No cramping in legs or buttocks when walking. Respiratory: No cough, hemoptysis, or wheezing. Gastrointestinal:+ brbpr   Genito-Urinary: No dysuria or hematuria. No urgency or polyuria. Musculoskeletal: No complaints of joint pain, joint swelling or muscular weakness/soreness. Neurological: No dizziness or headaches. No numbness/tingling, speech problems or weakness. No history of a stroke or TIA. Psychological: No anxiety or depression  Hematological and Lymphatic: No abnormal bleeding or bruising, blood clots, jaundice. Endocrine: No malaise/lethargy, palpitations, polydipsia/polyuria, temperature intolerance or unexpected weight changes. Skin: No rashes or non-healing ulcers. PE:  Blood pressure (!) 133/104, pulse 51, temperature 95.2 °F (35.1 °C), temperature source Core, resp. rate 17, height 5' 5\" (1.651 m), weight 109 lb 12.6 oz (49.8 kg), SpO2 99 %. General (appearance): Well developed.  No acute distress. Eyes: anicteric. eomi  Neck: supple. No jvd  Ears/Nose/Mouth/Thorat: No cyanosis  CV: RRR. No m/r/g   Respiratory:  Clear B, normal respiratory effort  GI: abd s/nt/nd. No peritoneal signs  Skin: Warm, dry. Neuro/Psych: Alert and oriented x 3. Appropriate behavior  Ext:  No c/c. Pulses:  2+ carotid    Lab Results   Component Value Date    WBC 5.6 04/19/2019    HGB 9.9 (L) 04/19/2019    HCT 29.1 (L) 04/19/2019    MCV 88.4 04/19/2019     04/19/2019     Lab Results   Component Value Date     04/19/2019    K 4.5 04/19/2019     04/19/2019    CO2 19 (L) 04/19/2019    BUN 35 (H) 04/19/2019    CREATININE 2.0 (H) 04/19/2019    GLUCOSE 64 (L) 04/19/2019    CALCIUM 7.8 (L) 04/19/2019    PROT 4.0 (L) 04/17/2019    LABALBU 1.5 (L) 04/17/2019    BILITOT <0.2 04/17/2019    ALKPHOS 79 04/17/2019    AST 10 (L) 04/17/2019    ALT 11 04/17/2019    LABGLOM 33 (A) 04/19/2019    GFRAA 39 (A) 04/19/2019    AGRATIO 0.6 (L) 04/11/2019    GLOB 3.4 04/11/2019     Lab Results   Component Value Date    INR 1.09 04/17/2019    INR 1.28 (H) 04/17/2019    INR 1.11 04/05/2019    PROTIME 12.4 04/17/2019    PROTIME 14.6 (H) 04/17/2019    PROTIME 12.6 04/05/2019 1/2019 TTE: EF 55-60%. No RWMA. Mild TR. RVSP 39. + R-L shunt. ? Structure in the aortic root? ECG reviewed.       Current Facility-Administered Medications:     pantoprazole (PROTONIX) injection 40 mg, 40 mg, Intravenous, Daily, Lisandra Her MD, 40 mg at 04/19/19 0934    HYDROCERIN cream CREA, , Topical, BID, Lisandra Her MD  David Flax  [START ON 4/24/2019] levothyroxine (SYNTHROID) injection 50 mcg, 50 mcg, Intravenous, Once **AND** [START ON 4/24/2019] sodium chloride (PF) 0.9 % injection 5 mL, 5 mL, Intravenous, Daily, Alexandro Villegas MD    sodium chloride flush 0.9 % injection 10 mL, 10 mL, Intravenous, 2 times per day, Lisandra Her MD, 10 mL at 04/19/19 0935    sodium chloride flush 0.9 % injection 10 mL, 10 mL, Intravenous, PRN, Lisandra Her, MD    magnesium hydroxide (MILK OF MAGNESIA) 400 MG/5ML suspension 30 mL, 30 mL, Oral, Daily PRN, Sweetie Valladares MD    ondansetron [facility]) injection 4 mg, 4 mg, Intravenous, Q6H PRN, Sweetie Valladares MD    glucose (GLUTOSE) 40 % oral gel 15 g, 15 g, Oral, PRN, Sweetie Valladares MD    dextrose 50 % solution 12.5 g, 12.5 g, Intravenous, PRN, Sweetie Valladares MD, 12.5 g at 04/19/19 1129    glucagon (rDNA) injection 1 mg, 1 mg, Intramuscular, PRN, Sweetie Valladares MD    dextrose 5 % solution, 100 mL/hr, Intravenous, PRN, Sweetie Valladares MD    lactated ringers infusion, , Intravenous, Continuous, Sweetie Valladares MD, Last Rate: 75 mL/hr at 04/19/19 0433    A/p:  -Paroxysmal a fib and h/o stroke  -GI bleed on Eliquis  -HTN  -Hyperlipidemia  -CAD    Recs:  -Hold Eliquis until GI states is safe. Ideally restart within several days given his high risk of stroke.  -Watch bp. As bleed stabilizes, titrate oral cv meds for bp control.   -Starting lisionpril now. Baron 28  Maria Fernanda Mcgill MD, [facility], [facility]

## 2019-04-19 NOTE — PROGRESS NOTES
Patient did have one small, brown, soft bowel movement this AM. No signs of bleeding from rectum. VS Stable, patient alert and oriented x4. Turned q2 hrs with pillow support and heels elevated to prevent pressure injury as patient is at high risk. Room door left open, bed locked and in lowest position with side rails up 3/4 and bed alarm engaged. Call light within reach. Will continue to monitor closely .

## 2019-04-19 NOTE — PROGRESS NOTES
GI:    Seen in ICU and re evaluated  Records reviewed   After stormy hospital course yesterday he stabilized today. No further bleed noted    Hg 9.7  Hct 28.8    No abdominal pain  TF is off for now  eliqius is on hold     Abdomen soft non tender. Peg tube in good position    I completely agree with Dr Nickolas Bean assessment .  Will await cardiology opinion    Will continue to closely follow    marisel ny m.d.  [phone]

## 2019-04-19 NOTE — PROGRESS NOTES
Palliative Care Chart Review  and Check in Note:     NAME:  Philippe Joe  Admit Date: 4/17/2019  Hospital Day:  Hospital Day: 3   Current Code status: DNR-CCA    Palliative care is continuing to following Mr. Suarez for symptom management,  and goals of care discussion as needed. Patient's chart reviewed today 4/19/19. The following are the currently established goals/code status, and Symptom management. Goals of care: Patient sleeping this morning, will return to [facility] once medically ready. Cardiology did see patient today and made recommendations. Updated the family yesterday regarding concerns of anticoagulation and GI bleeding. Reviewed case with Razia Martinez, RN/CM. Code status: DNR/CCA. As a DNR/CCA, this patient will continue to receive standard medical care until the time he or she experiences a cardiac or respiratory arrest.  In the event of respiratory distress, he/she would be okay with intubation prior to cardiac/pulmonary arrest occurring. Discharge plan: Back to [facility].     John Elizondo, APRN/CNP  Palliative Care  [phone]

## 2019-04-19 NOTE — PLAN OF CARE
Problem: Bowel Function - Altered:  Goal: Bowel elimination is within specified parameters  Description  Bowel elimination is within specified parameters  Outcome: Met This Shift   Patient had soft brown bowel movement this shift with no signs of bleeding. Tube feed restarted. Abdomen and bowel function assessed q4 hrs. Problem: Falls - Risk of:  Goal: Will remain free from falls  Description  Will remain free from falls  Outcome: Ongoing   Pt is at an increased risk for falls, see Abebe Fall Score. Fall precautions in place per protocol. Fall cloth at foot of bed, fall band around wrist and nonskid footwear in place. Pt instructed to call for assistance by using nurse call light before attempting to get out of bed. Bed is locked and in lowest position with side rails up 3/4. Bed alarm engaged. Room door left open. Belongings and call light within reach. Hourly visual safety checks in place. Problem: Risk for Impaired Skin Integrity  Goal: Tissue integrity - skin and mucous membranes  Description  Structural intactness and normal physiological function of skin and  mucous membranes. Outcome: Ongoing   Pt is at an increased risk for skin breakdown, see Larry Score. Pressure ulcer prevention measures in place per protocol. Pt assisted to turn q2 hrs and PRN with pillow support. Complete head to toe skin assessment qshift. No skin breakdown noted. Preventative sacral heart dressing in place over intact sacral skin. Skin kept clean/dry. Proper transferring/positioning in bed to prevent friction/shear. Heels floated off of bed and foot of bed elevated. Low air loss and alternating pressure mattress in use. Problem: Nutrition  Goal: Optimal nutrition therapy  Outcome: Ongoing   Patients TF restarted this shift and is at goal rate. Patient tolerating well. Tolerance assessed q4 hrs and PRN.      Problem: Infection - Central Venous Catheter-Associated Bloodstream Infection:  Goal: Will show no infection signs and symptoms  Description  Will show no infection signs and symptoms  Outcome: Ongoing   Pt is at an increased risk for infection d/t central venous catheter. Measures taken to prevent and monitor for infection. WBC's monitored daily. Aseptic technique used when accessing central venous catheter. Transparent occlusive dressing maintained with biopatch in place. Need for central venous line assessed q shift. Alcohol caps kept on unused lines. Problem: Urinary Elimination:  Goal: Signs and symptoms of infection will decrease  Description  Signs and symptoms of infection will decrease  Outcome: Ongoing   Pt is at an increased risk for CAUTI due to indwelling urinary catheter. Catheter care performed q12 hrs with CHG wipes. Closed system maintained, bag kept below level of bladder, and bag emptied before transfer. Stat lock securing device in place. Need for catheter assessed daily.

## 2019-04-19 NOTE — PROGRESS NOTES
04/17/19  1414  04/18/19  0501 04/18/19  1212 04/18/19  2113 04/19/19  0455   WBC 5.9  --  6.0  --   --  5.6   HGB 7.5*   < > 9.8*  9.9* 9.5* 9.7* 9.9*   HCT 22.4*   < > 29.0*  29.0* 28.4* 28.8* 29.1*     --  200  --   --  234    < > = values in this interval not displayed. Recent Labs     04/17/19  1414 04/18/19  0501 04/19/19  0455    135* 137   K 4.6 4.5 4.5    107 108   CO2 22 21 19*   BUN 45* 40* 35*   CREATININE 2.1* 1.9* 2.0*   CALCIUM 7.9* 7.2* 7.8*     Recent Labs     04/17/19  1414 04/17/19 1948   AST 13* 10*   ALT 14 11   BILIDIR <0.2 <0.2   BILITOT <0.2 <0.2   ALKPHOS 104 79     Recent Labs     04/17/19  1414 04/17/19 1949   INR 1.28* 1.09     Recent Labs     04/17/19 1948 04/18/19  0118   TROPONINI 0.04* 0.05*       Urinalysis:      Lab Results   Component Value Date    NITRU Negative 04/17/2019    WBCUA  04/17/2019    BACTERIA 1+ 04/17/2019    RBCUA 0-2 04/17/2019    BLOODU SMALL 04/17/2019    SPECGRAV 1.015 04/17/2019    GLUCOSEU Negative 04/17/2019       [id number] Score for Atrial Fibrillation Stroke Risk   Risk   Factors  Component Value   C CHF No 0   H HTN Yes 1   A2 Age >= 76 Yes,  (75 y.o.) 2   D DM Yes 1   S2 Prior Stroke/TIA Yes 2   V Vascular Disease No 0   A Age 74-69 No,  (75 y.o.) 0   Sc Sex male 0    [id number]  Score  6   Score last updated 0/84/32 2:00 PM    Click here for a link to the UpToDate guideline \"Atrial Fibrillation: Anticoagulation therapy to prevent embolization    Disclaimer: Risk Score calculation is dependent on accuracy of patient problem list and past encounter diagnosis. Radiology:  XR CHEST PORTABLE   Final Result   Impression: Streaky atelectasis of the right lung base. Layering density of the right lower lung field may represent a layering effusion. Assessment/Plan:    Cara Breaux, 66 y.o. male w/ a PMHx Afib, HFpEF, prior GI bleeds, prior stroke who was admitted for acute lower GI bleed.      Active Hospital Problems    Diagnosis Date Noted    Generalized abdominal pain [R10.84]     GI bleeding [K92.2] 04/17/2019    Altered mental status [R41.82]     Hypothermia [T68. XXXA] 03/26/2019    History of stroke [Z86.73] 01/17/2019    Paroxysmal atrial fibrillation (HCC) [I48.0]     Type 2 diabetes mellitus ([facility] [E11.9] 08/11/2016    Benign essential HTN [I10] 08/11/2016     Acute lower GI bleed  - hemoglobin stable today, no more signs of bleeding after colonoscopy yesterday  - GI following, appreciate recommendations on when patient can be resumed on anticoagulation   - will continue to monitor hemoglobin daily  - protonix 40mg IV daily    Acute blood loss anemia  - hemoglobin is stable now after GI intervention and 3 units pRBCs  - will monitor daily    Paroxysmal afib  - patient is off anticoagulation at this time due to bleed  - [id number] score is 6  - cardiology recommends patient resume anticoagulation when cleared by GI due to his high stroke risk    HTN  - continue lisinopril 5mg    HFpEF  - coreg held due to bradycardia    CKD stage IV  - creatinine stable at 2.0    Hypthyroidism  - continue IV synthroid    DM  - holding home antidiabetic medications due to hypoglycemia  - hypoglycemia treatment orders in place    DVT Prophylaxis: SCDs, no chem ppx due to GI bleed  Diet: DIET TUBE FEED CONTINUOUS/CYCLIC NPO; STANDARD WITH FIBER (jevity 1.2); Gastrostomy (PEG);  Continuous; 20; 60; 24  Code Status: DNR-CCA    Dispo - inpatient    I will discuss the patient with the senior resident and MD Bibiana Watt,    Internal Medicine Resident PGY-1  Pager: reach via [place]

## 2019-04-19 NOTE — PROGRESS NOTES
Surgery Daily Progress Note  Patient: Luda Tammy    CC: GI bleed    Subjective :    Patient without any more bloody bowel movements. He has remained hemodynamically stable. Primary team is investigating Watchman device. Objective :    ROS: A 14 point review of systems was conducted, significant findings as noted in HPI. All other systems negative. Physical exam:    Vitals:    04/19/19 0200 04/19/19 0300 04/19/19 0400 04/19/19 0500   BP: (!) 143/70 (!) 160/65 (!) 141/72 (!) 149/83   Pulse: 50 55 55 61   Resp: 12 12 12 12   Temp:   95.9 °F (35.5 °C)    TempSrc:   Core    SpO2:   97%    Weight:       Height:           Infusions:   dextrose      lactated ringers 75 mL/hr at 04/19/19 0433       I/O:I/O last 3 completed shifts: In: 2565.6 [I.V.:2565.6]  Out: 4178 [Urine:1235]           Wt Readings from Last 1 Encounters:   04/18/19 109 lb 12.6 oz (49.8 kg)         General appearance: alert, resting in bed  Neuro: A&Ox3, no focal deficits  HENT: trachea midline, no JVD, no LAD  Eyes: PERRLA, no scleral icterus  Chest/Lungs: CTAB, no crackles/rales, wheezes/rhonchi   Cardiovascular: RRR, no murmurs/gallops/rubs  Abdomen: soft, non-tender, non-distended, +BS, no guarding/rigidity  Skin: warm and dry  Extremities: no edema , no cyanosis      LABS:   Recent Labs     04/18/19  0501  04/18/19 2113 04/19/19 0455   WBC 6.0  --   --  5.6   HGB 9.8*  9.9*   < > 9.7* 9.9*   HCT 29.0*  29.0*   < > 28.8* 29.1*   MCV 88.1  --   --  88.4     --   --  234    < > = values in this interval not displayed.         Recent Labs     04/18/19  0501 04/19/19  0455   * 137   K 4.5 4.5    108   CO2 21 19*   BUN 40* 35*   CREATININE 1.9* 2.0*        Recent Labs     04/17/19  1414 04/17/19 1948   AST 13* 10*   ALT 14 11   BILIDIR <0.2 <0.2   BILITOT <0.2 <0.2   ALKPHOS 104 79        Recent Labs     04/17/19 1414   LIPASE 4.0*        Recent Labs     04/17/19  1414 04/17/19 1948 04/17/19 1949   PROT 5.1* 4.0*  -- INR 1.28*  --  1.09   APTT  --   --  33.3        Recent Labs     04/17/19  1948 04/18/19  0118   TROPONINI 0.04* 0.05*         ASSESSMENT/PLAN: This is a 66 y.o. male with Hx of CVA, afib on eliquis, recent PEG tube placement, and recent rectal biopsy who presented with GI bleed. Hemostasis has been achieved after application of hemospray. No urgent surgical intervention indicated. - consider benefits and risks of anticoagulation given episodes of bleeding  - okay to start diet from surgery perspective given stable Hgb for 24 hours  - Please contact with any further questions.      Isa Dubose MD PGY-1  General Surgery Resident  04/19/19  6:32 AM  [phone]

## 2019-04-19 NOTE — DISCHARGE INSTR - COC
Continuity of Care Form    Patient Name: Tolu Francois   :  1945  MRN:  [id number]    Admit date:  2019  Discharge date:  19    Code Status Order: DNR-CCA   Advance Directives:   [facility] Documentation     Date/Time Healthcare Directive Type of Healthcare Directive Copy in [facility] [address] Agent's Name Healthcare Agent's Phone Number    19 8218  Yes, patient has an advance directive for healthcare treatment --  Yes, copy in chart -- -- --    19 1536  No, patient does not have an advance directive for healthcare treatment --  -- -- -- --          Admitting Physician:  No admitting provider for patient encounter. PCP: Deisy Joaquin    Discharging Nurse:  Charity Mast 23 Unit/Room#: 7344/1627-10  Discharging Unit Phone Number: [phone]    Emergency Contact:   Extended Emergency Contact Information  Primary Emergency Contact: [address] [facility] Phone: [phone]  Relation: Niece/Nephew  Secondary Emergency Contact: [address] [facility] Phone: [phone]  Relation: Other    Past Surgical History:  Past Surgical History:   Procedure Laterality Date    ANUS SURGERY N/A 2019    EXAM UNDER ANESTHESIA, RECTAL BIOPSY X 2 performed by Rita Kessler MD at [facility] 2019    COLONOSCOPY performed by Remington Cortes MD at [address] [facility] 2019    COLONOSCOPY CONTROL HEMORRHAGE performed by Remington Cortes MD at [address] [facility] [address] N/A 2018    GASTRIC TUBE REMOVAL performed by Edith Artis MD at [facility]      G-tube placement   [address] [place] [address] N/A 2019    EGD PEG TUBE PLACEMENT successful tube placement performed by Remington Cortes MD at [address] [place] [address]    UPPER GASTROINTESTINAL ENDOSCOPY N/A 2019    EGD BIOPSY performed by Remington Cortes MD at [place] [address] GASTROINTESTINAL ENDOSCOPY N/A 4/12/2019    EGD DIAGNOSTIC ONLY performed by Jesus Mcarthur MD at [facility] ENDOSCOPY       Immunization History:   Immunization History   Administered Date(s) Administered    Influenza Virus Vaccine 10/15/2012, 10/18/2013, 12/12/2016, 10/01/2018    Pneumococcal 13-valent Conjugate ([id number]) 05/05/2015    Pneumococcal Polysaccharide ([id number]) 06/01/2006, 10/15/2012       Active Problems:  Patient Active Problem List   Diagnosis Code    Vitamin D deficiency E55.9    Vitamin B deficiency E53.9    Type 2 diabetes mellitus ([facility] E11.9    Angiopathy, peripheral ([facility] I73.9    Noncompliance with medication regimen Z91.14    Gonadotropin deficiency ([facility] E23.0    Benign essential HTN I10    Long term current use of insulin (HCC) Z79.4    Dyslipidemia E78.5    Carotid artery narrowing I65.29    Atherosclerosis of coronary artery I25.10    Hyperkalemia E87.5    Acute renal failure (HCC) N17.9    Paroxysmal atrial fibrillation ([facility]) I48.0    Coronary artery disease involving native coronary artery of native heart without angina pectoris I25.10    Cardiomyopathy ([facility] I42.9    Uncontrolled type 2 diabetes mellitus with hyperglycemia, with long-term current use of insulin ([facility]) E11.65, Z79.4    S/P hip hemiarthroplasty Z96.649    History of stroke Z86.73    SANDY (acute kidney injury) ([facility] N17.9    Normocytic anemia D64.9    Severe protein-calorie malnutrition (HCC) E43    S/P percutaneous endoscopic gastrostomy (PEG) tube placement ([facility]) Z93.1    Right to left cardiac shunt ([facility]) I28.0    H/O ischemic multifocal multiple vascular territories stroke Z86.73    Anticoagulated Z79.01    Vertebral artery stenosis, right I65.01    Hypoglycemia, exogenous insulin, in setting of SANDY E15    possible sepsis A41.9    Hypothermia T68. XXXA    SANDY (acute kidney injury) ([facility] N73.4    Metabolic acidosis R41.2    Acute metabolic encephalopathy E36.86    Altered mental status R41.82    Oropharyngeal dysphagia R13.12    Weakness R53.1    SOB (shortness of breath) R06.02    Goals of care, counseling/discussion Z71.89    DNR (do not resuscitate) discussion Z71.89    Encounter for palliative care Z51.5    Severe malnutrition ([facility] E43    Hypothyroidism E03.9    Rectal mass K62.9    Rectal bleeding K62.5    Acute metabolic encephalopathy R95.12    UTI (urinary tract infection) N39.0    Hypothermia T68. Everlena Lo    CKD (chronic kidney disease), stage IV (HCC) N18.4    GI bleeding K92.2    Generalized abdominal pain R10.84       Isolation/Infection:   Isolation          No Isolation            Nurse Assessment:  Last Vital Signs: /72   Pulse 70   Temp 95.4 °F (35.2 °C) (Core)   Resp 13   Ht 5' 5\" (1.651 m)   Wt 109 lb 12.6 oz (49.8 kg)   SpO2 100%   BMI 18.27 kg/m²     Last documented pain score (0-10 scale): Pain Level: 0  Last Weight:   Wt Readings from Last 1 Encounters:   04/18/19 109 lb 12.6 oz (49.8 kg)     Mental Status:  oriented    IV Access:  - G-tube    Nursing Mobility/ADLs:  Walking   Assisted  Transfer  Assisted  Bathing  Dependent  Dressing  Dependent  Toileting  Assisted  Feeding  Assisted  Med Admin  Assisted  Med Delivery   crushed- through G-tube    Wound Care Documentation and Therapy:  Wound 04/17/19 Perineum Left old and healed (Active)   Wound Other 4/18/2019 12:00 PM   Dressing Status Clean;Dry; Intact 4/19/2019 12:06 AM   Wound Length (cm) 2 cm 4/18/2019 12:00 PM   Wound Width (cm) 2 cm 4/18/2019 12:00 PM   Wound Surface Area (cm^2) 4 cm^2 4/18/2019 12:00 PM   Wound Assessment Clean;Dry; Intact 4/19/2019 12:06 AM   Drainage Amount None 4/19/2019 12:06 AM   Number of days: 1        Elimination:  Continence:   · Bowel: No  · Bladder: No  Urinary Catheter: None   Colostomy/Ileostomy/Ileal Conduit: No       Date of Last BM: 4/22/19    Intake/Output Summary (Last 24 hours) at 4/19/2019 1711  Last data filed at 4/19/2019 1400  Gross per 24 hour   Intake scale:  Glucose:  Dose:     No Insulin  140-199  1 Unit  200-249  2 Units  250-299  3 Units  300-349  4 Units  350-399  5 Units  400 and above 6 Units    Physician Certification: I certify the above information and transfer of Manuela Stapleton  is necessary for the continuing treatment of the diagnosis listed and that he requires [place] for less 30 days.      Update Admission H&P: No change in H&P    PHYSICIAN SIGNATURE:  Electronically signed by Mark Mariscal MD on 4/22/19 at 11:04 AM

## 2019-04-19 NOTE — CARE COORDINATION
Case Management Daily Note:    Current Plan of Care:   Monitoring post GI bleeding        Discharge Plan:  Return to [facility]    Tentative Discharge Date: 4-20-19    Current Barriers to Discharge:  Unsure     Resources/Information given:  N/A      Case Management Notes:     Planned discharge for today but no orders received. Plan for tomorrow. Transport will need to be arranged once dc orders received. Needs IMM but no precert or HENS.

## 2019-04-20 LAB
ANION GAP SERPL CALCULATED.3IONS-SCNC: 13 MMOL/L (ref 3–16)
BASOPHILS ABSOLUTE: 0.1 K/UL (ref 0–0.2)
BASOPHILS RELATIVE PERCENT: 1.2 %
BUN BLDV-MCNC: 33 MG/DL (ref 7–20)
CALCIUM SERPL-MCNC: 8.1 MG/DL (ref 8.3–10.6)
CHLORIDE BLD-SCNC: 103 MMOL/L (ref 99–110)
CO2: 19 MMOL/L (ref 21–32)
CREAT SERPL-MCNC: 2 MG/DL (ref 0.8–1.3)
EOSINOPHILS ABSOLUTE: 0.1 K/UL (ref 0–0.6)
EOSINOPHILS RELATIVE PERCENT: 1.8 %
GFR AFRICAN AMERICAN: 39
GFR NON-AFRICAN AMERICAN: 33
GLUCOSE BLD-MCNC: 193 MG/DL (ref 70–99)
GLUCOSE BLD-MCNC: 208 MG/DL (ref 70–99)
GLUCOSE BLD-MCNC: 238 MG/DL (ref 70–99)
GLUCOSE BLD-MCNC: 244 MG/DL (ref 70–99)
HCT VFR BLD CALC: 32 % (ref 40.5–52.5)
HEMOGLOBIN: 11.1 G/DL (ref 13.5–17.5)
LYMPHOCYTES ABSOLUTE: 0.6 K/UL (ref 1–5.1)
LYMPHOCYTES RELATIVE PERCENT: 9.3 %
MCH RBC QN AUTO: 30.6 PG (ref 26–34)
MCHC RBC AUTO-ENTMCNC: 34.6 G/DL (ref 31–36)
MCV RBC AUTO: 88.3 FL (ref 80–100)
MONOCYTES ABSOLUTE: 0.5 K/UL (ref 0–1.3)
MONOCYTES RELATIVE PERCENT: 8 %
NEUTROPHILS ABSOLUTE: 4.8 K/UL (ref 1.7–7.7)
NEUTROPHILS RELATIVE PERCENT: 79.7 %
PDW BLD-RTO: 14.4 % (ref 12.4–15.4)
PERFORMED ON: ABNORMAL
PLATELET # BLD: 310 K/UL (ref 135–450)
PMV BLD AUTO: 7.5 FL (ref 5–10.5)
POTASSIUM REFLEX MAGNESIUM: 4.5 MMOL/L (ref 3.5–5.1)
RBC # BLD: 3.63 M/UL (ref 4.2–5.9)
SODIUM BLD-SCNC: 135 MMOL/L (ref 136–145)
WBC # BLD: 6 K/UL (ref 4–11)

## 2019-04-20 PROCEDURE — 2580000003 HC RX 258: Performed by: STUDENT IN AN ORGANIZED HEALTH CARE EDUCATION/TRAINING PROGRAM

## 2019-04-20 PROCEDURE — 85025 COMPLETE CBC W/AUTO DIFF WBC: CPT

## 2019-04-20 PROCEDURE — 80048 BASIC METABOLIC PNL TOTAL CA: CPT

## 2019-04-20 PROCEDURE — C9113 INJ PANTOPRAZOLE SODIUM, VIA: HCPCS | Performed by: STUDENT IN AN ORGANIZED HEALTH CARE EDUCATION/TRAINING PROGRAM

## 2019-04-20 PROCEDURE — 6370000000 HC RX 637 (ALT 250 FOR IP): Performed by: STUDENT IN AN ORGANIZED HEALTH CARE EDUCATION/TRAINING PROGRAM

## 2019-04-20 PROCEDURE — 6360000002 HC RX W HCPCS: Performed by: STUDENT IN AN ORGANIZED HEALTH CARE EDUCATION/TRAINING PROGRAM

## 2019-04-20 PROCEDURE — 6370000000 HC RX 637 (ALT 250 FOR IP): Performed by: INTERNAL MEDICINE

## 2019-04-20 PROCEDURE — 94760 N-INVAS EAR/PLS OXIMETRY 1: CPT

## 2019-04-20 PROCEDURE — 36592 COLLECT BLOOD FROM PICC: CPT

## 2019-04-20 PROCEDURE — 99233 SBSQ HOSP IP/OBS HIGH 50: CPT | Performed by: INTERNAL MEDICINE

## 2019-04-20 PROCEDURE — 1200000000 HC SEMI PRIVATE

## 2019-04-20 PROCEDURE — 6360000002 HC RX W HCPCS: Performed by: INTERNAL MEDICINE

## 2019-04-20 RX ORDER — CARVEDILOL 6.25 MG/1
6.25 TABLET ORAL ONCE
Status: COMPLETED | OUTPATIENT
Start: 2019-04-20 | End: 2019-04-20

## 2019-04-20 RX ORDER — HALOPERIDOL 5 MG/ML
5 INJECTION INTRAMUSCULAR
Status: COMPLETED | OUTPATIENT
Start: 2019-04-20 | End: 2019-04-20

## 2019-04-20 RX ORDER — CARVEDILOL 6.25 MG/1
6.25 TABLET ORAL 2 TIMES DAILY WITH MEALS
Status: DISCONTINUED | OUTPATIENT
Start: 2019-04-20 | End: 2019-04-20

## 2019-04-20 RX ORDER — HYDRALAZINE HYDROCHLORIDE 20 MG/ML
5 INJECTION INTRAMUSCULAR; INTRAVENOUS EVERY 6 HOURS PRN
Status: DISCONTINUED | OUTPATIENT
Start: 2019-04-20 | End: 2019-04-22 | Stop reason: HOSPADM

## 2019-04-20 RX ORDER — CARVEDILOL 6.25 MG/1
12.5 TABLET ORAL 2 TIMES DAILY WITH MEALS
Status: DISCONTINUED | OUTPATIENT
Start: 2019-04-20 | End: 2019-04-21

## 2019-04-20 RX ADMIN — CARVEDILOL 6.25 MG: 6.25 TABLET, FILM COATED ORAL at 08:57

## 2019-04-20 RX ADMIN — HALOPERIDOL LACTATE 5 MG: 5 INJECTION INTRAMUSCULAR at 16:34

## 2019-04-20 RX ADMIN — INSULIN LISPRO 2 UNITS: 100 INJECTION, SOLUTION INTRAVENOUS; SUBCUTANEOUS at 11:03

## 2019-04-20 RX ADMIN — INSULIN LISPRO 1 UNITS: 100 INJECTION, SOLUTION INTRAVENOUS; SUBCUTANEOUS at 20:04

## 2019-04-20 RX ADMIN — SODIUM CHLORIDE, PRESERVATIVE FREE 10 ML: 5 INJECTION INTRAVENOUS at 09:05

## 2019-04-20 RX ADMIN — Medication: at 08:57

## 2019-04-20 RX ADMIN — SODIUM CHLORIDE, PRESERVATIVE FREE 10 ML: 5 INJECTION INTRAVENOUS at 20:05

## 2019-04-20 RX ADMIN — Medication: at 20:05

## 2019-04-20 RX ADMIN — PANTOPRAZOLE SODIUM 40 MG: 40 INJECTION, POWDER, FOR SOLUTION INTRAVENOUS at 08:57

## 2019-04-20 RX ADMIN — INSULIN LISPRO 1 UNITS: 100 INJECTION, SOLUTION INTRAVENOUS; SUBCUTANEOUS at 16:08

## 2019-04-20 RX ADMIN — CARVEDILOL 12.5 MG: 6.25 TABLET, FILM COATED ORAL at 18:47

## 2019-04-20 RX ADMIN — CARVEDILOL 6.25 MG: 6.25 TABLET, FILM COATED ORAL at 14:46

## 2019-04-20 RX ADMIN — INSULIN GLARGINE 4 UNITS: 100 INJECTION, SOLUTION SUBCUTANEOUS at 20:33

## 2019-04-20 RX ADMIN — LISINOPRIL 5 MG: 5 TABLET ORAL at 08:57

## 2019-04-20 ASSESSMENT — PAIN SCALES - GENERAL
PAINLEVEL_OUTOF10: 0

## 2019-04-20 NOTE — PROGRESS NOTES
GI Progress Note    Luis Dukes is a 66 y.o. male patient. 1. Rectal bleeding    2. Gastrointestinal hemorrhage, unspecified gastrointestinal hemorrhage type        Admit Date: 2019    Subjective:     Seen in icu and re evaluated  Feels better  No abdominal pain  No further sings of bleed   Tolerating TF on fiber free formula        ROS:  Cardiovascular ROS: no chest pain or dyspnea on exertion  Gastrointestinal ROS: as above  Respiratory ROS: no cough, shortness of breath, or wheezing    Scheduled Meds:   insulin lispro  0-6 Units Subcutaneous TID WC    insulin lispro  0-3 Units Subcutaneous Nightly    insulin glargine  4 Units Subcutaneous Nightly    carvedilol  12.5 mg Oral BID WC    lisinopril  5 mg Oral Daily    pantoprazole  40 mg Intravenous Daily    HYDROCERIN   Topical BID    [START ON 2019] sodium chloride (PF)  5 mL Intravenous Daily    And    [START ON 2019] levothyroxine  50 mcg Intravenous Once    sodium chloride flush  10 mL Intravenous 2 times per day       Continuous Infusions:   dextrose         PRN Meds:  haloperidol lactate, hydrALAZINE, sodium chloride flush, magnesium hydroxide, ondansetron, glucose, dextrose, glucagon (rDNA), dextrose      Objective:       Patient Vitals for the past 24 hrs:   BP Temp Temp src Pulse Resp SpO2   19 1327 -- -- -- -- 21 95 %   19 1132 -- 98.6 °F (37 °C) Oral -- -- --   19 0747 (!) 167/93 99.9 °F (37.7 °C) Oral 102 17 100 %   19 0401 (!) 153/99 97 °F (36.1 °C) CORE 119 22 100 %   19 0000 131/88 96.4 °F (35.8 °C) CORE 82 15 99 %   19 2000 (!) 129/90 96.1 °F (35.6 °C) CORE 89 16 100 %   19 1630 111/72 95.4 °F (35.2 °C) CORE 70 13 100 %       Exam:    VITALS:  BP (!) 167/93   Pulse 102   Temp 98.6 °F (37 °C) (Oral)   Resp 21   Ht 5' 5\" (1.651 m)   Wt 109 lb 12.6 oz (49.8 kg)   SpO2 95%   BMI 18.27 kg/m²   TEMPERATURE:  Current - Temp: 98.6 °F (37 °C);  Max - Temp  Av.2 °F (36.2 °C)  Min: questions    Hubert Rodrigues MD  4/20/2019  2:48 PM   ICU 4498

## 2019-04-20 NOTE — PROGRESS NOTES
Patient is alert and oriented x4. Knows he is in the hospital but is wanting to go home and is attempting to get out of bed. Patient is easily redirected. Fall precautions in place. Bed alarm engaged. Camera in use. Will continue to monitor.

## 2019-04-20 NOTE — PROGRESS NOTES
[address]   Cardiology Progress Note     Admit Date: 2019     Reason for follow up: Anticoagulation in the setting of GI bleed    HPI and Interval History:   Patient seen and examined. Clinical notes reviewed. Telemetry reviewed. No new complaint today. No major events overnight. Denies having chest pain, shortness of breath, dyspnea on exertion, Orthopnea, PND at the time of this visit. Review of System:  All other systems reviewed except for that noted above. Pertinent negatives and positives are:     · General: negative for fever, chills   · Ophthalmic ROS: negative for - eye pain or loss of vision  · ENT ROS: negative for - headaches, sore throat   · Respiratory: negative for - cough, sputum  · Cardiovascular: Reviewed in HPI  · Gastrointestinal: negative for - abdominal pain, diarrhea, N/V  · Hematology: negative for - bleeding, blood clots, bruising or jaundice  · Genito-Urinary:  negative for - Dysuria or incontinence  · Musculoskeletal: negative for - Joint swelling, muscle pain  · Neurological: negative for - confusion, dizziness, headaches   · Psychiatric: No anxiety, no depression. · Dermatological: negative for - rash      Physical Examination:  Vitals:    19 1601   BP: (!) 161/123   Pulse: 78   Resp: 16   Temp: 97.3 °F (36.3 °C)   SpO2: 100%        Intake/Output Summary (Last 24 hours) at 2019 1646  Last data filed at 2019 1605  Gross per 24 hour   Intake 2019 ml   Output 850 ml   Net 1169 ml     In: 2019 [I.V.:20; NG/GT:]  Out: 123    Wt Readings from Last 3 Encounters:   19 109 lb 12.6 oz (49.8 kg)   04/15/19 104 lb 11.5 oz (47.5 kg)   19 119 lb 11.4 oz (54.3 kg)     Temp  Av.6 °F (36.4 °C)  Min: 96.1 °F (35.6 °C)  Max: 99.9 °F (37.7 °C)  Pulse  Av  Min: 78  Max: 119  BP  Min: 129/90  Max: 167/93  SpO2  Av %  Min: 95 %  Max: 100 %    · Telemetry: Sinus tachycardia  · Constitutional: Alert. Oriented to person, place, and time.  No distress. · Head: Normocephalic and atraumatic. · Mouth/Throat: Lips appear moist. Oropharynx is clear and moist.  · Eyes: Conjunctivae normal. EOM are normal.   · Neck: Neck supple. No lymphadenopathy. No rigidity. No JVD present. · Cardiovascular: Normal rate, regular rhythm. Normal S1&S2. Carotid pulse 2+ bilaterally. · Pulmonary/Chest: Bilateral respiratory sounds present. No respiratory accessory muscle use. No wheezes, No rhonchi. · Abdominal: Soft. Normal bowel sounds present. No distension, No tenderness. No splenomegaly. No hernia. · Musculoskeletal: No tenderness. No edema    · Lymphadenopathy: Has no cervical adenopathy. · Neurological: Alert and oriented. Cranial nerve II-XII grossly intact, No gross deficit to touch. · Skin: Skin is warm and dry. No rash, lesions, ulcerations noted. · Psychiatric: No anxiety nor agitation. Labs, diagnostic and imaging results reviewed. Reviewed. Recent Labs     04/18/19  0501 04/19/19 0455 04/20/19 0452   * 137 135*   K 4.5 4.5 4.5    108 103   CO2 21 19* 19*   BUN 40* 35* 33*   CREATININE 1.9* 2.0* 2.0*     Recent Labs     04/18/19  0501  04/18/19  2113 04/19/19 0455 04/20/19 0452   WBC 6.0  --   --  5.6 6.0   HGB 9.8*  9.9*   < > 9.7* 9.9* 11.1*   HCT 29.0*  29.0*   < > 28.8* 29.1* 32.0*   MCV 88.1  --   --  88.4 88.3     --   --  234 310    < > = values in this interval not displayed. Lab Results   Component Value Date    CKTOTAL 369 01/18/2019    TROPONINI 0.05 04/18/2019     CrCl cannot be calculated (Unknown ideal weight.).    No results found for: BNP  Lab Results   Component Value Date    PROTIME 12.4 04/17/2019    PROTIME 14.6 04/17/2019    PROTIME 12.6 04/05/2019    INR 1.09 04/17/2019    INR 1.28 04/17/2019    INR 1.11 04/05/2019     Lab Results   Component Value Date    CHOL 91 01/18/2019    HDL 41 01/18/2019    TRIG 54 04/07/2019       Scheduled Meds:   insulin lispro  0-6 Units Subcutaneous TID WC    insulin lispro  0-3 Units Subcutaneous Nightly    insulin glargine  4 Units Subcutaneous Nightly    carvedilol  12.5 mg Oral BID WC    lisinopril  5 mg Oral Daily    pantoprazole  40 mg Intravenous Daily    HYDROCERIN   Topical BID    [START ON 4/24/2019] sodium chloride (PF)  5 mL Intravenous Daily    And    [START ON 4/24/2019] levothyroxine  50 mcg Intravenous Once    sodium chloride flush  10 mL Intravenous 2 times per day     Continuous Infusions:   dextrose       PRN Meds:hydrALAZINE, sodium chloride flush, magnesium hydroxide, ondansetron, glucose, dextrose, glucagon (rDNA), dextrose     Patient Active Problem List    Diagnosis Date Noted    Generalized abdominal pain     GI bleeding 04/17/2019    Acute metabolic encephalopathy 72/21/0276    UTI (urinary tract infection) 04/11/2019    Hypothermia 04/11/2019    CKD (chronic kidney disease), stage IV (HCC) 04/11/2019    Rectal mass     Rectal bleeding     Severe malnutrition ([address] 03/27/2019    Hypothyroidism 03/27/2019    Altered mental status     Oropharyngeal dysphagia     Weakness     SOB (shortness of breath)     Goals of care, counseling/discussion     DNR (do not resuscitate) discussion     Encounter for palliative care     possible sepsis 03/26/2019    Hypothermia 03/26/2019    SANDY (acute kidney injury) ([address] 07/51/4733    Metabolic acidosis 88/10/1695    Acute metabolic encephalopathy 16/74/4671    Hypoglycemia, exogenous insulin, in setting of SANDY 03/05/2019    Vertebral artery stenosis, right 03/01/2019    SANDY (acute kidney injury) ([address] 02/28/2019    Normocytic anemia 02/28/2019    Severe protein-calorie malnutrition ([address] 02/28/2019    S/P percutaneous endoscopic gastrostomy (PEG) tube placement ([address] 02/28/2019    Right to left cardiac shunt ([address] 02/28/2019    H/O ischemic multifocal multiple vascular territories stroke 02/28/2019    Anticoagulated 02/28/2019    History of stroke 01/17/2019    S/P hip hemiarthroplasty 12/29/2017    Uncontrolled type 2 diabetes mellitus with hyperglycemia, with long-term current use of insulin ([facility] 10/17/2017    Paroxysmal atrial fibrillation (HCC)     Coronary artery disease involving native coronary artery of native heart without angina pectoris     Cardiomyopathy ([address]     Acute renal failure ([address]     Hyperkalemia     Noncompliance with medication regimen 10/17/2016    Type 2 diabetes mellitus ([address] 08/11/2016    Benign essential HTN 08/11/2016    Vitamin B deficiency 11/26/2012    Atherosclerosis of coronary artery 04/04/2011    Long term current use of insulin ([facility] 01/25/2011    Vitamin D deficiency 09/17/2010    Angiopathy, peripheral ([address] 09/17/2010    Gonadotropin deficiency ([facility] 09/17/2010    Dyslipidemia 02/12/2010    Carotid artery narrowing 02/12/2010      Active Hospital Problems    Diagnosis Date Noted    Generalized abdominal pain [R10.84]     GI bleeding [K92.2] 04/17/2019    Altered mental status [R41.82]     Hypothermia [T68. XXXA] 03/26/2019    History of stroke [Z86.73] 01/17/2019    Paroxysmal atrial fibrillation ([address] [I48.0]     Type 2 diabetes mellitus ([facility] [E11.9] 08/11/2016    Benign essential HTN [I10] 08/11/2016       Assessment and Plan:     1. Significant GI bleed in the setting of oral anti-coagulation  2. History of paroxysmal atrial fibrillation, history of CVA  3. Due to significant GI bleed, patient would not be a candidate for future oral anticoagulation  4. Good candidate for left atrial appendage closure device - needs outpatient referral to Dr. Willy Doran  5. Increased Coreg to 12.5 mg b.i.d. secondary to poorly controlled hypertension      Thank you for allowing me to participate in the care of this patient. If you have any questions, please do not hesitate to contact me.     Nickie Martinez MD   Cardiac Electrophysiology  [address] [place] [address] [phone]  [employer]

## 2019-04-20 NOTE — PROGRESS NOTES
04/18/19  0501  04/18/19  2113 04/19/19  0455 04/20/19 0452   WBC 6.0  --   --  5.6 6.0   HGB 9.8*  9.9*   < > 9.7* 9.9* 11.1*   HCT 29.0*  29.0*   < > 28.8* 29.1* 32.0*     --   --  234 310    < > = values in this interval not displayed. Recent Labs     04/18/19  0501 04/19/19 0455 04/20/19 0452   * 137 135*   K 4.5 4.5 4.5    108 103   CO2 21 19* 19*   BUN 40* 35* 33*   CREATININE 1.9* 2.0* 2.0*   CALCIUM 7.2* 7.8* 8.1*     Recent Labs     04/17/19  1414 04/17/19 1948   AST 13* 10*   ALT 14 11   BILIDIR <0.2 <0.2   BILITOT <0.2 <0.2   ALKPHOS 104 79     Recent Labs     04/17/19  1414 04/17/19 1949   INR 1.28* 1.09     Recent Labs     04/17/19 1948 04/18/19  0118   TROPONINI 0.04* 0.05*       Urinalysis:      Lab Results   Component Value Date    NITRU Negative 04/17/2019    WBCUA  04/17/2019    BACTERIA 1+ 04/17/2019    RBCUA 0-2 04/17/2019    BLOODU SMALL 04/17/2019    SPECGRAV 1.015 04/17/2019    GLUCOSEU Negative 04/17/2019       [id number] Score for Atrial Fibrillation Stroke Risk   Risk   Factors  Component Value   C CHF No 0   H HTN Yes 1   A2 Age >= 76 Yes,  (75 y.o.) 2   D DM Yes 1   S2 Prior Stroke/TIA Yes 2   V Vascular Disease No 0   A Age 74-69 No,  (75 y.o.) 0   Sc Sex male 0    [id number]  Score  6   Score last updated 5/43/97 3:82 PM    Click here for a link to the UpToDate guideline \"Atrial Fibrillation: Anticoagulation therapy to prevent embolization    Disclaimer: Risk Score calculation is dependent on accuracy of patient problem list and past encounter diagnosis. Radiology:  XR CHEST PORTABLE   Final Result   Impression: Streaky atelectasis of the right lung base. Layering density of the right lower lung field may represent a layering effusion. Assessment/Plan:    Patrick Ford, 66 y.o. male w/ a PMHx Afib, HFpEF, prior GI bleeds, prior stroke who was admitted for acute lower GI bleed.      Active Hospital Problems    Diagnosis Date Noted    Generalized abdominal pain [R10.84]     GI bleeding [K92.2] 04/17/2019    Altered mental status [R41.82]     Hypothermia [T68. XXXA] 03/26/2019    History of stroke [Z86.73] 01/17/2019    Paroxysmal atrial fibrillation (HCC) [I48.0]     Type 2 diabetes mellitus ([facility] [E11.9] 08/11/2016    Benign essential HTN [I10] 08/11/2016     Acute lower GI bleed  - hemoglobin stable today, no more signs of bleeding after colonoscopy yesterday  - GI following, appreciate recommendations on when patient can be resumed on anticoagulation   - will have repeat scope on Monday. - will continue to monitor hemoglobin daily  - protonix 40mg IV daily    Acute blood loss anemia  - hemoglobin is stable now after GI intervention and 3 units pRBCs  - will monitor daily    Paroxysmal afib  - patient is off anticoagulation at this time due to bleed  - [id number] score is 6  - cardiology recommends patient resume anticoagulation when cleared by GI due to his high stroke risk  - Dr. Zhen Paul will evaluate patient regarding watchman device    HTN  - continue lisinopril 5mg    HFpEF  - resume coreg 6.25 BID    CKD stage IV  - creatinine stable at 2.0    Hypthyroidism  - continue IV synthroid    DM  - resume home nightly insulin 5 units  - low dose sliding scale  - hypoglycemia treatment orders in place    DVT Prophylaxis: SCDs, no chem ppx due to GI bleed  Diet: DIET TUBE FEED CONTINUOUS/CYCLIC NPO; STANDARD WITHOUT FIBER (jevity 1.2); Gastrostomy (PEG);  Continuous; 20; 60; 24  Code Status: DNR-CCA    Dispo - inpatient    I will discuss the patient with the senior resident and MD Chaparro Finn,    Internal Medicine Resident PGY-1  Pager: reach via [employer]

## 2019-04-20 NOTE — PROGRESS NOTES
Pt alert, oriented x1. Pt with confusion and hallucinations, requiring haldol x1. Pt fall risk, frequently attempting to climb out of bed. Pt refusing SCD's, this Rn explained the importance without success.

## 2019-04-21 LAB
ANION GAP SERPL CALCULATED.3IONS-SCNC: 7 MMOL/L (ref 3–16)
ANISOCYTOSIS: ABNORMAL
BANDED NEUTROPHILS RELATIVE PERCENT: 7 % (ref 0–7)
BASOPHILS ABSOLUTE: 0 K/UL (ref 0–0.2)
BASOPHILS RELATIVE PERCENT: 0 %
BLOOD BANK DISPENSE STATUS: NORMAL
BLOOD BANK DISPENSE STATUS: NORMAL
BLOOD BANK PRODUCT CODE: NORMAL
BLOOD BANK PRODUCT CODE: NORMAL
BPU ID: NORMAL
BPU ID: NORMAL
BUN BLDV-MCNC: 34 MG/DL (ref 7–20)
BURR CELLS: ABNORMAL
CALCIUM SERPL-MCNC: 8 MG/DL (ref 8.3–10.6)
CHLORIDE BLD-SCNC: 107 MMOL/L (ref 99–110)
CO2: 24 MMOL/L (ref 21–32)
CREAT SERPL-MCNC: 1.7 MG/DL (ref 0.8–1.3)
DESCRIPTION BLOOD BANK: NORMAL
DESCRIPTION BLOOD BANK: NORMAL
EOSINOPHILS ABSOLUTE: 0.2 K/UL (ref 0–0.6)
EOSINOPHILS RELATIVE PERCENT: 4 %
GFR AFRICAN AMERICAN: 48
GFR NON-AFRICAN AMERICAN: 39
GLUCOSE BLD-MCNC: 166 MG/DL (ref 70–99)
GLUCOSE BLD-MCNC: 259 MG/DL (ref 70–99)
GLUCOSE BLD-MCNC: 278 MG/DL (ref 70–99)
GLUCOSE BLD-MCNC: 36 MG/DL (ref 70–99)
GLUCOSE BLD-MCNC: 84 MG/DL (ref 70–99)
GLUCOSE BLD-MCNC: 92 MG/DL (ref 70–99)
HCT VFR BLD CALC: 27.9 % (ref 40.5–52.5)
HEMOGLOBIN: 9.5 G/DL (ref 13.5–17.5)
LYMPHOCYTES ABSOLUTE: 0.6 K/UL (ref 1–5.1)
LYMPHOCYTES RELATIVE PERCENT: 11 %
MCH RBC QN AUTO: 29.7 PG (ref 26–34)
MCHC RBC AUTO-ENTMCNC: 34.1 G/DL (ref 31–36)
MCV RBC AUTO: 86.9 FL (ref 80–100)
MONOCYTES ABSOLUTE: 0.5 K/UL (ref 0–1.3)
MONOCYTES RELATIVE PERCENT: 9 %
NEUTROPHILS ABSOLUTE: 4 K/UL (ref 1.7–7.7)
NEUTROPHILS RELATIVE PERCENT: 69 %
PDW BLD-RTO: 14.3 % (ref 12.4–15.4)
PERFORMED ON: ABNORMAL
PERFORMED ON: NORMAL
PERFORMED ON: NORMAL
PLATELET # BLD: 295 K/UL (ref 135–450)
PMV BLD AUTO: 7.4 FL (ref 5–10.5)
POTASSIUM REFLEX MAGNESIUM: 4.1 MMOL/L (ref 3.5–5.1)
RBC # BLD: 3.22 M/UL (ref 4.2–5.9)
SODIUM BLD-SCNC: 138 MMOL/L (ref 136–145)
WBC # BLD: 5.2 K/UL (ref 4–11)

## 2019-04-21 PROCEDURE — 85025 COMPLETE CBC W/AUTO DIFF WBC: CPT

## 2019-04-21 PROCEDURE — 6370000000 HC RX 637 (ALT 250 FOR IP): Performed by: INTERNAL MEDICINE

## 2019-04-21 PROCEDURE — 80048 BASIC METABOLIC PNL TOTAL CA: CPT

## 2019-04-21 PROCEDURE — 2580000003 HC RX 258: Performed by: STUDENT IN AN ORGANIZED HEALTH CARE EDUCATION/TRAINING PROGRAM

## 2019-04-21 PROCEDURE — C9113 INJ PANTOPRAZOLE SODIUM, VIA: HCPCS | Performed by: STUDENT IN AN ORGANIZED HEALTH CARE EDUCATION/TRAINING PROGRAM

## 2019-04-21 PROCEDURE — 1200000000 HC SEMI PRIVATE

## 2019-04-21 PROCEDURE — 6360000002 HC RX W HCPCS: Performed by: STUDENT IN AN ORGANIZED HEALTH CARE EDUCATION/TRAINING PROGRAM

## 2019-04-21 PROCEDURE — 36592 COLLECT BLOOD FROM PICC: CPT

## 2019-04-21 PROCEDURE — 99233 SBSQ HOSP IP/OBS HIGH 50: CPT | Performed by: INTERNAL MEDICINE

## 2019-04-21 RX ORDER — LISINOPRIL 20 MG/1
20 TABLET ORAL DAILY
Status: DISCONTINUED | OUTPATIENT
Start: 2019-04-22 | End: 2019-04-22 | Stop reason: HOSPADM

## 2019-04-21 RX ORDER — LEVOTHYROXINE SODIUM 0.05 MG/1
50 TABLET ORAL DAILY
Status: DISCONTINUED | OUTPATIENT
Start: 2019-04-21 | End: 2019-04-22 | Stop reason: HOSPADM

## 2019-04-21 RX ORDER — LEVOTHYROXINE SODIUM 0.05 MG/1
50 TABLET ORAL DAILY
Status: DISCONTINUED | OUTPATIENT
Start: 2019-04-21 | End: 2019-04-21

## 2019-04-21 RX ORDER — LISINOPRIL 10 MG/1
10 TABLET ORAL ONCE
Status: COMPLETED | OUTPATIENT
Start: 2019-04-21 | End: 2019-04-21

## 2019-04-21 RX ORDER — LISINOPRIL 5 MG/1
5 TABLET ORAL DAILY
Status: DISCONTINUED | OUTPATIENT
Start: 2019-04-22 | End: 2019-04-21

## 2019-04-21 RX ORDER — CARVEDILOL 6.25 MG/1
12.5 TABLET ORAL 2 TIMES DAILY WITH MEALS
Status: DISCONTINUED | OUTPATIENT
Start: 2019-04-21 | End: 2019-04-22 | Stop reason: HOSPADM

## 2019-04-21 RX ADMIN — CARVEDILOL 12.5 MG: 6.25 TABLET, FILM COATED ORAL at 17:59

## 2019-04-21 RX ADMIN — SODIUM CHLORIDE, PRESERVATIVE FREE 10 ML: 5 INJECTION INTRAVENOUS at 21:13

## 2019-04-21 RX ADMIN — LISINOPRIL 10 MG: 10 TABLET ORAL at 12:33

## 2019-04-21 RX ADMIN — LISINOPRIL 5 MG: 5 TABLET ORAL at 08:39

## 2019-04-21 RX ADMIN — CARVEDILOL 12.5 MG: 6.25 TABLET, FILM COATED ORAL at 08:39

## 2019-04-21 RX ADMIN — Medication: at 08:39

## 2019-04-21 RX ADMIN — SODIUM CHLORIDE, PRESERVATIVE FREE 10 ML: 5 INJECTION INTRAVENOUS at 08:39

## 2019-04-21 RX ADMIN — LEVOTHYROXINE SODIUM 50 MCG: 50 TABLET ORAL at 11:11

## 2019-04-21 RX ADMIN — PANTOPRAZOLE SODIUM 40 MG: 40 INJECTION, POWDER, FOR SOLUTION INTRAVENOUS at 08:39

## 2019-04-21 RX ADMIN — DEXTROSE MONOHYDRATE 12.5 G: 25 INJECTION, SOLUTION INTRAVENOUS at 06:58

## 2019-04-21 RX ADMIN — Medication: at 21:07

## 2019-04-21 RX ADMIN — INSULIN LISPRO 2 UNITS: 100 INJECTION, SOLUTION INTRAVENOUS; SUBCUTANEOUS at 21:08

## 2019-04-21 ASSESSMENT — PAIN SCALES - GENERAL
PAINLEVEL_OUTOF10: 0

## 2019-04-21 ASSESSMENT — PAIN - FUNCTIONAL ASSESSMENT: PAIN_FUNCTIONAL_ASSESSMENT: ACTIVITIES ARE NOT PREVENTED

## 2019-04-21 NOTE — PROGRESS NOTES
Pt transported to rm 4303 on monitor, with tube feed infusing via PEG per order, Bairhugger in place. All patient belongings with patient. Genny Nelson RN at bedside. Patient repositioned in bed, placed on telemetry monitor. Pt's daughter Rajani Tomlinson called to notify of transfer to room [phone].

## 2019-04-21 NOTE — PROGRESS NOTES
with hemospray. Hgb 9.5 today. - GI following, Dr. Mino Han  - H&H daily  - protonix 40mg IV daily     Acute blood loss anemia  Secondary to GI bleed. S/p 3 units pRBCs. - monitor daily  - keep Hgb > 7.0     Paroxysmal afib  Currently in NSR. Off anticoagulation 2/2 bleeding. May be candidate for Watchman device. - cardiology following  - OP referral to Dr. Zhen Paul for procedure     HTN  Poorly controlled. - continue Coreg 12.5mg BID   - continue lisinopril 5mg     HFpEF  Compensated. ECHO 1/2019 with EF 55-60% with right to left shunting (PFO vs ASD). - continue current management     CKD stage IV  - creatinine 1.7     Hypothyroidism  - switched to oral synthroid per PEG     DM2  - nightly insulin 5 units  - low dose sliding scale  - hypoglycemia treatment orders in place        F/N: DIET TUBE FEED CONTINUOUS/CYCLIC NPO; STANDARD WITHOUT FIBER (jevity 1.2); Gastrostomy (PEG);  Continuous; 20; 60; 24  Disposition: med/surg  Code: DNR-CCA        Discussed with attending:  Parisa De La uFente MD        Signed,    Robbert Rubinstein, MD  PGY3  Internal Medicine resident    7/56/73695:79 AM

## 2019-04-21 NOTE — CARE COORDINATION
Spoke with Dr. Oh Ear and potential discharge expected tomorrow 04/22 to return to [facility].      Electronically signed by Alisa Ganser, RN on 4/21/2019 at 4:40 PM  [phone]

## 2019-04-21 NOTE — PROGRESS NOTES
insulin lispro  0-3 Units Subcutaneous Nightly    pantoprazole  40 mg Intravenous Daily    HYDROCERIN   Topical BID    sodium chloride flush  10 mL Intravenous 2 times per day     Continuous Infusions:   dextrose       PRN Meds:hydrALAZINE, sodium chloride flush, magnesium hydroxide, ondansetron, glucose, dextrose, glucagon (rDNA), dextrose     Patient Active Problem List    Diagnosis Date Noted    Generalized abdominal pain     GI bleeding 04/17/2019    Acute metabolic encephalopathy 99/41/0910    UTI (urinary tract infection) 04/11/2019    Hypothermia 04/11/2019    CKD (chronic kidney disease), stage IV (HCC) 04/11/2019    Rectal mass     Rectal bleeding     Severe malnutrition ([address] 03/27/2019    Hypothyroidism 03/27/2019    Altered mental status     Oropharyngeal dysphagia     Weakness     SOB (shortness of breath)     Goals of care, counseling/discussion     DNR (do not resuscitate) discussion     Encounter for palliative care     possible sepsis 03/26/2019    Hypothermia 03/26/2019    SANDY (acute kidney injury) ([address] 27/20/9414    Metabolic acidosis 48/02/5098    Acute metabolic encephalopathy 13/16/3128    Hypoglycemia, exogenous insulin, in setting of SANDY 03/05/2019    Vertebral artery stenosis, right 03/01/2019    SANDY (acute kidney injury) ([address] 02/28/2019    Normocytic anemia 02/28/2019    Severe protein-calorie malnutrition ([address] 02/28/2019    S/P percutaneous endoscopic gastrostomy (PEG) tube placement ([address] 02/28/2019    Right to left cardiac shunt ([address] 02/28/2019    H/O ischemic multifocal multiple vascular territories stroke 02/28/2019    Anticoagulated 02/28/2019    History of stroke 01/17/2019    S/P hip hemiarthroplasty 12/29/2017    Uncontrolled type 2 diabetes mellitus with hyperglycemia, with long-term current use of insulin ([address] 10/17/2017    Paroxysmal atrial fibrillation (HCC)     Coronary artery disease involving native coronary artery of native heart without angina pectoris     Cardiomyopathy [facility]     Acute renal failure ([facility]     Hyperkalemia     Noncompliance with medication regimen 10/17/2016    Type 2 diabetes mellitus ([facility] 08/11/2016    Benign essential HTN 08/11/2016    Vitamin B deficiency 11/26/2012    Atherosclerosis of coronary artery 04/04/2011    Long term current use of insulin ([facility] 01/25/2011    Vitamin D deficiency 09/17/2010    Angiopathy, peripheral ([facility] 09/17/2010    Gonadotropin deficiency ([facility] 09/17/2010    Dyslipidemia 02/12/2010    Carotid artery narrowing 02/12/2010      Active Hospital Problems    Diagnosis Date Noted    Generalized abdominal pain [R10.84]     GI bleeding [K92.2] 04/17/2019    Altered mental status [R41.82]     Hypothermia [T68. XXXA] 03/26/2019    History of stroke [Z86.73] 01/17/2019    Paroxysmal atrial fibrillation ([facility] [I48.0]     Type 2 diabetes mellitus ([facility] [E11.9] 08/11/2016    Benign essential HTN [I10] 08/11/2016       Assessment and Plan:     1. History of paroxysmal atrial fibrillation, history of CVA  2. Significant GI bleed in the setting of oral anti-coagulation  3. Due to significant GI bleed, patient would not be a candidate for future oral anticoagulation  4. D/w Dr. Dilcia Arce - due to multiple comorbid conditions, patient is not a good candidate for left atrial appendage closure device  5. Increased Coreg to 12.5 mg b.i.d. secondary to poorly controlled hypertension  6. Continue to hold anticoagulation until cleared by GI    Thank you for allowing me to participate in the care of this patient. If you have any questions, please do not hesitate to contact me.     Linette Javed MD   Cardiac Electrophysiology  [address] [place] [address] [phone]  [employer]

## 2019-04-22 VITALS
BODY MASS INDEX: 17.81 KG/M2 | WEIGHT: 106.92 LBS | HEIGHT: 65 IN | SYSTOLIC BLOOD PRESSURE: 140 MMHG | TEMPERATURE: 97.5 F | HEART RATE: 69 BPM | DIASTOLIC BLOOD PRESSURE: 91 MMHG | OXYGEN SATURATION: 96 %

## 2019-04-22 LAB
ANION GAP SERPL CALCULATED.3IONS-SCNC: 10 MMOL/L (ref 3–16)
BANDED NEUTROPHILS RELATIVE PERCENT: 6 % (ref 0–7)
BASOPHILS ABSOLUTE: 0 K/UL (ref 0–0.2)
BASOPHILS RELATIVE PERCENT: 0 %
BUN BLDV-MCNC: 35 MG/DL (ref 7–20)
CALCIUM SERPL-MCNC: 8.9 MG/DL (ref 8.3–10.6)
CHLORIDE BLD-SCNC: 104 MMOL/L (ref 99–110)
CO2: 24 MMOL/L (ref 21–32)
CREAT SERPL-MCNC: 1.8 MG/DL (ref 0.8–1.3)
EKG ATRIAL RATE: 44 BPM
EKG DIAGNOSIS: NORMAL
EKG P AXIS: 71 DEGREES
EKG P-R INTERVAL: 144 MS
EKG Q-T INTERVAL: 554 MS
EKG QRS DURATION: 104 MS
EKG QTC CALCULATION (BAZETT): 473 MS
EKG R AXIS: 34 DEGREES
EKG T AXIS: 78 DEGREES
EKG VENTRICULAR RATE: 44 BPM
EOSINOPHILS ABSOLUTE: 0.1 K/UL (ref 0–0.6)
EOSINOPHILS RELATIVE PERCENT: 2 %
GFR AFRICAN AMERICAN: 45
GFR NON-AFRICAN AMERICAN: 37
GLUCOSE BLD-MCNC: 81 MG/DL (ref 70–99)
HCT VFR BLD CALC: 35.6 % (ref 40.5–52.5)
HEMOGLOBIN: 11.9 G/DL (ref 13.5–17.5)
LYMPHOCYTES ABSOLUTE: 0.8 K/UL (ref 1–5.1)
LYMPHOCYTES RELATIVE PERCENT: 14 %
MCH RBC QN AUTO: 29.6 PG (ref 26–34)
MCHC RBC AUTO-ENTMCNC: 33.5 G/DL (ref 31–36)
MCV RBC AUTO: 88.5 FL (ref 80–100)
MONOCYTES ABSOLUTE: 0.5 K/UL (ref 0–1.3)
MONOCYTES RELATIVE PERCENT: 9 %
MYELOCYTE PERCENT: 3 %
NEUTROPHILS ABSOLUTE: 4.1 K/UL (ref 1.7–7.7)
NEUTROPHILS RELATIVE PERCENT: 66 %
PDW BLD-RTO: 14.6 % (ref 12.4–15.4)
PLATELET # BLD: 355 K/UL (ref 135–450)
PMV BLD AUTO: 7.9 FL (ref 5–10.5)
POTASSIUM REFLEX MAGNESIUM: 4.6 MMOL/L (ref 3.5–5.1)
RBC # BLD: 4.03 M/UL (ref 4.2–5.9)
SODIUM BLD-SCNC: 138 MMOL/L (ref 136–145)
WBC # BLD: 5.5 K/UL (ref 4–11)

## 2019-04-22 PROCEDURE — 6370000000 HC RX 637 (ALT 250 FOR IP): Performed by: INTERNAL MEDICINE

## 2019-04-22 PROCEDURE — 3609008400 HC SIGMOIDOSCOPY DIAGNOSTIC: Performed by: INTERNAL MEDICINE

## 2019-04-22 PROCEDURE — C9113 INJ PANTOPRAZOLE SODIUM, VIA: HCPCS | Performed by: STUDENT IN AN ORGANIZED HEALTH CARE EDUCATION/TRAINING PROGRAM

## 2019-04-22 PROCEDURE — 85025 COMPLETE CBC W/AUTO DIFF WBC: CPT

## 2019-04-22 PROCEDURE — 6360000002 HC RX W HCPCS: Performed by: STUDENT IN AN ORGANIZED HEALTH CARE EDUCATION/TRAINING PROGRAM

## 2019-04-22 PROCEDURE — 99232 SBSQ HOSP IP/OBS MODERATE 35: CPT | Performed by: INTERNAL MEDICINE

## 2019-04-22 PROCEDURE — 2580000003 HC RX 258: Performed by: STUDENT IN AN ORGANIZED HEALTH CARE EDUCATION/TRAINING PROGRAM

## 2019-04-22 PROCEDURE — 2709999900 HC NON-CHARGEABLE SUPPLY: Performed by: INTERNAL MEDICINE

## 2019-04-22 PROCEDURE — 0DH63UZ INSERTION OF FEEDING DEVICE INTO STOMACH, PERCUTANEOUS APPROACH: ICD-10-PCS | Performed by: INTERNAL MEDICINE

## 2019-04-22 PROCEDURE — 36415 COLL VENOUS BLD VENIPUNCTURE: CPT

## 2019-04-22 PROCEDURE — 0DJD8ZZ INSPECTION OF LOWER INTESTINAL TRACT, VIA NATURAL OR ARTIFICIAL OPENING ENDOSCOPIC: ICD-10-PCS | Performed by: INTERNAL MEDICINE

## 2019-04-22 PROCEDURE — 80048 BASIC METABOLIC PNL TOTAL CA: CPT

## 2019-04-22 RX ORDER — LISINOPRIL 20 MG/1
20 TABLET ORAL DAILY
Qty: 30 TABLET | Refills: 1 | Status: ON HOLD | OUTPATIENT
Start: 2019-04-23 | End: 2019-05-04 | Stop reason: CLARIF

## 2019-04-22 RX ORDER — CARVEDILOL 12.5 MG/1
12.5 TABLET ORAL 2 TIMES DAILY WITH MEALS
Qty: 60 TABLET | Refills: 1 | Status: ON HOLD | OUTPATIENT
Start: 2019-04-22 | End: 2019-05-04 | Stop reason: CLARIF

## 2019-04-22 RX ORDER — CARVEDILOL 6.25 MG/1
12.5 TABLET ORAL 2 TIMES DAILY WITH MEALS
Qty: 60 TABLET | Refills: 3 | OUTPATIENT
Start: 2019-04-22

## 2019-04-22 RX ORDER — LISINOPRIL 20 MG/1
20 TABLET ORAL DAILY
Qty: 30 TABLET | Refills: 1 | OUTPATIENT
Start: 2019-04-22

## 2019-04-22 RX ADMIN — PANTOPRAZOLE SODIUM 40 MG: 40 INJECTION, POWDER, FOR SOLUTION INTRAVENOUS at 10:06

## 2019-04-22 RX ADMIN — Medication: at 10:06

## 2019-04-22 RX ADMIN — LISINOPRIL 20 MG: 20 TABLET ORAL at 10:12

## 2019-04-22 RX ADMIN — SODIUM CHLORIDE, PRESERVATIVE FREE 10 ML: 5 INJECTION INTRAVENOUS at 10:07

## 2019-04-22 RX ADMIN — CARVEDILOL 12.5 MG: 6.25 TABLET, FILM COATED ORAL at 10:06

## 2019-04-22 ASSESSMENT — PAIN SCALES - GENERAL
PAINLEVEL_OUTOF10: 0
PAINLEVEL_OUTOF10: 0

## 2019-04-22 NOTE — PROGRESS NOTES
Discharge note: Patient has been seen by doctor. Discharge order obtained, and discharge instructions reviewed. Patient educated, using the teach back method, about follow up instructions and discharge instructions. A completed copy of the AVS instructions given to mireya, mireya given brief report and all questions answered. IV catheter removed without complaints, catheter intact, site WNL. Discharged to [facility] via stretcher per mireya. Report called to Jacque Quiroga at [facility]. All questions answered,call back number left.   Electronically signed by Marie Conde RN on 4/22/2019 at 12:13 PM

## 2019-04-22 NOTE — CARE COORDINATION
Case Management Discharge Assessment    2019  [facility] Case Management Department    Patient: Payal Hamlin  MRN: [id number] / : 1945  ACCT: [de-identified]          Admission Documentation  Attending Provider: Joey Krause MD  Admit date/time: 2019  1:16 PM  Status: Inpatient [101]  Diagnosis: GI bleeding     Readmission within last 30 days:  yes     SW updated by charge Gatito that patient up from EGD and ready for discharge. SW confirmed with [facility] that patient can return today, orders/DNR-CCA faxed and confirmed. Jagdeep to call report ([phone]). SW spoke with patient daughter Elizabet Dumas ([phone]) who will meet patient back at facility. Daughter still agreeable to outpatient PC as discussed last admissions, [facility]. No other needs identified, SW intervention complete.       Living Situation  Discharge Planning  [facility]: <More Facilities>  Living Arrangements: Alone  Support Systems: Family Members  Potential Assistance Needed: Extended Care Facility  Type of Home Care Services: [facility]  Patient expects to be discharged to[de-identified] [facility]  Expected Discharge Date: 19    Service Assessment:       Values / Beliefs  Do you have any ethnic, cultural, sacramental, or spiritual Restoration needs you would like us to be aware of while you are in the hospital?: No    Advance Directives (For Healthcare)  Pre-existing DNR Comfort Care/DNR Arrest/DNI Order: No  Healthcare Directive: Yes, patient has an advance directive for healthcare treatment  Copy in Chart: Yes, copy in chart  Information on Healthcare Directives Requested: No  Patient Requests Assistance: No  Advance Directives: Pt. not interested at this time      Pharmacy: facility   Potential Assistance Purchasing Medications:  No  Does patient want to participate in local refill/meds to beds program?: No    Notification completed in HENS/PAS? No     IMM  Yes       Discharge disposition: [facility],[address] Phone: [phone] Fax: [phone]     LOC-LTC  [facility]  Phone of [phone]  Fax of [phone]    Mode of Transport-ambulance  Name of Marlyn W President Ta Paul  [facility]  Time of Sylvie Thomas and his family were provided with choice of provider; he and his family are in agreement with the discharge plan.       Care Transitions patient: No Karyle Fees, MSW, [facility]  Case Management Department  Ph: [phone]

## 2019-04-22 NOTE — DISCHARGE SUMMARY
Hospital Medicine Discharge Summary    Patient ID: Earnestine Villegas   Gender: male  : 1945   Age: 66 y.o. MRN: [id number]  Code Status: Prior    Patient's PCP: Bina Lima    Admit Date: 2019     Discharge Date: 2019     Admitting Physician: Marena Skiff, MD     Discharge Physician: Selma Sales MD     Discharge Diagnoses: Active Hospital Problems    Diagnosis Date Noted    Generalized abdominal pain [R10.84]     GI bleeding [K92.2] 2019    Altered mental status [R41.82]     Hypothermia [T68. XXXA] 2019    History of stroke [Z86.73] 2019    Paroxysmal atrial fibrillation ([facility] [I48.0]     Type 2 diabetes mellitus ([facility] [E11.9] 2016    Benign essential HTN [I10] 2016       The patient was seen and examined on day of discharge and this discharge summary is in conjunction with any daily progress note from day of discharge. Hospital Course:     Mr. Marnie Hopper is a 67 y/o M with a PMHx of MCA stroke (2019), Afib (Eliquis), chronic diastolic HF echo:  (RB 55%), CAD, HTN, HLD, DMII, and gastric ulcers who presented from his nursing facility with bright red blood per his rectum. Patient was initially admitted to the general floors but was transferred to the ICU for hypotension and. He received k centra and was transfused 3 units of blood. Blood pressure stabilized. GI performed a colonoscopy which revealed a bleeding rectal ulcer that was controlled with hemospray. Patient was monitored closely and his hemoglobin remained stable. He was transferred to the general medical floors. Repeat scope revealed a well healing ulcer with no further bleeding. Patient did pull out his peg tube during admission that was replaced. Cardiology was consulted for their input regarding patient's anticoagulation. He carries a real risk of stroke due to afib but has also had two GI bleeds while on anticoagulation.  He was evaluated for watchman procedure but was MANAGEMENT  IP CONSULT TO PALLIATIVE CARE  IP CONSULT TO GENERAL SURGERY  IP CONSULT TO CRITICAL CARE  IP CONSULT TO CARDIOLOGY  IP CONSULT TO DIETITIAN    Disposition:  Discharge to skilled facility    Condition at Discharge: Stable    Discharge Instructions/Follow-up:      - PT/OT  - Tube feeds, recommend nutrition consult  -outpatient palliative care ([place])  - No anticoagulation due to recurrent GIB, ok for ASA  - Insulin Sliding scale:  Glucose:  Dose:     No Insulin  140-199  1 Unit  200-249  2 Units  250-299  3 Units  300-349  4 Units  350-399  5 Units  400 and above 6 Units    Code Status:  Prior     Activity: activity as tolerated    Diet: tube feeds, diabetic diet, NPO      Discharge Medications:     Discharge Medication List as of 4/22/2019 11:50 AM           Details   insulin lispro (HUMALOG) 100 UNIT/ML pen Inject 0-6 Units into the skin every 4 hours, Disp-5 pen, R-1Print              Details   lisinopril (PRINIVIL;ZESTRIL) 20 MG tablet 1 tablet by PEG Tube route daily, Disp-30 tablet, R-1Print      carvedilol (COREG) 12.5 MG tablet 1 tablet by PEG Tube route 2 times daily (with meals), Disp-60 tablet, R-1Print              Details   insulin glargine (TOUJEO MAX SOLOSTAR) 300 UNIT/ML injection pen Inject 5 Units into the skin nightlyHistorical Med      levothyroxine (SYNTHROID) 100 MCG tablet Take 1 tablet by mouth daily, Disp-30 tablet, R-0Print      omeprazole (PRILOSEC) 20 MG delayed release capsule Take 20 mg by mouth 2 times dailyHistorical Med      tamsulosin (FLOMAX) 0.4 MG capsule Take 1 capsule by mouth Daily with supper, Disp-30 capsule, R-3Print      vitamin D (ERGOCALCIFEROL) 24081 units CAPS capsule Take 50,000 Units by mouth Twice a Week Give on Monday and ThursdayHistorical Med      megestrol (MEGACE) 40 MG/ML suspension Take 400 mg by mouth dailyHistorical Med      mirtazapine (REMERON) 15 MG tablet Take 15 mg by mouth nightlyHistorical Med      ferrous sulfate 325 (65 Fe) MG tablet Take 325 mg by mouth 3 times daily (with meals) Historical Med      amLODIPine (NORVASC) 5 MG tablet Take 5 mg by mouth daily Historical Med      aspirin 81 MG tablet Take 81 mg by mouth daily Historical Med           APIXABAN STOPPED due to recurrent GIBs    Signed: Mariposa Ayala MD  PGY1, Internal Medicine  04/22/2019    I have personally seen and evaluated this patient. I discussed findings and management with the resident, on 04/22/2019  and agree as documented in this note    Thank you Tana Landau for the opportunity to be involved in this patient's care. If you have any questions or concerns please feel free to contact me at [phone]. My time spent reviewing discharge plan and follow ups with resident, along with performing independent review of discharge medicines along with counseling the patient exceeds 30 minutes.     [facility]  Attending Physician

## 2019-04-22 NOTE — PLAN OF CARE
Problem: Falls - Risk of:  Goal: Absence of physical injury  Description  Absence of physical injury  4/22/2019 0211 by Tigist Carey RN  Note:   Pt with absence of falls this shift. Pt has nonskid socks in place and bed alarm is on. Pt does not demonstrate safety awareness. RN monitors frequently in the anticipation of needs. Will continue to monitor. Problem: Bowel Function - Altered:  Goal: Bowel elimination is within specified parameters  Description  Bowel elimination is within specified parameters  4/22/2019 0211 by Tigist Carey RN  Outcome: Ongoing  Note:   No sign of bleeding in BMs this shift. VSS. TF stopped at 2200 and NPO for GI procedure in AM. Will continue to monitor.

## 2019-04-22 NOTE — PROGRESS NOTES
[facility]  Cardiology Inpatient Consult Service  Daily Progress Note        Admit Date:  4/17/2019    Referring Physician: Maverick Palafox MD    Reason for Consultation/Chief Complaint: Cristela\b    Subjective: Interval history:  Endo  today    Medications:   levothyroxine  50 mcg PEG Tube Daily    carvedilol  12.5 mg PEG Tube BID WC    lisinopril  20 mg PEG Tube Daily    insulin lispro  0-6 Units Subcutaneous TID WC    insulin lispro  0-3 Units Subcutaneous Nightly    pantoprazole  40 mg Intravenous Daily    HYDROCERIN   Topical BID    sodium chloride flush  10 mL Intravenous 2 times per day       IV drips:   dextrose         PRN:  hydrALAZINE, sodium chloride flush, magnesium hydroxide, ondansetron, glucose, dextrose, glucagon (rDNA), dextrose      Objective:     Vitals:    04/22/19 0829 04/22/19 0830 04/22/19 0831 04/22/19 0832   BP:       Pulse: 81 69     Resp: 9 11 (!) 0 (!) 0   Temp:       TempSrc:       SpO2: 94% 96%     Weight:       Height:           Intake/Output Summary (Last 24 hours) at 4/22/2019 0931  Last data filed at 4/22/2019 0523  Gross per 24 hour   Intake 697 ml   Output 350 ml   Net 347 ml     I/O last 3 completed shifts:   In: 225 [I.V.:30; NG/GT:697]  Out: 350 [Urine:350]  Wt Readings from Last 3 Encounters:   04/22/19 106 lb 14.8 oz (48.5 kg)   04/15/19 104 lb 11.5 oz (47.5 kg)   04/07/19 119 lb 11.4 oz (54.3 kg)       Admit Wt: Weight: 105 lb 6.1 oz (47.8 kg)   Todays Wt: Weight: 106 lb 14.8 oz (48.5 kg)    TELEMETRY: Sinus     Physical Exam:     General:  Awake, alert, NAD  Skin:  Warm and dry  Neck:  -JVP  Chest:  Clear to auscultation, respiration normal  Cardiovascular:  RRR S1S2  Abdomen:  Soft -  Extremities:  - edema      Objective    Labs:   Recent Labs     04/20/19  0452 04/21/19  0616   * 138   K 4.5 4.1   BUN 33* 34*   CREATININE 2.0* 1.7*    107   CO2 19* 24   GLUCOSE 238* 36*   CALCIUM 8.1* 8.0*     Recent Labs 04/20/19  0452 04/21/19  0616   WBC 6.0 5.2   HGB 11.1* 9.5*   HCT 32.0* 27.9*    295   MCV 88.3 86.9     No results for input(s): CHOLTOT, TRIG, HDL in the last 72 hours. Invalid input(s): LIPIDCOMM, CHOLHDL, VLDCHOL, LDL  No results for input(s): PTT, INR in the last 72 hours. Invalid input(s): PT  No results for input(s): CKTOTAL, CKMB, CKMBINDEX, TROPONINI in the last 72 hours. No results for input(s): BNP in the last 72 hours. No results for input(s): NTPROBNP in the last 72 hours. No results for input(s): TSH in the last 72 hours. Imaging:   I personally reviewed imaging studies including CXR, Stress test, TTE/HAIR. Assessment & Plan:     Afib/GI Bleed/HTN  - now in NSR  - ideally would like to resume [address] when ok with GI, but likely risk is too high. Consider at least ASA. - poor candidate for Watchman procedure (discussed prev)  - If BP remains high may need to increase lisinopril to 40mg. Thank you for allowing to us to participate in the care of SWEEPiO. Please call our service with questions. All questions and concerns were addressed to the patient/family. Alternatives to my treatment were discussed. The note was completed using EMR. Every effort was made to ensure accuracy; however, inadvertent computerized transcription errors may be present. Yunior Chau MD, [address] [facility]    4/22/2019 9:31 AM

## 2019-04-22 NOTE — PROGRESS NOTES
Consent obtained for Flexible Sigmoidoscopy per MD Attar by pts daughter, Hinda Heimlich over phone. Signed off by two RNs.

## 2019-04-22 NOTE — PROGRESS NOTES
Pt returned form Endo per transportation.  Electronically signed by Marie Conde RN on 4/22/2019 at 9:58 AM

## 2019-04-22 NOTE — OP NOTE
[address]                                OPERATIVE REPORT    PATIENT NAME: Charles Perez                       :        1945  MED REC NO:   [id number]                          ROOM:       4303  ACCOUNT NO:   [de-identified]                           ADMIT DATE: 2019  PROVIDER:     Gerda Higgins MD    DATE OF PROCEDURE:  2019    INDICATION FOR THE PROCEDURE:  The patient is a 64-year-old man, who  presented with massive GI bleeding, originated from the rectum. At that  time, hemostasis was achieved with Hemospray. The patient had blood  transfusion. His Eliquis has been discontinued. The patient finally  stabilized. Today, he is having fiberoptic flexible proctosigmoidoscopy  to reevaluate the bleeding area prior to discharge. PROCEDURE:  With the patient in the left lateral position, and after no  sedation, the Olympus video PCF colonoscope was inserted into the  rectum. The bleeding point was identified and appeared to be  superficial ulceration that is in the process of healing. No active  bleeding was noted on today's examination. Scope was then removed  without complication. IMPRESSION:  Healing superficial rectal ulcer. No active bleeding.         Millicent Oliveros MD    D: 2019 8:45:59       T: 2019 12:34:17     [email or web address]  Job#: [id number]     Doc#: [id number]    CC:

## 2019-04-22 NOTE — PROGRESS NOTES
GI Progress Note    Alfredo Helton is a 66 y.o. male patient. 1. Rectal bleeding    2. Gastrointestinal hemorrhage, unspecified gastrointestinal hemorrhage type    3. Atrial Fibrillation    Admit Date: 2019    Subjective:       No major change  Hg dropped to 9.5   No signs of active bleed      Scheduled Meds:   levothyroxine  50 mcg PEG Tube Daily    carvedilol  12.5 mg PEG Tube BID WC    [START ON 2019] lisinopril  20 mg PEG Tube Daily    insulin lispro  0-6 Units Subcutaneous TID WC    insulin lispro  0-3 Units Subcutaneous Nightly    pantoprazole  40 mg Intravenous Daily    HYDROCERIN   Topical BID    sodium chloride flush  10 mL Intravenous 2 times per day       Continuous Infusions:   dextrose         PRN Meds:  hydrALAZINE, sodium chloride flush, magnesium hydroxide, ondansetron, glucose, dextrose, glucagon (rDNA), dextrose      Objective:       Patient Vitals for the past 24 hrs:   BP Temp Temp src Pulse Resp SpO2   19 1924 120/64 97.1 °F (36.2 °C) Temporal 72 18 99 %   19 1738 -- -- -- 59 -- --   19 1737 -- 97.5 °F (36.4 °C) Temporal -- -- --   19 1646 (!) 101/54 95.7 °F (35.4 °C) Temporal 66 12 98 %   19 1422 102/66 (!) 91.1 °F (32.8 °C) Rectal 53 12 100 %   19 1200 132/71 (!) 90 °F (32.2 °C) Temporal 50 8 99 %   19 1000 -- -- -- 59 13 --   19 0900 -- -- -- 70 11 --   19 0830 (!) 171/108 (!) 90 °F (32.2 °C) Temporal 73 14 100 %   19 0800 -- -- -- 75 16 --   19 0700 -- -- -- 66 13 --   19 0400 126/85 97 °F (36.1 °C) Axillary 58 10 99 %   19 0000 (!) 169/98 96.9 °F (36.1 °C) Axillary 69 13 98 %       Exam:    VITALS:  /64   Pulse 72   Temp 97.1 °F (36.2 °C) (Temporal)   Resp 18   Ht 5' 5\" (1.651 m)   Wt 109 lb 12.6 oz (49.8 kg)   SpO2 99%   BMI 18.27 kg/m²   TEMPERATURE:  Current - Temp: 97.1 °F (36.2 °C);  Max - Temp  Av.4 °F (34.7 °C)  Min: 90 °F (32.2 °C)  Max: 97.5 °F (36.4 °C)    NAD  Head: Normocephalic, without obvious abnormality, atraumatic  Neck: supple, symmetrical, trachea midline and thyroid not enlarged, symmetric, no tenderness/mass/nodules  CVS:  RRR, Nl s1s2  Lungs CTA Bilaterally, normal effort  Abdomen: soft, non-tender; bowel sounds normal; no masses,  no organomegaly. Peg in good position  Extremities: No edema. Lab Data:  Recent Labs     04/19/19 0455 04/20/19 0452 04/21/19  0616   WBC 5.6 6.0 5.2   HGB 9.9* 11.1* 9.5*   HCT 29.1* 32.0* 27.9*   MCV 88.4 88.3 86.9    310 295     Recent Labs     04/19/19 0455 04/20/19 0452 04/21/19  0616    135* 138   K 4.5 4.5 4.1    103 107   CO2 19* 19* 24   BUN 35* 33* 34*   CREATININE 2.0* 2.0* 1.7*     No results for input(s): AST, ALT, ALB, BILIDIR, BILITOT, ALKPHOS in the last 72 hours. No results for input(s): LIPASE, AMYLASE in the last 72 hours. No results for input(s): PROT, INR in the last 72 hours. No results for input(s): PTT in the last 72 hours. No results for input(s): OCCULTBLD in the last 72 hours. Assessment:       Active Problems:    Type 2 diabetes mellitus (HCC)    Benign essential HTN    Paroxysmal atrial fibrillation (HCC)    History of stroke    Hypothermia    Altered mental status    GI bleeding    Generalized abdominal pain  Resolved Problems:    * No resolved hospital problems. *          Recommendations:       Discussed with Dr Roxanne Adamson. Due to his poor medical condition he is not a candidate for watchman device    He is at high risk of re bleed if anticoagulant started again.  Hg dropped today to 9.5    Will plan a FU FFS in damon Fuller MD  4/21/2019

## 2019-04-22 NOTE — PROGRESS NOTES
Pt to endoscopy per transportation.  Electronically signed by Heather Skinner RN on 4/22/2019 at 7:31 AM

## 2019-04-22 NOTE — PROGRESS NOTES
GI Progress Note    Ethyl Party is a 66 y.o. male patient. 1. Rectal bleeding    2. Gastrointestinal hemorrhage, unspecified gastrointestinal hemorrhage type    3. Atrial fibrillation  4. CAD  5.       CKD    Admit Date: 4/17/2019    Subjective:       Pulled his peg tube early this am  No active bleed  confused      ROS:  Cardiovascular ROS: no chest pain or dyspnea on exertion  Gastrointestinal ROS: no abdominal pain, no hematochezia noted  Respiratory ROS: no cough, shortness of breath, or wheezing    Scheduled Meds:   levothyroxine  50 mcg PEG Tube Daily    carvedilol  12.5 mg PEG Tube BID WC    lisinopril  20 mg PEG Tube Daily    insulin lispro  0-6 Units Subcutaneous TID WC    insulin lispro  0-3 Units Subcutaneous Nightly    pantoprazole  40 mg Intravenous Daily    HYDROCERIN   Topical BID    sodium chloride flush  10 mL Intravenous 2 times per day       Continuous Infusions:   dextrose         PRN Meds:  hydrALAZINE, sodium chloride flush, magnesium hydroxide, ondansetron, glucose, dextrose, glucagon (rDNA), dextrose      Objective:       Patient Vitals for the past 24 hrs:   BP Temp Temp src Pulse Resp SpO2 Weight   04/22/19 0832 -- -- -- -- (!) 0 -- --   04/22/19 0831 -- -- -- -- (!) 0 -- --   04/22/19 0830 -- -- -- 69 11 96 % --   04/22/19 0829 -- -- -- 81 9 94 % --   04/22/19 0828 -- -- -- 85 9 98 % --   04/22/19 0827 (!) 140/91 -- -- 77 (!) 0 97 % --   04/22/19 0826 -- -- -- 81 9 97 % --   04/22/19 0819 -- -- -- 82 -- 97 % --   04/22/19 0818 -- -- -- -- (!) 0 -- --   04/22/19 0817 (!) 139/91 -- -- 81 13 97 % --   04/22/19 0816 -- -- -- 83 15 95 % --   04/22/19 0815 (!) 138/93 -- -- 83 12 95 % --   04/22/19 0738 (!) 150/88 97.5 °F (36.4 °C) Oral 86 18 99 % --   04/22/19 0730 (!) 164/99 97 °F (36.1 °C) Temporal 85 16 98 % --   04/22/19 0334 (!) 152/82 96.3 °F (35.7 °C) Temporal 73 16 100 % 106 lb 14.8 oz (48.5 kg)   04/21/19 2325 135/76 97.1 °F (36.2 °C) Temporal 78 18 100 % -- 19 1924 120/64 97.1 °F (36.2 °C) Temporal 72 18 99 % --   19 1738 -- -- -- 59 -- -- --   19 1737 -- 97.5 °F (36.4 °C) Temporal -- -- -- --   19 1646 (!) 101/54 95.7 °F (35.4 °C) Temporal 66 12 98 % --   19 1422 102/66 (!) 91.1 °F (32.8 °C) Rectal 53 12 100 % --   19 1200 132/71 (!) 90 °F (32.2 °C) Temporal 50 8 99 % --   19 1000 -- -- -- 59 13 -- --   19 0900 -- -- -- 70 11 -- --       Exam:    VITALS:  BP (!) 140/91   Pulse 69   Temp 97.5 °F (36.4 °C) (Oral)   Resp (!) 0   Ht 5' 5\" (1.651 m)   Wt 106 lb 14.8 oz (48.5 kg)   SpO2 96%   BMI 17.79 kg/m²   TEMPERATURE:  Current - Temp: 97.5 °F (36.4 °C); Max - Temp  Av.5 °F (35.3 °C)  Min: 90 °F (32.2 °C)  Max: 97.5 °F (36.4 °C)    NAD  confused  Head: Normocephalic, without obvious abnormality, atraumatic  Neck: supple, symmetrical, trachea midline and thyroid not enlarged, symmetric, no tenderness/mass/nodules  CVS:  RRR, Nl s1s2  Lungs CTA Bilaterally, normal effort  Abdomen: soft, non-tender; bowel sounds normal; no masses,  no organomegaly. Gt is out  Extremities: No edema. Lab Data:  Recent Labs     19  0616   WBC 6.0 5.2   HGB 11.1* 9.5*   HCT 32.0* 27.9*   MCV 88.3 86.9    295     Recent Labs     19  0616   * 138   K 4.5 4.1    107   CO2 19* 24   BUN 33* 34*   CREATININE 2.0* 1.7*     No results for input(s): AST, ALT, ALB, BILIDIR, BILITOT, ALKPHOS in the last 72 hours. No results for input(s): LIPASE, AMYLASE in the last 72 hours. No results for input(s): PROT, INR in the last 72 hours. No results for input(s): PTT in the last 72 hours. No results for input(s): OCCULTBLD in the last 72 hours.       Assessment:       Active Problems:    Type 2 diabetes mellitus (HCC)    Benign essential HTN    Paroxysmal atrial fibrillation (HCC)    History of stroke    Hypothermia    Altered mental status    GI bleeding    Generalized abdominal

## 2019-05-04 ENCOUNTER — HOSPITAL ENCOUNTER (INPATIENT)
Age: 74
LOS: 2 days | Discharge: NURSING, REHAB OR LONG-TERM CARE | DRG: 689 | End: 2019-05-06
Attending: EMERGENCY MEDICINE | Admitting: INTERNAL MEDICINE
Payer: MEDICARE

## 2019-05-04 ENCOUNTER — APPOINTMENT (OUTPATIENT)
Dept: GENERAL RADIOLOGY | Age: 74
DRG: 689 | End: 2019-05-04
Payer: MEDICARE

## 2019-05-04 DIAGNOSIS — N30.00 ACUTE CYSTITIS WITHOUT HEMATURIA: Primary | ICD-10-CM

## 2019-05-04 PROBLEM — I95.9 HYPOTENSION: Status: ACTIVE | Noted: 2019-05-04

## 2019-05-04 LAB
A/G RATIO: 0.5 (ref 1.1–2.2)
ABO/RH: NORMAL
ALBUMIN SERPL-MCNC: 1.8 G/DL (ref 3.4–5)
ALP BLD-CCNC: 92 U/L (ref 40–129)
ALT SERPL-CCNC: 20 U/L (ref 10–40)
ANION GAP SERPL CALCULATED.3IONS-SCNC: 10 MMOL/L (ref 3–16)
ANTIBODY SCREEN: NORMAL
AST SERPL-CCNC: 17 U/L (ref 15–37)
BACTERIA: ABNORMAL /HPF
BASOPHILS ABSOLUTE: 0 K/UL (ref 0–0.2)
BASOPHILS RELATIVE PERCENT: 0.5 %
BILIRUB SERPL-MCNC: <0.2 MG/DL (ref 0–1)
BILIRUBIN URINE: ABNORMAL
BLOOD, URINE: ABNORMAL
BUN BLDV-MCNC: 69 MG/DL (ref 7–20)
CALCIUM SERPL-MCNC: 8 MG/DL (ref 8.3–10.6)
CHLORIDE BLD-SCNC: 98 MMOL/L (ref 99–110)
CLARITY: ABNORMAL
CO2: 22 MMOL/L (ref 21–32)
COLOR: YELLOW
CREAT SERPL-MCNC: 2.3 MG/DL (ref 0.8–1.3)
EOSINOPHILS ABSOLUTE: 0.2 K/UL (ref 0–0.6)
EOSINOPHILS RELATIVE PERCENT: 2 %
EPITHELIAL CELLS, UA: ABNORMAL /HPF
GFR AFRICAN AMERICAN: 34
GFR NON-AFRICAN AMERICAN: 28
GLOBULIN: 3.3 G/DL
GLUCOSE BLD-MCNC: 165 MG/DL (ref 70–99)
GLUCOSE BLD-MCNC: 227 MG/DL (ref 70–99)
GLUCOSE URINE: 100 MG/DL
HCT VFR BLD CALC: 25.1 % (ref 40.5–52.5)
HEMOGLOBIN: 8.4 G/DL (ref 13.5–17.5)
INR BLD: 1.01 (ref 0.86–1.14)
KETONES, URINE: NEGATIVE MG/DL
LACTIC ACID: 1.7 MMOL/L (ref 0.4–2)
LEUKOCYTE ESTERASE, URINE: ABNORMAL
LYMPHOCYTES ABSOLUTE: 0.8 K/UL (ref 1–5.1)
LYMPHOCYTES RELATIVE PERCENT: 9.5 %
MCH RBC QN AUTO: 29.9 PG (ref 26–34)
MCHC RBC AUTO-ENTMCNC: 33.3 G/DL (ref 31–36)
MCV RBC AUTO: 89.9 FL (ref 80–100)
MICROSCOPIC EXAMINATION: YES
MONOCYTES ABSOLUTE: 0.8 K/UL (ref 0–1.3)
MONOCYTES RELATIVE PERCENT: 9.5 %
NEUTROPHILS ABSOLUTE: 6.3 K/UL (ref 1.7–7.7)
NEUTROPHILS RELATIVE PERCENT: 78.5 %
NITRITE, URINE: NEGATIVE
PDW BLD-RTO: 15.3 % (ref 12.4–15.4)
PERFORMED ON: ABNORMAL
PH UA: 6.5 (ref 5–8)
PLATELET # BLD: 317 K/UL (ref 135–450)
PMV BLD AUTO: 7.4 FL (ref 5–10.5)
POTASSIUM REFLEX MAGNESIUM: 5.5 MMOL/L (ref 3.5–5.1)
PROTEIN UA: 100 MG/DL
PROTHROMBIN TIME: 11.5 SEC (ref 9.8–13)
RBC # BLD: 2.79 M/UL (ref 4.2–5.9)
RBC UA: ABNORMAL /HPF (ref 0–2)
SODIUM BLD-SCNC: 130 MMOL/L (ref 136–145)
SPECIFIC GRAVITY UA: 1.01 (ref 1–1.03)
TOTAL PROTEIN: 5.1 G/DL (ref 6.4–8.2)
URINE TYPE: ABNORMAL
UROBILINOGEN, URINE: 0.2 E.U./DL
WBC # BLD: 8 K/UL (ref 4–11)
WBC UA: >100 /HPF (ref 0–5)

## 2019-05-04 PROCEDURE — 83036 HEMOGLOBIN GLYCOSYLATED A1C: CPT

## 2019-05-04 PROCEDURE — 81001 URINALYSIS AUTO W/SCOPE: CPT

## 2019-05-04 PROCEDURE — 2580000003 HC RX 258: Performed by: EMERGENCY MEDICINE

## 2019-05-04 PROCEDURE — 86900 BLOOD TYPING SEROLOGIC ABO: CPT

## 2019-05-04 PROCEDURE — 86850 RBC ANTIBODY SCREEN: CPT

## 2019-05-04 PROCEDURE — 99285 EMERGENCY DEPT VISIT HI MDM: CPT

## 2019-05-04 PROCEDURE — 6370000000 HC RX 637 (ALT 250 FOR IP): Performed by: INTERNAL MEDICINE

## 2019-05-04 PROCEDURE — 36415 COLL VENOUS BLD VENIPUNCTURE: CPT

## 2019-05-04 PROCEDURE — 6360000002 HC RX W HCPCS: Performed by: EMERGENCY MEDICINE

## 2019-05-04 PROCEDURE — 85025 COMPLETE CBC W/AUTO DIFF WBC: CPT

## 2019-05-04 PROCEDURE — 96360 HYDRATION IV INFUSION INIT: CPT

## 2019-05-04 PROCEDURE — 80053 COMPREHEN METABOLIC PANEL: CPT

## 2019-05-04 PROCEDURE — 83605 ASSAY OF LACTIC ACID: CPT

## 2019-05-04 PROCEDURE — 1200000000 HC SEMI PRIVATE

## 2019-05-04 PROCEDURE — 93005 ELECTROCARDIOGRAM TRACING: CPT | Performed by: EMERGENCY MEDICINE

## 2019-05-04 PROCEDURE — 85610 PROTHROMBIN TIME: CPT

## 2019-05-04 PROCEDURE — 71045 X-RAY EXAM CHEST 1 VIEW: CPT

## 2019-05-04 PROCEDURE — 96361 HYDRATE IV INFUSION ADD-ON: CPT

## 2019-05-04 PROCEDURE — 86901 BLOOD TYPING SEROLOGIC RH(D): CPT

## 2019-05-04 PROCEDURE — 2700000000 HC OXYGEN THERAPY PER DAY

## 2019-05-04 PROCEDURE — 2580000003 HC RX 258: Performed by: INTERNAL MEDICINE

## 2019-05-04 PROCEDURE — 94761 N-INVAS EAR/PLS OXIMETRY MLT: CPT

## 2019-05-04 RX ORDER — CARVEDILOL 6.25 MG/1
6.25 TABLET ORAL 2 TIMES DAILY WITH MEALS
Status: ON HOLD | COMMUNITY
End: 2019-05-06 | Stop reason: HOSPADM

## 2019-05-04 RX ORDER — DEXTROSE MONOHYDRATE 50 MG/ML
100 INJECTION, SOLUTION INTRAVENOUS PRN
Status: DISCONTINUED | OUTPATIENT
Start: 2019-05-04 | End: 2019-05-06 | Stop reason: HOSPADM

## 2019-05-04 RX ORDER — ACETAMINOPHEN 325 MG/1
650 TABLET ORAL EVERY 4 HOURS PRN
Status: DISCONTINUED | OUTPATIENT
Start: 2019-05-04 | End: 2019-05-06 | Stop reason: HOSPADM

## 2019-05-04 RX ORDER — ONDANSETRON 2 MG/ML
4 INJECTION INTRAMUSCULAR; INTRAVENOUS EVERY 6 HOURS PRN
Status: DISCONTINUED | OUTPATIENT
Start: 2019-05-04 | End: 2019-05-06 | Stop reason: HOSPADM

## 2019-05-04 RX ORDER — MEGESTROL ACETATE 40 MG/ML
400 SUSPENSION ORAL DAILY
Status: DISCONTINUED | OUTPATIENT
Start: 2019-05-05 | End: 2019-05-04

## 2019-05-04 RX ORDER — LEVOTHYROXINE SODIUM 0.1 MG/1
100 TABLET ORAL DAILY
Status: DISCONTINUED | OUTPATIENT
Start: 2019-05-05 | End: 2019-05-06 | Stop reason: HOSPADM

## 2019-05-04 RX ORDER — SODIUM CHLORIDE 0.9 % (FLUSH) 0.9 %
10 SYRINGE (ML) INJECTION PRN
Status: DISCONTINUED | OUTPATIENT
Start: 2019-05-04 | End: 2019-05-06 | Stop reason: HOSPADM

## 2019-05-04 RX ORDER — MIRTAZAPINE 15 MG/1
15 TABLET, FILM COATED ORAL NIGHTLY
Status: DISCONTINUED | OUTPATIENT
Start: 2019-05-04 | End: 2019-05-06 | Stop reason: HOSPADM

## 2019-05-04 RX ORDER — SODIUM CHLORIDE 9 MG/ML
INJECTION, SOLUTION INTRAVENOUS CONTINUOUS
Status: ACTIVE | OUTPATIENT
Start: 2019-05-04 | End: 2019-05-05

## 2019-05-04 RX ORDER — NICOTINE POLACRILEX 4 MG
15 LOZENGE BUCCAL PRN
Status: DISCONTINUED | OUTPATIENT
Start: 2019-05-04 | End: 2019-05-06 | Stop reason: HOSPADM

## 2019-05-04 RX ORDER — PANTOPRAZOLE SODIUM 40 MG/1
40 GRANULE, DELAYED RELEASE ORAL
Status: DISCONTINUED | OUTPATIENT
Start: 2019-05-05 | End: 2019-05-06 | Stop reason: HOSPADM

## 2019-05-04 RX ORDER — FERROUS SULFATE 300 MG/5ML
300 LIQUID (ML) ORAL
Status: DISCONTINUED | OUTPATIENT
Start: 2019-05-05 | End: 2019-05-06 | Stop reason: HOSPADM

## 2019-05-04 RX ORDER — ASPIRIN 81 MG/1
81 TABLET ORAL DAILY
Status: DISCONTINUED | OUTPATIENT
Start: 2019-05-05 | End: 2019-05-06 | Stop reason: HOSPADM

## 2019-05-04 RX ORDER — TAMSULOSIN HYDROCHLORIDE 0.4 MG/1
0.4 CAPSULE ORAL
Status: DISCONTINUED | OUTPATIENT
Start: 2019-05-04 | End: 2019-05-06 | Stop reason: HOSPADM

## 2019-05-04 RX ORDER — 0.9 % SODIUM CHLORIDE 0.9 %
1000 INTRAVENOUS SOLUTION INTRAVENOUS ONCE
Status: COMPLETED | OUTPATIENT
Start: 2019-05-04 | End: 2019-05-04

## 2019-05-04 RX ORDER — DEXTROSE MONOHYDRATE 25 G/50ML
12.5 INJECTION, SOLUTION INTRAVENOUS PRN
Status: DISCONTINUED | OUTPATIENT
Start: 2019-05-04 | End: 2019-05-06 | Stop reason: HOSPADM

## 2019-05-04 RX ORDER — SODIUM CHLORIDE 0.9 % (FLUSH) 0.9 %
10 SYRINGE (ML) INJECTION EVERY 12 HOURS SCHEDULED
Status: DISCONTINUED | OUTPATIENT
Start: 2019-05-04 | End: 2019-05-06 | Stop reason: HOSPADM

## 2019-05-04 RX ORDER — LISINOPRIL 5 MG/1
5 TABLET ORAL DAILY
Status: ON HOLD | COMMUNITY
End: 2019-05-06 | Stop reason: HOSPADM

## 2019-05-04 RX ADMIN — MIRTAZAPINE 15 MG: 15 TABLET, FILM COATED ORAL at 23:02

## 2019-05-04 RX ADMIN — TAMSULOSIN HYDROCHLORIDE 0.4 MG: 0.4 CAPSULE ORAL at 23:02

## 2019-05-04 RX ADMIN — INSULIN LISPRO 1 UNITS: 100 INJECTION, SOLUTION INTRAVENOUS; SUBCUTANEOUS at 22:52

## 2019-05-04 RX ADMIN — SODIUM CHLORIDE 1000 ML: 9 INJECTION, SOLUTION INTRAVENOUS at 18:51

## 2019-05-04 RX ADMIN — SODIUM CHLORIDE: 9 INJECTION, SOLUTION INTRAVENOUS at 22:50

## 2019-05-04 RX ADMIN — CEFTRIAXONE 1 G: 1 INJECTION, POWDER, FOR SOLUTION INTRAMUSCULAR; INTRAVENOUS at 20:49

## 2019-05-04 ASSESSMENT — ENCOUNTER SYMPTOMS
DIARRHEA: 0
ABDOMINAL PAIN: 0
CHEST TIGHTNESS: 0
VOMITING: 0
SHORTNESS OF BREATH: 0

## 2019-05-04 ASSESSMENT — PAIN DESCRIPTION - PROGRESSION: CLINICAL_PROGRESSION: NOT CHANGED

## 2019-05-04 ASSESSMENT — PAIN SCALES - WONG BAKER: WONGBAKER_NUMERICALRESPONSE: 0

## 2019-05-04 NOTE — ED TRIAGE NOTES
Pt arrives from [facility] for not talking as much as normal and his BP being lower than it normally is at the request of the family.

## 2019-05-04 NOTE — ED PROVIDER NOTES
[facility]  EMERGENCY DEPARTMENT ENCOUNTER          ATTENDING PHYSICIAN NOTE       Date of evaluation: 5/4/2019    Chief Complaint     Altered Mental Status      History of Present Illness     Nely Covarrubias is a 66 y.o. male who presents from his nursing home reportedly for altered mental status and low blood pressure. He should states that he has no complaints at this time. His family was reportedly concerned and asked that the nursing facility sent him here. He denies any recent vomiting or diarrhea. Denies any recent fevers or chills. States that he has not had anything to eat or drink today. Denies decrease in appetite    Review of Systems     Review of Systems   Constitutional: Positive for fatigue. Negative for activity change, appetite change, chills and fever. Respiratory: Negative for chest tightness and shortness of breath. Cardiovascular: Negative for chest pain, palpitations and leg swelling. Gastrointestinal: Negative for abdominal pain, diarrhea and vomiting. Neurological: Positive for weakness. Negative for dizziness, light-headedness and headaches. All other systems reviewed and are negative.       Past Medical, Surgical, Family, and Social History     He has a past medical history of Acute renal failure ([address] Acute respiratory failure with hypoxia ([address] Angina pectoris ([address] 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[address] CAD (coronary artery disease), Cardiac arrest ([address] Cardiac arrest ([address] Chronic systolic heart failure ([address] Diabetes mellitus ([address] Diabetes mellitus ([address] Diabetes mellitus type II, uncontrolled ([address] DKA (diabetic ketoacidoses) ([address] Elevated LFTs, Femoral neck fracture ([address] HTN (hypertension), Hyperkalemia, Hyperlipidemia, Hypertension, PAF (paroxysmal atrial fibrillation) ([address] Paroxysmal A-fib ([address] PEA (Pulseless electrical activity) ([address] Pneumonia, Pneumonia due to organism, Pneumonia of right lower lobe due to Streptococcus pneumoniae ([address] Protein-calorie malnutrition, severe ([facility] S/P hip hemiarthroplasty, Septic shock ([facility] Systolic CHF ([facility] Thrombocytopenia ([facility] Thrombocytopenia ([facility] and Type 2 diabetes mellitus with hyperglycemia ([facility] He has a past surgical history that includes Gastrostomy tube placement; joint replacement; gastrostomy tube change (N/A, 12/14/2018); Colonoscopy (N/A, 4/1/2019); Upper gastrointestinal endoscopy (N/A, 4/1/2019); Anus surgery (N/A, 4/2/2019); Gastrostomy tube placement (N/A, 4/5/2019); Upper gastrointestinal endoscopy (N/A, 4/12/2019); Colonoscopy (N/A, 4/17/2019); sigmoidoscopy (N/A, 4/22/2019); and gastrostomy tube change (4/22/2019). His family history is not on file. He reports that he quit smoking about 15 months ago. He has never used smokeless tobacco. He reports that he does not drink alcohol or use drugs.     Medications     Previous Medications    AMLODIPINE (NORVASC) 5 MG TABLET    Take 5 mg by mouth daily     ASPIRIN 81 MG TABLET    Take 81 mg by mouth daily     CARVEDILOL (COREG) 12.5 MG TABLET    1 tablet by PEG Tube route 2 times daily (with meals)    FERROUS SULFATE 325 (65 FE) MG TABLET    Take 325 mg by mouth 3 times daily (with meals)     INSULIN GLARGINE (TOUJEO MAX SOLOSTAR) 300 UNIT/ML INJECTION PEN    Inject 5 Units into the skin nightly    INSULIN LISPRO (HUMALOG) 100 UNIT/ML PEN    Inject 0-6 Units into the skin every 4 hours    LEVOTHYROXINE (SYNTHROID) 100 MCG TABLET    Take 1 tablet by mouth daily    LISINOPRIL (PRINIVIL;ZESTRIL) 20 MG TABLET    1 tablet by PEG Tube route daily    MEGESTROL (MEGACE) 40 MG/ML SUSPENSION    Take 400 mg by mouth daily    MIRTAZAPINE (REMERON) 15 MG TABLET    Take 15 mg by mouth nightly    OMEPRAZOLE (PRILOSEC) 20 MG DELAYED RELEASE CAPSULE    Take 20 mg by mouth 2 times daily    TAMSULOSIN (FLOMAX) 0.4 MG CAPSULE    Take 1 capsule by mouth Daily with supper    VITAMIN D (ERGOCALCIFEROL) 27517 UNITS CAPS CAPSULE    Take 50,000 Units by mouth Twice a Week Give on Monday and Thursday       Allergies     He has No Known Allergies. Physical Exam     INITIAL VITALS: BP: (!) 100/52, Temp: 98 °F (36.7 °C), Pulse: 76, Resp: 17, SpO2: 100 %    Physical Exam   Constitutional: He is oriented to person, place, and time. He appears well-developed and well-nourished. No distress. HENT:   Head: Normocephalic and atraumatic. Mouth/Throat: Mucous membranes are dry. Eyes: Pupils are equal, round, and reactive to light. EOM are normal.   Neck: Normal range of motion. Cardiovascular: Normal rate, regular rhythm, normal heart sounds and intact distal pulses. No murmur heard. Pulmonary/Chest: Breath sounds normal. No respiratory distress. He has no wheezes. He has no rales. Abdominal: He exhibits no distension. There is no tenderness. Musculoskeletal: He exhibits no edema. Chronic skin changes noted to bilateral lower extremities   Neurological: He is alert and oriented to person, place, and time. No cranial nerve deficit or sensory deficit. Coordination normal. GCS eye subscore is 4. GCS verbal subscore is 5. GCS motor subscore is 6. Patient does know that he is a [facility] he knows the year is 2019, he knows that Anibal is the president. He is able to follow commands with all 4 extremities. He has no focal neurologic deficits   Skin: Skin is warm and dry. Nursing note and vitals reviewed. Diagnostic Results     EKG   EKG Interpretation    Interpreted by emergency department physician    Rhythm: normal sinus   Rate: normal  Axis: normal  Ectopy: none  Conduction: nonspecific intraventricular conduction delay  ST Segments: no acute change  T Waves: normal  Q Waves: none    Clinical Impression: non-specific EKG    Fadumo Jiangks      RADIOLOGY:  XR CHEST PORTABLE   Final Result      No significant interval change since 4/17/2019. Small right pleural effusion with adjacent atelectasis.                 LABS:   Results for orders placed or performed during the hospital encounter of 05/04/19   CBC Auto Differential   Result Value Ref Range    WBC 8.0 4.0 - 11.0 K/uL    RBC 2.79 (L) 4.20 - 5.90 M/uL    Hemoglobin 8.4 (L) 13.5 - 17.5 g/dL    Hematocrit 25.1 (L) 40.5 - 52.5 %    MCV 89.9 80.0 - 100.0 fL    MCH 29.9 26.0 - 34.0 pg    MCHC 33.3 31.0 - 36.0 g/dL    RDW 15.3 12.4 - 15.4 %    Platelets 040 533 - 216 K/uL    MPV 7.4 5.0 - 10.5 fL    Neutrophils % 78.5 %    Lymphocytes % 9.5 %    Monocytes % 9.5 %    Eosinophils % 2.0 %    Basophils % 0.5 %    Neutrophils # 6.3 1.7 - 7.7 K/uL    Lymphocytes # 0.8 (L) 1.0 - 5.1 K/uL    Monocytes # 0.8 0.0 - 1.3 K/uL    Eosinophils # 0.2 0.0 - 0.6 K/uL    Basophils # 0.0 0.0 - 0.2 K/uL   Comprehensive Metabolic Panel w/ Reflex to MG   Result Value Ref Range    Sodium 130 (L) 136 - 145 mmol/L    Potassium reflex Magnesium 5.5 (H) 3.5 - 5.1 mmol/L    Chloride 98 (L) 99 - 110 mmol/L    CO2 22 21 - 32 mmol/L    Anion Gap 10 3 - 16    Glucose 227 (H) 70 - 99 mg/dL    BUN 69 (H) 7 - 20 mg/dL    CREATININE 2.3 (H) 0.8 - 1.3 mg/dL    GFR Non-African American 28 (A) >60    GFR  34 (A) >60    Calcium 8.0 (L) 8.3 - 10.6 mg/dL    Total Protein 5.1 (L) 6.4 - 8.2 g/dL    Alb 1.8 (L) 3.4 - 5.0 g/dL    Albumin/Globulin Ratio 0.5 (L) 1.1 - 2.2    Total Bilirubin <0.2 0.0 - 1.0 mg/dL    Alkaline Phosphatase 92 40 - 129 U/L    ALT 20 10 - 40 U/L    AST 17 15 - 37 U/L    Globulin 3.3 g/dL   Protime-INR   Result Value Ref Range    Protime 11.5 9.8 - 13.0 sec    INR 1.01 0.86 - 1.14   Urinalysis, reflex to microscopic   Result Value Ref Range    Color, UA Yellow Straw/Yellow    Clarity, UA CLOUDY (A) Clear    Glucose, Ur 100 (A) Negative mg/dL    Bilirubin Urine SMALL (A) Negative    Ketones, Urine Negative Negative mg/dL    Specific Gravity, UA 1.010 1.005 - 1.030    Blood, Urine MODERATE (A) Negative    pH, UA 6.5 5.0 - 8.0    Protein,  (A) Negative mg/dL    Urobilinogen, Urine 0.2 <2.0 E.U./dL    Nitrite, Urine Negative

## 2019-05-05 LAB
ANION GAP SERPL CALCULATED.3IONS-SCNC: 12 MMOL/L (ref 3–16)
BASOPHILS ABSOLUTE: 0 K/UL (ref 0–0.2)
BASOPHILS RELATIVE PERCENT: 0.5 %
BUN BLDV-MCNC: 65 MG/DL (ref 7–20)
CALCIUM SERPL-MCNC: 8.2 MG/DL (ref 8.3–10.6)
CHLORIDE BLD-SCNC: 106 MMOL/L (ref 99–110)
CO2: 20 MMOL/L (ref 21–32)
CREAT SERPL-MCNC: 2.1 MG/DL (ref 0.8–1.3)
EOSINOPHILS ABSOLUTE: 0.2 K/UL (ref 0–0.6)
EOSINOPHILS RELATIVE PERCENT: 2.4 %
ESTIMATED AVERAGE GLUCOSE: 125.5 MG/DL
GFR AFRICAN AMERICAN: 37
GFR NON-AFRICAN AMERICAN: 31
GLUCOSE BLD-MCNC: 100 MG/DL (ref 70–99)
GLUCOSE BLD-MCNC: 112 MG/DL (ref 70–99)
GLUCOSE BLD-MCNC: 119 MG/DL (ref 70–99)
GLUCOSE BLD-MCNC: 93 MG/DL (ref 70–99)
GLUCOSE BLD-MCNC: 94 MG/DL (ref 70–99)
HBA1C MFR BLD: 6 %
HCT VFR BLD CALC: 26.6 % (ref 40.5–52.5)
HEMOGLOBIN: 9 G/DL (ref 13.5–17.5)
LYMPHOCYTES ABSOLUTE: 0.7 K/UL (ref 1–5.1)
LYMPHOCYTES RELATIVE PERCENT: 7.8 %
MCH RBC QN AUTO: 30.1 PG (ref 26–34)
MCHC RBC AUTO-ENTMCNC: 33.7 G/DL (ref 31–36)
MCV RBC AUTO: 89.5 FL (ref 80–100)
MONOCYTES ABSOLUTE: 0.7 K/UL (ref 0–1.3)
MONOCYTES RELATIVE PERCENT: 6.9 %
NEUTROPHILS ABSOLUTE: 7.8 K/UL (ref 1.7–7.7)
NEUTROPHILS RELATIVE PERCENT: 82.4 %
PDW BLD-RTO: 15 % (ref 12.4–15.4)
PERFORMED ON: ABNORMAL
PERFORMED ON: NORMAL
PLATELET # BLD: 323 K/UL (ref 135–450)
PMV BLD AUTO: 7.3 FL (ref 5–10.5)
POTASSIUM REFLEX MAGNESIUM: 4.9 MMOL/L (ref 3.5–5.1)
RBC # BLD: 2.97 M/UL (ref 4.2–5.9)
SODIUM BLD-SCNC: 138 MMOL/L (ref 136–145)
WBC # BLD: 9.5 K/UL (ref 4–11)

## 2019-05-05 PROCEDURE — 80048 BASIC METABOLIC PNL TOTAL CA: CPT

## 2019-05-05 PROCEDURE — 92610 EVALUATE SWALLOWING FUNCTION: CPT

## 2019-05-05 PROCEDURE — 6360000002 HC RX W HCPCS: Performed by: INTERNAL MEDICINE

## 2019-05-05 PROCEDURE — 85025 COMPLETE CBC W/AUTO DIFF WBC: CPT

## 2019-05-05 PROCEDURE — 2580000003 HC RX 258: Performed by: INTERNAL MEDICINE

## 2019-05-05 PROCEDURE — 1200000000 HC SEMI PRIVATE

## 2019-05-05 PROCEDURE — 36415 COLL VENOUS BLD VENIPUNCTURE: CPT

## 2019-05-05 PROCEDURE — 6370000000 HC RX 637 (ALT 250 FOR IP): Performed by: INTERNAL MEDICINE

## 2019-05-05 PROCEDURE — 6370000000 HC RX 637 (ALT 250 FOR IP): Performed by: HOSPITALIST

## 2019-05-05 RX ORDER — SENNA PLUS 8.6 MG/1
1 TABLET ORAL 2 TIMES DAILY
Status: DISCONTINUED | OUTPATIENT
Start: 2019-05-05 | End: 2019-05-06 | Stop reason: HOSPADM

## 2019-05-05 RX ADMIN — ENOXAPARIN SODIUM 30 MG: 30 INJECTION SUBCUTANEOUS at 09:03

## 2019-05-05 RX ADMIN — MINERAL SUPPLEMENT IRON 300 MG / 5 ML STRENGTH LIQUID 100 PER BOX UNFLAVORED 300 MG: at 17:43

## 2019-05-05 RX ADMIN — ASPIRIN 81 MG: 81 TABLET, COATED ORAL at 09:03

## 2019-05-05 RX ADMIN — CEFTRIAXONE 1 G: 1 INJECTION, POWDER, FOR SOLUTION INTRAMUSCULAR; INTRAVENOUS at 20:30

## 2019-05-05 RX ADMIN — Medication 10 ML: at 09:03

## 2019-05-05 RX ADMIN — MINERAL SUPPLEMENT IRON 300 MG / 5 ML STRENGTH LIQUID 100 PER BOX UNFLAVORED 300 MG: at 09:09

## 2019-05-05 RX ADMIN — Medication 10 ML: at 20:32

## 2019-05-05 RX ADMIN — MIRTAZAPINE 15 MG: 15 TABLET, FILM COATED ORAL at 20:32

## 2019-05-05 RX ADMIN — MINERAL SUPPLEMENT IRON 300 MG / 5 ML STRENGTH LIQUID 100 PER BOX UNFLAVORED 300 MG: at 12:06

## 2019-05-05 RX ADMIN — SODIUM CHLORIDE: 9 INJECTION, SOLUTION INTRAVENOUS at 17:43

## 2019-05-05 RX ADMIN — PANTOPRAZOLE SODIUM 40 MG: 40 GRANULE, DELAYED RELEASE ORAL at 09:02

## 2019-05-05 RX ADMIN — LEVOTHYROXINE SODIUM 100 MCG: 100 TABLET ORAL at 09:03

## 2019-05-05 RX ADMIN — TAMSULOSIN HYDROCHLORIDE 0.4 MG: 0.4 CAPSULE ORAL at 17:43

## 2019-05-05 RX ADMIN — SENNOSIDES 8.6 MG: 8.6 TABLET, FILM COATED ORAL at 20:32

## 2019-05-05 RX ADMIN — SENNOSIDES 8.6 MG: 8.6 TABLET, FILM COATED ORAL at 12:38

## 2019-05-05 ASSESSMENT — PAIN SCALES - WONG BAKER
WONGBAKER_NUMERICALRESPONSE: 0

## 2019-05-05 NOTE — PROGRESS NOTES
Speech Language Pathology  Facility/Department: [address]   BEDSIDE SWALLOW EVALUATION    NAME: Luis Dukes  : 1945  MRN: [id number]    ADMISSION DATE: 2019  ADMITTING DIAGNOSIS: has Vitamin D deficiency; Vitamin B deficiency; Type 2 diabetes mellitus ([address] Angiopathy, peripheral ([address] Noncompliance with medication regimen; Gonadotropin deficiency ([address] Benign essential HTN; Long term current use of insulin ([address] Dyslipidemia; Carotid artery narrowing; Atherosclerosis of coronary artery; Hyperkalemia; Acute renal failure ([address] Paroxysmal atrial fibrillation ([address] Coronary artery disease involving native coronary artery of native heart without angina pectoris; Cardiomyopathy ([address] Uncontrolled type 2 diabetes mellitus with hyperglycemia, with long-term current use of insulin ([address] S/P hip hemiarthroplasty; History of stroke; SANDY (acute kidney injury) ([address] Normocytic anemia; Severe protein-calorie malnutrition ([address] S/P percutaneous endoscopic gastrostomy (PEG) tube placement ([address] Right to left cardiac shunt ([address] H/O ischemic multifocal multiple vascular territories stroke; Anticoagulated; Vertebral artery stenosis, right; Hypoglycemia, exogenous insulin, in setting of SANDY; possible sepsis; Hypothermia; SANDY (acute kidney injury) ([address] Metabolic acidosis; Acute metabolic encephalopathy; Altered mental status; Oropharyngeal dysphagia; Weakness; SOB (shortness of breath); Goals of care, counseling/discussion; DNR (do not resuscitate) discussion; Encounter for palliative care; Severe malnutrition ([address] Hypothyroidism; Rectal mass; Rectal bleeding; Acute metabolic encephalopathy; UTI (urinary tract infection);  Hypothermia; CKD (chronic kidney disease), stage IV ([address] GI bleeding; Generalized abdominal pain; and Hypotension on their problem list.  ONSET DATE: 19    Recent Chest Xray/CT of Chest: 19     No significant interval change since 2019.       Small right pleural effusion with adjacent atelectasis           Prior dysphagia history:  Previous MBS (#2) 1/18/19  Oral Phase: Mild-moderate oral phase deficits. Pt edentulous and reported that dentures currently at nursing home when questioned and reported \"I can chew anything with or without them\".  Pt [facility] for pureed with fairly timely oral phase with no oral residue.  With cracker trial, pt with slow  A-P tranport and prolonged mastication with minimal residue on the left due to reduced left lingual strength and ROM.     Pharyngeal phase:  Pt with moderate pharyngeal deficits characterized by delay in swallow initiation with most po trials with spillover into pyriform sinus prior to swallow initiation with most po trials.  Minimal to no pharyngeal residue.  Due to reduced laryngeal excursion and elevation, pt with silent aspiration during the swallow with nectar via cup and aspiration with delayed cough response with nectar via cup with chin tuck.  Chin tuck did not eliminate aspiration. No aspiration/penetration with puree x2 trials, honey thick liquids via cup X2 or spoon or with nectar via spoon (only one trial given).  Did not attempt thins due to deficits with nectar.      Previous MBS #1- 4/21/17  Oral Phase:   1. Moderate-severe oral stage dysphagia characterized by suspected decreased mastication and decreased lingual manipulation/control.    2. Prolonged bolus prep and oral transit with lingual mashing.    3. Reduced bolus control with premature bolus loss to the pharynx with all trials.    4. Lingual residue with all trials is cleared with cued repeat swallow. Pharyngeal:   1. Severe pharyngeal stage dysphagia characterized by delayed swallow, decreased laryngeal elevation, and reduced pharyngeal peristalsis.    2. Premature spillage to the valleculae with all trials and to the pyriform with nectar and thin trials.    3. Intermittent, flash penetration with nectar and honey trials.    4.  Deep penetration with thin; residue lining laryngeal vestibule evident post-penetration of thin.    5. Vallecular and pyriform residue (vallecular > pyriform) with all trials.    6. Clears liquid residue with cued repeat swallow.    7. Unable to clear puree residue using repeat swallows and/or liquid wash.    8. Patient at severe risk for aspiration after the swallow of observed residues during study. Upper Esophageal Screen:   1. No apparent upper esophageal involvements per screening  Dysphagia Outcome Severity Scale: Level 2: Moderate Severe dysphagia- Maximum assistance or maximum use of strategies with partial PO only     MBS results 2/28/19:  Pt presents with moderate oral and pharyngeal phase dysphagia  Oral- pt with poor A-P transport with all consistencies. Mastication with cracker was prolonged. pt unable to draw honey thick barium up the straw. Pharyngeal- pt presents with incomplete epiglottal closure which resulted in deep penetration of nectar thick barium without spontaneous clearing. Nectar thick barium would have been aspirated it pt was not instructed to cough and clear throat several times. Pt also silently aspirated thin barium during the swallow.  Did not attempt chin tuck as question pt's ability to recall and perform independently as well as this strategy was not beneficial in the past. Pt with no aspiration or penetration with pureed, cracker or honey thick liquids by cup. Pt with minimal residue remaining in the valleculae and pyriform after the swallow with honey thick liquids and to a slightly greater degree with pureed and cracker. Pt able to clear residue when instructed to dry swallow.         MBS 3/28/19:  Impressions:  Pt exhibits slow propulsion of bolus posteriorly in oral cavity.  Lingual residue of cracker noted after study completed.  Pt with reduced tongue base strength and reduced laryngeal elevation resulting in mod amount of residue in valleculae and pyriform sinuses with puree and honey thick liquid textures.  This then resulted in spillover into airway with deep penetration and eventual SILENT aspiration.  Pt did not clear residue spontaneously, no extra swallows were noted.  Pt asked if he felt anything, which he denied. Pt then asked to swallow again, which did not clear residue.  Pt then presented with nectar thick liquid trials via cup/straw/tsp, where no penetration or aspiration was identified. Thin liquids was presented via tsp and cup, where deep penetration occurred during the swallow, without spontaneous clearing and no cough reflex. In summary, nectar thick liquids were the only texture that pt did not penetrate or aspirate.  Would trial nectar thick clear liquid diet with close monitoring for s/s of aspiration.    Pending pt wishes for quality of life, will follow up for diet advancement.  Solid texture poses a risk due to pt being edentulous, oral residue noted after study as well as pt report of choking on turkey sandwich prior to admit.          Date of Eval: 5/5/2019  Evaluating Therapist: Amanda Zhang    Current Diet level:  Current Diet : Regular  Current Liquid Diet : Thin      Primary Complaint  Patient Complaint: \"I don't like thickened drinks\" when presenting thickened liquids during assessment. Pain:  Pain Assessment  Denies any pain when questioned    Reason for Referral  Maureen Estes was referred for a bedside swallow evaluation to assess the efficiency of his swallow function, identify signs and symptoms of aspiration and make recommendations regarding safe dietary consistencies, effective compensatory strategies, and safe eating environment. Impression  Dysphagia Diagnosis: Moderate oral stage dysphagia; Suspected needs further assessment  Dysphagia Impression : Pt mildly lethargic at times which impeded speech intelligiblity; oriented to person, year, but not to place.   Pt with history of dysphagia and seen by team in the recent past.  Spoke with staff of [address] who indicated pt does not receive po diet and is given po via G-tube. Pt seen for multiple MBS in the past with last one in March, 2019 recommending nectar clear liquids at that time (see last MBS report above). Pt with prolonged oral prep phase with all po trials. Suspected pharyngeal deficits due to history of dysphagia and MBS findings in March. Pt with delayed swallow initiation with ice chips, water via spoon/cup, and nectar via spoon with initiation becoming more delayed as trials progressed. No cough/wet vocal quality with ice chips/thins via spoon (trials x3 each) and cup (trials given x2); pt with fairly immediate strong and fairly lengthy cough with second trial of nectar via spoon. Recommend NPO with meds/po via alternate form of feeding (suspect swallow fatigues as trials progressed). Will reassess tomorrow with f/u instrumental exam as appropriate. Dysphagia Outcome Severity Scale: Level 1: Severe dysphagia- NPO. Unable to tolerate any PO safely     Treatment Plan  Requires SLP Intervention: Yes  Duration/Frequency of Treatment: Dysphagia therapy 3-5 times a week. D/C Recommendations: To be determined       Recommended Diet and Intervention  Diet Solids Recommendation: NPO - recommend via G tube  Liquid Consistency Recommendation: NPO - recommend via G tube  Recommended Form of Meds: Via alternative means of nutrition     Therapeutic Interventions: Patient/Family education; Therapeutic PO trials with SLP    Compensatory Swallowing Strategies       Treatment/Goals  Short-term Goals  Goal 1: Pt will participate in repeat bedside assessment  Goal 2: Pt/family will verbalize and/or demonstrate understanding of swallowing recommendations.     Goal 3: Pt will participate in instrumental exam as appropriate    General  Chart Reviewed: Yes  Behavior/Cognition: Cooperative(Alert but mildly lethargic at times)  Breath Sounds: Diminished  Communication Observation: (Some slurred speech; suspect due to mild lethargy)  Follows Directions: Simple  Dentition: Edentulous  Patient Positioning: Upright in bed  Baseline Vocal Quality: Normal  Volitional Cough: Weak  Volitional Swallow: Absent  Prior Dysphagia History: Pt seen for multiple MBS in the past with last MBS on   Consistencies Administered: Ice Chips; Thin - cup; Thin - teaspoon;Nectar - teaspoon           Vision/Hearing  Vision  Vision: Within Functional Limits  Hearing  Hearing: Within functional limits    Oral Motor Deficits  Oral/Motor  Oral Motor: (Mild deviation to the right with tongue protrusion; reduced lingual strength on the left with resistance task to inner cheek; no facial droop at rest or retraction; weak cough)    Oral Phase Dysfunction  Oral Phase  Oral Phase - Comment: Pt with prolonged oral prep phase with all po trials. Indicators of Pharyngeal Phase Dysfunction   Pharyngeal Phase   Pharyngeal: Suspected deficits due to history of dysphagia and MBS findings in March. Pt with delayed swallow initiation with ice chips, water via spoon/cup, and nectar via spoon with initiation becoming more delayed as trials progressed. No cough/wet vocal quality with ice chips/thins via spoon (trials x3 each) and cup (trials given x2); pt with fairly immediate strong and fairly lengthy cough with second trial of nectar via spoon. Prognosis  Prognosis  Prognosis for safe diet advancement: guarded  Barriers to reach goals: severity of dysphagia;fatigue  Individuals consulted  Consulted and agree with results and recommendations: Patient;RN    Education  Patient Education: Pt educated to role of dysphagia, what aspiration is and the s/s of it, physiology of swallow, recommendation to continue with NPO until reassessment tomorrow and will f/u with instrumental exam as appropriate. Pt verbalize fair understanding. No family present.    Patient Education Response: Needs reinforcement  Safety Devices in place: Yes  Type of

## 2019-05-05 NOTE — PROGRESS NOTES
Hospitalist Progress Note      PCP: Opal Brown    Date of Admission: 5/4/2019    Chief Complaint: acute encephalopathy    Hospital Course: this is 76-year-old male nursing home resident brought to emergency room with the low blood pressure altered mental status    Subjective: patient is alert oriented times 3 denies any chest pain or shortness of breath no nausea vomiting. Medications:  Reviewed    Infusion Medications    dextrose      sodium chloride 100 mL/hr at 05/04/19 2250     Scheduled Medications    senna  1 tablet Oral BID    aspirin  81 mg Oral Daily    ferrous sulfate  300 mg Oral TID WC    levothyroxine  100 mcg Oral Daily    mirtazapine  15 mg Oral Nightly    pantoprazole sodium  40 mg Oral QAM AC    tamsulosin  0.4 mg Oral Dinner    sodium chloride flush  10 mL Intravenous 2 times per day    enoxaparin  30 mg Subcutaneous Daily    insulin lispro  0-6 Units Subcutaneous TID WC    insulin lispro  0-3 Units Subcutaneous Nightly    cefTRIAXone (ROCEPHIN) IV  1 g Intravenous Q24H     PRN Meds: sodium chloride flush, magnesium hydroxide, ondansetron, glucose, dextrose, glucagon (rDNA), dextrose, acetaminophen      Intake/Output Summary (Last 24 hours) at 5/5/2019 1522  Last data filed at 5/5/2019 1205  Gross per 24 hour   Intake 1745.67 ml   Output 675 ml   Net 1070.67 ml       Physical Exam Performed:    /67   Pulse 75   Temp 97 °F (36.1 °C) (Oral)   Resp 16   Ht 5' 5\" (1.651 m)   Wt 106 lb (48.1 kg)   SpO2 100%   BMI 17.64 kg/m²     General appearance: No apparent distress, appears stated age and cooperative. HEENT: Pupils equal, round, and reactive to light. Conjunctivae/corneas clear. Neck: Supple, with full range of motion. No jugular venous distention. Trachea midline. Respiratory:  Normal respiratory effort. Clear to auscultation, bilaterally without Rales/Wheezes/Rhonchi.   Cardiovascular: Regular rate and rhythm with normal S1/S2 without murmurs, rubs or gallops. Abdomen: Soft, non-tender, non-distended with normal bowel sounds. Musculoskeletal: No clubbing, cyanosis or edema bilaterally. Full range of motion without deformity. Skin: Skin color, texture, turgor normal.  No rashes or lesions. Neurologic:  Neurovascularly intact without any focal sensory/motor deficits. Cranial nerves: II-XII intact, grossly non-focal.  Psychiatric: Alert and oriented, thought content appropriate, normal insight  Capillary Refill: Brisk,< 3 seconds   Peripheral Pulses: +2 palpable, equal bilaterally       Labs:   Recent Labs     05/04/19 1940 05/05/19  0914   WBC 8.0 9.5   HGB 8.4* 9.0*   HCT 25.1* 26.6*    323     Recent Labs     05/04/19 1940 05/05/19  0757   * 138   K 5.5* 4.9   CL 98* 106   CO2 22 20*   BUN 69* 65*   CREATININE 2.3* 2.1*   CALCIUM 8.0* 8.2*     Recent Labs     05/04/19 1940   AST 17   ALT 20   BILITOT <0.2   ALKPHOS 92     Recent Labs     05/04/19 1940   INR 1.01     No results for input(s): Andriy Aver in the last 72 hours. Urinalysis:      Lab Results   Component Value Date    NITRU Negative 05/04/2019    WBCUA >100 05/04/2019    BACTERIA 3+ 05/04/2019    RBCUA 3-5 05/04/2019    BLOODU MODERATE 05/04/2019    SPECGRAV 1.010 05/04/2019    GLUCOSEU 100 05/04/2019       Radiology:  XR CHEST PORTABLE   Final Result      No significant interval change since 4/17/2019. Small right pleural effusion with adjacent atelectasis. Assessment/Plan:    Active Hospital Problems    Diagnosis Date Noted    Hypotension [I95.9] 05/04/2019   acute kidney injury on chronic kidney disease  Hyperkalemia  UTI  diabetes mellitus type II  hypothyroidism  1. This 66-year-old male admitted with acute encephalopathy ? due to UTI continue with IV Rocephin urine culture pending. 2.  Acute kidney injury on chronic kidney disease improving continue with IV hydration per 24 hour check BMP in a.m.   3.  Hypothyroidism continue with the Synthroid. 4.  Diabetes mellitus type II continue with the current care check hemoglobin A-1 C.  5.  Dysphagia patient has peg tube in place dietitian to evaluate for tube feeding keep him NPO? Modified barium swallow in a.m.  6.  Patient is DNR cc nursing home resident. 7.  Hyperkalemia present on admission resolved.     DVT Prophylaxis: Lovenox sub Q  Diet: DIET CARB CONTROL;  Code Status: DNR-CCA    PT/OT Eval Status:     Cassi Martell MD

## 2019-05-05 NOTE — PROGRESS NOTES
Pt admitted to room 6303, awake and oriented x 3. 2 liters NC, resp easy. Lungs diminished. Vital signs stable, afebrile. Pt was wet and dirty, incont of bowel and bladder. Clean gown and linen applied. Oriented to bed, call light in reach. Bed alarm in place.

## 2019-05-05 NOTE — H&P
Hospital Medicine History & Physical      PCP: Sarina Bernal    Date of Admission: 5/4/2019    Date of Service: Pt seen/examined on 5/4/2019 and Admitted to Inpatient with expected LOS greater than two midnights due to medical therapy. Chief Complaint:  AMS      History Of Present Illness:    66 y.o. male who presents from his nursing home reportedly for altered mental status and low blood pressure. Pt is AAO x 3 and states that he has no complaints at this time. He states he is from [address], and [facility] this is month of April. His family was concerned and asked that the nursing facility sent him here. He denies any recent vomiting or diarrhea, fevers. States he gets chills off and on  States that he has not had anything to eat or drink today. Denies decrease in appetite, does not like NH food, states that he gets broth all the time. Walks with a walker, some left sided weakness.  His speech is difficult to understand at times     Past Medical History:          Diagnosis Date    Acute renal failure ([address]     Acute respiratory failure with hypoxia (HCC)     Angina pectoris (HCC)     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HCC)     CAD (coronary artery disease)     Cardiac arrest ([address]     Card[facility] arrest ([facility]     Chronic systolic heart failure (HCC)     Diabetes mellitus ([address]     Diabetes mellitus ([address] 10/9/2012    Diabetes mellitus type II, uncontrolled ([address]     4/1017 A1c = 13    DKA (diabetic ketoacidoses) ([address] 04/2017    Elevated LFTs     Femoral neck fracture ([address] 10/17/2017    HTN (hypertension) 10/17/2017    Hyperkalemia     Hyperlipidemia     Hypertension     PAF (paroxysmal atrial fibrillation) (HCC)     Paroxysmal A-fib (HCC)     PEA (Pulseless electrical activity) ([address] 04/13/2017    Pneumonia 04/2017    LIKELY PNEUMOCOCCAL    Pneumonia due to organism     Pneumonia of right lower lobe due to Streptococcus pneumoniae ([address]     Protein-calorie malnutrition, severe ([facility] 04/2017    BMI = 19    S/P hip hemiarthroplasty 12/29/2017    Septic shock (HCC)     Systolic CHF ([facility]     EF 30%    Thrombocytopenia (HCC)     Thrombocytopenia (HCC)     Type 2 diabetes mellitus with hyperglycemia ([facility] 11/28/2016       Past Surgical History:          Procedure Laterality Date    ANUS SURGERY N/A 4/2/2019    EXAM UNDER ANESTHESIA, RECTAL BIOPSY X 2 performed by Robina Wilcox MD at [facility] 4/1/2019    COLONOSCOPY performed by Debbie Heard MD at [facility] COLONOSCOPY N/A 4/17/2019    COLONOSCOPY CONTROL HEMORRHAGE performed by Debbie Heard MD at [address] N/A 12/14/2018    GASTRIC TUBE REMOVAL performed by Huang James MD at [address]  4/22/2019    GASTROSTOMY TUBE PLACEMENT CHANGE WITH #20 ANGELITA TUBE performed by Debbie Heard MD at [facility]      G-tube placement   [facility] N/A 4/5/2019    EGD PEG TUBE PLACEMENT successful tube placement performed by Debbie Heard MD at [address] [place] [address]      hip    SIGMOIDOSCOPY N/A 4/22/2019    SIGMOIDOSCOPY DIAGNOSTIC FLEXIBLE performed by Debbie Heard MD at [facility] 4/1/2019    EGD BIOPSY performed by Debbie Heard MD at [place] [address] N/A 4/12/2019    EGD DIAGNOSTIC ONLY performed by Debbie Heard MD at [place] [address]       Medications Prior to Admission:      Prior to Admission medications    Medication Sig Start Date End Date Taking?  Authorizing Provider   insulin lispro (HUMALOG) 100 UNIT/ML pen Inject 0-6 Units into the skin every 4 hours 4/22/19   Cole Garcia, DO   lisinopril (PRINIVIL;ZESTRIL) 20 MG tablet 1 tablet by PEG Tube route daily 4/23/19   Cole Garcia, DO   carvedilol (COREG) 12.5 MG tablet 1 tablet by PEG Tube route 2 times daily (with meals) 4/22/19   Cole Garcia, DO   insulin glargine (TOUJEO MAX SOLOSTAR) 300 UNIT/ML injection pen Inject 5 Units into the skin nightly    Historical Provider, MD   levothyroxine (SYNTHROID) 100 MCG tablet Take 1 tablet by mouth daily 4/15/19   Christen Sotelo MD   omeprazole (PRILOSEC) 20 MG delayed release capsule Take 20 mg by mouth 2 times daily    Historical Provider, MD   tamsulosin (FLOMAX) 0.4 MG capsule Take 1 capsule by mouth Daily with supper 4/7/19   Isaac Whalen MD   vitamin D (ERGOCALCIFEROL) 45851 units CAPS capsule Take 50,000 Units by mouth Twice a Week Give on Monday and Thursday    Historical Provider, MD   megestrol (MEGACE) 40 MG/ML suspension Take 400 mg by mouth daily    Historical Provider, MD   mirtazapine (REMERON) 15 MG tablet Take 15 mg by mouth nightly    Historical Provider, MD   ferrous sulfate 325 (65 Fe) MG tablet Take 325 mg by mouth 3 times daily (with meals)     Historical Provider, MD   amLODIPine (NORVASC) 5 MG tablet Take 5 mg by mouth daily     Historical Provider, MD   aspirin 81 MG tablet Take 81 mg by mouth daily     Historical Provider, MD       Allergies:  Patient has no known allergies. Social History:    TOBACCO:   reports that he quit smoking about 15 months ago. He has never used smokeless tobacco.  ETOH:   reports that he does not drink alcohol. Family History:    Reviewed in detail and negative for DM, CAD, Cancer, CVA. Positive as follows:    History reviewed. No pertinent family history. REVIEW OF SYSTEMS:   Pertinent positives as noted in the HPI. All other systems reviewed and negative. PHYSICAL EXAM PERFORMED:    /71   Pulse 75   Temp 98 °F (36.7 °C) (Oral)   Resp 17   Ht 5' 5\" (1.651 m)   Wt 106 lb (48.1 kg)   SpO2 100%   BMI 17.64 kg/m²     General appearance:  No apparent distress, appears stated age and cooperative. Frail AA male  HEENT:  Normal cephalic, atraumatic without obvious deformity. Pupils equal, round, and reactive to light.   Extra ocular muscles intact. Conjunctivae/corneas clear. Dry and cracked lips, dry oral mucosa  Neck: Supple, with full range of motion. No jugular venous distention. Trachea midline. Respiratory:  Normal respiratory effort. Clear to auscultation, bilaterally without Rales/Wheezes/Rhonchi. Cardiovascular:  Regular rate and rhythm with normal S1/S2 without murmurs, rubs or gallops. Abdomen: Soft, non-tender, non-distended with normal bowel sounds. Musculoskeletal:  No clubbing, cyanosis or edema bilaterally. Full range of motion without deformity. Skin: Skin color, texture, turgor normal.  No rashes or lesions. Neurologic:  Neurovascularly intact without any focal sensory/motor deficits. Cranial nerves: II-XII intact, slurred speech- chronic, motor on L/LE 3-4/5. Psychiatric:  Alert and oriented, thought content appropriate, normal insight  Capillary Refill: Brisk,< 3 seconds   Peripheral Pulses: +2 palpable, equal bilaterally       Labs:     Recent Labs     05/04/19 1940   WBC 8.0   HGB 8.4*   HCT 25.1*        Recent Labs     05/04/19 1940   *   K 5.5*   CL 98*   CO2 22   BUN 69*   CREATININE 2.3*   CALCIUM 8.0*     Recent Labs     05/04/19 1940   AST 17   ALT 20   BILITOT <0.2   ALKPHOS 92     Recent Labs     05/04/19 1940   INR 1.01     No results for input(s): Delcie Birmingham in the last 72 hours. Urinalysis:      Lab Results   Component Value Date    NITRU Negative 05/04/2019    WBCUA >100 05/04/2019    BACTERIA 3+ 05/04/2019    RBCUA 3-5 05/04/2019    BLOODU MODERATE 05/04/2019    SPECGRAV 1.010 05/04/2019    GLUCOSEU 100 05/04/2019       Radiology:   EKG:  I have reviewed the EKG with the following interpretation: SR, normal rate and intervals, IVCD    XR CHEST PORTABLE   Final Result      No significant interval change since 4/17/2019. Small right pleural effusion with adjacent atelectasis.                 ASSESSMENT:    Active Hospital Problems    Diagnosis Date Noted    Hypotension [I95.9] 54/36/6086   Acute metabolic encephalopathy per nursing home - seems resolved. AAO x 3  Severe dehydration with dry lips and oral mucosa, decreased skin turgor  UTI  Hypotension, responding well to IVF. Pt is in 3 BP medications at the NH  IDDM  Hx of PAFib, not on AC    PLAN:  IV hydration  Continue IV ABX, follow urine cultures  Hold Norvasc, Coreg, lisinopril due to hypotension  Hold Insulin Toujeo, start on low dose of SSI  SLP evaluation  PT/OT  SS consult    DVT Prophylaxis:   Diet: DIET CARB CONTROL;  Code Status: DNR-CCA    PT/OT Eval Status: Yes    Dispo - SNF       Lui Flores MD    Thank you Maisha Thomas for the opportunity to be involved in this patient's care. If you have any questions or concerns please feel free to contact me at [phone].

## 2019-05-05 NOTE — PROGRESS NOTES
Medication Reconciliation Note- Pharmacy    NH med list reviewed and [id number] med list updated in [facility], after admission orders placed. Changes to med list:    1) Carvedilol (Coreg)- Pt takes 6.25 mg BID    (currenly held)  2) Lisinopril - Pt takes 5 mg daily    (currently held)  3) Megestrol - Not on NH med list.  Removed from list.  Currently 400 mg daily is ordered.       Brad Torres [facility]   5/4/2019 10:32 PM

## 2019-05-05 NOTE — ED NOTES
Report called to [facility] on [address] and pt transported to room 314 [facility], [address] [place] [address]  05/04/19 2051

## 2019-05-05 NOTE — PROGRESS NOTES
4 Eyes Admission Assessment     I agree as the admission nurse that 2 RN's have performed a thorough Head to Toe Skin Assessment on the patient. ALL assessment sites listed below have been assessed on admission. Areas assessed by both nurses:   [x]   Head, Face, and Ears   [x]   Shoulders, Back, and Chest  [x]   Arms, Elbows, and Hands   [x]   Coccyx, Sacrum, and Ischum  [x]   Legs, Feet, and Heels        Does the Patient have Skin Breakdown? Yes a wound was noted on the Admission Assessment and an LDA was Initiated documentation include the Belle-wound, Wound Assessment, Measurements, Dressing Treatment, Drainage, and Color\",         Larry Prevention initiated:  Yes   Wound Care Orders initiated:  Yes      [id number] nurse consulted for Pressure Injury (Stage 3,4, Unstageable, DTI, NWPT, and Complex wounds):  Yes      Nurse 1 eSignature: Electronically signed by Pavithra Bishop RN on 5/5/19 at 6:10 AM    **SHARE this note so that the co-signing nurse is able to place an eSignature**    Nurse 2 eSignature:  Issac Núñez RN

## 2019-05-06 ENCOUNTER — APPOINTMENT (OUTPATIENT)
Dept: GENERAL RADIOLOGY | Age: 74
DRG: 689 | End: 2019-05-06
Payer: MEDICARE

## 2019-05-06 VITALS
TEMPERATURE: 96.9 F | RESPIRATION RATE: 16 BRPM | SYSTOLIC BLOOD PRESSURE: 126 MMHG | WEIGHT: 106 LBS | DIASTOLIC BLOOD PRESSURE: 66 MMHG | HEIGHT: 65 IN | OXYGEN SATURATION: 98 % | BODY MASS INDEX: 17.66 KG/M2 | HEART RATE: 71 BPM

## 2019-05-06 LAB
ANION GAP SERPL CALCULATED.3IONS-SCNC: 13 MMOL/L (ref 3–16)
BUN BLDV-MCNC: 59 MG/DL (ref 7–20)
CALCIUM SERPL-MCNC: 8.4 MG/DL (ref 8.3–10.6)
CHLORIDE BLD-SCNC: 104 MMOL/L (ref 99–110)
CO2: 18 MMOL/L (ref 21–32)
CREAT SERPL-MCNC: 2.1 MG/DL (ref 0.8–1.3)
EKG ATRIAL RATE: 77 BPM
EKG DIAGNOSIS: NORMAL
EKG P AXIS: 67 DEGREES
EKG P-R INTERVAL: 130 MS
EKG Q-T INTERVAL: 386 MS
EKG QRS DURATION: 100 MS
EKG QTC CALCULATION (BAZETT): 436 MS
EKG R AXIS: -28 DEGREES
EKG T AXIS: 78 DEGREES
EKG VENTRICULAR RATE: 77 BPM
GFR AFRICAN AMERICAN: 37
GFR NON-AFRICAN AMERICAN: 31
GLUCOSE BLD-MCNC: 102 MG/DL (ref 70–99)
GLUCOSE BLD-MCNC: 119 MG/DL (ref 70–99)
GLUCOSE BLD-MCNC: 91 MG/DL (ref 70–99)
GLUCOSE BLD-MCNC: 95 MG/DL (ref 70–99)
HCT VFR BLD CALC: 27.7 % (ref 40.5–52.5)
HEMOGLOBIN: 9.4 G/DL (ref 13.5–17.5)
MCH RBC QN AUTO: 30.5 PG (ref 26–34)
MCHC RBC AUTO-ENTMCNC: 33.9 G/DL (ref 31–36)
MCV RBC AUTO: 90.1 FL (ref 80–100)
PDW BLD-RTO: 15.4 % (ref 12.4–15.4)
PERFORMED ON: ABNORMAL
PERFORMED ON: ABNORMAL
PERFORMED ON: NORMAL
PLATELET # BLD: 359 K/UL (ref 135–450)
PMV BLD AUTO: 7.4 FL (ref 5–10.5)
POTASSIUM SERPL-SCNC: 5.4 MMOL/L (ref 3.5–5.1)
RBC # BLD: 3.08 M/UL (ref 4.2–5.9)
SODIUM BLD-SCNC: 135 MMOL/L (ref 136–145)
WBC # BLD: 9.1 K/UL (ref 4–11)

## 2019-05-06 PROCEDURE — 36415 COLL VENOUS BLD VENIPUNCTURE: CPT

## 2019-05-06 PROCEDURE — 97166 OT EVAL MOD COMPLEX 45 MIN: CPT

## 2019-05-06 PROCEDURE — 6370000000 HC RX 637 (ALT 250 FOR IP): Performed by: INTERNAL MEDICINE

## 2019-05-06 PROCEDURE — 97116 GAIT TRAINING THERAPY: CPT

## 2019-05-06 PROCEDURE — 6370000000 HC RX 637 (ALT 250 FOR IP): Performed by: HOSPITALIST

## 2019-05-06 PROCEDURE — 85027 COMPLETE CBC AUTOMATED: CPT

## 2019-05-06 PROCEDURE — 6360000002 HC RX W HCPCS: Performed by: INTERNAL MEDICINE

## 2019-05-06 PROCEDURE — 2580000003 HC RX 258: Performed by: HOSPITALIST

## 2019-05-06 PROCEDURE — 80048 BASIC METABOLIC PNL TOTAL CA: CPT

## 2019-05-06 PROCEDURE — 97161 PT EVAL LOW COMPLEX 20 MIN: CPT

## 2019-05-06 PROCEDURE — 97530 THERAPEUTIC ACTIVITIES: CPT

## 2019-05-06 PROCEDURE — 74230 X-RAY XM SWLNG FUNCJ C+: CPT

## 2019-05-06 PROCEDURE — 92611 MOTION FLUOROSCOPY/SWALLOW: CPT

## 2019-05-06 PROCEDURE — 2580000003 HC RX 258: Performed by: INTERNAL MEDICINE

## 2019-05-06 PROCEDURE — 92526 ORAL FUNCTION THERAPY: CPT

## 2019-05-06 RX ORDER — 0.9 % SODIUM CHLORIDE 0.9 %
500 INTRAVENOUS SOLUTION INTRAVENOUS ONCE
Status: COMPLETED | OUTPATIENT
Start: 2019-05-06 | End: 2019-05-06

## 2019-05-06 RX ORDER — SODIUM POLYSTYRENE SULFONATE 15 G/60ML
30 SUSPENSION ORAL; RECTAL ONCE
Status: COMPLETED | OUTPATIENT
Start: 2019-05-06 | End: 2019-05-06

## 2019-05-06 RX ORDER — SENNA PLUS 8.6 MG/1
1 TABLET ORAL 2 TIMES DAILY
Qty: 60 TABLET | Refills: 0 | Status: SHIPPED | OUTPATIENT
Start: 2019-05-06 | End: 2019-05-23

## 2019-05-06 RX ADMIN — PANTOPRAZOLE SODIUM 40 MG: 40 GRANULE, DELAYED RELEASE ORAL at 08:50

## 2019-05-06 RX ADMIN — SODIUM CHLORIDE 500 ML: 9 INJECTION, SOLUTION INTRAVENOUS at 15:57

## 2019-05-06 RX ADMIN — MINERAL SUPPLEMENT IRON 300 MG / 5 ML STRENGTH LIQUID 100 PER BOX UNFLAVORED 300 MG: at 17:30

## 2019-05-06 RX ADMIN — MINERAL SUPPLEMENT IRON 300 MG / 5 ML STRENGTH LIQUID 100 PER BOX UNFLAVORED 300 MG: at 08:48

## 2019-05-06 RX ADMIN — ENOXAPARIN SODIUM 30 MG: 30 INJECTION SUBCUTANEOUS at 08:47

## 2019-05-06 RX ADMIN — LEVOTHYROXINE SODIUM 100 MCG: 100 TABLET ORAL at 08:47

## 2019-05-06 RX ADMIN — ASPIRIN 81 MG: 81 TABLET, COATED ORAL at 08:47

## 2019-05-06 RX ADMIN — Medication 10 ML: at 08:47

## 2019-05-06 RX ADMIN — SODIUM POLYSTYRENE SULFONATE 30 G: 15 SUSPENSION ORAL; RECTAL at 16:33

## 2019-05-06 RX ADMIN — CEFTRIAXONE 1 G: 1 INJECTION, POWDER, FOR SOLUTION INTRAMUSCULAR; INTRAVENOUS at 15:59

## 2019-05-06 RX ADMIN — SENNOSIDES 8.6 MG: 8.6 TABLET, FILM COATED ORAL at 08:47

## 2019-05-06 RX ADMIN — MINERAL SUPPLEMENT IRON 300 MG / 5 ML STRENGTH LIQUID 100 PER BOX UNFLAVORED 300 MG: at 13:25

## 2019-05-06 ASSESSMENT — PAIN SCALES - GENERAL
PAINLEVEL_OUTOF10: 0

## 2019-05-06 ASSESSMENT — PAIN SCALES - WONG BAKER
WONGBAKER_NUMERICALRESPONSE: 0
WONGBAKER_NUMERICALRESPONSE: 0

## 2019-05-06 NOTE — PLAN OF CARE
Problem: Risk for Impaired Skin Integrity  Goal: Tissue integrity - skin and mucous membranes  Description  Structural intactness and normal physiological function of skin and  mucous membranes. 5/6/2019 1036 by Lopez Reasons, RN  Outcome: Ongoing  Note:   Skin assessment performed each shift per protocol. Skin care protocol in place. Turns self.    5/6/2019 0505 by Cl Fernández, RN  Outcome: Ongoing

## 2019-05-06 NOTE — PROGRESS NOTES
Speech Language Pathology  Facility/Department: [address]  Dysphagia Daily Treatment Note    NAME: Pamela Darling  : 1945  MRN: [id number]    Patient Diagnosis(es):   Patient Active Problem List    Diagnosis Date Noted    Hypotension 2019    Generalized abdominal pain     GI bleeding 2019    Acute metabolic encephalopathy     UTI (urinary tract infection) 2019    Hypothermia 2019    CKD (chronic kidney disease), stage IV ([address] 2019    Rectal mass     Rectal bleeding     Severe malnutrition ([address] 2019    Hypothyroidism 2019    Altered mental status     Oropharyngeal dysphagia     Weakness     SOB (shortness of breath)     Goals of care, counseling/discussion     DNR (do not resuscitate) discussion     Encounter for palliative care     possible sepsis 2019    Hypothermia 2019    SANDY (acute kidney injury) ([address]     Metabolic acidosis     Acute metabolic encephalopathy     Hypoglycemia, exogenous insulin, in setting of SANDY 2019    Vertebral artery stenosis, right 2019    SANDY (acute kidney injury) ([address] 2019    Normocytic anemia 2019    Severe protein-calorie malnutrition ([address] 2019    S/P percutaneous endoscopic gastrostomy (PEG) tube placement ([address] 2019    Right to left cardiac shunt ([address] 2019    H/O ischemic multifocal multiple vascular territories stroke 2019    Anticoagulated 2019    History of stroke 2019    S/P hip hemiarthroplasty 2017    Uncontrolled type 2 diabetes mellitus with hyperglycemia, with long-term current use of insulin ([address] 10/17/2017    Paroxysmal atrial fibrillation (HCC)     Coronary artery disease involving native coronary artery of native heart without angina pectoris     Cardiomyopathy ([address]     Acute renal failure (HCC)     Hyperkalemia     Noncompliance with medication regimen 10/17/2016    Type 2 diabetes mellitus ([facility] 08/11/2016    Benign essential HTN 08/11/2016    Vitamin B deficiency 11/26/2012    Atherosclerosis of coronary artery 04/04/2011    Long term current use of insulin ([facility] 01/25/2011    Vitamin D deficiency 09/17/2010    Angiopathy, peripheral ([address] 09/17/2010    Gonadotropin deficiency ([facility] 09/17/2010    Dyslipidemia 02/12/2010    Carotid artery narrowing 02/12/2010           Recent Chest Xray/CT of Chest: 5/4/19          No significant interval change since 4/17/2019.         Small right pleural effusion with adjacent atelectasis              Prior dysphagia history:  Previous MBS (#2) 1/18/19  Oral Phase: Mild-moderate oral phase deficits. Pt edentulous and reported that dentures currently at nursing home when questioned and reported \"I can chew anything with or without them\".  Pt [place] for pureed with fairly timely oral phase with no oral residue.  With cracker trial, pt with slow  A-P tranport and prolonged mastication with minimal residue on the left due to reduced left lingual strength and ROM.     Pharyngeal phase:  Pt with moderate pharyngeal deficits characterized by delay in swallow initiation with most po trials with spillover into pyriform sinus prior to swallow initiation with most po trials.  Minimal to no pharyngeal residue.  Due to reduced laryngeal excursion and elevation, pt with silent aspiration during the swallow with nectar via cup and aspiration with delayed cough response with nectar via cup with chin tuck.  Chin tuck did not eliminate aspiration. No aspiration/penetration with puree x2 trials, honey thick liquids via cup X2 or spoon or with nectar via spoon (only one trial given).  Did not attempt thins due to deficits with nectar.      Previous MBS #1- 4/21/17  Oral Phase:   1. Moderate-severe oral stage dysphagia characterized by suspected decreased mastication and decreased lingual manipulation/control.    2. Prolonged bolus prep and oral transit with lingual mashing.    3. Reduced bolus control with premature bolus loss to the pharynx with all trials.    4. Lingual residue with all trials is cleared with cued repeat swallow. Pharyngeal:   1. Severe pharyngeal stage dysphagia characterized by delayed swallow, decreased laryngeal elevation, and reduced pharyngeal peristalsis.    2. Premature spillage to the valleculae with all trials and to the pyriform with nectar and thin trials.    3. Intermittent, flash penetration with nectar and honey trials.    4. Deep penetration with thin; residue lining laryngeal vestibule evident post-penetration of thin.    5. Vallecular and pyriform residue (vallecular > pyriform) with all trials.    6. Clears liquid residue with cued repeat swallow.    7. Unable to clear puree residue using repeat swallows and/or liquid wash.    8. Patient at severe risk for aspiration after the swallow of observed residues during study. Upper Esophageal Screen:   1. No apparent upper esophageal involvements per screening  Dysphagia Outcome Severity Scale: Level 2: Moderate Severe dysphagia- Maximum assistance or maximum use of strategies with partial PO only     MBS results 2/28/19:  Pt presents with moderate oral and pharyngeal phase dysphagia  Oral- pt with poor A-P transport with all consistencies. Mastication with cracker was prolonged. pt unable to draw honey thick barium up the straw. Pharyngeal- pt presents with incomplete epiglottal closure which resulted in deep penetration of nectar thick barium without spontaneous clearing. Nectar thick barium would have been aspirated it pt was not instructed to cough and clear throat several times.  Pt also silently aspirated thin barium during the swallow.  Did not attempt chin tuck as question pt's ability to recall and perform independently as well as this strategy was not beneficial in the past. Pt with no aspiration or penetration with pureed, cracker or honey thick liquids by cup. Pt with minimal residue remaining in the valleculae and pyriform after the swallow with honey thick liquids and to a slightly greater degree with pureed and cracker. Pt able to clear residue when instructed to dry swallow.         [facility] 3/28/19:  Impressions:  Pt exhibits slow propulsion of bolus posteriorly in oral cavity. Lingual residue of cracker noted after study completed.  Pt with reduced tongue base strength and reduced laryngeal elevation resulting in mod amount of residue in valleculae and pyriform sinuses with puree and honey thick liquid textures.  This then resulted in spillover into airway with deep penetration and eventual SILENT aspiration.  Pt did not clear residue spontaneously, no extra swallows were noted.  Pt asked if he felt anything, which he denied. Pt then asked to swallow again, which did not clear residue.  Pt then presented with nectar thick liquid trials via cup/straw/tsp, where no penetration or aspiration was identified. Thin liquids was presented via tsp and cup, where deep penetration occurred during the swallow, without spontaneous clearing and no cough reflex. In summary, nectar thick liquids were the only texture that pt did not penetrate or aspirate.  Would trial nectar thick clear liquid diet with close monitoring for s/s of aspiration.    Pending pt wishes for quality of life, will follow up for diet advancement.  Solid texture poses a risk due to pt being edentulous, oral residue noted after study as well as pt report of choking on turkey sandwich prior to admit.         Chart reviewed. Medical Diagnosis: Hypotension  Treatment Diagnosis: Oropharyngeal dysphagia    BSE Impression:  5/6/19  Dysphagia Impression : Pt mildly lethargic at times which impeded speech intelligiblity; oriented to person, year, but not to place.   Pt with history of dysphagia and seen by team in the recent past.  Spoke with staff of [facility] center who indicated pt does not receive po diet and is given po via G-tube. Pt seen for multiple MBS in the past with last one in March, 2019 recommending nectar clear liquids at that time (see last MBS report above). Pt with prolonged oral prep phase with all po trials. Suspected pharyngeal deficits due to history of dysphagia and MBS findings in March. Pt with delayed swallow initiation with ice chips, water via spoon/cup, and nectar via spoon with initiation becoming more delayed as trials progressed. No cough/wet vocal quality with ice chips/thins via spoon (trials x3 each) and cup (trials given x2); pt with fairly immediate strong and fairly lengthy cough with second trial of nectar via spoon. Recommend NPO with meds/po via alternate form of feeding (suspect swallow fatigues as trials progressed). Will reassess tomorrow with f/u instrumental exam as appropriate. MBS results  - scheduled for this afternoon    Pain: none    Current Diet : NPO with G tube    Treatment:  Pt seen bedside to address the following goals:  Short-term Goals  Goal 1: Pt will participate in repeat bedside assessment  5/6:  Pt alert and easier to understand today but still reduced intelligibility at times. Pt analyzed with puree x6 with one delayed cough noted, but no wet vocal quality. Nectar thick liquids via tsp/cup given with no cough/wet vocal quality. Pt is edentulous. Cracker trial yielded mastication time of around 3 minutes with pt not swallowing despite cues with eventual expectorating bolus upon request and cough noted during mastication. 2 trials of thins via tsp given with no cough/wet vocal quality. Mildly prolonged oral phase with tongue pumping with puree and delayed swallow response/initiation with all po trials. Recommend continue with NPO and to do MBS to current determine pharyngeal function and abilities with po.   Pt reported \"I do not really like thickened liquids\" but \"I want to be able to eat\" and is in agreement with plan. Goal 2: Pt/family will verbalize and/or demonstrate understanding of swallowing recommendations. 5/6: No family present. Pt educated to what aspiration is, history of dysphagia, benefit of MBS and that recommendations will be to follow that study. Pt verbalized fair understanding. Cont goal  Goal 3: Pt will participate in instrumental exam as appropriate  5/6:   Scheduled for this afternoon. Patient/Family/Caregiver Education: see under goal 2 above  . Compensatory Strategies:  Oral care       Plan:  Continued daily Dysphagia treatment with goals per  plan of care. Diet recommendations: NPO with G tube; MBS to determine current pharyngeal function and appropriateness for po. DC recommendation: TBD after MBS  Treatment: 12  D/W nursing  Needs met prior to leaving room, call button in reach. Wilbert Del Toro MA CCC/SLP [id number]  Pg.  # [phone]    If patient is discharged prior to next treatment, this note will serve as the discharge summary

## 2019-05-06 NOTE — DISCHARGE INSTR - OTHER ORDERS
Patient Complaints/Reason for Referral:  Kev Farrell was referred for a MBS to assess the efficiency of his/her swallow function, assess for aspiration, and to make recommendations regarding safe dietary consistencies, effective compensatory strategies, and safe eating environment. Onset of problem:   Date of Onset: 5/4/19     Behavior/Cognition/Vision/Hearing:  Behavior/Cognition: Alert     Impressions:  Pt presenting with mod oral/pharyngeal dysphagia. Pt consumed puree and honey thick liquids via cup/straw with no penetration or aspiration or pharyngeal residue. Premature spillage over base of tongue with incomplete epiglottal closure resulted in deep penetration of nectar thick liquids via cup / deeper with straw. Throat clear x 1 occurred, no other coughing or throat clear occurred. No actual aspiration was identified, however this did not clear spontaneously or when pt asked to cough/clear. Study improved as pt now able to tolerate puree and had no pharyngeal residue, which was present on last study. Recommend puree with honey thick liquids  Treatment Dx and ICD 10: oropharyngeal dysphagia   Patient Position: Lateral and Patient Degrees: 90     Consistencies Administered: Puree;Nectar straw;Nectar  teaspoon;Nectar cup;Honey straw;Honey cup;Honey teaspoon   Dysphagia Outcome Severity Scale: Level 3: Moderate dysphagia- Total assisstance, supervision or strategies.  Two or more diet consistencies restricted  Penetration-Aspiration Scale (PAS): 5 - Material enters the airway, contacts the vocal folds, and is not ejected into the airway     Recommended Diet:  Solid consistency: Dysphagia I Pureed  Liquid consistency: Honey  Liquid administration via: Cup;Straw  Medication administration: Via NG/PEG     Safe Swallow Protocol:  Supervision: Close  Compensatory Swallowing Strategies:   Small bites/sips  Upright as possible for all oral intake  Remain upright for 30-45 minutes after meals  Check for pocketing of food      Recommendations/Treatment  Requires SLP Intervention: Yes  D/C Recommendations: returning to SNF      Recommended Exercises: effortful swallow   Therapeutic Interventions: Patient/Family education;Diet tolerance monitoring;Oral care     Education: Images and recommendations were reviewed with pt following this exam.   Patient Education: pt educated to results of MBS  Patient Education Response: Needs reinforcement     Prognosis for safe diet advancement: fair  Barriers to reach goals: (history of dysphagia)  Duration/Frequency of Treatment: 2-4 x     Goals:    Short-term Goals  Goal 1: Pt will participate in repeat bedside assessment  5/6:  Pt alert and easier to understand today but still reduced intelligibility at times. Pt analyzed with puree x6 with one delayed cough noted, but no wet vocal quality. Nectar thick liquids via tsp/cup given with no cough/wet vocal quality. Pt is edentulous. Cracker trial yielded mastication time of around 3 minutes with pt not swallowing despite cues with eventual expectorating bolus upon request and cough noted during mastication.  2 trials of thins via tsp given with no cough/wet vocal quality.  Mildly prolonged oral phase with tongue pumping with puree and delayed swallow response/initiation with all po trials.  Recommend continue with NPO and to do MBS to current determine pharyngeal function and abilities with po.  Pt reported \"I do not really like thickened liquids\" but \"I want to be able to eat\" and is in agreement with plan.    Goal met     Goal 2: Pt/family will verbalize and/or demonstrate understanding of swallowing recommendations.    5/6: No family present.  Pt educated to what aspiration is, history of dysphagia, benefit of MBS and that recommendations will be to follow that study.  Pt verbalized fair understanding.  Cont goal  5/6: pt educated to results of MBS, pt with questionable full comprehension  Cont goal     Goal 3: Pt will participate in instrumental exam as appropriate  5/6:   Scheduled for this afternoon.  - MBS completed - goal met     New goal:  Goal 4:  Pt will consume PO safely without signs or symptoms of aspiration     Oral Preparation / Oral Phase  Pt was assessed bedside with soft solid and not able to manage effectively     Pharyngeal Phase  Pt consumed puree and honey thick liquids via cup/straw with no penetration or aspiration or pharyngeal residue. Premature spillage over base of tongue with incomplete epiglottal closure resulted in deep penetration of nectar thick liquids via cup / deeper with straw. Throat clear x 1 occurred, no other coughing or throat clear occurred. No actual aspiration was identified, however this did not clear spontaneously or when pt asked to cough/clear. Study improved as pt now able to tolerate puree and had no pharyngeal residue, which was present on last study     Esophageal Phase  Not assessed     Pain   Patient Currently in Pain: No  Pain Level: 0     Therapy Time:    Individual Concurrent Group Co-treatment   Time In 0225      Time Out 0245      Minutes 20      Plan:  Diet recommendations; Puree with honey thick liquids - cup/straw  Dc recommendation: follow up by SLP upon dc        JUNE Barrera M.S./[facility]-SLP #[id number]  Pg.  # [phone]  Needs met prior to leaving dept   5/6/2019, 3:17 PM

## 2019-05-06 NOTE — PROGRESS NOTES
Report called to Nadya Roberts at [facility] [phone]. Advised of 1730 p/u time. No questions/ concerns at this time.

## 2019-05-06 NOTE — PROCEDURES
INSTRUMENTAL SWALLOW REPORT  MODIFIED BARIUM SWALLOW    NAME: Zaina Elaine   : 1945  MRN: [id number]       Date of Eval: 2019     Ordering Physician: Dr Marly Lopez  Radiologist: Dr. Eneida Alvares     Referring Diagnosis(es): Referring Diagnosis: hypotension    Past Medical History:  has a past medical history of Acute renal failure ([address] Acute respiratory failure with hypoxia ([address] Angina pectoris ([address] 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[address] CAD (coronary artery disease), Cardiac arrest ([address] Cardiac arrest ([address] Chronic systolic heart failure ([address] Diabetes mellitus ([address] Diabetes mellitus ([address] Diabetes mellitus type II, uncontrolled ([address] DKA (diabetic ketoacidoses) ([address] Elevated LFTs, Femoral neck fracture ([address] HTN (hypertension), Hyperkalemia, Hyperlipidemia, Hypertension, PAF (paroxysmal atrial fibrillation) ([address] Paroxysmal A-fib ([address] PEA (Pulseless electrical activity) ([address] Pneumonia, Pneumonia due to organism, Pneumonia of right lower lobe due to Streptococcus pneumoniae ([address] Protein-calorie malnutrition, severe ([address] S/P hip hemiarthroplasty, Septic shock ([address] Systolic CHF ([address] Thrombocytopenia ([address] Thrombocytopenia ([address] and Type 2 diabetes mellitus with hyperglycemia ([address] Past Surgical History:  has a past surgical history that includes Gastrostomy tube placement; joint replacement; gastrostomy tube change (N/A, 2018); Colonoscopy (N/A, 2019); Upper gastrointestinal endoscopy (N/A, 2019); Anus surgery (N/A, 2019); Gastrostomy tube placement (N/A, 2019); Upper gastrointestinal endoscopy (N/A, 2019); Colonoscopy (N/A, 2019); sigmoidoscopy (N/A, 2019); and gastrostomy tube change (2019).     Current Diet Solid Consistency: (pt has feeding tube for main source of nutrition)     Recent Chest Xray/CT of Chest: 19            No significant interval change since 2019.           Small right pleural effusion with adjacent atelectasis             Prior dysphagia history:  Previous MBS (#2) 1/18/19  Oral Phase: Mild-moderate oral phase deficits. Pt edentulous and reported that dentures currently at nursing home when questioned and reported \"I can chew anything with or without them\".  Pt [employer] for pureed with fairly timely oral phase with no oral residue.  With cracker trial, pt with slow  A-P tranport and prolonged mastication with minimal residue on the left due to reduced left lingual strength and ROM.     Pharyngeal phase:  Pt with moderate pharyngeal deficits characterized by delay in swallow initiation with most po trials with spillover into pyriform sinus prior to swallow initiation with most po trials.  Minimal to no pharyngeal residue.  Due to reduced laryngeal excursion and elevation, pt with silent aspiration during the swallow with nectar via cup and aspiration with delayed cough response with nectar via cup with chin tuck.  Chin tuck did not eliminate aspiration. No aspiration/penetration with puree x2 trials, honey thick liquids via cup X2 or spoon or with nectar via spoon (only one trial given).  Did not attempt thins due to deficits with nectar.      Previous MBS #1- 4/21/17  Oral Phase:   1. Moderate-severe oral stage dysphagia characterized by suspected decreased mastication and decreased lingual manipulation/control.    2. Prolonged bolus prep and oral transit with lingual mashing.    3. Reduced bolus control with premature bolus loss to the pharynx with all trials.    4. Lingual residue with all trials is cleared with cued repeat swallow. Pharyngeal:   1. Severe pharyngeal stage dysphagia characterized by delayed swallow, decreased laryngeal elevation, and reduced pharyngeal peristalsis.    2. Premature spillage to the valleculae with all trials and to the pyriform with nectar and thin trials.    3. Intermittent, flash penetration with nectar and honey trials.    4.  Deep penetration with thin; residue lining laryngeal vestibule evident post-penetration of thin.    5. Vallecular and pyriform residue (vallecular > pyriform) with all trials.    6. Clears liquid residue with cued repeat swallow.    7. Unable to clear puree residue using repeat swallows and/or liquid wash.    8. Patient at severe risk for aspiration after the swallow of observed residues during study. Upper Esophageal Screen:   1. No apparent upper esophageal involvements per screening  Dysphagia Outcome Severity Scale: Level 2: Moderate Severe dysphagia- Maximum assistance or maximum use of strategies with partial PO only     MBS results 2/28/19:  Pt presents with moderate oral and pharyngeal phase dysphagia  Oral- pt with poor A-P transport with all consistencies. Mastication with cracker was prolonged. pt unable to draw honey thick barium up the straw. Pharyngeal- pt presents with incomplete epiglottal closure which resulted in deep penetration of nectar thick barium without spontaneous clearing. Nectar thick barium would have been aspirated it pt was not instructed to cough and clear throat several times. Pt also silently aspirated thin barium during the swallow.  Did not attempt chin tuck as question pt's ability to recall and perform independently as well as this strategy was not beneficial in the past. Pt with no aspiration or penetration with pureed, cracker or honey thick liquids by cup. Pt with minimal residue remaining in the valleculae and pyriform after the swallow with honey thick liquids and to a slightly greater degree with pureed and cracker. Pt able to clear residue when instructed to dry swallow.      [facility] [place]:  Impressions:  Pt exhibits slow propulsion of bolus posteriorly in oral cavity.  Lingual residue of cracker noted after study completed.  Pt with reduced tongue base strength and reduced laryngeal elevation resulting in mod amount of residue in valleculae and pyriform sinuses with puree and honey thick liquid textures.  This then resulted in spillover into airway with deep penetration and eventual SILENT aspiration.  Pt did not clear residue spontaneously, no extra swallows were noted.  Pt asked if he felt anything, which he denied. Pt then asked to swallow again, which did not clear residue.  Pt then presented with nectar thick liquid trials via cup/straw/tsp, where no penetration or aspiration was identified. Thin liquids was presented via tsp and cup, where deep penetration occurred during the swallow, without spontaneous clearing and no cough reflex. In summary, nectar thick liquids were the only texture that pt did not penetrate or aspirate.  Would trial nectar thick clear liquid diet with close monitoring for s/s of aspiration.    Pending pt wishes for quality of life, will follow up for diet advancement.  Solid texture poses a risk due to pt being edentulous, oral residue noted after study as well as pt report of choking on turkey sandwich prior to admit. Patient Complaints/Reason for Referral:  Zaina Elaine was referred for a MBS to assess the efficiency of his/her swallow function, assess for aspiration, and to make recommendations regarding safe dietary consistencies, effective compensatory strategies, and safe eating environment. Onset of problem:   Date of Onset: 5/4/19    Behavior/Cognition/Vision/Hearing:  Behavior/Cognition: Alert    Impressions:  Pt presenting with mod oral/pharyngeal dysphagia. Pt consumed puree and honey thick liquids via cup/straw with no penetration or aspiration or pharyngeal residue. Premature spillage over base of tongue with incomplete epiglottal closure resulted in deep penetration of nectar thick liquids via cup / deeper with straw. Throat clear x 1 occurred, no other coughing or throat clear occurred. No actual aspiration was identified, however this did not clear spontaneously or when pt asked to cough/clear.     Study improved as pt now able to tolerate puree and had no pharyngeal

## 2019-05-06 NOTE — PROGRESS NOTES
Occupational Therapy   Occupational Therapy Initial Assessment/Tx Note  Date: 2019   Patient Name: Lidia Darnell  MRN: [id number]     : 1945    Date of Service: 2019  Assessment: Functional independence is decreased from baseline. On eval, pt required up to mod assist for transfers and fatigued quickly. Limited participation in ADLs but anticipate assist needed. Pt reports being independent with gait and most ADLs at [facility]. Recommend continued inpt OT during admit. Pt would benefit from continued OT after d/c as well. Discharge Recommendations: Lidia Darnell scored a 15/24 on the AM-PAC ADL Inpatient form. Current research shows that an AM-PAC score of 17 or less is typically not associated with a discharge to the patient's home setting. Based on the patients AM-PAC score and their current ADL deficits, it is recommended that the patient have 3-5 sessions per week of Occupational Therapy at d/c to increase the patients independence. OT Equipment Recommendations  Equipment Needed: No    Assessment   Performance deficits / Impairments: Decreased functional mobility ; Decreased ADL status; Decreased strength;Decreased endurance;Decreased balance  Assessment: Functional independence is decreased from baseline. On eval, pt required up to mod assist for transfers and fatigued quickly. Limited participation in ADLs but anticipate assist needed. Pt reports being independent with gait and most ADLs at [facility]. Recommend continued inpt OT during admit. Pt would benefit from continued OT after d/c as well.    Treatment Diagnosis: Decreased activity tolerance, impaired ADLs and mobility  Decision Making: Medium Complexity  Patient Education: Role of OT, d/c planning, activity promotion - verb understanding  REQUIRES OT FOLLOW UP: Yes  Activity Tolerance  Activity Tolerance: Patient limited by fatigue  Activity Tolerance: No c/o dizziness or lightheadedness, initiated own rest break, /72 upon return to bed, unable to obtain BP in sitting. Safety Devices  Safety Devices in place: Yes  Type of devices: Left in bed;Bed alarm in place;Nurse notified;Call light within reach           Treatment Diagnosis: Decreased activity tolerance, impaired ADLs and mobility      Restrictions  Position Activity Restriction  Other position/activity restrictions: up with assistance    Subjective   General  Chart Reviewed: Yes  Patient assessed for rehabilitation services?: Yes  Additional Pertinent Hx: 66 y.o. M admitted 5/4 from ECF with hypotension and AMS. +UTI, dehydration. PMHx includes renal failure, CAD, Cardiac arrest, CHF, DMDKA, femoral neck fracture, Afib, s/p hip hremiarthroplasty, anus surgery, Gtube   Family / Caregiver Present: No  Referring Practitioner: Alisha Xiong  Diagnosis: hypotension  Subjective  Subjective: Pt in bed on entry. needing min encouragement to participate, but cooperative overall. Reports being tired from so many people coming into his room. Pain Assessment  Pain Level: 0  Social/Functional History  Social/Functional History  Type of Home: Facility([facility])  ADL Assistance: Independent  Ambulation Assistance: Needs assistance(with FWW; patient reports sometimes needing assistance but sometimes being able to ambulate on his own)  Transfer Assistance: Needs assistance  Additional Comments: patient reports participating in daily therapy at [facility]. Pt is a questionable historian. Objective        Orientation  Overall Orientation Status: Impaired  Orientation Level: Oriented to person;Disoriented to place; Disoriented to time;Disoriented to situation     Balance  Sitting Balance: Stand by assistance  Standing Balance: Minimal assistance  Standing Balance  Time: 1 min + 1 min   Activity: functional mobility in room  Comment: CGA sit to stand from bed, min assist for gait with rolling walker.  Pt abruptly sat in chair for rest break with min assist. Mod assist sit stated       Therapy Time   Individual Concurrent Group Co-treatment   Time In 0955         Time Out 1019         Minutes 24          Timed Code Tx Min: 9  Total Tx Time: 24        Awa Vazquez, OT

## 2019-05-06 NOTE — DISCHARGE INSTR - COC
ENDOSCOPY    UPPER GASTROINTESTINAL ENDOSCOPY N/A 4/1/2019    EGD BIOPSY performed by Blank Castro MD at [facility] N/A 4/12/2019    EGD DIAGNOSTIC ONLY performed by Blank Castro MD at [facility] ENDOSCOPY       Immunization History:   Immunization History   Administered Date(s) Administered    Influenza Virus Vaccine 10/15/2012, 10/18/2013, 12/12/2016, 10/01/2018    Pneumococcal 13-valent Conjugate (Hehdxus38) 05/05/2015    Pneumococcal Polysaccharide (Ufpyidxum36) 06/01/2006, 10/15/2012       Active Problems:  Patient Active Problem List   Diagnosis Code    Vitamin D deficiency E55.9    Vitamin B deficiency E53.9    Type 2 diabetes mellitus ([facility] E11.9    Angiopathy, peripheral ([facility] I73.9    Noncompliance with medication regimen Z91.14    Gonadotropin deficiency ([facility] E23.0    Benign essential HTN I10    Long term current use of insulin (HCC) Z79.4    Dyslipidemia E78.5    Carotid artery narrowing I65.29    Atherosclerosis of coronary artery I25.10    Hyperkalemia E87.5    Acute renal failure (HCC) N17.9    Paroxysmal atrial fibrillation ([facility]) I48.0    Coronary artery disease involving native coronary artery of native heart without angina pectoris I25.10    Cardiomyopathy ([address] I42.9    Uncontrolled type 2 diabetes mellitus with hyperglycemia, with long-term current use of insulin (HCC) E11.65, Z79.4    S/P hip hemiarthroplasty Z96.649    History of stroke Z86.73    SANDY (acute kidney injury) ([address] N17.9    Normocytic anemia D64.9    Severe protein-calorie malnutrition ([address] E43    S/P percutaneous endoscopic gastrostomy (PEG) tube placement ([facility]) Z93.1    Right to left cardiac shunt ([facility]) I28.0    H/O ischemic multifocal multiple vascular territories stroke Z86.73    Anticoagulated Z79.01    Vertebral artery stenosis, right I65.01    Hypoglycemia, exogenous insulin, in setting of SANDY E15    possible sepsis A41.9    Hypothermia T68. Rhodia Redmajo    SANDY (acute kidney injury) ([facility] M20.3    Metabolic acidosis Z45.6    Acute metabolic encephalopathy B46.04    Altered mental status R41.82    Oropharyngeal dysphagia R13.12    Weakness R53.1    SOB (shortness of breath) R06.02    Goals of care, counseling/discussion Z71.89    DNR (do not resuscitate) discussion Z71.89    Encounter for palliative care Z51.5    Severe malnutrition ([facility] E43    Hypothyroidism E03.9    Rectal mass K62.9    Rectal bleeding K62.5    Acute metabolic encephalopathy G35.72    UTI (urinary tract infection) N39.0    Hypothermia T68. Edu Douse    CKD (chronic kidney disease), stage IV (HCC) N18.4    GI bleeding K92.2    Generalized abdominal pain R10.84    Hypotension I95.9       Isolation/Infection:   Isolation          No Isolation            Nurse Assessment:  Last Vital Signs: BP (!) 85/53   Pulse 81   Temp 97 °F (36.1 °C) (Oral)   Resp 16   Ht 5' 5\" (1.651 m)   Wt 106 lb (48.1 kg)   SpO2 100%   BMI 17.64 kg/m²     Last documented pain score (0-10 scale): Pain Level: 0  Last Weight:   Wt Readings from Last 1 Encounters:   05/04/19 106 lb (48.1 kg)     Mental Status:  alert    IV Access:  - None    Nursing Mobility/ADLs:  Walking   Assisted  Transfer  Assisted  Bathing  Assisted  Dressing  Assisted  Toileting  Assisted  Feeding  Assisted  Med Admin  Assisted  Med Delivery   crushed    Wound Care Documentation and Therapy:  Wound 04/17/19 Perineum Left old and healed (Active)   Dressing Status Clean;Dry; Intact 5/6/2019  1:15 PM   Wound Assessment Clean;Dry; Intact 5/6/2019  1:15 PM   Drainage Amount None 5/6/2019  1:15 PM   Odor None 5/6/2019  1:15 PM   Number of days: 18       Wound 04/21/19 Coccyx Healing stage 2 pressure injury (Active)   Dressing Status Other (Comment) 5/6/2019  1:15 PM   Dressing/Treatment Open to air 5/6/2019  1:15 PM   Wound Assessment Red;Pink 5/6/2019  1:15 PM   Belle-wound Assessment Intact 5/6/2019  1:15 PM   Number of days: 14 Elimination:  Continence:   · Bowel: no  · Bladder: no  Urinary Catheter: None   Colostomy/Ileostomy/Ileal Conduit: No       Date of Last BM: 2019    Intake/Output Summary (Last 24 hours) at 2019 1534  Last data filed at 2019 0846  Gross per 24 hour   Intake 1335 ml   Output --   Net 1335 ml     I/O last 3 completed shifts: In: 0876 [I.V.:1285; IV Piggyback:50]  Out: -     Safety Concerns:     Aspiration Risk    Impairments/Disabilities:      None and Hearing    Nutrition Therapy:  Current Nutrition Therapy:   - Tube Feedings:  {[facility] Tube Feedings:003979027}    Routes of Feeding: Gastrostomy Tube  Liquids: Honey Thick Liquids  Daily Fluid Restriction: no  Last Modified Barium Swallow with Video (Video Swallowing Test): done on 2019/***    Treatments at the Time of Hospital Discharge:   Respiratory Treatments: ***  Oxygen Therapy:  is not on home oxygen therapy. Ventilator:    - No ventilator support    Rehab Therapies: {THERAPEUTIC INTERVENTION:5348272613}  Weight Bearing Status/Restrictions: [address] [place] [address] Weight Bearin}  Other Medical Equipment (for information only, NOT a DME order):  {EQUIPMENT:841478946}  Other Treatments: ***    Patient's personal belongings (please select all that are sent with patient):  {[facility] DME Belongings:586147420}    RN SIGNATURE:  Electronically signed by Jyoti Thomas RN on 19 at 4:57 PM    CASE MANAGEMENT/SOCIAL WORK SECTION    Inpatient Status Date: 2019    Readmission Risk Assessment Score:  Readmission Risk              Risk of Unplanned Readmission:        41           Discharging to Facility/ [address] [place] [address]  Report: [phone]  Fax:  [phone].          / signature: Electronically signed by WIL Mack on 19 at 3:34 PM    PHYSICIAN SECTION    Prognosis: Poor    Condition at Discharge: Stable    Rehab Potential (if transferring to Rehab): Guarded    Recommended Labs or Other Treatments After Discharge: Recommended family meeting with hospice and palliative care    Physician Certification: I certify the above information and transfer of Tolu Francois  is necessary for the continuing treatment of the diagnosis listed and that he requires [facility] for greater 30 days.      Update Admission H&P: No change in H&P    PHYSICIAN SIGNATURE:  Electronically signed by John Ovalle MD on 5/6/19 at 4:03 PM

## 2019-05-06 NOTE — CARE COORDINATION
MOISÉS received notification from bedside RN that patient is medically ready for discharge on this date. MOISÉS placed call to [address] [facility]. They can transport patient at 5:30 pm ([phone]). MOISÉS notified bedside RN regarding transport time. MOISÉS placed call to [facility] ([phone]) admissions with update. [address]  Report: [phone]  Fax:  [phone].      WIL Shen, LSW     [facility]   [phone]

## 2019-05-06 NOTE — CARE COORDINATION
Case Management Daily Note:    Current Plan of Care: Low blood pressure; AMS. PT AM-PAC: Pending/24    Date:     OT AM-PAC: Pending /24   Date:     DME needs: uses walker for mobility. Discharge Plan: From [facility]: return at discharge. Reportedly active with [facility], [phone]. Tentative Discharge Date: TBD    Current Barriers to Discharge: Medical Clearance     Resources/Information given: N/A      Case Management Notes: Chart review reveals 66year old male admitted from [facility]. SW to assess and follow for discharge needs.          Johnathan Holm MSW, LSW     [facility]   [phone]

## 2019-05-06 NOTE — CARE COORDINATION
SW attempted to meet with patient at bedside. Patient was not in room at this time. SW to follow up to complete assessment and discuss discharge plan. SW placed information on board in room.        WIL Quintana, LSW     [facility]   [phone]

## 2019-05-06 NOTE — DISCHARGE SUMMARY
Hospital Medicine Discharge Summary    Patient ID: Guillaume Roberts      Patient's PCP: Rei Barone    Admit Date: 5/4/2019     Discharge Date:   5/6/19    Admitting Physician: Lokesh Javed MD     Discharge Physician: Joel Villasenor MD     Discharge Diagnoses: Active Hospital Problems    Diagnosis Date Noted    Hypotension [I95.9] 05/04/2019       The patient was seen and examined on day of discharge and this discharge summary is in conjunction with any daily progress note from day of discharge. Hospital Course:   3  This  58-year-old male admitted with acute encephalopathy ? due to UTI continue with IV Rocephin x3 doses. No urine culture was sent from emergency room. 2.  Acute kidney injury on chronic kidney disease, creatinine remained stable patient continues to have poor by mouth intake has peg tube in place recommended to give tube feeding and water boluses per dietitian recommendation  3.  Hypothyroidism continue with the Synthroid. 4.  Diabetes mellitus type II continue with the current care on sliding scale and due to poor by mouth intake hemoglobin A-1 C=6.0 on 5/4/19  5.  Dysphagia patient has peg tube in place dietitian to evaluate for tube feeding and fluid boluses, modified barium swallow todayAbnormal swallowing study with deep laryngeal penetration with nectar consistency only. No sign of any aspiration. 6.  Patient is DNR ccA nursing home resident. consider reconsulting a palliative care and family meeting as patient is in ? hospice care. 7.  Hyperkalemia ACE inhibitor was discontinued, patient was given 1 dose of Kayexalate prior to discharge. Repeat BMP in 1 to 2 days. 8.  Hypotension antihypertensive medication has been discontinued, given a fluid bolus. Recommended continue the tube feeding and fluid boluses per peg tube.   9.  Patient will be discharged to nursing home to follow-up with PCP within a week highly recommended family meeting and palliative care

## 2019-05-06 NOTE — PROGRESS NOTES
Hospitalist Progress Note      PCP: Chevis Saint    Date of Admission: 5/4/2019    Chief Complaint: Acute encephalopathy    Hospital Course: this is 73-year-old male nursing home resident brought to emergency room with the low blood pressure altered mental status      Subjective: Patient denies any chest pain or shots with no nausea vomiting continues to have poor by mouth intake. Medications:  Reviewed    Infusion Medications    dextrose       Scheduled Medications    sodium chloride  500 mL Intravenous Once    sodium polystyrene  30 g Oral Once    senna  1 tablet Oral BID    aspirin  81 mg Oral Daily    ferrous sulfate  300 mg Oral TID WC    levothyroxine  100 mcg Oral Daily    mirtazapine  15 mg Oral Nightly    pantoprazole sodium  40 mg Oral QAM AC    tamsulosin  0.4 mg Oral Dinner    sodium chloride flush  10 mL Intravenous 2 times per day    enoxaparin  30 mg Subcutaneous Daily    insulin lispro  0-6 Units Subcutaneous TID WC    insulin lispro  0-3 Units Subcutaneous Nightly    cefTRIAXone (ROCEPHIN) IV  1 g Intravenous Q24H     PRN Meds: sodium chloride flush, magnesium hydroxide, ondansetron, glucose, dextrose, glucagon (rDNA), dextrose, acetaminophen      Intake/Output Summary (Last 24 hours) at 5/6/2019 1604  Last data filed at 5/6/2019 0846  Gross per 24 hour   Intake 1335 ml   Output --   Net 1335 ml       Physical Exam Performed:    BP (!) 85/53   Pulse 81   Temp 97 °F (36.1 °C) (Oral)   Resp 16   Ht 5' 5\" (1.651 m)   Wt 106 lb (48.1 kg)   SpO2 100%   BMI 17.64 kg/m²     General appearance: No apparent distress, appears stated age and cooperative. HEENT: Pupils equal, round, and reactive to light. Conjunctivae/corneas clear. Neck: Supple, with full range of motion. No jugular venous distention. Trachea midline. Respiratory:  Normal respiratory effort. Clear to auscultation, bilaterally without Rales/Wheezes/Rhonchi.   Cardiovascular: Regular rate and rhythm with 57-year-old male admitted with acute encephalopathy ? due to UTI continue with IV Rocephin x3 doses. No urine culture was sent from emergency room. 2.  Acute kidney injury on chronic kidney disease, creatinine remained stable patient continues to have poor by mouth intake has peg tube in place recommended to give tube feeding and water boluses per dietitian recommendation  3. Hypothyroidism continue with the Synthroid. 4.  Diabetes mellitus type II continue with the current care on sliding scale and due to poor by mouth intake hemoglobin A-1 C=6.0 on 5/4/19  5. Dysphagia patient has peg tube in place dietitian to evaluate for tube feeding and fluid boluses, modified barium swallow todayAbnormal swallowing study with deep laryngeal penetration with nectar consistency only. No sign of any aspiration. 6.  Patient is DNR ccA nursing home resident. consider reconsulting a palliative care and family meeting as patient is in ? hospice care. 7.  Hyperkalemia ACE inhibitor was discontinued, patient was given 1 dose of Kayexalate prior to discharge. 8.  Hypotension antihypertensive medication has been discontinued, given a fluid boluse.   9.  Patient will be discharged to nursing home to follow-up with PCP within a week highly recommended family meeting and palliative care due to poor prognosis for her by mouth intake           DVT Prophylaxis: Lovenox   Diet: Diet NPO Effective Now  Code Status: DNR-CCA    PT/OT Eval Status:     Maxim Hroan MD

## 2019-05-06 NOTE — PROGRESS NOTES
Physical Therapy    Facility/Department: [address]  Initial Assessment and treatment    NAME: Payal Hamlin  : 1945  MRN: [id number]    Date of Service: 2019    Discharge Recommendations:    Payal Hamlin scored a 17/24 on the AM-PAC short mobility form. Current research shows that an AM-PAC score of 17 or less is typically not associated with a discharge to the patient's home setting. Based on the patients AM-PAC score and their current functional mobility deficits, it is recommended that the patient have 3-5 sessions per week of Physical Therapy at d/c to increase the patients independence. PT Equipment Recommendations  Equipment Needed: No(defer to next level of care)    Assessment   Body structures, Functions, Activity limitations: Decreased functional mobility ; Decreased endurance;Decreased strength;Decreased balance;Decreased safe awareness  Assessment: Patient tolerated session fair with mobility being limited due to weakness and decreased activity tolerance. Patient appears very fatigued throughout mobility, requiring verbal cues for encouragement for participation in evaluation. Patient able to perform bed mobility with SBA and increased time to complete tasks but needs assistance with transfers and ambulation. Patient's /70mmHg following mobility. Patient appears to be functioning below baseline level, extremely fatigued with limited mobility. Recommend continued skilled PT upon discharge. Treatment Diagnosis: impaired mobility associated with hypotension  Prognosis: Good  Decision Making: Low Complexity  Patient Education: Educated on role of PT, safety with mobility, use of call light; patient would benefit from re-inforcement. REQUIRES PT FOLLOW UP: Yes  Activity Tolerance  Activity Tolerance: Patient limited by fatigue;Patient limited by endurance       Patient Diagnosis(es): The encounter diagnosis was Acute cystitis without hematuria.      has a past patient will perform bed mobility with supervision  Short term goal 2: patient will perform transfers sit<>stand with SBA  Short term goal 3: patient will ambulate 76' with FWW and SBA  Patient Goals   Patient goals : none stated       Therapy Time   Individual Concurrent Group Co-treatment   Time In 0955         Time Out 1019         Minutes 24         Timed Code Treatment Minutes: 9 Minutes    Timed Code Treatment Minutes:  9 Minutes    Total Treatment Minutes:    9 minutes treatment + 15 minutes evaluation = 24 total treatment minutes    If patient is discharged from the hospital prior to next treatment session, this note will serve as the discharge summary.      Kierra NUGENT [address]

## 2019-05-06 NOTE — PROGRESS NOTES
Nutrition Assessment (Enteral Nutrition)    Type and Reason for Visit: Initial    Nutrition Recommendations:     **SLP recommends patient be NPO at this time. Recommend initiating EN via PEG at this time. Recommendations provided below. ENTERAL NUTRITION  1. Recommend order \"Diet: Tube feed continuous/ NPO\". Initiate Glucerna 1.2 formula at 20 mL/hr and as tolerated, increase by 20 mL/hr q 4 hours until goal of 55 mL/hr is met per PEG. 2. Suggest 100 mL H20 q 4 hours for 100% fluid recommendations when no IVF. 3. Monitor for tolerance (bowel habits, N/V, cramping, abdominal distention). Nutrition Assessment: Positive screen for poor po, wt loss and swallowing difficulty. Patient is nutritionally compromised as he meets ASPEN criteria for severe PCM with fat, muscle and wt loss, and stg 2 pressure ulcer to coccyx. Patient had PEG placed at previous admissiona dn only tolerated nectar liquids for pleasure. SLP evaluated patient today and recommended pt be NPO. Will provide EN recommendations and monitor nutritional status. Malnutrition Assessment:  · Malnutrition Status: Meets the criteria for severe malnutrition  · Context: Chronic illness  · Findings of the 6 clinical characteristics of malnutrition (Minimum of 2 out of 6 clinical characteristics is required to make the diagnosis of moderate or severe Protein Calorie Malnutrition based on AND/ASPEN Guidelines):  1. Energy Intake-Unable to assess,      2. Weight Loss-7.5% loss or greater, (~2 weeks)  3. Fat Loss-Severe subcutaneous fat loss, Orbital, Triceps, Fat overlying the ribs  4. Muscle Loss-Severe muscle mass loss, Temples (temporalis muscle), Clavicles (pectoralis and deltoids), Calf (gastrocnemius)  5. Fluid Accumulation-No significant fluid accumulation,    6.   Strength-Not measured    Nutrition Risk Level: High    Nutrition Needs:  · Estimated Daily Total Kcal: 0767-4256(29-11)  · Estimated Daily Protein (g): 62-72(1.3-1.5)  · Estimated Daily Fluid (ml/day): 1680 ml    Nutrition Diagnosis:   · Problem: Severe malnutrition  · Etiology: related to Insufficient energy/nutrient consumption     Signs and symptoms:  as evidenced by NPO status due to medical condition, Weight loss greater than or equal to 5% in 1 month, Severe loss of subcutaneous fat, Severe muscle loss    Objective Information:  · Nutrition-Focused Physical Findings: +BM 5/6  · Wound Type: Stage II, Pressure Ulcer  · Current Nutrition Therapies:  · Oral Diet intake: NPO  · Oral Nutrition Supplement (ONS) Orders: None  · ONS intake: NPO  · Tube Feeding (TF) Orders:   · Feeding Route: Gastrostomy  · Goal TF & Flush Orders Provides: RD Goal: Glucerna 1.2 at goal rate of 55 ml/hr will [id number] ml TV, 1584 kcal, 79 gm protein and 1062 ml free water  · Anthropometric Measures:  · Ht: 5' 5\" (165.1 cm)   · Current Body Wt: 106 lb (48.1 kg)  · Weight Change: 10% loss ~2 weeks, 23% loss ~8 months   · Ideal Body Wt: 136 lb (61.7 kg),     · BMI Classification: BMI <18.5 Underweight    Nutrition Interventions:   Continue NPO, Start Tube Feeding  Continued Inpatient Monitoring    Nutrition Evaluation:   · Evaluation: Goals set   · Goals: Patient will tolerate EN to meet 100% of nutrition needs   · Monitoring: Nutrition Progression, Wound Healing, Pertinent Labs      Electronically signed by Hortencia López RD, LD on 5/6/19 at 12:11 PM  Contact Number: [phone]

## 2019-05-11 PROBLEM — N39.0 UTI (URINARY TRACT INFECTION): Status: RESOLVED | Noted: 2019-04-11 | Resolved: 2019-05-11

## 2019-05-23 ENCOUNTER — HOSPITAL ENCOUNTER (INPATIENT)
Age: 74
LOS: 1 days | Discharge: NURSING, REHAB OR LONG-TERM CARE | DRG: 177 | End: 2019-05-24
Attending: EMERGENCY MEDICINE | Admitting: INTERNAL MEDICINE
Payer: MEDICARE

## 2019-05-23 ENCOUNTER — APPOINTMENT (OUTPATIENT)
Dept: GENERAL RADIOLOGY | Age: 74
DRG: 177 | End: 2019-05-23
Payer: MEDICARE

## 2019-05-23 DIAGNOSIS — I21.4 NSTEMI (NON-ST ELEVATED MYOCARDIAL INFARCTION) (HCC): ICD-10-CM

## 2019-05-23 DIAGNOSIS — T68.XXXA HYPOTHERMIA, INITIAL ENCOUNTER: ICD-10-CM

## 2019-05-23 DIAGNOSIS — N39.0 URINARY TRACT INFECTION WITHOUT HEMATURIA, SITE UNSPECIFIED: ICD-10-CM

## 2019-05-23 DIAGNOSIS — E16.2 HYPOGLYCEMIA: Primary | ICD-10-CM

## 2019-05-23 PROBLEM — G93.41 ACUTE METABOLIC ENCEPHALOPATHY: Status: ACTIVE | Noted: 2019-05-23

## 2019-05-23 PROBLEM — N18.9 CRF (CHRONIC RENAL FAILURE): Status: ACTIVE | Noted: 2019-05-23

## 2019-05-23 LAB
ANION GAP SERPL CALCULATED.3IONS-SCNC: 12 MMOL/L (ref 3–16)
ANION GAP SERPL CALCULATED.3IONS-SCNC: 14 MMOL/L (ref 3–16)
BACTERIA: ABNORMAL /HPF
BASE EXCESS VENOUS: -5 (ref -3–3)
BASOPHILS ABSOLUTE: 0.1 K/UL (ref 0–0.2)
BASOPHILS RELATIVE PERCENT: 0.6 %
BILIRUBIN URINE: NEGATIVE
BLOOD, URINE: NEGATIVE
BUN BLDV-MCNC: 60 MG/DL (ref 7–20)
BUN BLDV-MCNC: 69 MG/DL (ref 7–20)
CALCIUM SERPL-MCNC: 8.9 MG/DL (ref 8.3–10.6)
CALCIUM SERPL-MCNC: 8.9 MG/DL (ref 8.3–10.6)
CHLORIDE BLD-SCNC: 101 MMOL/L (ref 99–110)
CHLORIDE BLD-SCNC: 105 MMOL/L (ref 99–110)
CHP ED QC CHECK: NORMAL
CLARITY: CLEAR
CO2: 20 MMOL/L (ref 21–32)
CO2: 21 MMOL/L (ref 21–32)
COLOR: YELLOW
CREAT SERPL-MCNC: 1.6 MG/DL (ref 0.8–1.3)
CREAT SERPL-MCNC: 1.7 MG/DL (ref 0.8–1.3)
EKG ATRIAL RATE: 86 BPM
EKG DIAGNOSIS: NORMAL
EKG P AXIS: 63 DEGREES
EKG P-R INTERVAL: 126 MS
EKG Q-T INTERVAL: 400 MS
EKG QRS DURATION: 100 MS
EKG QTC CALCULATION (BAZETT): 478 MS
EKG R AXIS: -63 DEGREES
EKG T AXIS: 88 DEGREES
EKG VENTRICULAR RATE: 86 BPM
EOSINOPHILS ABSOLUTE: 0.7 K/UL (ref 0–0.6)
EOSINOPHILS RELATIVE PERCENT: 7.2 %
EPITHELIAL CELLS, UA: ABNORMAL /HPF
GFR AFRICAN AMERICAN: 48
GFR AFRICAN AMERICAN: 51
GFR NON-AFRICAN AMERICAN: 39
GFR NON-AFRICAN AMERICAN: 42
GLUCOSE BLD-MCNC: 112 MG/DL (ref 70–99)
GLUCOSE BLD-MCNC: 114 MG/DL (ref 70–99)
GLUCOSE BLD-MCNC: 122 MG/DL (ref 70–99)
GLUCOSE BLD-MCNC: 124 MG/DL
GLUCOSE BLD-MCNC: 124 MG/DL (ref 70–99)
GLUCOSE BLD-MCNC: 130 MG/DL (ref 70–99)
GLUCOSE BLD-MCNC: 155 MG/DL (ref 70–99)
GLUCOSE URINE: NEGATIVE MG/DL
HCO3 VENOUS: 19.2 MMOL/L (ref 23–29)
HCT VFR BLD CALC: 26.2 % (ref 40.5–52.5)
HEMOGLOBIN: 8.8 G/DL (ref 13.5–17.5)
INR BLD: 0.96 (ref 0.86–1.14)
KETONES, URINE: NEGATIVE MG/DL
LACTATE: 1.54 MMOL/L (ref 0.4–2)
LACTIC ACID: 1.5 MMOL/L (ref 0.4–2)
LEUKOCYTE ESTERASE, URINE: ABNORMAL
LYMPHOCYTES ABSOLUTE: 0.6 K/UL (ref 1–5.1)
LYMPHOCYTES RELATIVE PERCENT: 6.1 %
MCH RBC QN AUTO: 31.4 PG (ref 26–34)
MCHC RBC AUTO-ENTMCNC: 33.8 G/DL (ref 31–36)
MCV RBC AUTO: 93.1 FL (ref 80–100)
MICROSCOPIC EXAMINATION: YES
MONOCYTES ABSOLUTE: 0.6 K/UL (ref 0–1.3)
MONOCYTES RELATIVE PERCENT: 5.7 %
NEUTROPHILS ABSOLUTE: 8.3 K/UL (ref 1.7–7.7)
NEUTROPHILS RELATIVE PERCENT: 80.4 %
NITRITE, URINE: NEGATIVE
O2 SAT, VEN: 71 %
PCO2, VEN: 30 MM HG (ref 40–50)
PDW BLD-RTO: 19.7 % (ref 12.4–15.4)
PERFORMED ON: ABNORMAL
PH UA: 6 (ref 5–8)
PH VENOUS: 7.41 (ref 7.35–7.45)
PLATELET # BLD: 355 K/UL (ref 135–450)
PMV BLD AUTO: 7.2 FL (ref 5–10.5)
PO2, VEN: 36 MM HG
POC SAMPLE TYPE: ABNORMAL
POTASSIUM REFLEX MAGNESIUM: 4.6 MMOL/L (ref 3.5–5.1)
POTASSIUM SERPL-SCNC: 4.9 MMOL/L (ref 3.5–5.1)
PRO-BNP: 654 PG/ML (ref 0–449)
PROCALCITONIN: 0.32 NG/ML (ref 0–0.15)
PROTEIN UA: ABNORMAL MG/DL
PROTHROMBIN TIME: 10.9 SEC (ref 9.8–13)
RBC # BLD: 2.81 M/UL (ref 4.2–5.9)
RBC UA: ABNORMAL /HPF (ref 0–2)
SODIUM BLD-SCNC: 135 MMOL/L (ref 136–145)
SODIUM BLD-SCNC: 138 MMOL/L (ref 136–145)
SPECIFIC GRAVITY UA: 1.01 (ref 1–1.03)
TCO2 CALC VENOUS: 20 MMOL/L
TOTAL CK: 70 U/L (ref 39–308)
TROPONIN: 0.1 NG/ML
TROPONIN: 0.12 NG/ML
TROPONIN: 0.13 NG/ML
TROPONIN: 0.13 NG/ML
TSH REFLEX: 2.63 UIU/ML (ref 0.27–4.2)
URINE TYPE: ABNORMAL
UROBILINOGEN, URINE: 0.2 E.U./DL
WBC # BLD: 10.3 K/UL (ref 4–11)
WBC UA: ABNORMAL /HPF (ref 0–5)

## 2019-05-23 PROCEDURE — 87633 RESP VIRUS 12-25 TARGETS: CPT

## 2019-05-23 PROCEDURE — 93005 ELECTROCARDIOGRAM TRACING: CPT | Performed by: EMERGENCY MEDICINE

## 2019-05-23 PROCEDURE — 0DH63UZ INSERTION OF FEEDING DEVICE INTO STOMACH, PERCUTANEOUS APPROACH: ICD-10-PCS | Performed by: INTERNAL MEDICINE

## 2019-05-23 PROCEDURE — 36415 COLL VENOUS BLD VENIPUNCTURE: CPT

## 2019-05-23 PROCEDURE — 6360000002 HC RX W HCPCS: Performed by: EMERGENCY MEDICINE

## 2019-05-23 PROCEDURE — 84484 ASSAY OF TROPONIN QUANT: CPT

## 2019-05-23 PROCEDURE — 84443 ASSAY THYROID STIM HORMONE: CPT

## 2019-05-23 PROCEDURE — 96361 HYDRATE IV INFUSION ADD-ON: CPT

## 2019-05-23 PROCEDURE — 87798 DETECT AGENT NOS DNA AMP: CPT

## 2019-05-23 PROCEDURE — 2580000003 HC RX 258: Performed by: INTERNAL MEDICINE

## 2019-05-23 PROCEDURE — 2580000003 HC RX 258: Performed by: EMERGENCY MEDICINE

## 2019-05-23 PROCEDURE — 87581 M.PNEUMON DNA AMP PROBE: CPT

## 2019-05-23 PROCEDURE — 87040 BLOOD CULTURE FOR BACTERIA: CPT

## 2019-05-23 PROCEDURE — 6370000000 HC RX 637 (ALT 250 FOR IP): Performed by: INTERNAL MEDICINE

## 2019-05-23 PROCEDURE — 6360000002 HC RX W HCPCS: Performed by: INTERNAL MEDICINE

## 2019-05-23 PROCEDURE — 96365 THER/PROPH/DIAG IV INF INIT: CPT

## 2019-05-23 PROCEDURE — 71045 X-RAY EXAM CHEST 1 VIEW: CPT

## 2019-05-23 PROCEDURE — 87186 SC STD MICRODIL/AGAR DIL: CPT

## 2019-05-23 PROCEDURE — 87086 URINE CULTURE/COLONY COUNT: CPT

## 2019-05-23 PROCEDURE — 80048 BASIC METABOLIC PNL TOTAL CA: CPT

## 2019-05-23 PROCEDURE — 82550 ASSAY OF CK (CPK): CPT

## 2019-05-23 PROCEDURE — 1200000000 HC SEMI PRIVATE

## 2019-05-23 PROCEDURE — 87077 CULTURE AEROBIC IDENTIFY: CPT

## 2019-05-23 PROCEDURE — 85025 COMPLETE CBC W/AUTO DIFF WBC: CPT

## 2019-05-23 PROCEDURE — 74018 RADEX ABDOMEN 1 VIEW: CPT

## 2019-05-23 PROCEDURE — 84145 PROCALCITONIN (PCT): CPT

## 2019-05-23 PROCEDURE — 81001 URINALYSIS AUTO W/SCOPE: CPT

## 2019-05-23 PROCEDURE — 83880 ASSAY OF NATRIURETIC PEPTIDE: CPT

## 2019-05-23 PROCEDURE — 83605 ASSAY OF LACTIC ACID: CPT

## 2019-05-23 PROCEDURE — 87449 NOS EACH ORGANISM AG IA: CPT

## 2019-05-23 PROCEDURE — 82803 BLOOD GASES ANY COMBINATION: CPT

## 2019-05-23 PROCEDURE — 85610 PROTHROMBIN TIME: CPT

## 2019-05-23 PROCEDURE — 87486 CHLMYD PNEUM DNA AMP PROBE: CPT

## 2019-05-23 PROCEDURE — 99291 CRITICAL CARE FIRST HOUR: CPT

## 2019-05-23 RX ORDER — AMLODIPINE BESYLATE 5 MG/1
5 TABLET ORAL DAILY
Status: DISCONTINUED | OUTPATIENT
Start: 2019-05-23 | End: 2019-05-24

## 2019-05-23 RX ORDER — SODIUM CHLORIDE 0.9 % (FLUSH) 0.9 %
10 SYRINGE (ML) INJECTION PRN
Status: DISCONTINUED | OUTPATIENT
Start: 2019-05-23 | End: 2019-05-24 | Stop reason: HOSPADM

## 2019-05-23 RX ORDER — LISINOPRIL 5 MG/1
5 TABLET ORAL DAILY
Status: ON HOLD | COMMUNITY
End: 2019-05-24 | Stop reason: HOSPADM

## 2019-05-23 RX ORDER — LORAZEPAM 1 MG/1
1 TABLET ORAL EVERY 6 HOURS PRN
COMMUNITY

## 2019-05-23 RX ORDER — ACETAMINOPHEN 160 MG/5ML
650 SOLUTION ORAL EVERY 4 HOURS PRN
Status: DISCONTINUED | OUTPATIENT
Start: 2019-05-23 | End: 2019-05-24 | Stop reason: HOSPADM

## 2019-05-23 RX ORDER — ONDANSETRON 2 MG/ML
4 INJECTION INTRAMUSCULAR; INTRAVENOUS EVERY 6 HOURS PRN
Status: DISCONTINUED | OUTPATIENT
Start: 2019-05-23 | End: 2019-05-24 | Stop reason: HOSPADM

## 2019-05-23 RX ORDER — TAMSULOSIN HYDROCHLORIDE 0.4 MG/1
0.4 CAPSULE ORAL
Status: DISCONTINUED | OUTPATIENT
Start: 2019-05-23 | End: 2019-05-24 | Stop reason: HOSPADM

## 2019-05-23 RX ORDER — LISINOPRIL 5 MG/1
5 TABLET ORAL DAILY
Status: DISCONTINUED | OUTPATIENT
Start: 2019-05-23 | End: 2019-05-24

## 2019-05-23 RX ORDER — SODIUM CHLORIDE 0.9 % (FLUSH) 0.9 %
10 SYRINGE (ML) INJECTION EVERY 12 HOURS SCHEDULED
Status: DISCONTINUED | OUTPATIENT
Start: 2019-05-23 | End: 2019-05-24 | Stop reason: HOSPADM

## 2019-05-23 RX ORDER — FERROUS SULFATE 300 MG/5ML
300 LIQUID (ML) ORAL
Status: DISCONTINUED | OUTPATIENT
Start: 2019-05-23 | End: 2019-05-24 | Stop reason: HOSPADM

## 2019-05-23 RX ORDER — PANTOPRAZOLE SODIUM 40 MG/1
40 GRANULE, DELAYED RELEASE ORAL
Status: DISCONTINUED | OUTPATIENT
Start: 2019-05-24 | End: 2019-05-24 | Stop reason: HOSPADM

## 2019-05-23 RX ORDER — LEVOTHYROXINE SODIUM 0.1 MG/1
100 TABLET ORAL DAILY
Status: DISCONTINUED | OUTPATIENT
Start: 2019-05-23 | End: 2019-05-24 | Stop reason: HOSPADM

## 2019-05-23 RX ORDER — LORAZEPAM 1 MG/1
1 TABLET ORAL EVERY 6 HOURS PRN
Status: DISCONTINUED | OUTPATIENT
Start: 2019-05-23 | End: 2019-05-24 | Stop reason: HOSPADM

## 2019-05-23 RX ORDER — MEGESTROL ACETATE 40 MG/ML
400 SUSPENSION ORAL DAILY
Status: DISCONTINUED | OUTPATIENT
Start: 2019-05-23 | End: 2019-05-24 | Stop reason: HOSPADM

## 2019-05-23 RX ORDER — AMLODIPINE BESYLATE 5 MG/1
5 TABLET ORAL DAILY
Status: ON HOLD | COMMUNITY
End: 2019-05-24 | Stop reason: HOSPADM

## 2019-05-23 RX ORDER — ERGOCALCIFEROL 1.25 MG/1
50000 CAPSULE ORAL
Status: DISCONTINUED | OUTPATIENT
Start: 2019-05-23 | End: 2019-05-24 | Stop reason: HOSPADM

## 2019-05-23 RX ORDER — CARVEDILOL 6.25 MG/1
6.25 TABLET ORAL 2 TIMES DAILY
Status: ON HOLD | COMMUNITY
End: 2019-05-24 | Stop reason: HOSPADM

## 2019-05-23 RX ORDER — MIRTAZAPINE 15 MG/1
15 TABLET, FILM COATED ORAL NIGHTLY
Status: DISCONTINUED | OUTPATIENT
Start: 2019-05-23 | End: 2019-05-24 | Stop reason: HOSPADM

## 2019-05-23 RX ORDER — MEGESTROL ACETATE 40 MG/ML
400 SUSPENSION ORAL DAILY
Status: ON HOLD | COMMUNITY
End: 2019-05-24 | Stop reason: HOSPADM

## 2019-05-23 RX ORDER — CARVEDILOL 6.25 MG/1
6.25 TABLET ORAL 2 TIMES DAILY
Status: DISCONTINUED | OUTPATIENT
Start: 2019-05-23 | End: 2019-05-24 | Stop reason: HOSPADM

## 2019-05-23 RX ORDER — FERROUS SULFATE 325(65) MG
325 TABLET ORAL
Status: DISCONTINUED | OUTPATIENT
Start: 2019-05-23 | End: 2019-05-23

## 2019-05-23 RX ORDER — 0.9 % SODIUM CHLORIDE 0.9 %
1000 INTRAVENOUS SOLUTION INTRAVENOUS ONCE
Status: COMPLETED | OUTPATIENT
Start: 2019-05-23 | End: 2019-05-23

## 2019-05-23 RX ORDER — ASPIRIN 81 MG/1
81 TABLET, CHEWABLE ORAL DAILY
Status: DISCONTINUED | OUTPATIENT
Start: 2019-05-23 | End: 2019-05-24 | Stop reason: HOSPADM

## 2019-05-23 RX ADMIN — SODIUM CHLORIDE 1000 ML: 0.9 INJECTION, SOLUTION INTRAVENOUS at 13:59

## 2019-05-23 RX ADMIN — CEFTRIAXONE 1 G: 1 INJECTION, POWDER, FOR SOLUTION INTRAMUSCULAR; INTRAVENOUS at 13:15

## 2019-05-23 RX ADMIN — TAMSULOSIN HYDROCHLORIDE 0.4 MG: 0.4 CAPSULE ORAL at 20:50

## 2019-05-23 RX ADMIN — LISINOPRIL 5 MG: 5 TABLET ORAL at 20:53

## 2019-05-23 RX ADMIN — Medication 10 ML: at 20:52

## 2019-05-23 RX ADMIN — ENOXAPARIN SODIUM 30 MG: 30 INJECTION SUBCUTANEOUS at 21:45

## 2019-05-23 RX ADMIN — LEVOTHYROXINE SODIUM 100 MCG: 100 TABLET ORAL at 20:51

## 2019-05-23 RX ADMIN — CARVEDILOL 6.25 MG: 6.25 TABLET, FILM COATED ORAL at 20:51

## 2019-05-23 RX ADMIN — AMPICILLIN SODIUM AND SULBACTAM SODIUM 1.5 G: 1; .5 INJECTION, POWDER, FOR SOLUTION INTRAMUSCULAR; INTRAVENOUS at 21:45

## 2019-05-23 RX ADMIN — MIRTAZAPINE 15 MG: 15 TABLET, FILM COATED ORAL at 20:50

## 2019-05-23 RX ADMIN — MINERAL SUPPLEMENT IRON 300 MG / 5 ML STRENGTH LIQUID 100 PER BOX UNFLAVORED 300 MG: at 20:50

## 2019-05-23 RX ADMIN — ASPIRIN 81 MG 81 MG: 81 TABLET ORAL at 20:51

## 2019-05-23 RX ADMIN — AMLODIPINE BESYLATE 5 MG: 5 TABLET ORAL at 20:51

## 2019-05-23 ASSESSMENT — ENCOUNTER SYMPTOMS
NAUSEA: 0
SHORTNESS OF BREATH: 0
DIARRHEA: 0
COUGH: 1
VOMITING: 0
CONSTIPATION: 0

## 2019-05-23 NOTE — ED PROVIDER NOTES
(Pulseless electrical activity) ([facility] [address] Pneumonia, Pneumonia due to organism, Pneumonia of right lower lobe due to Streptococcus pneumoniae ([facility] [address] Protein-calorie malnutrition, severe ([facility] [address] S/P hip hemiarthroplasty, Septic shock ([facility] [address] Systolic CHF ([facility] [address] Thrombocytopenia ([facility] [address] Thrombocytopenia ([facility] [address] and Type 2 diabetes mellitus with hyperglycemia ([facility] [address] He has a past surgical history that includes Gastrostomy tube placement; joint replacement; gastrostomy tube change (N/A, 12/14/2018); Colonoscopy (N/A, 4/1/2019); Upper gastrointestinal endoscopy (N/A, 4/1/2019); Anus surgery (N/A, 4/2/2019); Gastrostomy tube placement (N/A, 4/5/2019); Upper gastrointestinal endoscopy (N/A, 4/12/2019); Colonoscopy (N/A, 4/17/2019); sigmoidoscopy (N/A, 4/22/2019); and gastrostomy tube change (4/22/2019). His family history is not on file. He reports that he quit smoking about 16 months ago. He has never used smokeless tobacco. He reports that he does not drink alcohol or use drugs. Medications     Previous Medications    AMLODIPINE (NORVASC) 5 MG TABLET    5 mg by PEG Tube route daily    ASPIRIN 81 MG TABLET    Take 81 mg by mouth daily Via PEG Tube    CARVEDILOL (COREG) 6.25 MG TABLET    6.25 mg by PEG Tube route 2 times daily    FERROUS SULFATE 325 (65 FE) MG TABLET    325 mg by PEG Tube route 3 times daily (with meals)     INSULIN ASPART (NOVOLOG) 100 UNIT/ML INJECTION PEN    Inject 0-4 Units into the skin 3 times daily (before meals) Sliding scale :  -200 (1 unit)    201-250 (2 unit)   251-300 (3 unit)  > 301 (4 unit)    INSULIN GLARGINE (TOUJEO MAX SOLOSTAR) 300 UNIT/ML INJECTION PEN    Inject 5 Units into the skin nightly    LEVOTHYROXINE (SYNTHROID) 100 MCG TABLET    Take 1 tablet by mouth daily    LISINOPRIL (PRINIVIL;ZESTRIL) 5 MG TABLET    5 mg by PEG Tube route daily    LORAZEPAM (ATIVAN) 1 MG TABLET    1 mg by PEG Tube route every 6 hours as needed for Anxiety.     MEGESTROL (MEGACE) 40 MG/ML SUSPENSION

## 2019-05-23 NOTE — ED TRIAGE NOTES
Pt to ed c/o altered mental status, pt had a glucose of 38 upon squad arrival, pt was given d10 and his glucose was 78, now has glucose is 155, pt also had a rectal temp of 93.6

## 2019-05-23 NOTE — H&P
History and Physical  PGY-3    Hospital Day: 1   Code:Prior  Admit Date: 5/23/2019 10:40 AM            PCP: Jo Ann Gil                                  Chief Complaint: AMS    History of present illness:   [age] Toscano y. o. male with PMHx of MCA stroke (1/2019), Afib (no anticoagulation), chronic diastolic HF echo: 2/66 (AC 55%), CAD, HTN, HLD, DMII, and gastric ulcers presented to ED with altered mental status. He has no recollection as to what transpired the day of or night before. He does state taking insulin that is provided by SNF. He has a PEG placed for supplemental feeding for hx of dysphagia/stroke. He stated that he also has food that can be taken orally. He is also having some new wet cough. Denied chest pain, SOB, or palpitations. In the ED  He was found to be hypoglycemic and hypothermic. Glucose was found to be in the 30's in the SNF. Was treated with D10. Glucose is 123 in the ED. He was hypothermic to  T 93.6, luigi hugger given and T is now 97.6, discontinued. He is mentating AAOx3. Review ofsystems:   History obtained from the patient    Review of Systems   Constitutional: Negative for chills and fever. Respiratory: Positive for cough. Negative for shortness of breath. Cardiovascular: Negative for chest pain and palpitations. Gastrointestinal: Negative for constipation, diarrhea, nausea and vomiting.    Psychiatric/Behavioral: Negative for confusion.     ============================================================================  Past medical history:  Past Medical History:   Diagnosis Date    Acute renal failure ([facility] [address]     Acute respiratory failure with hypoxia (HCC)     Angina pectoris (HCC)     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HCC)     CAD (coronary artery disease)     Cardiac arrest ([facility] [address]     Cardiac arrest (HCC)     Chronic systolic heart failure (HCC)     Diabetes mellitus ([facility] [address]     Diabetes mellitus ([facility] [address] 10/9/2012    Diabetes mellitus type II, uncontrolled medications  - Will continue to monitor    # Hypothyroidism  - Continue with home Synthroid  - TSH with reflex    # Essential HTN  - Continue with home Coreg, Lisinopril, and Amlodpine    # Severe Caloric Malnutrition in Setting of Poor PO intake 2/2 dysphagia  - Dietician consulted for PEG feed management  - SLP evaluation   - Old Tube Feed Notes:  1. Recommend order \"Diet: Tube feed continuous/ NPO\". Initiate Jevity 1.2 (Standard with Fiber formula) at 20 mL/hr and as tolerated, increase by 20 mL/hr q 4 hours until goal of 65 mL/hr is met via PEG   2. Recommend 100 mL H20 q 4 hours. Recommend reevaluate IV fluids at this time. Increase flush if Na increases greater than 145 mEq/L.    - Sit upright for PEG feeding    # Disposition  - PT/OT  - Social Work for disposition planning    ============================================================================  Code Status: DNR-CCA  FEN:  NPO  PPX:  Lovenox  DISPO: Wards    This patient has been staffed and discussed with Dr. Yuri Landis  -----------------------------  Andres Hastings MD PGY-3  Pager - [phone]  05/23/19

## 2019-05-23 NOTE — H&P
General appearance:  No apparent distress, appears cachetic, stated age and cooperative. HEENT:  Normal cephalic,atraumatic without obvious deformity. Pupils equal, round, and reactive to light. Extra ocular muscles intact. Conjunctivae/corneas clear. Neck: Supple, with full range of motion. No jugular venous distention. Trachea midline. Respiratory:  Normal respiratory effort. Clear to auscultation, bilaterally without Rales/Wheezes/Rhonchi. Cardiovascular:  Regular rate and rhythm with normal S1/S2 without murmurs, rubs or gallops. Abdomen: Soft, non-tender, non-distended with normal bowel sounds. PEG tube in place LUQ. Musculoskeletal:  No clubbing, cyanosis oredema bilaterally. Full range of motion without deformity. Skin: Skin color, texture, turgor normal.  Norashes or lesions. Neurologic:  Neurovascularly intact without any focal sensory/motor deficits. Cranialnerves: II-XII intact, grossly non-focal.  Psychiatric:  Alert and oriented, thought content appropriate,normal insight  Capillary Refill: Brisk,< 3 seconds   Peripheral Pulses: +2 palpable, equal bilaterally       Labs:     Recent Labs     05/23/19  1128   WBC 10.3   HGB 8.8*   HCT 26.2*        Recent Labs     05/23/19  1128   *   K 4.6      CO2 20*   BUN 69*   CREATININE 1.6*   CALCIUM 8.9     No results for input(s): AST, ALT, BILIDIR, BILITOT, ALKPHOS in the last 72 hours.   Recent Labs     05/23/19  1128   INR 0.96     Recent Labs     05/23/19  1128 05/23/19  1349   TROPONINI 0.13* 0.10*       Urinalysis:      Lab Results   Component Value Date    NITRU Negative 05/23/2019    WBCUA 20-50 05/23/2019    BACTERIA 4+ 05/23/2019    RBCUA 0-2 05/23/2019    BLOODU Negative 05/23/2019    SPECGRAV 1.010 05/23/2019    GLUCOSEU Negative 05/23/2019       Radiology:       XR CHEST PORTABLE    (Results Pending)       ASSESSMENT & PLAN:    Active Hospital Problems    Diagnosis Date Noted    Hypoglycemia [E16.2] 05/23/2019    Hypothermia [T68. XXXA] 05/23/2019    Acute metabolic encephalopathy [W65.12] 05/23/2019    CRF (chronic renal failure) [N18.9] 05/23/2019    UTI (urinary tract infection) [N39.0] 04/11/2019     Hypoglycemic Encephalopathy Patient currently AAOx3 and .  - Resolved    Hypothermia T:97.5  - Resolved    Aspiration Pneumonia CXR: RLL consolidation, productive cough, dysphagia  - ampicillin/sulbactam  - f/u respiratory panel    UTI pyuria, large leukocyte esterase, recurrent UTI  - ceftriaxone    pAFib  - eliquis    T2DM  - LDSS    Cachexia  - megestrol    Anemia  - FeSO4    Hypothyroidism  - levothyroxine    Hypertension  - amlodipine, carvedilol, lisinopril    BPH  - tamsulosin    DVT Prophylaxis: on home eliquis  Diet: No diet orders on file  Code Status: Prior  PT/OT Eval Status: consult  Dispo - GM[facility]    I will discuss the patient with the senior resident MD Gudelia Juárez, DO  Internal Medicine, PGY1  Contact on [employer]

## 2019-05-24 VITALS
HEIGHT: 65 IN | RESPIRATION RATE: 22 BRPM | DIASTOLIC BLOOD PRESSURE: 62 MMHG | HEART RATE: 105 BPM | TEMPERATURE: 97.8 F | OXYGEN SATURATION: 97 % | BODY MASS INDEX: 16.71 KG/M2 | WEIGHT: 100.31 LBS | SYSTOLIC BLOOD PRESSURE: 104 MMHG

## 2019-05-24 LAB
ANION GAP SERPL CALCULATED.3IONS-SCNC: 16 MMOL/L (ref 3–16)
BUN BLDV-MCNC: 67 MG/DL (ref 7–20)
CALCIUM SERPL-MCNC: 9.1 MG/DL (ref 8.3–10.6)
CHLORIDE BLD-SCNC: 103 MMOL/L (ref 99–110)
CO2: 17 MMOL/L (ref 21–32)
CREAT SERPL-MCNC: 1.8 MG/DL (ref 0.8–1.3)
GFR AFRICAN AMERICAN: 45
GFR NON-AFRICAN AMERICAN: 37
GLUCOSE BLD-MCNC: 160 MG/DL (ref 70–99)
GLUCOSE BLD-MCNC: 187 MG/DL (ref 70–99)
GLUCOSE BLD-MCNC: 200 MG/DL (ref 70–99)
GLUCOSE BLD-MCNC: 214 MG/DL (ref 70–99)
GLUCOSE BLD-MCNC: 222 MG/DL (ref 70–99)
HCT VFR BLD CALC: 28.5 % (ref 40.5–52.5)
HEMOGLOBIN: 9.3 G/DL (ref 13.5–17.5)
MAGNESIUM: 1.5 MG/DL (ref 1.8–2.4)
MCH RBC QN AUTO: 30.3 PG (ref 26–34)
MCHC RBC AUTO-ENTMCNC: 32.8 G/DL (ref 31–36)
MCV RBC AUTO: 92.5 FL (ref 80–100)
PDW BLD-RTO: 19.5 % (ref 12.4–15.4)
PERFORMED ON: ABNORMAL
PLATELET # BLD: 325 K/UL (ref 135–450)
PMV BLD AUTO: 7.7 FL (ref 5–10.5)
POTASSIUM REFLEX MAGNESIUM: 5.3 MMOL/L (ref 3.5–5.1)
RBC # BLD: 3.08 M/UL (ref 4.2–5.9)
REPORT: NORMAL
RESPIRATORY PANEL PCR: NORMAL
SODIUM BLD-SCNC: 136 MMOL/L (ref 136–145)
TROPONIN: 0.13 NG/ML
WBC # BLD: 18.2 K/UL (ref 4–11)

## 2019-05-24 PROCEDURE — 6370000000 HC RX 637 (ALT 250 FOR IP): Performed by: INTERNAL MEDICINE

## 2019-05-24 PROCEDURE — 84484 ASSAY OF TROPONIN QUANT: CPT

## 2019-05-24 PROCEDURE — 94664 DEMO&/EVAL PT USE INHALER: CPT

## 2019-05-24 PROCEDURE — 83735 ASSAY OF MAGNESIUM: CPT

## 2019-05-24 PROCEDURE — 94150 VITAL CAPACITY TEST: CPT

## 2019-05-24 PROCEDURE — 87641 MR-STAPH DNA AMP PROBE: CPT

## 2019-05-24 PROCEDURE — 6360000002 HC RX W HCPCS: Performed by: INTERNAL MEDICINE

## 2019-05-24 PROCEDURE — 6360000002 HC RX W HCPCS: Performed by: STUDENT IN AN ORGANIZED HEALTH CARE EDUCATION/TRAINING PROGRAM

## 2019-05-24 PROCEDURE — 6370000000 HC RX 637 (ALT 250 FOR IP): Performed by: STUDENT IN AN ORGANIZED HEALTH CARE EDUCATION/TRAINING PROGRAM

## 2019-05-24 PROCEDURE — 85027 COMPLETE CBC AUTOMATED: CPT

## 2019-05-24 PROCEDURE — 36415 COLL VENOUS BLD VENIPUNCTURE: CPT

## 2019-05-24 PROCEDURE — 2580000003 HC RX 258: Performed by: INTERNAL MEDICINE

## 2019-05-24 PROCEDURE — 94669 MECHANICAL CHEST WALL OSCILL: CPT

## 2019-05-24 PROCEDURE — 89220 SPUTUM SPECIMEN COLLECTION: CPT

## 2019-05-24 PROCEDURE — 80048 BASIC METABOLIC PNL TOTAL CA: CPT

## 2019-05-24 PROCEDURE — 94760 N-INVAS EAR/PLS OXIMETRY 1: CPT

## 2019-05-24 PROCEDURE — 94761 N-INVAS EAR/PLS OXIMETRY MLT: CPT

## 2019-05-24 RX ORDER — GUAIFENESIN 100 MG/5ML
300 SOLUTION ORAL EVERY 6 HOURS
Status: DISCONTINUED | OUTPATIENT
Start: 2019-05-24 | End: 2019-05-24

## 2019-05-24 RX ORDER — GUAIFENESIN 100 MG/5ML
300 SOLUTION ORAL EVERY 6 HOURS
Status: DISCONTINUED | OUTPATIENT
Start: 2019-05-24 | End: 2019-05-24 | Stop reason: HOSPADM

## 2019-05-24 RX ORDER — AMOXICILLIN AND CLAVULANATE POTASSIUM 875; 125 MG/1; MG/1
1 TABLET, FILM COATED ORAL 2 TIMES DAILY
Qty: 20 TABLET | Refills: 0 | Status: SHIPPED | OUTPATIENT
Start: 2019-05-24 | End: 2019-06-03

## 2019-05-24 RX ORDER — MAGNESIUM SULFATE IN WATER 40 MG/ML
4 INJECTION, SOLUTION INTRAVENOUS ONCE
Status: COMPLETED | OUTPATIENT
Start: 2019-05-24 | End: 2019-05-24

## 2019-05-24 RX ORDER — GUAIFENESIN 600 MG/1
600 TABLET, EXTENDED RELEASE ORAL 2 TIMES DAILY
Status: DISCONTINUED | OUTPATIENT
Start: 2019-05-24 | End: 2019-05-24

## 2019-05-24 RX ORDER — SODIUM CHLORIDE 9 MG/ML
INJECTION, SOLUTION INTRAVENOUS CONTINUOUS
Status: DISCONTINUED | OUTPATIENT
Start: 2019-05-24 | End: 2019-05-24

## 2019-05-24 RX ADMIN — AMPICILLIN SODIUM AND SULBACTAM SODIUM 1.5 G: 1; .5 INJECTION, POWDER, FOR SOLUTION INTRAMUSCULAR; INTRAVENOUS at 06:04

## 2019-05-24 RX ADMIN — CARVEDILOL 6.25 MG: 6.25 TABLET, FILM COATED ORAL at 10:55

## 2019-05-24 RX ADMIN — LORAZEPAM 1 MG: 1 TABLET ORAL at 00:06

## 2019-05-24 RX ADMIN — Medication 10 ML: at 10:58

## 2019-05-24 RX ADMIN — MEGESTROL ACETATE 400 MG: 40 SUSPENSION ORAL at 10:57

## 2019-05-24 RX ADMIN — ASPIRIN 81 MG 81 MG: 81 TABLET ORAL at 10:56

## 2019-05-24 RX ADMIN — TAMSULOSIN HYDROCHLORIDE 0.4 MG: 0.4 CAPSULE ORAL at 18:45

## 2019-05-24 RX ADMIN — GUAIFENESIN 300 MG: 100 SOLUTION ORAL at 18:41

## 2019-05-24 RX ADMIN — AMPICILLIN SODIUM AND SULBACTAM SODIUM 1.5 G: 1; .5 INJECTION, POWDER, FOR SOLUTION INTRAMUSCULAR; INTRAVENOUS at 18:42

## 2019-05-24 RX ADMIN — GUAIFENESIN 300 MG: 100 SOLUTION ORAL at 13:15

## 2019-05-24 RX ADMIN — PANTOPRAZOLE SODIUM 40 MG: 40 GRANULE, DELAYED RELEASE ORAL at 06:16

## 2019-05-24 RX ADMIN — LEVOTHYROXINE SODIUM 100 MCG: 100 TABLET ORAL at 10:56

## 2019-05-24 RX ADMIN — MAGNESIUM SULFATE HEPTAHYDRATE 4 G: 40 INJECTION, SOLUTION INTRAVENOUS at 10:50

## 2019-05-24 RX ADMIN — ENOXAPARIN SODIUM 30 MG: 30 INJECTION SUBCUTANEOUS at 10:57

## 2019-05-24 ASSESSMENT — PAIN SCALES - GENERAL
PAINLEVEL_OUTOF10: 0
PAINLEVEL_OUTOF10: 0

## 2019-05-24 NOTE — CARE COORDINATION
Case Management Daily Note:    Current Plan of Care: AMS,  Hypoglycemia, UTI,         Discharge Plan: From [facility]. Tentative Discharge Date: TBD    Current Barriers to Discharge: Medical treatment     Resources/Information given: N/A      Case Management Notes: Chart review reveals 66year old patient from [facility]. Patient is a Long term resident there. SW to assess with goal of patient returning to [facility]  Upon discharge.         WIL Moreau, LSW     [facility]   [phone]
family are in agreement with the discharge plan.       Care Transitions patient: Brynn    Roger Lisa, MSW  [facility]  Case Management Department  Ph: [phone]

## 2019-05-24 NOTE — PROGRESS NOTES
Notified On call resident of patients increased moist non productive cough. Crackles noted to bilateral bases. Oropharyngeal suctioned of small amouth thin clear secretions. Tube feed placed on hold at this time.

## 2019-05-24 NOTE — CONSULTS
Needs:  · Estimated Daily Total Kcal: 1545-9365(24-38)  · Estimated Daily Protein (g): 67-81(1.4-1.6)  · Estimated Daily Fluid (ml/day): 0643-6195 ml    Nutrition Diagnosis:   · Problem: Severe malnutrition  · Etiology: related to Insufficient energy/nutrient consumption     Signs and symptoms:  as evidenced by Nutrition support - EN, Diet history of poor intake, Weight loss, Severe loss of subcutaneous fat, Severe muscle loss    Objective Information:  · Nutrition-Focused Physical Findings: +BM 5/23  · Current Nutrition Therapies:  · Oral Diet Orders: NPO   · Oral Diet intake: NPO  · Oral Nutrition Supplement (ONS) Orders: None  · ONS intake: NPO  · Tube Feeding (TF) Orders:   · Feeding Route: Gastrostomy  · Formula: Standard w/Fiber  · Rate (ml/hr):65 ml/hr    · Volume (ml/day): Held r/t respiratory status  · Current TF & Flush Orders Provides: Jevity 1.2 at 65 ml/hr will provide 1560 ml TV, 1872 kcal, 86 gm protein and 1258 ml free water  · Goal TF & Flush Orders Provides: RD Goal: Glucerna at goal rate of 55 ml/hr will provide 1320 ml TV, 1584 kcal, 79 gm protein and 1062 ml free water  · Anthropometric Measures:  · Ht: 5' 5\" (165.1 cm)   · Current Body Wt: 100 lb (45.4 kg)  · Weight Change: 15% loss ~1 month   · Ideal Body Wt: 136 lb (61.7 kg),   · BMI Classification: BMI <18.5 Underweight    Nutrition Interventions:   Continue NPO, Modify current Tube Feeding  Continued Inpatient Monitoring    Nutrition Evaluation:   · Evaluation: Goals set   · Goals: Patient will tolerate EN to meet 100% of patients needs   · Monitoring: Nutrition Progression, TF Intake, TF Tolerance, Pertinent Labs      Electronically signed by Ladan Wolfe RD, LD on 5/24/19 at 11:07 AM    Contact Number: [phone]
reports that he does not use drugs. Review of Systems -   General ROS: negative  Psychological ROS: negative  ENT ROS: negative  Hematological and Lymphatic ROS: negative  Respiratory ROS: no cough, shortness of breath, or wheezing  Cardiovascular ROS: no chest pain or dyspnea on exertion  Gastrointestinal ROS: no abdominal pain, change in bowel habits, or black or bloody stools  Genito-Urinary ROS: no dysuria, trouble voiding, or hematuria  Musculoskeletal ROS: negative  Neurological ROS: positive for - weakness    Objective:        PHYSICAL EXAM:    /62   Pulse 105   Temp 97.8 °F (36.6 °C) (Oral)   Resp 22   Ht 5' 5\" (1.651 m)   Wt 100 lb 5 oz (45.5 kg)   SpO2 94%   BMI 16.69 kg/m²   General appearance: alert, appears stated age and cooperative Chahectic  Head: Normocephalic, without obvious abnormality, atraumatic  Lungs: clear to auscultation bilaterally  Heart: regular rate and rhythm, S1, S2 normal, no murmur, click, rub or gallop  Abdomen: soft, non-tender; bowel sounds normal; no masses,  no organomegaly  Extremities: extremities normal, atraumatic, no cyanosis or edema  Neurologic: Alert oriented to person only. gneralized weakness    Palliative Performance Scale:  60% ? Ambulation reduced; Significant disease; Can't do hobbies/housework; intake normal   or reduced; occasional assist; LOC full/confusion  50% ? Mainly sit/lie; Extensive disease; Can't do any work; Considerable assist; intake normal  Or reduced; LOC full/confusion  40% ? Mainly in bed; Extensive disease; Mainly assist; intake normal or reduced; occasional assist; LOC full/confusion  30% ? Bed Bound; Extensive disease; Total care; intake reduced; LOC full/confusion  20% ? Bed Bound; Extensive disease; Total care; intake minimal; Drowsy/coma  10% ? Bed Bound; Extensive disease; Total care; Mouth care only; Drowsy/coma  0 ?  Death    PPS: 30%    Vitals:    /62   Pulse 105   Temp 97.8 °F (36.6 °C) (Oral)   Resp 22   Ht 5'

## 2019-05-24 NOTE — PROGRESS NOTES
bilaterally. Full range of motion without deformity. Skin: Skin color, texture, turgor normal.  No rashes or lesions. Neurologic:  Neurovascularly intact without any focal sensory/motor deficits. Cranial nerves: II-XII intact, grossly non-focal.  Psychiatric: Alert and oriented, thought content appropriate, normal insight  Capillary Refill: Brisk,< 3 seconds   Peripheral Pulses: +2 palpable, equal bilaterally       Labs:   Recent Labs     05/23/19  1128 05/24/19  0450   WBC 10.3 18.2*   HGB 8.8* 9.3*   HCT 26.2* 28.5*    325     Recent Labs     05/23/19  1128 05/23/19  1703 05/24/19  0454   * 138 136   K 4.6 4.9 5.3*    105 103   CO2 20* 21 17*   BUN 69* 60* 67*   CREATININE 1.6* 1.7* 1.8*   CALCIUM 8.9 8.9 9.1     No results for input(s): AST, ALT, BILIDIR, BILITOT, ALKPHOS in the last 72 hours. Recent Labs     05/23/19  1128   INR 0.96     Recent Labs     05/23/19  1148  05/23/19  1703 05/23/19  2230 05/24/19  0450   CKTOTAL 70  --   --   --   --    TROPONINI  --    < > 0.13* 0.12* 0.13*    < > = values in this interval not displayed. Urinalysis:      Lab Results   Component Value Date    NITRU Negative 05/23/2019    WBCUA 20-50 05/23/2019    BACTERIA 4+ 05/23/2019    RBCUA 0-2 05/23/2019    BLOODU Negative 05/23/2019    SPECGRAV 1.010 05/23/2019    GLUCOSEU Negative 05/23/2019       Radiology:  XR ABDOMEN (KUB) (SINGLE AP VIEW)   Final Result   Addendum 1 of 1   [******** ADDENDUM #1 ********   Addendum:      ADDENDUM: A nasogastric tube is not present. Gastrostomy tube overlies    expected location of the stomach. Incidental pancreatic calcifications. No    free air. Nonobstructive bowel gas pattern. Final      XR CHEST PORTABLE   Final Result      Consolidation the right lower lung zone with associated pleural effusion. Findings could be infectious. Assessment/Plan:    Napolean Solo, 66 y.o. male w/ ***. Admitted for ***. Suspicion for *** as etiology.   Active Hospital Problems    Diagnosis Date Noted    Hypoglycemia [E16.2] 05/23/2019    Hypothermia [X35. XXXA] 05/23/2019    Acute metabolic encephalopathy [I07.09] 05/23/2019    CRF (chronic renal failure) [N18.9] 05/23/2019    UTI (urinary tract infection) [N39.0] 04/11/2019         DVT Prophylaxis: ***  Diet: DIET TUBE FEED CONTINUOUS/CYCLIC NPO; STANDARD WITH FIBER; (PEG);  Cyclic; 20; 65  Code Status: DNR-CCA    PT/OT Eval Status: ***    Dispo - ***    I will discuss the patient with the senior resident and MD Charlene Johnson, DO  Internal Medicine, PGY1  Contact on [employer]

## 2019-05-24 NOTE — PROGRESS NOTES
rhythm with tachycardia and normal S1/S2 without murmurs, rubs or gallops. Abdomen: Soft, non-tender, non-distended with normal bowel sounds. Musculoskeletal: No clubbing, cyanosis or edema bilaterally. Full range of motion without deformity. Skin: Skin color, texture, turgor normal.  No rashes or lesions. Neurologic:  Dysarthric at baseline. Strength 4-5/5 throughout with normal sensation to light touch. Psychiatric: Alert and oriented to person and year, not month, place or purpose. Capillary Refill: Brisk,< 3 seconds   Peripheral Pulses: +2 palpable, equal bilaterally       Labs:   Recent Labs     05/23/19  1128 05/24/19  0450   WBC 10.3 18.2*   HGB 8.8* 9.3*   HCT 26.2* 28.5*    325     Recent Labs     05/23/19  1128 05/23/19  1703 05/24/19  0454   * 138 136   K 4.6 4.9 5.3*    105 103   CO2 20* 21 17*   BUN 69* 60* 67*   CREATININE 1.6* 1.7* 1.8*   CALCIUM 8.9 8.9 9.1     No results for input(s): AST, ALT, BILIDIR, BILITOT, ALKPHOS in the last 72 hours. Recent Labs     05/23/19  1128   INR 0.96     Recent Labs     05/23/19  1148  05/23/19  1703 05/23/19  2230 05/24/19  0450   CKTOTAL 70  --   --   --   --    TROPONINI  --    < > 0.13* 0.12* 0.13*    < > = values in this interval not displayed. Urinalysis:      Lab Results   Component Value Date    NITRU Negative 05/23/2019    WBCUA 20-50 05/23/2019    BACTERIA 4+ 05/23/2019    RBCUA 0-2 05/23/2019    BLOODU Negative 05/23/2019    SPECGRAV 1.010 05/23/2019    GLUCOSEU Negative 05/23/2019       Radiology:      Assessment/Plan:  Active Hospital Problems    Diagnosis Date Noted    Hypoglycemia [E16.2] 05/23/2019    Hypothermia [T68. XXXA] 05/23/2019    Acute metabolic encephalopathy [T89.80] 05/23/2019    CRF (chronic renal failure) [N18.9] 05/23/2019    UTI (urinary tract infection) [N39.0] 04/11/2019        # Acute Metabolic Encephalopathy   Due to infectious etiology as below in setting of severe malnutrition  - IV abx  -

## 2019-05-25 LAB
MRSA SCREEN RT-PCR: NORMAL
ORGANISM: ABNORMAL
STREP PNEUMONIAE ANTIGEN, URINE: NORMAL
URINE CULTURE, ROUTINE: ABNORMAL
URINE CULTURE, ROUTINE: ABNORMAL

## 2019-05-28 LAB
BLOOD CULTURE, ROUTINE: NORMAL
CULTURE, BLOOD 2: NORMAL

## 2019-06-22 PROBLEM — N39.0 UTI (URINARY TRACT INFECTION): Status: RESOLVED | Noted: 2019-04-11 | Resolved: 2019-06-22

## 2020-11-03 PROBLEM — N17.9 ACUTE RENAL FAILURE (HCC): Status: RESOLVED | Noted: 2020-11-03 | Resolved: 2020-11-03

## 2020-11-03 PROBLEM — N17.9 AKI (ACUTE KIDNEY INJURY) (HCC): Status: RESOLVED | Noted: 2019-02-28 | Resolved: 2020-11-03
